# Patient Record
Sex: MALE | Race: WHITE | NOT HISPANIC OR LATINO | Employment: OTHER | URBAN - METROPOLITAN AREA
[De-identification: names, ages, dates, MRNs, and addresses within clinical notes are randomized per-mention and may not be internally consistent; named-entity substitution may affect disease eponyms.]

---

## 2023-10-18 ENCOUNTER — TELEPHONE (OUTPATIENT)
Dept: PRIMARY CARE | Facility: CLINIC | Age: 78
End: 2023-10-18
Payer: MEDICARE

## 2023-10-18 DIAGNOSIS — E11.9 TYPE 2 DIABETES MELLITUS WITHOUT COMPLICATION, UNSPECIFIED WHETHER LONG TERM INSULIN USE (MULTI): ICD-10-CM

## 2023-10-18 DIAGNOSIS — E78.2 MIXED HYPERLIPIDEMIA: ICD-10-CM

## 2023-10-18 RX ORDER — SIMVASTATIN 20 MG/1
20 TABLET, FILM COATED ORAL EVERY 24 HOURS
Qty: 90 TABLET | Refills: 0 | Status: SHIPPED | OUTPATIENT
Start: 2023-10-18 | End: 2024-01-04

## 2023-10-18 RX ORDER — METFORMIN HYDROCHLORIDE 500 MG/1
500 TABLET ORAL
COMMUNITY
Start: 2023-02-06 | End: 2023-10-18 | Stop reason: SDUPTHER

## 2023-10-18 RX ORDER — SIMVASTATIN 20 MG/1
20 TABLET, FILM COATED ORAL EVERY 24 HOURS
COMMUNITY
End: 2023-10-18 | Stop reason: SDUPTHER

## 2023-10-18 RX ORDER — METFORMIN HYDROCHLORIDE 500 MG/1
500 TABLET ORAL
Qty: 180 TABLET | Refills: 0 | Status: SHIPPED | OUTPATIENT
Start: 2023-10-18 | End: 2024-01-04

## 2023-10-18 NOTE — TELEPHONE ENCOUNTER
Rx Refill Request Telephone Encounter    Name:  Juan M Mcrae  :  [id number]  Medication Name:  metformin 500 mg                                    simvastatin 20 mg            Specific Pharmacy location:  [facility]  Date of last appointment:    Date of next appointment:    Best number to reach patient:  [phone] - Option #2

## 2024-01-04 DIAGNOSIS — E78.2 MIXED HYPERLIPIDEMIA: ICD-10-CM

## 2024-01-04 DIAGNOSIS — E11.9 TYPE 2 DIABETES MELLITUS WITHOUT COMPLICATION, UNSPECIFIED WHETHER LONG TERM INSULIN USE (MULTI): ICD-10-CM

## 2024-01-04 RX ORDER — METFORMIN HYDROCHLORIDE 500 MG/1
1000 TABLET ORAL
Qty: 180 TABLET | Refills: 0 | Status: SHIPPED | OUTPATIENT
Start: 2024-01-04 | End: 2024-04-01 | Stop reason: DRUGHIGH

## 2024-01-04 RX ORDER — SIMVASTATIN 20 MG/1
TABLET, FILM COATED ORAL
Qty: 90 TABLET | Refills: 0 | Status: SHIPPED | OUTPATIENT
Start: 2024-01-04 | End: 2024-04-01 | Stop reason: WASHOUT

## 2024-02-05 ENCOUNTER — LAB (OUTPATIENT)
Dept: LAB | Facility: LAB | Age: 79
End: 2024-02-05
Payer: MEDICARE

## 2024-02-05 DIAGNOSIS — E78.00 PURE HYPERCHOLESTEROLEMIA, UNSPECIFIED: ICD-10-CM

## 2024-02-05 DIAGNOSIS — E11.9 TYPE 2 DIABETES MELLITUS WITHOUT COMPLICATIONS (MULTI): Primary | ICD-10-CM

## 2024-02-05 LAB
ALBUMIN SERPL-MCNC: 4.6 G/DL (ref 3.5–5)
ALP BLD-CCNC: 45 U/L (ref 35–125)
ALT SERPL-CCNC: 10 U/L (ref 5–40)
ANION GAP SERPL CALC-SCNC: 11 MMOL/L
APPEARANCE UR: CLEAR
AST SERPL-CCNC: 13 U/L (ref 5–40)
BILIRUB DIRECT SERPL-MCNC: <0.2 MG/DL (ref 0–0.2)
BILIRUB SERPL-MCNC: 0.5 MG/DL (ref 0.1–1.2)
BILIRUB UR STRIP.AUTO-MCNC: NEGATIVE MG/DL
BUN SERPL-MCNC: 14 MG/DL (ref 8–25)
CALCIUM SERPL-MCNC: 9.7 MG/DL (ref 8.5–10.4)
CHLORIDE SERPL-SCNC: 104 MMOL/L (ref 97–107)
CHOLEST SERPL-MCNC: 142 MG/DL (ref 133–200)
CHOLEST/HDLC SERPL: 1.9 {RATIO}
CO2 SERPL-SCNC: 28 MMOL/L (ref 24–31)
COLOR UR: NORMAL
CREAT SERPL-MCNC: 1.1 MG/DL (ref 0.4–1.6)
EGFRCR SERPLBLD CKD-EPI 2021: 68 ML/MIN/1.73M*2
EST. AVERAGE GLUCOSE BLD GHB EST-MCNC: 148 MG/DL
GLUCOSE SERPL-MCNC: 125 MG/DL (ref 65–99)
GLUCOSE UR STRIP.AUTO-MCNC: NORMAL MG/DL
HBA1C MFR BLD: 6.8 %
HDLC SERPL-MCNC: 76 MG/DL
KETONES UR STRIP.AUTO-MCNC: NEGATIVE MG/DL
LDLC SERPL CALC-MCNC: 55 MG/DL (ref 65–130)
LEUKOCYTE ESTERASE UR QL STRIP.AUTO: NEGATIVE
NITRITE UR QL STRIP.AUTO: NEGATIVE
PH UR STRIP.AUTO: 5.5 [PH]
POTASSIUM SERPL-SCNC: 4.6 MMOL/L (ref 3.4–5.1)
PROT SERPL-MCNC: 6.7 G/DL (ref 5.9–7.9)
PROT UR STRIP.AUTO-MCNC: NEGATIVE MG/DL
RBC # UR STRIP.AUTO: NEGATIVE /UL
SODIUM SERPL-SCNC: 143 MMOL/L (ref 133–145)
SP GR UR STRIP.AUTO: 1.02
TRIGL SERPL-MCNC: 57 MG/DL (ref 40–150)
UROBILINOGEN UR STRIP.AUTO-MCNC: NORMAL MG/DL

## 2024-02-05 PROCEDURE — 80053 COMPREHEN METABOLIC PANEL: CPT

## 2024-02-05 PROCEDURE — 81003 URINALYSIS AUTO W/O SCOPE: CPT

## 2024-02-05 PROCEDURE — 36415 COLL VENOUS BLD VENIPUNCTURE: CPT

## 2024-02-05 PROCEDURE — 82248 BILIRUBIN DIRECT: CPT

## 2024-02-05 PROCEDURE — 83036 HEMOGLOBIN GLYCOSYLATED A1C: CPT

## 2024-02-05 PROCEDURE — 80061 LIPID PANEL: CPT

## 2024-02-14 ENCOUNTER — OFFICE VISIT (OUTPATIENT)
Dept: PRIMARY CARE | Facility: CLINIC | Age: 79
End: 2024-02-14
Payer: MEDICARE

## 2024-02-14 ENCOUNTER — TELEPHONE (OUTPATIENT)
Dept: PRIMARY CARE | Facility: CLINIC | Age: 79
End: 2024-02-14

## 2024-02-14 VITALS
HEART RATE: 63 BPM | SYSTOLIC BLOOD PRESSURE: 120 MMHG | DIASTOLIC BLOOD PRESSURE: 78 MMHG | BODY MASS INDEX: 29.25 KG/M2 | OXYGEN SATURATION: 98 % | WEIGHT: 181.2 LBS

## 2024-02-14 DIAGNOSIS — E78.2 MIXED HYPERLIPIDEMIA: ICD-10-CM

## 2024-02-14 DIAGNOSIS — K21.9 GASTROESOPHAGEAL REFLUX DISEASE, UNSPECIFIED WHETHER ESOPHAGITIS PRESENT: ICD-10-CM

## 2024-02-14 DIAGNOSIS — R13.10 DYSPHAGIA, UNSPECIFIED TYPE: ICD-10-CM

## 2024-02-14 DIAGNOSIS — E11.9 TYPE 2 DIABETES MELLITUS WITHOUT COMPLICATION, UNSPECIFIED WHETHER LONG TERM INSULIN USE (MULTI): ICD-10-CM

## 2024-02-14 DIAGNOSIS — E11.9 TYPE 2 DIABETES MELLITUS WITHOUT COMPLICATION, WITHOUT LONG-TERM CURRENT USE OF INSULIN (MULTI): Primary | ICD-10-CM

## 2024-02-14 PROCEDURE — 1126F AMNT PAIN NOTED NONE PRSNT: CPT | Performed by: STUDENT IN AN ORGANIZED HEALTH CARE EDUCATION/TRAINING PROGRAM

## 2024-02-14 PROCEDURE — 1159F MED LIST DOCD IN RCRD: CPT | Performed by: STUDENT IN AN ORGANIZED HEALTH CARE EDUCATION/TRAINING PROGRAM

## 2024-02-14 PROCEDURE — 99214 OFFICE O/P EST MOD 30 MIN: CPT | Performed by: STUDENT IN AN ORGANIZED HEALTH CARE EDUCATION/TRAINING PROGRAM

## 2024-02-14 PROCEDURE — 1036F TOBACCO NON-USER: CPT | Performed by: STUDENT IN AN ORGANIZED HEALTH CARE EDUCATION/TRAINING PROGRAM

## 2024-02-14 RX ORDER — PIOGLITAZONEHYDROCHLORIDE 30 MG/1
1 TABLET ORAL DAILY
COMMUNITY
Start: 2023-07-05 | End: 2024-05-06 | Stop reason: WASHOUT

## 2024-02-14 RX ORDER — TRAZODONE HYDROCHLORIDE 100 MG/1
1 TABLET ORAL NIGHTLY
COMMUNITY
Start: 2023-08-08 | End: 2024-05-07 | Stop reason: SDUPTHER

## 2024-02-14 RX ORDER — ATENOLOL 50 MG/1
1 TABLET ORAL DAILY
COMMUNITY
Start: 2018-06-28

## 2024-02-14 ASSESSMENT — PAIN SCALES - GENERAL: PAINLEVEL: 0-NO PAIN

## 2024-02-14 NOTE — PROGRESS NOTES
GENERAL PROGRESS NOTE    Chief Complaint   Patient presents with    Diabetes       HPI  Juan M Mcrae is a 78 y.o. male that presents today for DM follow up.     CURRENT CONCERNS   Couple weeks ago went to restaurant and was eating fish. Experienced sensation that bolus of food was stuck while eating that lasted few minutes and resolved. Since has had a little burning sensation in sternal area. Does have hx of acid reflux which has usually been controlled with GERD precautions and TUMS. Has noted increased gassiness, more burping. Has had a little more difficulty swallowing pills past few weeks. Couple years ago had precancerous lesions on vocal cords which was removed. Does get checks every 6 mos. Will be seeing next month.   Is gradually getting better. Does not eat after 8pm due to GERD  Had colonoscopy 9/2023 with Dr. Zheng    #DM review  Last A1c - 6.8, 6.1, 6.6, 7.0, 6.8, 6.6, 6.7   Current treatment:  - Metformin 1000mg BID  - Pioglitazone 30mg   Last eGFR -  68, 78, 77, 77  Last Creatinine -  1.1, 1.0, 1.0, 1.0  Last microalbumin - 7/23  Last eye exam - 10/23  Last foot exam - Today  Pneumovax done? - Complete  Need for diabetic educator? -  No  Exercise - Walking daily, stair stepper, weights    Vital signs   /78   Pulse 63   Wt 82.2 kg (181 lb 3.2 oz)   SpO2 98%   BMI 29.25 kg/m²      Current Outpatient Medications   Medication Sig Dispense Refill    atenolol (Tenormin) 50 mg tablet Take by mouth.      metFORMIN (Glucophage) 500 mg tablet Take 2 tablets (1,000 mg) by mouth 2 times a day with meals. 180 tablet 0    pioglitazone (Actos) 30 mg tablet Take by mouth.      simvastatin (Zocor) 20 mg tablet TAKE 1 TABLET EVERY 24     HOURS 90 tablet 0    traZODone (Desyrel) 100 mg tablet Take 1 tablet (100 mg) by mouth.       No current facility-administered medications for this visit.        Review of Systems   Constitutional:  Negative for chills and fever.   Eyes:  Negative for visual disturbance.   Respiratory:  Negative for shortness of breath.    Cardiovascular:  Negative for chest pain, palpitations and leg swelling.   Endocrine: Negative for polydipsia, polyphagia and polyuria.   Neurological:  Negative for headaches.        Physical Exam  Constitutional:       Appearance: Normal appearance.   Cardiovascular:      Rate and Rhythm: Normal rate and regular rhythm.      Heart sounds: Normal heart sounds. No murmur heard.  Pulmonary:      Effort: Pulmonary effort is normal.      Breath sounds: Normal breath sounds. No wheezing, rhonchi or rales.   Musculoskeletal:      Right lower leg: No edema.      Left lower leg: No edema.   Neurological:      Mental Status: He is alert.     DIABETIC FOOT EXAM:  Skin changes - None  Pulses Present bilaterally to DP and PT  Nails - No fungal infections  Sensation in tact throughout with monifilament  No skin breaks between toes  No ulcers      Assessment/Plan   1. Type 2 diabetes mellitus without complication, without long-term current use of insulin (CMS/[facility])  Interval increase in A1c from prior 6.1 to 6.8 but still in goal range. Encouraged to continue healthy, diabetic diet.  Limit simple sugars. Goals reviewed.  Continue with current medication.      2. Dysphagia, unspecified type  3. Gastroesophageal reflux disease, unspecified whether esophagitis present  Given his age and current symptoms recommend GI referral tor evaluation and consideration for EGD to further assess. In meantime, start pepcid daily, has omeprazole allergy. He also has upcoming appt with ENT for surveillance for VC lesions and he will discuss his current symptoms with his ENT provider as well.   - Referral to Gastroenterology; Future    Candy Grossman MD  02/14/24  10:14 AM

## 2024-02-27 ASSESSMENT — ENCOUNTER SYMPTOMS
HEADACHES: 0
PALPITATIONS: 0
POLYPHAGIA: 0
SHORTNESS OF BREATH: 0
CHILLS: 0
POLYDIPSIA: 0
FEVER: 0

## 2024-02-28 PROCEDURE — 88342 IMHCHEM/IMCYTCHM 1ST ANTB: CPT | Performed by: STUDENT IN AN ORGANIZED HEALTH CARE EDUCATION/TRAINING PROGRAM

## 2024-02-28 PROCEDURE — 88341 IMHCHEM/IMCYTCHM EA ADD ANTB: CPT | Performed by: STUDENT IN AN ORGANIZED HEALTH CARE EDUCATION/TRAINING PROGRAM

## 2024-02-28 PROCEDURE — 88342 IMHCHEM/IMCYTCHM 1ST ANTB: CPT

## 2024-02-28 PROCEDURE — 88341 IMHCHEM/IMCYTCHM EA ADD ANTB: CPT

## 2024-03-04 ENCOUNTER — OFFICE VISIT (OUTPATIENT)
Dept: PRIMARY CARE | Facility: CLINIC | Age: 79
End: 2024-03-04
Payer: MEDICARE

## 2024-03-04 VITALS
BODY MASS INDEX: 29.09 KG/M2 | SYSTOLIC BLOOD PRESSURE: 136 MMHG | WEIGHT: 181 LBS | HEIGHT: 66 IN | OXYGEN SATURATION: 98 % | HEART RATE: 57 BPM | DIASTOLIC BLOOD PRESSURE: 80 MMHG | TEMPERATURE: 97.6 F

## 2024-03-04 DIAGNOSIS — L23.9 ALLERGIC CONTACT DERMATITIS, UNSPECIFIED TRIGGER: Primary | ICD-10-CM

## 2024-03-04 PROCEDURE — 99213 OFFICE O/P EST LOW 20 MIN: CPT

## 2024-03-04 PROCEDURE — 1160F RVW MEDS BY RX/DR IN RCRD: CPT

## 2024-03-04 PROCEDURE — 1126F AMNT PAIN NOTED NONE PRSNT: CPT

## 2024-03-04 PROCEDURE — 1159F MED LIST DOCD IN RCRD: CPT

## 2024-03-04 PROCEDURE — 1036F TOBACCO NON-USER: CPT

## 2024-03-04 RX ORDER — METHYLPREDNISOLONE 4 MG/1
TABLET ORAL
Qty: 21 TABLET | Refills: 0 | Status: SHIPPED | OUTPATIENT
Start: 2024-03-04 | End: 2024-03-11

## 2024-03-04 ASSESSMENT — PATIENT HEALTH QUESTIONNAIRE - PHQ9
2. FEELING DOWN, DEPRESSED OR HOPELESS: NOT AT ALL
SUM OF ALL RESPONSES TO PHQ9 QUESTIONS 1 AND 2: 0
1. LITTLE INTEREST OR PLEASURE IN DOING THINGS: NOT AT ALL

## 2024-03-04 ASSESSMENT — PAIN SCALES - GENERAL: PAINLEVEL: 0-NO PAIN

## 2024-03-04 NOTE — PROGRESS NOTES
"Subjective   Patient ID: Juan M Mcrae is a 78 y.o. male who presents for Rash (Left arm) and Blister (Left arm ).    HPI     Juan M presents with concerns for blistered areas to his left arm which began on Tuesday.  On Tuesday he was helping to prep for painting, was using a new paste on the wall and the rash seemed to begin after he applied the paste and touche the past with hhis left hand and arm.  Rash has not spead to any other areas.  Started very mild on Tuesday and has progressivtly worsened to blisters now.     Review of Systems  All other systems have been reviewed and are negative except as noted in the HPI.       Objective   /80 (BP Location: Left arm)   Pulse 57   Temp 36.4 °C (97.6 °F) (Temporal)   Ht 1.676 m (5' 6\")   Wt 82.1 kg (181 lb)   SpO2 98%   BMI 29.21 kg/m²     Physical Exam  Vitals and nursing note reviewed.   Constitutional:       General: He is not in acute distress.     Appearance: Normal appearance. He is not ill-appearing.   Skin:     General: Skin is warm and dry.      Capillary Refill: Capillary refill takes less than 2 seconds.      Findings: Erythema and rash present. Rash is macular and papular.          Neurological:      Mental Status: He is alert.   Psychiatric:         Behavior: Behavior is cooperative.         Assessment/Plan   Problem List Items Addressed This Visit    None  Visit Diagnoses         Codes    Allergic contact dermatitis, unspecified trigger    -  Primary  Began oral steroids as prescribed. Explained intended effects, potential side effects, and schedule of dosages of the medication.  Recommend patient apply hydrocortisone once/twice a day.   May take OTC medications as discussed, including benadryl, Claritin, or zyrtec.  Follow-up for erythema, purulent drainage, fever, or other signs of infection.   Patient verbalizes understanding regarding plan of care and all questions answered.   L23.9    Relevant Medications    methylPREDNISolone (Medrol " Dospak) 4 mg tablets

## 2024-03-05 ENCOUNTER — OFFICE VISIT (OUTPATIENT)
Dept: OTOLARYNGOLOGY | Facility: CLINIC | Age: 79
End: 2024-03-05
Payer: MEDICARE

## 2024-03-05 ENCOUNTER — CLINICAL SUPPORT (OUTPATIENT)
Dept: AUDIOLOGY | Facility: CLINIC | Age: 79
End: 2024-03-05
Payer: MEDICARE

## 2024-03-05 VITALS — WEIGHT: 182 LBS | TEMPERATURE: 96.8 F | BODY MASS INDEX: 29.25 KG/M2 | HEIGHT: 66 IN

## 2024-03-05 DIAGNOSIS — J38.3 VOCAL CORD DYSPLASIA: ICD-10-CM

## 2024-03-05 DIAGNOSIS — H90.3 SENSORINEURAL HEARING LOSS (SNHL) OF BOTH EARS: Primary | ICD-10-CM

## 2024-03-05 DIAGNOSIS — H90.3 ASNHL (ASYMMETRICAL SENSORINEURAL HEARING LOSS): Primary | ICD-10-CM

## 2024-03-05 DIAGNOSIS — G47.33 OBSTRUCTIVE SLEEP APNEA: ICD-10-CM

## 2024-03-05 DIAGNOSIS — J34.2 DEVIATED NASAL SEPTUM: ICD-10-CM

## 2024-03-05 DIAGNOSIS — R49.0 HOARSENESS: ICD-10-CM

## 2024-03-05 PROCEDURE — 1159F MED LIST DOCD IN RCRD: CPT | Performed by: OTOLARYNGOLOGY

## 2024-03-05 PROCEDURE — 1160F RVW MEDS BY RX/DR IN RCRD: CPT | Performed by: OTOLARYNGOLOGY

## 2024-03-05 PROCEDURE — 92557 COMPREHENSIVE HEARING TEST: CPT | Performed by: AUDIOLOGIST

## 2024-03-05 PROCEDURE — 92550 TYMPANOMETRY & REFLEX THRESH: CPT | Performed by: AUDIOLOGIST

## 2024-03-05 PROCEDURE — 1126F AMNT PAIN NOTED NONE PRSNT: CPT | Performed by: OTOLARYNGOLOGY

## 2024-03-05 PROCEDURE — 99214 OFFICE O/P EST MOD 30 MIN: CPT | Performed by: OTOLARYNGOLOGY

## 2024-03-05 PROCEDURE — 31575 DIAGNOSTIC LARYNGOSCOPY: CPT | Performed by: OTOLARYNGOLOGY

## 2024-03-05 PROCEDURE — 1036F TOBACCO NON-USER: CPT | Performed by: OTOLARYNGOLOGY

## 2024-03-05 ASSESSMENT — PATIENT HEALTH QUESTIONNAIRE - PHQ9
SUM OF ALL RESPONSES TO PHQ9 QUESTIONS 1 & 2: 0
2. FEELING DOWN, DEPRESSED OR HOPELESS: NOT AT ALL
1. LITTLE INTEREST OR PLEASURE IN DOING THINGS: NOT AT ALL

## 2024-03-05 NOTE — PROGRESS NOTES
Chief Complaint   Patient presents with    Follow-up     Audio and follow up     using cpap every night doing good ,hoarseness  depends on the day     HPI:  Juan M Mcrae is a 78 y.o. male who presents in follow-up for recheck of his hearing loss as well as his history of obstructive sleep apnea and to check his larynx.  He has been doing quite well recently since I last saw him.  No sore throat, hoarseness, dysphagia.  Occasionally his voice gets raspy with use.  He has been using his CPAP every night for at least 6 to 7 hours with great success.  Beginning to notice the hearing loss and is thinking about hearing aids.  Repeat audiogram today shows no change from prior with continued mildly asymmetric sloping moderate to severe sensorineural hearing loss which is a little bit worse on the right.  96% discrimination on the left and 84% on the right.  H/O left vocal cord atypia and dysplasia. 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   He did have some visible recurrence and was referred to Dr. West. He had to KTP laser ablations, the last was in November 2018.  He is an ex-smoker.    PMH:  Past Medical History:   Diagnosis Date    Disorder of lipoprotein metabolism, unspecified     Elevated serum cholesterol    Other specified diabetes mellitus without complications (CMS/HCC)     Diabetes mellitus of other type without complication    Personal history of other diseases of the circulatory system     History of hypertension    Personal history of other diseases of urinary system     History of kidney disease    Sleep apnea      Past Surgical History:   Procedure Laterality Date    COLONOSCOPY  2023    Repeat in one year    OTHER SURGICAL HISTORY  03/08/2021    Eye surgery    OTHER SURGICAL HISTORY  03/08/2021    Transurethral resection of prostate    OTHER SURGICAL HISTORY  03/08/2021     "Bladder surgery    OTHER SURGICAL HISTORY  03/08/2021    Lithotripsy         Medications:     Current Outpatient Medications:     atenolol (Tenormin) 50 mg tablet, Take by mouth., Disp: , Rfl:     metFORMIN (Glucophage) 500 mg tablet, Take 2 tablets (1,000 mg) by mouth 2 times a day with meals., Disp: 180 tablet, Rfl: 0    pioglitazone (Actos) 30 mg tablet, Take by mouth., Disp: , Rfl:     simvastatin (Zocor) 20 mg tablet, TAKE 1 TABLET EVERY 24     HOURS, Disp: 90 tablet, Rfl: 0    traZODone (Desyrel) 100 mg tablet, Take 1 tablet (100 mg) by mouth., Disp: , Rfl:     methylPREDNISolone (Medrol Dospak) 4 mg tablets, Take as directed on package. (Patient not taking: Reported on 3/5/2024), Disp: 21 tablet, Rfl: 0     Allergies:  Allergies   Allergen Reactions    Omeprazole Itching and Unknown    Shellfish Containing Products Unknown    Sulfa (Sulfonamide Antibiotics) Rash        ROS:  Review of systems normal unless stated otherwise in the HPI and/or PMH.    Physical Exam:  Temperature 36 °C (96.8 °F), height 1.676 m (5' 6\"), weight 82.6 kg (182 lb). Body mass index is 29.38 kg/m².     GENERAL APPEARANCE: Well developed and well nourished.  Alert and oriented in no acute distress.  Normal vocal quality.      HEAD/FACE: No erythema or edema or facial tenderness.  Normal facial nerve function bilaterally.    EAR:       EXTERNAL: Normal pinnas and external auditory canals without lesion or obstructing wax.       MIDDLE EAR: Tympanic membranes intact and mobile with normal landmarks.  Middle ear space appears well aerated.  Wax removed bilaterally with alligators.       TUBE STATUS: N/A       MASTOID CAVITY: N/A       HEARING: Gross hearing assessment is within normal limits.      NOSE:       VISUALIZED USING: Anterior rhinoscopy with headlight and nasal speculum.       DORSUM: Midline, nontraumatic appearance.       MUCOSA: Normal-appearing.       SECRETIONS: Normal.       SEPTUM: Right deviation       INFERIOR " TURBINATES: Normal.       MIDDLE TURBINATES/MEATUS: N/A       BLEEDING: N/A         ORAL CAVITY/PHARYNX:       TEETH: Adequate dentition.       TONGUE: No mass or lesion.  Normal mobility.       FLOOR OF MOUTH: No mass or lesion.       PALATE: Normal hard palate, soft palate, and uvula.       OROPHARYNX: Normal without mass or lesion.       BUCCAL MUCOSA/GBS: Normal without mass or lesion.       LIPS: Normal.    LARYNX/HYPOPHARYNX/NASOPHARYNX: Flexible laryngoscopy was performed after consent secondary to an inadequate mirror examination.  The flexible laryngoscope was placed through the nasal cavity revealing normal nasopharynx, normal oropharynx, normal hypopharynx, and normal larynx.  There is normal bilateral vocal cord mobility without any mucosal masses or lesions.    NECK: No palpable masses or abnormal adenopathy.  Trachea is midline.    THYROID: No thyromegaly or palpable nodule.    SALIVARY GLANDS: Normal bilateral parotid and submandibular glands by inspection and palpation.    TMJ's: Normal.    NEURO: Cranial nerve exam grossly normal bilaterally.       Assessment/Plan   Juan M was seen today for follow-up.  Diagnoses and all orders for this visit:  ASNHL (asymmetrical sensorineural hearing loss) (Primary)  Obstructive sleep apnea  Hoarseness  Vocal cord dysplasia  Deviated nasal septum     No evidence of disease in the larynx.  Continue to recheck that yearly.  Doing great with CPAP compliance and clinical effect.  Continue CPAP.  He is interested in hearing aids.  He will schedule appointment with the audiologist.  Check audiogram yearly as well.  Follow up in about 1 year (around 3/5/2025) for Recheck.     Dario Smith MD

## 2024-03-05 NOTE — PROGRESS NOTES
AUDIOLOGY ADULT AUDIOMETRIC EVALUATION    Name:  Juan M Mcrae  :  1945  Age:  78 y.o.  Date of Evaluation:  2024    Reason for visit: Mr. Mcrae is seen in the clinic today at the request of Dario Smith MD in otolaryngology for an audiologic evaluation. Patient complains of increased difficulty hearing.    EVALUATION  See scanned audiogram: “Media” > “Audiology Report” > Document ID [id number].    RESULTS  Otoscopic Evaluation  Right Ear: minimal non-occluding cerumen with visualization of the tympanic membrane  Left Ear: minimal non-occluding cerumen with visualization of the tympanic membrane    Immittance Measures  Tympanometry:  Right Ear: Type A, normal tympanic membrane mobility with normal middle ear pressure  Left Ear: Type A, normal tympanic membrane mobility with normal middle ear pressure    Acoustic Reflexes:  Ipsilateral Right Ear: present and normal from 500 Hz to 1000 Hz, absent from 1000 Hz to 4000 Hz   Ipsilateral Left Ear: present and normal from 500 Hz to 2000 Hz, absent at 4000 Hz   Contralateral Right Ear: did not evaluate  Contralateral Left Ear: did not evaluate    Distortion Product Otoacoustic Emissions (DPOAEs)  Right Ear: absent from 1000 Hz to 8000 Hz  Left Ear: absent from 1000 Hz to 8000 Hz    Audiometry  Test Technique and Reliability:   Standard audiometry via supra-aural headphones. Pulsed tone stimulus. Reliability is good.    Pure tone air and bone conduction audiometry:  Right Ear: normal hearing sensitivity through 500 Hz with a mild to moderately-severe sensorineural hearing loss in the higher frequencies   Left Ear: normal hearing sensitivity through 1500 Hz with a mild to moderately-severe sensorineural hearing loss in the higher frequencies     Speech Audiometry:  Speech Reception Threshold (SRT) Right Ear: 40 dB HL  Speech Reception Threshold (SRT) Left Ear: 30 dB HL  Word Recognition Score (WRS) Right Ear: Good, 84% at 80 dB HL  Word Recognition  Score (WRS) Left Ear: Excellent, 96% at 70 dB HL    IMPRESSIONS  Audiometric evaluation revealed sensorineural hearing loss bilaterally. Results are essentially stable compared with 03/07/2023 evaluation. Tympanometry indicates normal tympanic membrane mobility with normal middle ear pressure bilaterally. The presence of acoustic reflexes within normal intensity limits is consistent with normal middle ear and brainstem function, and suggests that auditory sensitivity is not significantly impaired. An absent acoustic reflex threshold is consistent with a middle ear disorder, hearing loss in the stimulated ear, and/or interruption of neural innervation of the stapedius muscle. Absent Distortion Product Otoacoustic Emissions (DPOAEs) are consistent with abnormal cochlear outer hair cell function and some degree of hearing loss at those frequencies.    Hearing aids briefly discussed. Encouraged patient to schedule hearing aid evaluation appointment, and to contact insurance to determine if there is an applicable benefit and where it can be used.    RECOMMENDATIONS  - Follow up with otolaryngology today as scheduled.  - Annual audiologic evaluation, sooner if an acute change is noted.  - Consider hearing aids. Contact insurance to determine if there is an applicable benefit and where it can be used. Schedule a hearing aid evaluation appointment should he wish to proceed with hearing aids through our clinic.  - Follow-up with medical care team as planned.    PATIENT EDUCATION  Discussed results, impressions and recommendations with the patient. Questions were addressed and the patient was encouraged to contact our office should concerns arise.    Time for this encounter: 930-1000    Tiny Nazario, CCC-A  Licensed Audiologist

## 2024-03-14 ENCOUNTER — CLINICAL SUPPORT (OUTPATIENT)
Dept: AUDIOLOGY | Facility: CLINIC | Age: 79
End: 2024-03-14
Payer: MEDICARE

## 2024-03-14 DIAGNOSIS — H90.3 SENSORINEURAL HEARING LOSS (SNHL) OF BOTH EARS: Primary | ICD-10-CM

## 2024-03-14 PROCEDURE — HRANC PR HEARING AID NO CHARGE: Performed by: AUDIOLOGIST

## 2024-03-14 NOTE — PROGRESS NOTES
AUDIOLOGY ADULT HEARING AID EVALUATION    Name:  Juan M Mcrae  :  1945  Age:  78 y.o.  Date of Service:  2024    Reason for visit: Mr. Mcrae is seen in the clinic today for a hearing aid evaluation.    HISTORY  Patient reports increased difficulty hearing especially in group settings in background noise.    Audiogram dated 2024 revealed normal hearing sensitivity through 500 Hz with a mild to moderately-severe sensorineural hearing loss in the higher frequencies in the right ear, and normal hearing sensitivity through 1500 Hz with a mild to moderately-severe sensorineural hearing loss in the higher frequencies in the left ear. Word recognition ability was good in the right ear, and excellent in the left ear.     Patient has never worn hearing aids. He wears glasses for reading and driving, but there are no apparent vision or dexterity issues. He has a Chamelic1 cell phone and is interested in bluetooth connectivity.    HEARING AID EVALUATION  Reviewed results of audiologic evaluation with the patient in detail.    Client Oriented Scale of Improvement (COSI) goals:  1. Ask for repetition less often  2. Hear/understand conversation better when get together with his family  3. Hear the television at a lower volume  4. Hear people on his right side easier    Communication difficulties, amplification options and potential benefits/limitations of hearing aids were discussed. Specifically discussed hearing aid styles, technology levels, and monaural versus binaural hearing aids. Also discussed the option to do nothing. Explained cell phone connectivity options, traditional battery versus rechargeable, water resistant versus water proof hearing aids, trial period, repair warranty, and loss/damage warranty. Explained that there is no fee for hearing aid office visits within one year of hearing aid fitting; however, after one year there will be a fee.    Discussed ReSound promotion.  Discussed Phonak promotion.    Encouraged patient to contact insurance to determine if there is an applicable benefit and where it can be used. Specifically discussed third party administrators.    IMPRESSIONS  Bilateral sensorineural hearing loss. Patient wishes to proceed with ordering new hearing aids.     Patient expressed interest in a trial of rechargeable  in the ear (ABRAN) hearing aids bilaterally. Specifically, ReSound Nexia 9 [id number] miniRIE rechargeable  in the ear (ABRAN) hearing aids in sparkling silver with 1LP receivers, medium closed dome on the right, medium open dome on the left, and no retention lines. New user. iPhone. Self-pay, but patient is checking with his insurance company. [employer].    RECOMMENDATIONS  - Contact insurance to determine if there is an applicable benefit and where it can be used.  - Purchase Order Number was requested. Hearing aids were ordred from the .  - Hearing aid fitting appointment was scheduled.  - Annual audiologic evaluation, sooner if an acute change is noted.  - Follow-up with otolaryngology as planned.  - Follow-up with medical care team as planned.    PATIENT EDUCATION  Discussed results, impressions and recommendations with the patient. Questions were addressed and the patient was encouraged to contact our office should concerns arise.    Time for this encounter: 0314-0290    Tiny Nazario, CCC-A  Licensed Audiologist

## 2024-03-19 DIAGNOSIS — C16.0: Primary | ICD-10-CM

## 2024-03-20 ENCOUNTER — LAB (OUTPATIENT)
Dept: LAB | Facility: LAB | Age: 79
End: 2024-03-20
Payer: MEDICARE

## 2024-03-20 DIAGNOSIS — C15.5 MALIGNANT NEOPLASM OF LOWER THIRD OF ESOPHAGUS (MULTI): ICD-10-CM

## 2024-03-20 LAB
CREAT SERPL-MCNC: 1 MG/DL (ref 0.4–1.6)
EGFRCR SERPLBLD CKD-EPI 2021: 77 ML/MIN/1.73M*2

## 2024-03-20 PROCEDURE — 36415 COLL VENOUS BLD VENIPUNCTURE: CPT

## 2024-03-20 PROCEDURE — 82565 ASSAY OF CREATININE: CPT

## 2024-03-21 ENCOUNTER — HOSPITAL ENCOUNTER (OUTPATIENT)
Dept: GASTROENTEROLOGY | Facility: HOSPITAL | Age: 79
Setting detail: SURGERY ADMIT
Discharge: HOME | End: 2024-03-21
Payer: MEDICARE

## 2024-03-21 ENCOUNTER — ANESTHESIA EVENT (OUTPATIENT)
Dept: GASTROENTEROLOGY | Facility: HOSPITAL | Age: 79
End: 2024-03-21
Payer: MEDICARE

## 2024-03-21 ENCOUNTER — ANESTHESIA (OUTPATIENT)
Dept: GASTROENTEROLOGY | Facility: HOSPITAL | Age: 79
End: 2024-03-21
Payer: MEDICARE

## 2024-03-21 VITALS
OXYGEN SATURATION: 100 % | DIASTOLIC BLOOD PRESSURE: 83 MMHG | TEMPERATURE: 96.8 F | HEIGHT: 67 IN | RESPIRATION RATE: 16 BRPM | HEART RATE: 65 BPM | WEIGHT: 176.37 LBS | BODY MASS INDEX: 27.68 KG/M2 | SYSTOLIC BLOOD PRESSURE: 158 MMHG

## 2024-03-21 DIAGNOSIS — C16.0: ICD-10-CM

## 2024-03-21 LAB
GLUCOSE BLD MANUAL STRIP-MCNC: 119 MG/DL (ref 74–99)
GLUCOSE BLD MANUAL STRIP-MCNC: 140 MG/DL (ref 74–99)

## 2024-03-21 PROCEDURE — 99100 ANES PT EXTEME AGE<1 YR&>70: CPT | Performed by: ANESTHESIOLOGY

## 2024-03-21 PROCEDURE — 7100000009 HC PHASE TWO TIME - INITIAL BASE CHARGE

## 2024-03-21 PROCEDURE — 43259 EGD US EXAM DUODENUM/JEJUNUM: CPT | Performed by: INTERNAL MEDICINE

## 2024-03-21 PROCEDURE — 3700000001 HC GENERAL ANESTHESIA TIME - INITIAL BASE CHARGE

## 2024-03-21 PROCEDURE — 2500000004 HC RX 250 GENERAL PHARMACY W/ HCPCS (ALT 636 FOR OP/ED): Performed by: ANESTHESIOLOGIST ASSISTANT

## 2024-03-21 PROCEDURE — A43232 PR ESOPHAGOSCOPY INTRA/TRANSMURAL NEEDLE ASPIRAT/BX: Performed by: ANESTHESIOLOGY

## 2024-03-21 PROCEDURE — A43232 PR ESOPHAGOSCOPY INTRA/TRANSMURAL NEEDLE ASPIRAT/BX: Performed by: ANESTHESIOLOGIST ASSISTANT

## 2024-03-21 PROCEDURE — 3700000002 HC GENERAL ANESTHESIA TIME - EACH INCREMENTAL 1 MINUTE

## 2024-03-21 PROCEDURE — 2500000005 HC RX 250 GENERAL PHARMACY W/O HCPCS: Performed by: ANESTHESIOLOGIST ASSISTANT

## 2024-03-21 PROCEDURE — 43237 ENDOSCOPIC US EXAM ESOPH: CPT | Performed by: INTERNAL MEDICINE

## 2024-03-21 PROCEDURE — 2720000007 HC OR 272 NO HCPCS

## 2024-03-21 PROCEDURE — 82947 ASSAY GLUCOSE BLOOD QUANT: CPT | Mod: 91

## 2024-03-21 PROCEDURE — 7100000010 HC PHASE TWO TIME - EACH INCREMENTAL 1 MINUTE

## 2024-03-21 RX ORDER — SODIUM CHLORIDE, SODIUM LACTATE, POTASSIUM CHLORIDE, CALCIUM CHLORIDE 600; 310; 30; 20 MG/100ML; MG/100ML; MG/100ML; MG/100ML
INJECTION, SOLUTION INTRAVENOUS CONTINUOUS PRN
Status: DISCONTINUED | OUTPATIENT
Start: 2024-03-21 | End: 2024-03-21

## 2024-03-21 RX ORDER — PROPOFOL 10 MG/ML
INJECTION, EMULSION INTRAVENOUS AS NEEDED
Status: DISCONTINUED | OUTPATIENT
Start: 2024-03-21 | End: 2024-03-21

## 2024-03-21 RX ORDER — LIDOCAINE HYDROCHLORIDE 20 MG/ML
INJECTION, SOLUTION EPIDURAL; INFILTRATION; INTRACAUDAL; PERINEURAL AS NEEDED
Status: DISCONTINUED | OUTPATIENT
Start: 2024-03-21 | End: 2024-03-21

## 2024-03-21 RX ORDER — PROPOFOL 10 MG/ML
INJECTION, EMULSION INTRAVENOUS CONTINUOUS PRN
Status: DISCONTINUED | OUTPATIENT
Start: 2024-03-21 | End: 2024-03-21

## 2024-03-21 RX ADMIN — SODIUM CHLORIDE, POTASSIUM CHLORIDE, SODIUM LACTATE AND CALCIUM CHLORIDE: 600; 310; 30; 20 INJECTION, SOLUTION INTRAVENOUS at 13:26

## 2024-03-21 RX ADMIN — LIDOCAINE HYDROCHLORIDE 100 MG: 20 INJECTION, SOLUTION EPIDURAL; INFILTRATION; INTRACAUDAL; PERINEURAL at 13:26

## 2024-03-21 RX ADMIN — PROPOFOL 40 MG: 10 INJECTION, EMULSION INTRAVENOUS at 13:26

## 2024-03-21 RX ADMIN — PROPOFOL 20 MG: 10 INJECTION, EMULSION INTRAVENOUS at 13:30

## 2024-03-21 RX ADMIN — PROPOFOL 150 MCG/KG/MIN: 10 INJECTION, EMULSION INTRAVENOUS at 13:26

## 2024-03-21 ASSESSMENT — PAIN SCALES - GENERAL
PAINLEVEL_OUTOF10: 0 - NO PAIN

## 2024-03-21 ASSESSMENT — COLUMBIA-SUICIDE SEVERITY RATING SCALE - C-SSRS
2. HAVE YOU ACTUALLY HAD ANY THOUGHTS OF KILLING YOURSELF?: NO
1. IN THE PAST MONTH, HAVE YOU WISHED YOU WERE DEAD OR WISHED YOU COULD GO TO SLEEP AND NOT WAKE UP?: NO
6. HAVE YOU EVER DONE ANYTHING, STARTED TO DO ANYTHING, OR PREPARED TO DO ANYTHING TO END YOUR LIFE?: NO

## 2024-03-21 ASSESSMENT — PAIN - FUNCTIONAL ASSESSMENT: PAIN_FUNCTIONAL_ASSESSMENT: 0-10

## 2024-03-21 NOTE — DISCHARGE INSTRUCTIONS
Patient Instructions after an endoscopy      The anesthetics, sedatives or narcotics which were given to you today will be acting in your body for the next 24 hours, so you might feel a little sleepy or groggy.  This feeling should slowly wear off. Carefully read and follow the instructions.     You received sedation today:  - Do not drive or operate any machinery or power tools of any kind.   - No alcoholic beverages today, not even beer or wine.  - Do not make any important decisions or sign any legal documents.  - No over the counter medications that contain alcohol or that may cause drowsiness.  - Do not make any important decisions or sign any legal documents.    While it is common to experience mild to moderate abdominal distention, gas, or belching after your procedure, if any of these symptoms occur following discharge from the GI Lab or within one week of having your procedure, call the [facility] to be advised whether a visit to your nearest Urgent Care or Emergency Department is indicated.  Take this paper with you if you go.     - If you develop an allergic reaction to the medications that were given during your procedure such as difficulty breathing, rash, hives, severe nausea, vomiting or lightheadedness.- If you experience chest pain, shortness of breath, severe abdominal pain, fevers and chills.    -If you develop signs and symptoms of bleeding such as blood in your spit, if your stools turn black, tarry, or bloody    - If you have not urinated within 8 hours following your procedure.- If your IV site becomes painful, red, inflamed, or looks infected.

## 2024-03-21 NOTE — ANESTHESIA POSTPROCEDURE EVALUATION
Patient: Juan M Mcrae    Procedure Summary       Date: 03/21/24 Room / Location: [facility] [place]    Anesthesia Start: 1324 Anesthesia Stop: 1356    Procedure: ENDOSCOPIC ULTRASOUND (UPPER) Diagnosis: Primary malignant neoplasm of esophagogastric junction (CMS/HCC)    Scheduled Providers: Melina Farrell MD; Dario Rodas MD; MAGDALENO Weaver Responsible Provider: Dario Rodas MD    Anesthesia Type: MAC ASA Status: 2            Anesthesia Type: MAC    Vitals Value Taken Time   /83 03/21/24 1425   Temp 36 °C (96.8 °F) 03/21/24 1405   Pulse 65 03/21/24 1420   Resp 16 03/21/24 1425   SpO2 97 % 03/21/24 1420       Anesthesia Post Evaluation    Patient participation: complete - patient participated  Level of consciousness: awake  Pain management: adequate  Airway patency: patent  Cardiovascular status: acceptable and hemodynamically stable  Respiratory status: acceptable  Hydration status: acceptable  Postoperative Nausea and Vomiting: none        There were no known notable events for this encounter.

## 2024-03-21 NOTE — ANESTHESIA PREPROCEDURE EVALUATION
Patient: Juan M Mcrae    Procedure Information       Date/Time: 03/21/24 1300    Scheduled providers: Melina Farrell MD; Dario Rodas MD; MAGDALENO Weaver    Procedure: ENDOSCOPIC ULTRASOUND (UPPER)    Location: [facility]          79 yo M hx HTN, Dm (A1c 6.8) preop ), STACY on CPAP    Relevant Problems   Cardiovascular   (+) Mixed hyperlipidemia      Endocrine   (+) Type 2 diabetes mellitus without complications (CMS/HCC)       Clinical information reviewed:   Tobacco  Allergies  Meds   Med Hx  Surg Hx   Fam Hx  Soc Hx        NPO Detail:  NPO/Void Status  Carbohydrate Drink Given Prior to Surgery? : N  Date of Last Liquid: 03/21/24  Time of Last Liquid: 0800  Date of Last Solid: 03/20/24  Time of Last Solid: 2000  Last Intake Type: Clear fluids  Time of Last Void: 1200         Physical Exam    Airway  Mallampati: II  TM distance: >3 FB  Neck ROM: full     Cardiovascular   Rate: normal     Dental - normal exam     Pulmonary - normal exam     Abdominal            Anesthesia Plan    History of general anesthesia?: yes  History of complications of general anesthesia?: no    ASA 2     MAC     intravenous induction   Anesthetic plan and risks discussed with patient.

## 2024-03-21 NOTE — H&P
"History Of Present Illness  Juan M Mcrae is a 78 y.o. male presenting with esophageal cancer.    Here for staging EUS.     Past Medical History  Past Medical History:   Diagnosis Date    Disorder of lipoprotein metabolism, unspecified     Elevated serum cholesterol    Other specified diabetes mellitus without complications (CMS/HCC)     Diabetes mellitus of other type without complication    Personal history of other diseases of the circulatory system     History of hypertension    Personal history of other diseases of urinary system     History of kidney disease    Sleep apnea        Surgical History  Past Surgical History:   Procedure Laterality Date    COLONOSCOPY  2023    Repeat in one year    OTHER SURGICAL HISTORY  03/08/2021    Eye surgery    OTHER SURGICAL HISTORY  03/08/2021    Transurethral resection of prostate    OTHER SURGICAL HISTORY  03/08/2021    Bladder surgery    OTHER SURGICAL HISTORY  03/08/2021    Lithotripsy        Social History  He reports that he has quit smoking. His smoking use included cigarettes. He has never used smokeless tobacco. He reports current alcohol use. He reports current drug use. Drug: Marijuana.    Family History  No family history on file.     Allergies  Omeprazole, Shellfish containing products, and Sulfa (sulfonamide antibiotics)    Review of Systems     Physical Exam     Last Recorded Vitals  Blood pressure 167/72, pulse 67, temperature 36 °C (96.8 °F), temperature source Temporal, resp. rate 16, height 1.702 m (5' 7\"), weight 80 kg (176 lb 5.9 oz), SpO2 99 %.    Relevant Results       Assessment/Plan   Proceed with EUS.        I spent 5 minutes in the professional and overall care of this patient.      Melina Farrell MD    "

## 2024-03-22 ENCOUNTER — HOSPITAL ENCOUNTER (OUTPATIENT)
Dept: RADIOLOGY | Facility: CLINIC | Age: 79
Discharge: HOME | End: 2024-03-22
Payer: MEDICARE

## 2024-03-22 DIAGNOSIS — C15.5 MALIGNANT NEOPLASM OF LOWER THIRD OF ESOPHAGUS (MULTI): ICD-10-CM

## 2024-03-22 PROCEDURE — 2550000001 HC RX 255 CONTRASTS: Mod: 59 | Performed by: THORACIC SURGERY (CARDIOTHORACIC VASCULAR SURGERY)

## 2024-03-22 PROCEDURE — 71260 CT THORAX DX C+: CPT | Performed by: RADIOLOGY

## 2024-03-22 PROCEDURE — 71260 CT THORAX DX C+: CPT

## 2024-03-22 RX ADMIN — IOHEXOL 50 ML: 240 INJECTION, SOLUTION INTRATHECAL; INTRAVASCULAR; INTRAVENOUS; ORAL at 10:41

## 2024-03-22 RX ADMIN — IOHEXOL 50 ML: 350 INJECTION, SOLUTION INTRAVENOUS at 10:40

## 2024-03-22 ASSESSMENT — PAIN SCALES - GENERAL: PAINLEVEL_OUTOF10: 0 - NO PAIN

## 2024-03-27 ENCOUNTER — OFFICE VISIT (OUTPATIENT)
Dept: SURGERY | Facility: HOSPITAL | Age: 79
End: 2024-03-27
Payer: MEDICARE

## 2024-03-27 VITALS
OXYGEN SATURATION: 98 % | HEART RATE: 55 BPM | RESPIRATION RATE: 16 BRPM | HEIGHT: 65 IN | WEIGHT: 175.4 LBS | BODY MASS INDEX: 29.22 KG/M2 | TEMPERATURE: 97.3 F | DIASTOLIC BLOOD PRESSURE: 7 MMHG | SYSTOLIC BLOOD PRESSURE: 156 MMHG

## 2024-03-27 DIAGNOSIS — Z01.818 PREOP TESTING: Primary | ICD-10-CM

## 2024-03-27 DIAGNOSIS — C15.5 MALIGNANT NEOPLASM OF LOWER THIRD OF ESOPHAGUS (MULTI): Primary | ICD-10-CM

## 2024-03-27 PROCEDURE — 1160F RVW MEDS BY RX/DR IN RCRD: CPT | Performed by: THORACIC SURGERY (CARDIOTHORACIC VASCULAR SURGERY)

## 2024-03-27 PROCEDURE — 1126F AMNT PAIN NOTED NONE PRSNT: CPT | Performed by: THORACIC SURGERY (CARDIOTHORACIC VASCULAR SURGERY)

## 2024-03-27 PROCEDURE — 1159F MED LIST DOCD IN RCRD: CPT | Performed by: THORACIC SURGERY (CARDIOTHORACIC VASCULAR SURGERY)

## 2024-03-27 PROCEDURE — 99215 OFFICE O/P EST HI 40 MIN: CPT | Performed by: THORACIC SURGERY (CARDIOTHORACIC VASCULAR SURGERY)

## 2024-03-27 PROCEDURE — 99205 OFFICE O/P NEW HI 60 MIN: CPT | Performed by: THORACIC SURGERY (CARDIOTHORACIC VASCULAR SURGERY)

## 2024-03-27 PROCEDURE — 1157F ADVNC CARE PLAN IN RCRD: CPT | Performed by: THORACIC SURGERY (CARDIOTHORACIC VASCULAR SURGERY)

## 2024-03-27 PROCEDURE — 1036F TOBACCO NON-USER: CPT | Performed by: THORACIC SURGERY (CARDIOTHORACIC VASCULAR SURGERY)

## 2024-03-27 ASSESSMENT — PATIENT HEALTH QUESTIONNAIRE - PHQ9
SUM OF ALL RESPONSES TO PHQ9 QUESTIONS 1 AND 2: 0
2. FEELING DOWN, DEPRESSED OR HOPELESS: NOT AT ALL
1. LITTLE INTEREST OR PLEASURE IN DOING THINGS: NOT AT ALL

## 2024-03-27 ASSESSMENT — COLUMBIA-SUICIDE SEVERITY RATING SCALE - C-SSRS
1. IN THE PAST MONTH, HAVE YOU WISHED YOU WERE DEAD OR WISHED YOU COULD GO TO SLEEP AND NOT WAKE UP?: NO
2. HAVE YOU ACTUALLY HAD ANY THOUGHTS OF KILLING YOURSELF?: NO
6. HAVE YOU EVER DONE ANYTHING, STARTED TO DO ANYTHING, OR PREPARED TO DO ANYTHING TO END YOUR LIFE?: NO

## 2024-03-27 ASSESSMENT — ENCOUNTER SYMPTOMS: DEPRESSION: 0

## 2024-03-27 ASSESSMENT — PAIN SCALES - GENERAL: PAINLEVEL: 0-NO PAIN

## 2024-03-27 NOTE — PROGRESS NOTES
HPI:   Juan M Mcrae is a 78 y.o.male referred with esophageal cancer.  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  He underwent endoscopic ultrasound on March 21, 2024 that showed T2 N1 extending from 41 cm to 43 cm with minimal extension on the cardia.  He intentionally lost 60 pounds of weight over the last year.  He does have dysphagia to both solids and liquids at this point.  He has no other symptoms related to this condition.    Past Medical History:   Diagnosis Date    Disorder of lipoprotein metabolism, unspecified     Elevated serum cholesterol    Other specified diabetes mellitus without complications (CMS/HCC)     Diabetes mellitus of other type without complication    Personal history of other diseases of the circulatory system     History of hypertension    Personal history of other diseases of urinary system     History of kidney disease    Sleep apnea           Current Outpatient Medications:     atenolol (Tenormin) 50 mg tablet, Take by mouth., Disp: , Rfl:     metFORMIN (Glucophage) 500 mg tablet, Take 2 tablets (1,000 mg) by mouth 2 times a day with meals., Disp: 180 tablet, Rfl: 0    pioglitazone (Actos) 30 mg tablet, Take by mouth., Disp: , Rfl:     simvastatin (Zocor) 20 mg tablet, TAKE 1 TABLET EVERY 24     HOURS, Disp: 90 tablet, Rfl: 0    traZODone (Desyrel) 100 mg tablet, Take 1 tablet (100 mg) by mouth., Disp: , Rfl:     PSHx:  SHx: ex smoker quit in 1999  denies ETOH, or illicit drugs   FMHx: negative for history of thoracic cancer     ROS  General: negative for fever, chills, weight loss, night sweat  Head: negative for severe headache, vision change, blurred vision,   CV: negative for chest pain, dizziness, lightheadedness   Pulm: negative for shortness of breath, dyspnea on exertion, hemoptysis  GI: negative for diarrhea, constipation, abdominal pain, nausea or vomiting, BRBPR  : negative for  dysuria, hematuria, incontinence  Skin: negative for rash  Heme: negative for blood thinner, bleeding disorder or clotting disorder  Endo: negative for heat or cold intolerance, weight gain or weight loss  MSK: negative for rash, edema, weakness    PHYSICAL EXAM  Constitution: well-developed well-nourished in no acute distress  HEENT: NCAT, moist mucosal membrane, neck supple, no crepitus, sclera anicteric  Lymph nodes: no cervical or supraclavicular lymphadenopathy  Cardiac: RRR, normal S1, S2, no mrg  Pulmonary: normal air movement, CTAP, no wcr  Abdomen: soft, non-distended, non-tender, no rigidity, guarding or rebound tenderness, no splenohepatomegaly  Neuro: AOx3, CNII-XII grossly normal  Ext: warm, dry, no edema noted  Skin: dry, clean and intact  Psych: mood and affect wnl    Results    I looked at his chest CT from March 22 which is unremarkable.  I also looked at his pathology report and the endoscopic ultrasound reports.  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  We have set up an appointment with a medical and radiation oncology.  I explained the best strategy for this type of tumor provided does not metastatic his chemoradiation followed by recovery and surgery.  We went over today will be requiring surgery including the benefits risks and alternatives.  I also explained that I would recommend laparoscopy and placement of a J-tube prior to chemoradiation as his swallowing may get more difficult during chemoradiation and also we would like to check the abdomen for any metastatic disease.  I explained risks of bleeding or infection with his procedure and the benefits above.  I explained alternatives of no feeding tube.  He consents to feeding tube.    I discussed the risks (3-10% mortality,  pneumonia, blood clots, bleeding, leak from esophagus possibly causing a life threatening infection, narrowing of new esophagus, vocal cord injury, and respiratory difficulty) as well as benefits (potential for cure) of esophagectomy. I explained a 1% risk that the new esophagus would be nonfucntional requiring an additional large operation.  I explained the alternatives of chemotherapy and/or radiation. The patient had the opportunity to ask questions which were answered and consents to esophagectomy.        Macho Manzo MD  Chief Division of Thoracic and Esophageal Surgery    [facility]   Co-Director, Thoracic Oncology Program    [facility]  Professor of Surgery    [facility] [employer] [facility] [employer]  Office phone: [phone]  Fax: [phone]

## 2024-03-28 ENCOUNTER — PREP FOR PROCEDURE (OUTPATIENT)
Dept: CARDIOTHORACIC SURGERY | Facility: HOSPITAL | Age: 79
End: 2024-03-28
Payer: MEDICARE

## 2024-03-28 DIAGNOSIS — C15.5 MALIGNANT NEOPLASM OF LOWER THIRD OF ESOPHAGUS (MULTI): Primary | ICD-10-CM

## 2024-04-01 ENCOUNTER — TELEPHONE (OUTPATIENT)
Dept: RADIATION ONCOLOGY | Facility: HOSPITAL | Age: 79
End: 2024-04-01

## 2024-04-01 ENCOUNTER — NUTRITION (OUTPATIENT)
Dept: HEMATOLOGY/ONCOLOGY | Facility: CLINIC | Age: 79
End: 2024-04-01

## 2024-04-01 ENCOUNTER — OFFICE VISIT (OUTPATIENT)
Dept: HEMATOLOGY/ONCOLOGY | Facility: CLINIC | Age: 79
End: 2024-04-01
Payer: MEDICARE

## 2024-04-01 ENCOUNTER — HOSPITAL ENCOUNTER (OUTPATIENT)
Dept: RADIOLOGY | Facility: CLINIC | Age: 79
Discharge: HOME | End: 2024-04-01
Payer: MEDICARE

## 2024-04-01 VITALS
OXYGEN SATURATION: 97 % | BODY MASS INDEX: 28.04 KG/M2 | WEIGHT: 174.49 LBS | SYSTOLIC BLOOD PRESSURE: 159 MMHG | DIASTOLIC BLOOD PRESSURE: 78 MMHG | HEART RATE: 62 BPM | RESPIRATION RATE: 18 BRPM | TEMPERATURE: 97.6 F | HEIGHT: 66 IN

## 2024-04-01 VITALS — WEIGHT: 174.49 LBS | BODY MASS INDEX: 28.04 KG/M2 | HEIGHT: 66 IN

## 2024-04-01 DIAGNOSIS — C15.5 MALIGNANT NEOPLASM OF LOWER THIRD OF ESOPHAGUS (MULTI): ICD-10-CM

## 2024-04-01 DIAGNOSIS — C15.5 MALIGNANT NEOPLASM OF LOWER THIRD OF ESOPHAGUS (MULTI): Primary | ICD-10-CM

## 2024-04-01 LAB — GLUCOSE BLD MANUAL STRIP-MCNC: 146 MG/DL (ref 74–99)

## 2024-04-01 PROCEDURE — 1159F MED LIST DOCD IN RCRD: CPT | Performed by: INTERNAL MEDICINE

## 2024-04-01 PROCEDURE — 99205 OFFICE O/P NEW HI 60 MIN: CPT | Performed by: INTERNAL MEDICINE

## 2024-04-01 PROCEDURE — 78815 PET IMAGE W/CT SKULL-THIGH: CPT | Performed by: NUCLEAR MEDICINE

## 2024-04-01 PROCEDURE — 1157F ADVNC CARE PLAN IN RCRD: CPT | Performed by: INTERNAL MEDICINE

## 2024-04-01 PROCEDURE — 82947 ASSAY GLUCOSE BLOOD QUANT: CPT

## 2024-04-01 PROCEDURE — 1126F AMNT PAIN NOTED NONE PRSNT: CPT | Performed by: INTERNAL MEDICINE

## 2024-04-01 PROCEDURE — A9552 F18 FDG: HCPCS | Performed by: THORACIC SURGERY (CARDIOTHORACIC VASCULAR SURGERY)

## 2024-04-01 PROCEDURE — 99215 OFFICE O/P EST HI 40 MIN: CPT | Performed by: INTERNAL MEDICINE

## 2024-04-01 PROCEDURE — 1036F TOBACCO NON-USER: CPT | Performed by: INTERNAL MEDICINE

## 2024-04-01 PROCEDURE — 1160F RVW MEDS BY RX/DR IN RCRD: CPT | Performed by: INTERNAL MEDICINE

## 2024-04-01 PROCEDURE — 78815 PET IMAGE W/CT SKULL-THIGH: CPT

## 2024-04-01 PROCEDURE — 3430000001 HC RX 343 DIAGNOSTIC RADIOPHARMACEUTICALS: Performed by: THORACIC SURGERY (CARDIOTHORACIC VASCULAR SURGERY)

## 2024-04-01 RX ORDER — FLUDEOXYGLUCOSE F 18 200 MCI/ML
12 INJECTION, SOLUTION INTRAVENOUS
Status: COMPLETED | OUTPATIENT
Start: 2024-04-01 | End: 2024-04-01

## 2024-04-01 RX ADMIN — FLUDEOXYGLUCOSE F 18 12 MILLICURIE: 200 INJECTION, SOLUTION INTRAVENOUS at 08:55

## 2024-04-01 ASSESSMENT — PAIN SCALES - GENERAL: PAINLEVEL: 0-NO PAIN

## 2024-04-01 ASSESSMENT — COLUMBIA-SUICIDE SEVERITY RATING SCALE - C-SSRS
1. IN THE PAST MONTH, HAVE YOU WISHED YOU WERE DEAD OR WISHED YOU COULD GO TO SLEEP AND NOT WAKE UP?: NO
6. HAVE YOU EVER DONE ANYTHING, STARTED TO DO ANYTHING, OR PREPARED TO DO ANYTHING TO END YOUR LIFE?: NO
2. HAVE YOU ACTUALLY HAD ANY THOUGHTS OF KILLING YOURSELF?: NO

## 2024-04-01 ASSESSMENT — PATIENT HEALTH QUESTIONNAIRE - PHQ9
SUM OF ALL RESPONSES TO PHQ9 QUESTIONS 1 AND 2: 0
1. LITTLE INTEREST OR PLEASURE IN DOING THINGS: NOT AT ALL
2. FEELING DOWN, DEPRESSED OR HOPELESS: NOT AT ALL

## 2024-04-01 ASSESSMENT — ENCOUNTER SYMPTOMS
OCCASIONAL FEELINGS OF UNSTEADINESS: 0
DEPRESSION: 0
LOSS OF SENSATION IN FEET: 0

## 2024-04-01 NOTE — PROGRESS NOTES
"NUTRITION Assessment NOTE    Nutrition Assessment     Reason for Visit:  Juan M Mcrae is a 78 y.o. male who presents for esophageal cancer (lower esophageal/ GE junction)    Hx: DM, HTN  Allergy: omeprazole  A1c 6.8 (2/5/24) -- pt taken off metformin  Jtube to be placed on 4/8    Nutrition consult per diagnosis- visited with pt today following MD vergara.     Lab Results   Component Value Date/Time    GLUCOSE 125 (H) 02/05/2024 0852     02/05/2024 0852    K 4.6 02/05/2024 0852     02/05/2024 0852    CO2 28 02/05/2024 0852    ANIONGAP 11 02/05/2024 0852    BUN 14 02/05/2024 0852    CREATININE 1.00 03/20/2024 1515    EGFR 77 03/20/2024 1515    CALCIUM 9.7 02/05/2024 0852    ALBUMIN 4.6 02/05/2024 0852    ALKPHOS 45 02/05/2024 0852    PROT 6.7 02/05/2024 0852    AST 13 02/05/2024 0852    BILITOT 0.5 02/05/2024 0852    ALT 10 02/05/2024 0852     No results found for: \"VITD25\"    Anthropometrics:  Anthropometrics  Height: 167.4 cm (5' 5.91\")  Weight: 79.2 kg (174 lb 7.9 oz)  BMI (Calculated): 28.24  IBW/kg (Dietitian Calculated): 62 kg  Percent of IBW: 127 %  Weight Change  Weight History / % Weight Change: Pt reports intentionally losing weight last year using GOLO- down 60lbs and stopped at 175lbs. Weight loss from there has been unintentional due to swallowing difficulties; 3.5% loss in 1 month  Significant Weight Loss: No  Interpretation of Weight Loss: Other (see comment) (unintentional weight loss is not significant at this time)        Wt Readings from Last 10 Encounters:   04/01/24 79.2 kg (174 lb 7.9 oz)   04/01/24 79.2 kg (174 lb 7.9 oz)   03/27/24 79.6 kg (175 lb 6.4 oz)   03/21/24 80 kg (176 lb 5.9 oz)   03/05/24 82.6 kg (182 lb)   03/04/24 82.1 kg (181 lb)   02/14/24 82.2 kg (181 lb 3.2 oz)   08/08/23 81.6 kg (180 lb)   07/11/23 81.6 kg (180 lb)   03/07/23 91.6 kg (202 lb)        Food And Nutrient Intake:  Food and Nutrient History  Food and Nutrient History: Pt has difficulty swallowing soft/ " "solid foods- been doing more liquid/ blended foods- feels like it gets \"Stuck\"/ air bubble which resolves once he hiccups/ burps.  Energy Intake: Fair 50-75 %, Good > 75 % (softer, pureed/ blended foods mostly)  Fluid Intake: water, tea (herbal teas), hot chocolate- whole milk  Food Allergy: shrimp, crab; some shell fish- hives  Food Intolerance: nka  GI Symptoms: none  Oral Problems: dysphagia (sometimes stuck- dysphagia; things get stuck, then resolves on own- hiccuping)     Food Intake  Amount of Food: Pt is using baby foods to help other softer foods go down- is using meats, veggies; is trying to blend up some foods too. Can tolerate softer foods; baby food; eggs, cottage cheese    Food Preparation  Cooking: Patient, Spouse/Significant Other                                       Food Supplement Intake  Oral Nutrition Supplements: Boost Plus           Nutrition Focused Physical Exam Findings:      Subcutaneous Fat Loss  Orbital Fat Pads: Well nourished (slightly bulging fat pads)  Buccal Fat Pads: Mild-Moderate (flat cheeks, minimal bounce)  Triceps: Defer  Ribs: Defer    Muscle Wasting  Temporalis: Well nourished (well-defined muscle)  Pectoralis (Clavicular Region): Defer  Deltoid/Trapezius: Defer  Interosseous: Mild-Moderate (slightly depressed area between thumb and forefinger)  Trapezius/Infraspinatus/Supraspinatus (Scapular Region): Defer  Quadriceps: Defer  Gastrocnemius: Defer              Energy Needs  Calculated Energy Needs Using Equations  Height: 167.4 cm (5' 5.91\")  Weight Used for Equation Calculations: 79.2 kg (174 lb 9.7 oz)  Cheo Llanos Equation (Overweight or Obese Patients): 1448  Estimated Energy Needs  Total Energy Estimated Needs (kCal): 1980 kCal  Total Estimated Energy Need per Day (kCal/kg): 25 kCal/kg  Method for Estimating Needs: up to 2376+kcal/ day once start treatment  Estimated Fluid Needs  Total Fluid Estimated Needs (mL): 1980 mL  Total Fluid Estimated Needs (mL/kg): 25 " mL/kg  Estimated Protein Needs  Total Protein Estimated Needs (g): 79 g  Total Protein Estimated Needs (g/kg): 1 g/kg  Method for Estimating Needs: up to 119g/day (1.5g/day) once treatment starts        Nutrition Diagnosis        Nutrition Diagnosis  Patient has Nutrition Diagnosis: Yes  Diagnosis Status (1): New  Nutrition Diagnosis 1: Predicted inadequate energy intake  Related to (1): potential for nutrition impact symptoms and altered GI function  As Evidenced by (1): pt with esophageal CA with planned to start treatment, currently with dysphagia and need for altered consistency diet/ enteral feed supplementation to maintain weight.    Nutrition Interventions/Recommendations   Nutrition Prescription  Individualized Nutrition Prescription Provided for : Nutrition for altered consistency diet (purees/ blended) ; enteral nutrition (jtube)    Food and Nutrition Delivery  Food and Nutrition Delivery  Meals & Snacks: Texture-Modified Diet, Protein-modified diet, Energy-modified diet, Modify schedule of foods/fluids  Goals: Discussed with pt frequent meals and snacks; discussed pureed/ blended foods/ use of baby foods as well to meet needs and quanitites to meet needs. Encouraged use of ONS on own or as base in smoothies for added calories. Discussed ways o add calories and protein to foods. Encouraged intake of protein source with each meal and snacks.  Enteral Nutrition: Other, specify: (Jtube education)  Goals: Discussed with pt how a j-tube works and how he will need a pump to administer his feeds. Discussed length of time on pump from 12 or more hours daily- use of backpack to go out as well as use of IV pole while in home for mobility. Provided with Jtube care book. To be placed on 4/8/24 and kept over night for enteral set-up    Nutrition Education  Nutrition Education  Nutrition Education Content: Content related nutrition education  Goals: High calorie high protein soft/ moist/ pureed foods; until enteral  access obtained    Coordination of Care  Coordination of Nutrition Care by a Nutrition Professional  Collaboration and referral of nutrition care: Collaboration by nutrition professional with other providers    Patient Instructions   Discussed use of Boost VHC and how to obtain  Encouraged using supplements in base of smoothies  Encouraged frequent meals/ snacks 4-6x/day  Discussed ways to add calories: powdered milk, heavy cream, and butter/ oil.   Provided with the following handouts and reviewed:  High calorie high protein recipes  Blended food recipes.  Your Jtube Guide    Nutrition Monitoring and Evaluation   Food/Nutrient Related History Monitoring  Monitoring and Evaluation Plan: Energy intake, Fluid intake, Amount of food, Meal/snack pattern, Protein intake, Enteral and parenteral nutrition intake  Energy Intake: Estimated energy intake  Criteria: meet >/= 75% of needs  Fluid Intake: Estimated fluid intake  Criteria: maintain hydration; 66+oz daily  Amount of Food: Medical food intake  Criteria: use of VHC 2+ times daily depending on intake of other smoothies/ meals; up to 4x/day would meet needs  Meal/Snack Pattern: Estimated meal and snack pattern  Criteria: 4-6 meals/ snacks daily  Estimated protein intake: Estimated protein intake  Criteria: meet >/= 75% of needs  Enteral and Parenteral Nutrition Intake: Enteral nutrition formula/solution, Enteral nutrition intake  Criteria: Jtube then to meet nutritional needs once placed.  Body Composition/Growth/Weight History  Monitoring and Evaluation Plan: Weight  Weight: Measured weight  Criteria: Maintain weight          Time Spent  Prep time on day of patient encounter: 5 minutes  Time spent directly with patient, family or caregiver: 25 minutes  Additional Time Spent on Patient Care Activities: 5 minutes  Documentation Time: 25 minutes  Other Time Spent: 0 minutes  Total: 60 minutes        Readiness to Change : Good  Level of Understanding : Good  Anticipated  Compliant : Fair

## 2024-04-01 NOTE — PROGRESS NOTES
"Patient is being seen today for initial consultation and referred by Dr. Manzo for esophageal cancer. His family is present.    Fatigue: energy level \"still good\", he's still trying to exercise regularly.   PO intake: eating is getting harder, difficulty swallowing, liquids are ok  Weight: appears stable  N/V: denies, he does have occasional regurgitation of thick sputum    Medications reviewed.   Dr. Fry instructed patient to stop Metformin and simvastatin due to weight loss.     He has an appointment with PAT tomorrow with possible J-tube placement on 4/8. [facility]    PET unable to be fully reviewed as it has not been finalized. Results will ne available for his [facility] appointment.    If localized, patient will receive chemoRT followed by surgery with Dr. Manzo.     Red chemo bag and treatment specific education for Carboplatin/Paclitaxel reviewed with patient and family. All questions answered. They did not want a tour of day treatment.     IR referral placed for port placement.  Plan for phone visit on 4/11 to review.   "

## 2024-04-01 NOTE — PATIENT INSTRUCTIONS
Please stop your Metformin and Simvastatin. These are no longer needed due to your weight loss.     Referral placed to have port placed for chemotherapy.   Phone visit on 4/11.     Dr. Fry's team will reach out to schedule.

## 2024-04-01 NOTE — PATIENT INSTRUCTIONS
Discussed use of Boost VHC and how to obtain  Encouraged using supplements in base of smoothies  Encouraged frequent meals/ snacks 4-6x/day  Discussed ways to add calories: powdered milk, heavy cream, and butter/ oil.   Provided with the following handouts and reviewed:  High calorie high protein recipes  Blended food recipes.  Your [email or web address] Guide

## 2024-04-01 NOTE — PROGRESS NOTES
Patient ID: Juan M Mcrae is a 78 y.o. male.    Diagnoses: GE junction adenocarcinoma (signet ring cell), MMR status pending.  Staging pending.    Genomic profile:  MMR status on the biopsy by IHC has been ordered on April 1, 2024.    Assessment and Plan:  This is a pleasant 78-year-old man with a new diagnosis of GE junction adenocarcinoma.  His staging PET/CT result is pending.  MMR status on the tumor is also pending.  He is in good general health and has an excellent performance status.    Assuming that he has a locally advanced disease and has no distant metastasis, I have discussed neoadjuvant chemoradiation with chemotherapy being weekly carboplatin and Taxol along with radiation.  One of our clinic nurses, Felicia, will provide him printed information on both carboplatin and Taxol.  Briefly, carboplatin and Taxol can cause various side effects which include but not limited to nausea, vomiting, diarrhea, hair loss, oral mucositis, fatigue, peripheral neuropathy, risk of kidney damage, risk of infection, metallic test in the mouth, allergic reactions, etc..  I ordered a port placement as well.  He will see radiation oncology (Dr. Irish Thompson) tomorrow.  I have requested a follow-up appointment to finalize the plan.    I mention to him about the study where he can get standard radiation (photon) versus proton beam.  I asked him to discuss this study with Dr. Thompson tomorrow.  If he is assigned to the proton therapy, he has to travel to the main campus.    His J-tube is scheduled for April 8,2024.  I requested an appointment with the nutritionist today.      Follow up plan-in 7 to 10 days to finalize the plan.    I have placed all orders as outlined above. I advised the patient to schedule the tests and follow-up appointment as discussed by contacting the  on the way out or calling by phone.    Providers:  Surgeon: Dr. Jovany Bucknerc:  Jennie: Donna.    Chief complaint: GE junction  adenocarcinoma.    HPI:  ONCOLOGIC HISTORY-    February 28, 2024: Underwent an EGD for progressive dysphagia and weight loss.  EGD revealed an obstructing mass in the distal esophagus.  Biopsy confirmed adenocarcinoma with signet ring cell features.    March 21, 2024: EUS revealed T2 N1 mass extending from 41 cm to 43 cm and involving less than 1 cm of the gastric cardia.    March 22, 2024: CT scan of chest did not show any metastatic disease.    April 1, 2024 24: PET/CT scan done.  Report pending.    Interval history: He has significant dysphagia although he can get by with soup.  He has lost about 60 pounds in the last 6 months or so.  Feels fatigued.  Denies any pain.  Denies fever or chill or any other sign of ongoing infection.    Past Medical History: Type 2 diabetes mellitus on oral hypoglycemics, hypertension controlled with medications.  Past Medical History:  No date: CKD (chronic kidney disease)  No date: Disorder of lipoprotein metabolism, unspecified      Comment:  Elevated serum cholesterol  No date: Dysphagia  No date: ED (erectile dysfunction)  No date: GERD (gastroesophageal reflux disease)  No date: Hyperlipidemia  No date: Hypertension  No date: Nephrolithiasis  No date: Other specified diabetes mellitus without complications (CMS/  HCC)      Comment:  last A1c= 6.8 on 2/5/2024  No date: Personal history of other diseases of the circulatory system      Comment:  History of hypertension  No date: Personal history of other diseases of urinary system      Comment:  History of kidney disease  No date: Sleep apnea      Comment:  wear CPAP   Surgical History:    Past Surgical History:   Procedure Laterality Date    BLADDER SURGERY      COLONOSCOPY  2023    Repeat in one year    EYE SURGERY      LITHOTRIPSY      TRANSURETHRAL RESECTION OF PROSTATE      UPPER GASTROINTESTINAL ENDOSCOPY  03/02/2024      Family History:    No family history on file.  Family Oncology History:    Cancer-related family history  "is not on file.  Social History:    Social History     Tobacco Use    Smoking status: Former     Types: Cigarettes    Smokeless tobacco: Never   Vaping Use    Vaping Use: Never used   Substance Use Topics    Alcohol use: Yes     Comment: socially    Drug use: Yes     Types: Marijuana     Comment: gummies        Allergies  Allergies   Allergen Reactions    Omeprazole Itching and Unknown    Shellfish Containing Products Unknown    Sulfa (Sulfonamide Antibiotics) Rash        Medications  Current Outpatient Medications   Medication Instructions    atenolol (Tenormin) 50 mg tablet oral    pioglitazone (Actos) 30 mg tablet oral    traZODone (DESYREL) 100 mg, oral          Objective   VS: /78 (BP Location: Left arm, Patient Position: Sitting, BP Cuff Size: Large adult)   Pulse 62   Temp 36.4 °C (97.6 °F) (Temporal)   Resp 18   Ht (S) 1.674 m (5' 5.91\")   Wt 79.2 kg (174 lb 7.9 oz)   SpO2 97%   BMI 28.24 kg/m²   Weight:   Vitals:    04/01/24 1044   Weight: 79.2 kg (174 lb 7.9 oz)        PHYSICAL EXAMINATION  ECOG performance status-1.  VS:  /78 (BP Location: Left arm, Patient Position: Sitting, BP Cuff Size: Large adult)   Pulse 62   Temp 36.4 °C (97.6 °F) (Temporal)   Resp 18   Ht (S) 1.674 m (5' 5.91\")   Wt 79.2 kg (174 lb 7.9 oz)   SpO2 97%   BMI 28.24 kg/m²   Weight:   Vitals:    04/01/24 1044   Weight: 79.2 kg (174 lb 7.9 oz)       BSA: 1.92 meters squared   Pain Scale: 0.    Constitutional: Awake/alert/oriented x3, cooperative and answers questions appropriately.     Eyes: No pallor, conjunctival injection, clear sclera.    Mouth: No ulceration. No thrush.     Head/Neck: Neck supple, no apparent injury, thyroid without mass or tenderness, No JVD, trachea midline, no bruits.     Respiratory/Thorax: Normal breath sounds with bilaterally symmetrical chest expansion. No dullness.      Cardiovascular: No audible murmurs, normal heart sounds. No pericardial rub.     Gastrointestinal: Nondistended, " soft, non-tender, no rebound tenderness or guarding, no masses palpable, no organomegaly, +BS, no bruits. No ascites.     Musculoskeletal: No joint swelling, redness.      Extremities: normal extremities, no cyanosis edema, contusions or wounds, no clubbing.     Lymphatic: No significant lymphadenopathy.     Skin: Warm and dry, no lesions, no rashes.     Labs                         Image           Catarino Fry MD.

## 2024-04-02 ENCOUNTER — HOSPITAL ENCOUNTER (OUTPATIENT)
Dept: CARDIOLOGY | Facility: HOSPITAL | Age: 79
Discharge: HOME | End: 2024-04-02
Payer: MEDICARE

## 2024-04-02 ENCOUNTER — PRE-ADMISSION TESTING (OUTPATIENT)
Dept: PREADMISSION TESTING | Facility: HOSPITAL | Age: 79
End: 2024-04-02
Payer: MEDICARE

## 2024-04-02 ENCOUNTER — HOSPITAL ENCOUNTER (OUTPATIENT)
Dept: RADIATION ONCOLOGY | Facility: HOSPITAL | Age: 79
Setting detail: RADIATION/ONCOLOGY SERIES
Discharge: HOME | End: 2024-04-02
Payer: MEDICARE

## 2024-04-02 VITALS
TEMPERATURE: 97.5 F | HEART RATE: 57 BPM | SYSTOLIC BLOOD PRESSURE: 132 MMHG | HEIGHT: 67 IN | BODY MASS INDEX: 27.5 KG/M2 | WEIGHT: 175.2 LBS | DIASTOLIC BLOOD PRESSURE: 80 MMHG

## 2024-04-02 VITALS
OXYGEN SATURATION: 98 % | WEIGHT: 175 LBS | DIASTOLIC BLOOD PRESSURE: 77 MMHG | RESPIRATION RATE: 18 BRPM | TEMPERATURE: 98.1 F | HEIGHT: 67 IN | SYSTOLIC BLOOD PRESSURE: 170 MMHG | BODY MASS INDEX: 27.47 KG/M2 | HEART RATE: 62 BPM

## 2024-04-02 DIAGNOSIS — E78.2 MIXED HYPERLIPIDEMIA: Primary | ICD-10-CM

## 2024-04-02 DIAGNOSIS — C15.5 MALIGNANT NEOPLASM OF LOWER THIRD OF ESOPHAGUS (MULTI): ICD-10-CM

## 2024-04-02 DIAGNOSIS — Z01.818 PREOP TESTING: ICD-10-CM

## 2024-04-02 DIAGNOSIS — E78.2 MIXED HYPERLIPIDEMIA: ICD-10-CM

## 2024-04-02 DIAGNOSIS — C15.5 MALIGNANT NEOPLASM OF LOWER THIRD OF ESOPHAGUS (MULTI): Primary | ICD-10-CM

## 2024-04-02 DIAGNOSIS — D49.9 NEOPLASM: ICD-10-CM

## 2024-04-02 LAB
ABO GROUP (TYPE) IN BLOOD: NORMAL
ALBUMIN SERPL BCP-MCNC: 4.4 G/DL (ref 3.4–5)
ALP SERPL-CCNC: 40 U/L (ref 33–136)
ALT SERPL W P-5'-P-CCNC: 10 U/L (ref 10–52)
ANION GAP SERPL CALC-SCNC: 15 MMOL/L (ref 10–20)
ANTIBODY SCREEN: NORMAL
APTT PPP: 27 SECONDS (ref 27–38)
AST SERPL W P-5'-P-CCNC: 12 U/L (ref 9–39)
BILIRUB SERPL-MCNC: 0.4 MG/DL (ref 0–1.2)
BUN SERPL-MCNC: 17 MG/DL (ref 6–23)
CALCIUM SERPL-MCNC: 9.8 MG/DL (ref 8.6–10.6)
CHLORIDE SERPL-SCNC: 102 MMOL/L (ref 98–107)
CO2 SERPL-SCNC: 29 MMOL/L (ref 21–32)
CREAT SERPL-MCNC: 0.98 MG/DL (ref 0.5–1.3)
EGFRCR SERPLBLD CKD-EPI 2021: 78 ML/MIN/1.73M*2
ERYTHROCYTE [DISTWIDTH] IN BLOOD BY AUTOMATED COUNT: 14.6 % (ref 11.5–14.5)
GLUCOSE SERPL-MCNC: 124 MG/DL (ref 74–99)
HCT VFR BLD AUTO: 45.2 % (ref 41–52)
HGB BLD-MCNC: 14.4 G/DL (ref 13.5–17.5)
INR PPP: 1 (ref 0.9–1.1)
MCH RBC QN AUTO: 32.3 PG (ref 26–34)
MCHC RBC AUTO-ENTMCNC: 31.9 G/DL (ref 32–36)
MCV RBC AUTO: 101 FL (ref 80–100)
NRBC BLD-RTO: 0 /100 WBCS (ref 0–0)
PLATELET # BLD AUTO: 243 X10*3/UL (ref 150–450)
POTASSIUM SERPL-SCNC: 4.7 MMOL/L (ref 3.5–5.3)
PROT SERPL-MCNC: 7.1 G/DL (ref 6.4–8.2)
PROTHROMBIN TIME: 11.3 SECONDS (ref 9.8–12.8)
RBC # BLD AUTO: 4.46 X10*6/UL (ref 4.5–5.9)
RH FACTOR (ANTIGEN D): NORMAL
SODIUM SERPL-SCNC: 141 MMOL/L (ref 136–145)
WBC # BLD AUTO: 6.1 X10*3/UL (ref 4.4–11.3)

## 2024-04-02 PROCEDURE — 77263 THER RADIOLOGY TX PLNG CPLX: CPT | Performed by: RADIOLOGY

## 2024-04-02 PROCEDURE — 87081 CULTURE SCREEN ONLY: CPT | Performed by: NURSE PRACTITIONER

## 2024-04-02 PROCEDURE — 93010 ELECTROCARDIOGRAM REPORT: CPT | Performed by: INTERNAL MEDICINE

## 2024-04-02 PROCEDURE — 99204 OFFICE O/P NEW MOD 45 MIN: CPT | Performed by: NURSE PRACTITIONER

## 2024-04-02 PROCEDURE — 85027 COMPLETE CBC AUTOMATED: CPT | Performed by: NURSE PRACTITIONER

## 2024-04-02 PROCEDURE — 77470 SPECIAL RADIATION TREATMENT: CPT | Performed by: RADIOLOGY

## 2024-04-02 PROCEDURE — 93005 ELECTROCARDIOGRAM TRACING: CPT

## 2024-04-02 PROCEDURE — 99205 OFFICE O/P NEW HI 60 MIN: CPT | Performed by: RADIOLOGY

## 2024-04-02 PROCEDURE — 99215 OFFICE O/P EST HI 40 MIN: CPT | Mod: GC,25 | Performed by: RADIOLOGY

## 2024-04-02 PROCEDURE — 80053 COMPREHEN METABOLIC PANEL: CPT | Performed by: NURSE PRACTITIONER

## 2024-04-02 PROCEDURE — 36415 COLL VENOUS BLD VENIPUNCTURE: CPT

## 2024-04-02 PROCEDURE — 85730 THROMBOPLASTIN TIME PARTIAL: CPT | Mod: 91 | Performed by: NURSE PRACTITIONER

## 2024-04-02 PROCEDURE — 86900 BLOOD TYPING SEROLOGIC ABO: CPT | Mod: 91 | Performed by: NURSE PRACTITIONER

## 2024-04-02 RX ORDER — CHLORHEXIDINE GLUCONATE 40 MG/ML
SOLUTION TOPICAL 2 TIMES DAILY
Qty: 473 ML | Refills: 0 | Status: ON HOLD | OUTPATIENT
Start: 2024-04-02 | End: 2024-04-08 | Stop reason: WASHOUT

## 2024-04-02 RX ORDER — CHLORHEXIDINE GLUCONATE ORAL RINSE 1.2 MG/ML
15 SOLUTION DENTAL SEE ADMIN INSTRUCTIONS
Qty: 473 ML | Refills: 0 | Status: ON HOLD | OUTPATIENT
Start: 2024-04-02 | End: 2024-04-08 | Stop reason: WASHOUT

## 2024-04-02 ASSESSMENT — ENCOUNTER SYMPTOMS
LOSS OF SENSATION IN FEET: 0
CHILLS: 0
CONSTITUTIONAL NEGATIVE: 1
NEUROLOGICAL NEGATIVE: 1
NECK NEGATIVE: 1
FEVER: 0
OCCASIONAL FEELINGS OF UNSTEADINESS: 0
ENDOCRINE NEGATIVE: 1
GASTROINTESTINAL NEGATIVE: 1
DEPRESSION: 0
RESPIRATORY NEGATIVE: 1
CARDIOVASCULAR NEGATIVE: 1
MUSCULOSKELETAL NEGATIVE: 1
TROUBLE SWALLOWING: 1
EYES NEGATIVE: 1

## 2024-04-02 ASSESSMENT — DUKE ACTIVITY SCORE INDEX (DASI)
CAN YOU HAVE SEXUAL RELATIONS: NO
CAN YOU DO HEAVY WORK AROUND THE HOUSE LIKE SCRUBBING FLOORS OR LIFTING AND MOVING HEAVY FURNITURE: NO
CAN YOU DO LIGHT WORK AROUND THE HOUSE LIKE DUSTING OR WASHING DISHES: YES
CAN YOU TAKE CARE OF YOURSELF (EAT, DRESS, BATHE, OR USE TOILET): YES
TOTAL_SCORE: 37.45
CAN YOU PARTICIPATE IN MODERATE RECREATIONAL ACTIVITIES LIKE GOLF, BOWLING, DANCING, DOUBLES TENNIS OR THROWING A BASEBALL OR FOOTBALL: YES
CAN YOU WALK INDOORS, SUCH AS AROUND YOUR HOUSE: YES
DASI METS SCORE: 7.3
CAN YOU DO YARD WORK LIKE RAKING LEAVES, WEEDING OR PUSHING A MOWER: YES
CAN YOU WALK A BLOCK OR TWO ON LEVEL GROUND: YES
CAN YOU PARTICIPATE IN STRENOUS SPORTS LIKE SWIMMING, SINGLES TENNIS, FOOTBALL, BASKETBALL, OR SKIING: NO
CAN YOU DO MODERATE WORK AROUND THE HOUSE LIKE VACUUMING, SWEEPING FLOORS OR CARRYING GROCERIES: YES
CAN YOU CLIMB A FLIGHT OF STAIRS OR WALK UP A HILL: YES
CAN YOU RUN A SHORT DISTANCE: YES

## 2024-04-02 ASSESSMENT — CHADS2 SCORE
HYPERTENSION: YES
DIABETES: YES
PRIOR STROKE OR TIA OR THROMBOEMBOLISM: NO
CHF: NO
AGE GREATER THAN OR EQUAL TO 75: YES
CHADS2 SCORE: 3
AGE GREATER THAN OR EQUAL TO 75: YES

## 2024-04-02 ASSESSMENT — PAIN SCALES - GENERAL: PAINLEVEL_OUTOF10: 0 - NO PAIN

## 2024-04-02 ASSESSMENT — LIFESTYLE VARIABLES: SMOKING_STATUS: NONSMOKER

## 2024-04-02 NOTE — CPM/PAT H&P
CPM/PAT Evaluation       Name: Juan M Mcrae (Juan M Mcrae)  /Age: 1945/78 y.o.     Visit Type:   In-Person       Chief Complaint: Creation Jejunostomy Laparoscopy     HPI Patient is a 78 year old male, accompanied by family,  scheduled for Creation Jejunostomy Laparoscopy with Dr. Macho Manzo on 24 related to Malignant neoplasm of lower third of esophagus.    Patient referred by Dr. Manzo for preoperative evaluation of dysphagia, esophageal cancer, GERD, hyperlipidemia, hypertension, diabetes, and sleep apnea    Past Medical History:   Diagnosis Date    Cataract     CKD (chronic kidney disease)     Colon polyp     Deviated septum     Disorder of lipoprotein metabolism, unspecified     Elevated serum cholesterol    Dysphagia     ED (erectile dysfunction)     Esophageal cancer (CMS/HCC)     GERD (gastroesophageal reflux disease)     Hearing aid worn     HL (hearing loss)     Hyperlipidemia     Hypertension     Nephrolithiasis     Other specified diabetes mellitus without complications (CMS/HCC)     last A1c= 6.8 on 2024    Sleep apnea     wear CPAP/BiPAP    Vision loss     wears glasses     Past Surgical History:   Procedure Laterality Date    BLADDER SURGERY      COLONOSCOPY      Repeat in one year    EYE SURGERY      LITHOTRIPSY      OTHER SURGICAL HISTORY      Vocal cord surgery    TRANSURETHRAL RESECTION OF PROSTATE      UPPER GASTROINTESTINAL ENDOSCOPY  2024     Family History   Problem Relation Name Age of Onset    Stroke Father      Kidney cancer Brother Eliu     Stroke Brother Eliu      Allergies   Allergen Reactions    Omeprazole Itching and Unknown    Shellfish Containing Products Unknown    Sulfa (Sulfonamide Antibiotics) Rash     Prior to Admission medications    Medication Sig Start Date End Date Taking? Authorizing Provider   atenolol (Tenormin) 50 mg tablet Take by mouth. 18   Historical Provider, MD   multivitamin with minerals iron-free (Centrum Silver) Take 1  tablet by mouth once daily.    Historical Provider, MD   pioglitazone (Actos) 30 mg tablet Take by mouth. 7/5/23   Historical Provider, MD   traZODone (Desyrel) 100 mg tablet Take 1 tablet (100 mg) by mouth. 8/8/23   Historical Provider, MD   metFORMIN (Glucophage) 500 mg tablet Take 2 tablets (1,000 mg) by mouth 2 times a day with meals. 1/4/24 4/1/24  Candy Grossman MD   simvastatin (Zocor) 20 mg tablet TAKE 1 TABLET EVERY 24     HOURS 1/4/24 4/1/24  Candy Grossman MD      PAT ROS:   Constitutional:   neg     no fever   no chills  Neuro/Psych:   neg    Eyes:   neg    Ears:    Getting bilateral hearing aids this week     hearing loss   hearing aides  Nose:   neg    Mouth:   neg    Throat:    Soft foods and pureed foods   dysphagia  Neck:   neg    Cardio:   neg    Respiratory:   neg    Endocrine:   neg    GI:   neg    :   neg    Musculoskeletal:   neg    Hematologic:   neg     no history of blood transfusion   no blood clots  Skin:  neg      Physical Exam  Vitals reviewed.   Constitutional:       Appearance: Normal appearance. He is normal weight.   HENT:      Head: Normocephalic and atraumatic.      Nose: Nose normal.      Mouth/Throat:      Mouth: Mucous membranes are moist.      Pharynx: Oropharynx is clear.   Eyes:      Extraocular Movements: Extraocular movements intact.      Pupils: Pupils are equal, round, and reactive to light.   Cardiovascular:      Rate and Rhythm: Normal rate and regular rhythm.      Heart sounds: Normal heart sounds.   Pulmonary:      Effort: Pulmonary effort is normal.      Breath sounds: Normal breath sounds.   Abdominal:      General: Abdomen is flat. Bowel sounds are normal.      Palpations: Abdomen is soft.   Musculoskeletal:         General: Normal range of motion.      Cervical back: Normal range of motion and neck supple.   Skin:     General: Skin is warm and dry.   Neurological:      Mental Status: He is alert and oriented to person, place, and time.   Psychiatric:         Mood  and Affect: Mood normal.         Behavior: Behavior normal.         Thought Content: Thought content normal.         Judgment: Judgment normal.        PAT AIRWAY:   Airway:     Mallampati::  III    Neck ROM::  Full   States nothing loose or removable    Visit Vitals  /80 Comment: manual recheck   Pulse 57   Temp 36.4 °C (97.5 °F) (Temporal)     DASI Risk Score      Flowsheet Row Most Recent Value   DASI SCORE 37.45   METS Score (Will be calculated only when all the questions are answered) 7.3          Caprini DVT Assessment      Flowsheet Row Most Recent Value   DVT Score 10   Current Status Present cancer or chemotherapy, Major surgery planned, lasting 2-3 hours   History Prior major surgery   Age Over 75 years   BMI 30 or less          Modified Frailty Index      Flowsheet Row Most Recent Value   Modified Frailty Index Calculator .0909          CHADS2 Stroke Risk  Current as of 2 hours ago        N/A 3 - 100%: High Risk   2 - 3%: Medium Risk   0 - 2%: Low Risk     Last Change: N/A          This score determines the patient's risk of having a stroke if the patient has atrial fibrillation.        This score is not applicable to this patient. Components are not calculated.          Revised Cardiac Risk Index      Flowsheet Row Most Recent Value   Revised Cardiac Risk Calculator 1          Apfel Simplified Score      Flowsheet Row Most Recent Value   Apfel Simplified Score Calculator 2          Risk Analysis Index Results This Encounter    No data found in the last 1 encounters.       Stop Bang Score      Flowsheet Row Most Recent Value   Do you snore loudly? 1   Do you often feel tired or fatigued after your sleep? 1   Has anyone ever observed you stop breathing in your sleep? 1   Do you have or are you being treated for high blood pressure? 0   Recent BMI (Calculated) 27.4   Is BMI greater than 35 kg/m2? 0=No   Age older than 50 years old? 1=Yes   Is your neck circumference greater than 17 inches (Male) or 16  inches (Female)? 0   Gender - Male 1=Yes   STOP-BANG Total Score 5          Assessment and Plan:     Neuro:   No neurologic diagnoses, however, the patient is at an increased risk for post operative delirium secondary to age >/= 65,  type and duration of surgery    The patient is at an increased risk for perioperative stroke secondary to  increased age, HTN, HLD, DM, general anesthesia, and op time >2.5 hours    Patient given information on brain exercises and delirium prevention.    HEENT/Airway    States has had surgery on vocal cords in the past due to cancer.     Cardiovascular    Hyperlipidemia & hypertension  Currently taking atenolol. Was taking simvastatin but states he was recently told to stop taking it.   BP today 132/80  2-5-24: LDL 55    RCRI  The patient meets 0-1 RCRI criteria and therefore has a less than 1% risk of major adverse cardiac complications.  METS  The patient's functional capacity is greater than 4 METS.  MACE  30-day risk for MACE of 1 predictor, 6.0% risk for cardiac death, nonfatal myocardial infarction, and nonfatal cardiac arrest  MICHELE  0.4% risk for 51st to 90th percentile    4-2-24: EKG sinus bradycardia at 57    Pulmonary     Sleep apnea  States he uses CPAP    STOP BANG Score of 5, which places patient at high risk for having STACY.  ARISCAT 34, Intermediate, 13.3% risk of in-hospital postoperative pulmonary complications  PRODIGY 28, high of respiratory depression episode.    Patient given information on deep breathing exercises.     Renal  No diagnosis or significant findings on chart review or clinical presentation and evaluation.    Endocrine    Diabetes Evaluation  Currently treated with Actos   2-5-24: A1c 6.8    Hematology  No diagnosis or significant findings on chart review or clinical presentation and evaluation.    Caprini score 10, high risk of VTE    Transfusion Evaluation  A type and screen was obtained given the likelihood for perioperative transfusion of blood or  blood products.    Patient given information on DVT prevention.     GI    Dysphagia, esophageal cancer, & GERD  States eats soft and pureed foods. Also drinking protein shakes    Eat 10- 32  Apfel: 2 points 39% risk for post operative nausea/vomiting.     Genitourinary  No diagnosis or significant findings on chart review or clinical presentation and evaluation.    ID  No diagnosis or significant findings on chart review or clinical presentation and evaluation.    MRSA swab ordered  Chlorhexidine mouth wash and body wash ordered    Musculoskeletal  No diagnosis or significant findings on chart review or clinical presentation and evaluation.    -Preoperative medication instructions were provided and reviewed with the patient.  Any additional testing or evaluation was explained to the patient.  NPO Instructions were discussed, and the patient's questions were answered prior to conclusion of this encounter    Labs ordered:    Recent Results (from the past 168 hour(s))   POCT GLUCOSE    Collection Time: 04/01/24  8:49 AM   Result Value Ref Range    POCT Glucose 146 (H) 74 - 99 mg/dL   CBC    Collection Time: 04/02/24 10:45 AM   Result Value Ref Range    WBC 6.1 4.4 - 11.3 x10*3/uL    nRBC 0.0 0.0 - 0.0 /100 WBCs    RBC 4.46 (L) 4.50 - 5.90 x10*6/uL    Hemoglobin 14.4 13.5 - 17.5 g/dL    Hematocrit 45.2 41.0 - 52.0 %     (H) 80 - 100 fL    MCH 32.3 26.0 - 34.0 pg    MCHC 31.9 (L) 32.0 - 36.0 g/dL    RDW 14.6 (H) 11.5 - 14.5 %    Platelets 243 150 - 450 x10*3/uL   Comprehensive Metabolic Panel    Collection Time: 04/02/24 10:45 AM   Result Value Ref Range    Glucose 124 (H) 74 - 99 mg/dL    Sodium 141 136 - 145 mmol/L    Potassium 4.7 3.5 - 5.3 mmol/L    Chloride 102 98 - 107 mmol/L    Bicarbonate 29 21 - 32 mmol/L    Anion Gap 15 10 - 20 mmol/L    Urea Nitrogen 17 6 - 23 mg/dL    Creatinine 0.98 0.50 - 1.30 mg/dL    eGFR 78 >60 mL/min/1.73m*2    Calcium 9.8 8.6 - 10.6 mg/dL    Albumin 4.4 3.4 - 5.0 g/dL     Alkaline Phosphatase 40 33 - 136 U/L    Total Protein 7.1 6.4 - 8.2 g/dL    AST 12 9 - 39 U/L    Bilirubin, Total 0.4 0.0 - 1.2 mg/dL    ALT 10 10 - 52 U/L   Coagulation Screen    Collection Time: 04/02/24 10:45 AM   Result Value Ref Range    Protime 11.3 9.8 - 12.8 seconds    INR 1.0 0.9 - 1.1    aPTT 27 27 - 38 seconds   Type And Screen    Collection Time: 04/02/24 10:45 AM   Result Value Ref Range    ABO TYPE B     Rh TYPE POS     ANTIBODY SCREEN NEG    Staphylococcus aureus/MRSA colonization, Culture    Collection Time: 04/02/24 10:45 AM    Specimen: Nares/Axilla/Groin; Swab   Result Value Ref Range    Staph/MRSA Screen Culture No Staphylococcus aureus isolated    ECG 12 Lead    Collection Time: 04/03/24  2:58 PM   Result Value Ref Range    Ventricular Rate 57 BPM    Atrial Rate 57 BPM    ID Interval 152 ms    QRS Duration 96 ms    QT Interval 434 ms    QTC Calculation(Bazett) 422 ms    P Axis 49 degrees    R Axis 68 degrees    T Axis 63 degrees    QRS Count 9 beats    Q Onset 221 ms    P Onset 145 ms    P Offset 202 ms    T Offset 438 ms    QTC Fredericia 426 ms

## 2024-04-02 NOTE — PREPROCEDURE INSTRUCTIONS
NPO Instructions:    Do not eat any food after midnight the night before your surgery/procedure.  You may have 10 ounces clear liquids until TWO hours before surgery/procedure. This includes water, black tea/coffee, (no milk or cream) apple juice and electrolyte drinks (Gatorade).  You may chew gum up to TWO hours before your surgery/procedure.    Additional Instructions:     Seven/Six Days before Surgery:  We have sent a prescription for Hibiclens soap to your preferred pharmacy.  If you have not already, Please  your prescription and start using five days before surgery.  Follow the instruction sheet provided to you at your CPM/PAT appointment.  Review your medication instructions, stop indicated medications    Five Days before Surgery:  Review your medication instructions, stop indicated medications  Begin using your Hibiclens    Three Days before Surgery:  Review your medication instructions, stop indicated medications    The Day before Surgery:  Review your medication instructions, stop indicated medications  You will be contacted regarding the time of your arrival to facility and surgery time  Do not eat any food after Midnight    Day of Surgery:  Review your medication instructions, take indicated medications  If you have diabetes, please check your fasting blood sugar upon awakening.  If fasting blood sugar is <80 mg/dl, drink 100 ml of apple juice, time limit of 2 hours before  You may have clear liquids until TWO hours before surgery/procedure.  This includes water, black tea/coffee, (no milk or cream) apple juice and electrolyte drinks (Gatorade)  You may chew gum up to TWO hours before your surgery/procedure  Wear  comfortable loose fitting clothing  Do not use moisturizers, creams, lotions or perfume  All jewelry and valuables should be left at home    Avoid herbal supplements, multivitamins and NSAIDS (non-steroidal anti-inflammatory drugs) such as Advil, Aleve, Ibuprofen, Naproxen, Excedrin,  Meloxicam or Celebrex for at least 7 days prior to surgery. May take Tylenol as needed.    Thank you for visiting [facility].  If you have any changes to your health condition, please call the surgeons office to alert them and give them details of your symptoms.      Nay Emmanuel OBIE-CNP NP-C  Family Nurse Practitioner   Department of Anesthesiology and Perioperative Medicine  Main phone [phone]  Fax [phone]                  Preoperative Brain Exercises    What are brain exercises?  A brain exercise is any activity that engages your thinking (cognitive) skills.    What types of activities are considered brain exercises?  Jigsaw puzzles, crossword puzzles, word jumble, memory games, word search, and many more.  Many can be found free online or on your phone via a mobile alexandre.    Why should I do brain exercises before my surgery?  More recent research has shown brain exercise before surgery can lower the risk of postoperative delirium (confusion) which can be especially important for older adults.  Patients who did brain exercises for 5 to 10 hours the days before surgery, cut their risk of postoperative delirium in half up to 1 week after surgery.                  [facility]    Preoperative Deep Breathing Exercises    Why it is important to do deep breathing exercises before my surgery?  Deep breathing exercises strengthen your breathing muscles.  This helps you to recover after your surgery and decreases the chance of breathing complications.      How are the deep breathing exercises done?  Sit straight with your back supported.  Breathe in deeply and slowly through your nose. Your lower rib cage should expand and your abdomen may move forward.  Hold that breath for 3 to 5 seconds.  Breathe out through pursed lips, slowly and completely.  Rest and repeat 10 times every hour while awake.  Rest longer if you become dizzy or  lightheaded.                Patient and Family Education             Ways You Can Help Prevent Blood Clots             This handout explains some simple things you can do to help prevent blood clots.      Blood clots are blockages that can form in the body's veins. When a blood clot forms in your deep veins, it may be called a deep vein thrombosis, or DVT for short. Blood clots can happen in any part of the body where blood flows, but they are most common in the arms and legs. If a piece of a blood clot breaks free and travels to the lungs, it is called a pulmonary embolus (PE). A PE can be a very serious problem.         Being in the hospital or having surgery can raise your chances of getting a blood clot because you may not be well enough to move around as much as you normally do.         Ways you can help prevent blood clots in the hospital         Wearing SCDs. SCDs stands for Sequential Compression Devices.   SCDs are special sleeves that wrap around your legs  They attach to a pump that fills them with air to gently squeeze your legs every few minutes.   This helps return the blood in your legs to your heart.   SCDs should only be taken off when walking or bathing.   SCDs may not be comfortable, but they can help save your life.               Wearing compression stockings - if your doctor orders them. These special snug fitting stockings gently squeeze your legs to help blood flow.       Walking. Walking helps move the blood in your legs.   If your doctor says it is ok, try walking the halls at least   5 times a day. Ask us to help you get up, so you don't fall.      Taking any blood thinning medicines your doctor orders.        Page 1 of 2     [facility]; 3/23   Ways you can help prevent blood clots at home       Wearing compression stockings - if your doctor orders them. ? Walking - to help move the blood in your legs.       Taking any blood thinning medicines your doctor orders.      Signs of a  blood clot or PE      Tell your doctor or nurse know right away if you have of the problems listed below.    If you are at home, seek medical care right away. Call 911 for chest pain or problems breathing.               Signs of a blood clot (DVT) - such as pain,  swelling, redness or warmth in your arm or leg      Signs of a pulmonary embolism (PE) - such as chest     pain or feeling short of breath

## 2024-04-02 NOTE — H&P (VIEW-ONLY)
CPM/PAT Evaluation       Name: Juan M Mcrae (Juan M Mcrae)  /Age: 1945/78 y.o.     Visit Type:   In-Person       Chief Complaint: Creation Jejunostomy Laparoscopy     HPI Patient is a 78 year old male, accompanied by family,  scheduled for Creation Jejunostomy Laparoscopy with Dr. Macho Manzo on 24 related to Malignant neoplasm of lower third of esophagus.    Patient referred by Dr. Manzo for preoperative evaluation of dysphagia, esophageal cancer, GERD, hyperlipidemia, hypertension, diabetes, and sleep apnea    Past Medical History:   Diagnosis Date    Cataract     CKD (chronic kidney disease)     Colon polyp     Deviated septum     Disorder of lipoprotein metabolism, unspecified     Elevated serum cholesterol    Dysphagia     ED (erectile dysfunction)     Esophageal cancer (CMS/HCC)     GERD (gastroesophageal reflux disease)     Hearing aid worn     HL (hearing loss)     Hyperlipidemia     Hypertension     Nephrolithiasis     Other specified diabetes mellitus without complications (CMS/HCC)     last A1c= 6.8 on 2024    Sleep apnea     wear CPAP/BiPAP    Vision loss     wears glasses     Past Surgical History:   Procedure Laterality Date    BLADDER SURGERY      COLONOSCOPY      Repeat in one year    EYE SURGERY      LITHOTRIPSY      OTHER SURGICAL HISTORY      Vocal cord surgery    TRANSURETHRAL RESECTION OF PROSTATE      UPPER GASTROINTESTINAL ENDOSCOPY  2024     Family History   Problem Relation Name Age of Onset    Stroke Father      Kidney cancer Brother lEiu     Stroke Brother Eliu      Allergies   Allergen Reactions    Omeprazole Itching and Unknown    Shellfish Containing Products Unknown    Sulfa (Sulfonamide Antibiotics) Rash     Prior to Admission medications    Medication Sig Start Date End Date Taking? Authorizing Provider   atenolol (Tenormin) 50 mg tablet Take by mouth. 18   Historical Provider, MD   multivitamin with minerals iron-free (Centrum Silver) Take 1  tablet by mouth once daily.    Historical Provider, MD   pioglitazone (Actos) 30 mg tablet Take by mouth. 7/5/23   Historical Provider, MD   traZODone (Desyrel) 100 mg tablet Take 1 tablet (100 mg) by mouth. 8/8/23   Historical Provider, MD   metFORMIN (Glucophage) 500 mg tablet Take 2 tablets (1,000 mg) by mouth 2 times a day with meals. 1/4/24 4/1/24  Candy Grossman MD   simvastatin (Zocor) 20 mg tablet TAKE 1 TABLET EVERY 24     HOURS 1/4/24 4/1/24  Candy Grossman MD      PAT ROS:   Constitutional:   neg     no fever   no chills  Neuro/Psych:   neg    Eyes:   neg    Ears:    Getting bilateral hearing aids this week     hearing loss   hearing aides  Nose:   neg    Mouth:   neg    Throat:    Soft foods and pureed foods   dysphagia  Neck:   neg    Cardio:   neg    Respiratory:   neg    Endocrine:   neg    GI:   neg    :   neg    Musculoskeletal:   neg    Hematologic:   neg     no history of blood transfusion   no blood clots  Skin:  neg      Physical Exam  Vitals reviewed.   Constitutional:       Appearance: Normal appearance. He is normal weight.   HENT:      Head: Normocephalic and atraumatic.      Nose: Nose normal.      Mouth/Throat:      Mouth: Mucous membranes are moist.      Pharynx: Oropharynx is clear.   Eyes:      Extraocular Movements: Extraocular movements intact.      Pupils: Pupils are equal, round, and reactive to light.   Cardiovascular:      Rate and Rhythm: Normal rate and regular rhythm.      Heart sounds: Normal heart sounds.   Pulmonary:      Effort: Pulmonary effort is normal.      Breath sounds: Normal breath sounds.   Abdominal:      General: Abdomen is flat. Bowel sounds are normal.      Palpations: Abdomen is soft.   Musculoskeletal:         General: Normal range of motion.      Cervical back: Normal range of motion and neck supple.   Skin:     General: Skin is warm and dry.   Neurological:      Mental Status: He is alert and oriented to person, place, and time.   Psychiatric:         Mood  and Affect: Mood normal.         Behavior: Behavior normal.         Thought Content: Thought content normal.         Judgment: Judgment normal.        PAT AIRWAY:   Airway:     Mallampati::  III    Neck ROM::  Full   States nothing loose or removable    Visit Vitals  /80 Comment: manual recheck   Pulse 57   Temp 36.4 °C (97.5 °F) (Temporal)     DASI Risk Score      Flowsheet Row Most Recent Value   DASI SCORE 37.45   METS Score (Will be calculated only when all the questions are answered) 7.3          Caprini DVT Assessment      Flowsheet Row Most Recent Value   DVT Score 10   Current Status Present cancer or chemotherapy, Major surgery planned, lasting 2-3 hours   History Prior major surgery   Age Over 75 years   BMI 30 or less          Modified Frailty Index      Flowsheet Row Most Recent Value   Modified Frailty Index Calculator .0909          CHADS2 Stroke Risk  Current as of 2 hours ago        N/A 3 - 100%: High Risk   2 - 3%: Medium Risk   0 - 2%: Low Risk     Last Change: N/A          This score determines the patient's risk of having a stroke if the patient has atrial fibrillation.        This score is not applicable to this patient. Components are not calculated.          Revised Cardiac Risk Index      Flowsheet Row Most Recent Value   Revised Cardiac Risk Calculator 1          Apfel Simplified Score      Flowsheet Row Most Recent Value   Apfel Simplified Score Calculator 2          Risk Analysis Index Results This Encounter    No data found in the last 1 encounters.       Stop Bang Score      Flowsheet Row Most Recent Value   Do you snore loudly? 1   Do you often feel tired or fatigued after your sleep? 1   Has anyone ever observed you stop breathing in your sleep? 1   Do you have or are you being treated for high blood pressure? 0   Recent BMI (Calculated) 27.4   Is BMI greater than 35 kg/m2? 0=No   Age older than 50 years old? 1=Yes   Is your neck circumference greater than 17 inches (Male) or 16  inches (Female)? 0   Gender - Male 1=Yes   STOP-BANG Total Score 5          Assessment and Plan:     Neuro:   No neurologic diagnoses, however, the patient is at an increased risk for post operative delirium secondary to age >/= 65,  type and duration of surgery    The patient is at an increased risk for perioperative stroke secondary to  increased age, HTN, HLD, DM, general anesthesia, and op time >2.5 hours    Patient given information on brain exercises and delirium prevention.    HEENT/Airway    States has had surgery on vocal cords in the past due to cancer.     Cardiovascular    Hyperlipidemia & hypertension  Currently taking atenolol. Was taking simvastatin but states he was recently told to stop taking it.   BP today 132/80  2-5-24: LDL 55    RCRI  The patient meets 0-1 RCRI criteria and therefore has a less than 1% risk of major adverse cardiac complications.  METS  The patient's functional capacity is greater than 4 METS.  MACE  30-day risk for MACE of 1 predictor, 6.0% risk for cardiac death, nonfatal myocardial infarction, and nonfatal cardiac arrest  MICHELE  0.4% risk for 51st to 90th percentile    4-2-24: EKG sinus bradycardia at 57    Pulmonary     Sleep apnea  States he uses CPAP    STOP BANG Score of 5, which places patient at high risk for having STACY.  ARISCAT 34, Intermediate, 13.3% risk of in-hospital postoperative pulmonary complications  PRODIGY 28, high of respiratory depression episode.    Patient given information on deep breathing exercises.     Renal  No diagnosis or significant findings on chart review or clinical presentation and evaluation.    Endocrine    Diabetes Evaluation  Currently treated with Actos   2-5-24: A1c 6.8    Hematology  No diagnosis or significant findings on chart review or clinical presentation and evaluation.    Caprini score 10, high risk of VTE    Transfusion Evaluation  A type and screen was obtained given the likelihood for perioperative transfusion of blood or  blood products.    Patient given information on DVT prevention.     GI    Dysphagia, esophageal cancer, & GERD  States eats soft and pureed foods. Also drinking protein shakes    Eat 10- 32  Apfel: 2 points 39% risk for post operative nausea/vomiting.     Genitourinary  No diagnosis or significant findings on chart review or clinical presentation and evaluation.    ID  No diagnosis or significant findings on chart review or clinical presentation and evaluation.    MRSA swab ordered  Chlorhexidine mouth wash and body wash ordered    Musculoskeletal  No diagnosis or significant findings on chart review or clinical presentation and evaluation.    -Preoperative medication instructions were provided and reviewed with the patient.  Any additional testing or evaluation was explained to the patient.  NPO Instructions were discussed, and the patient's questions were answered prior to conclusion of this encounter    Labs ordered:    Recent Results (from the past 168 hour(s))   POCT GLUCOSE    Collection Time: 04/01/24  8:49 AM   Result Value Ref Range    POCT Glucose 146 (H) 74 - 99 mg/dL   CBC    Collection Time: 04/02/24 10:45 AM   Result Value Ref Range    WBC 6.1 4.4 - 11.3 x10*3/uL    nRBC 0.0 0.0 - 0.0 /100 WBCs    RBC 4.46 (L) 4.50 - 5.90 x10*6/uL    Hemoglobin 14.4 13.5 - 17.5 g/dL    Hematocrit 45.2 41.0 - 52.0 %     (H) 80 - 100 fL    MCH 32.3 26.0 - 34.0 pg    MCHC 31.9 (L) 32.0 - 36.0 g/dL    RDW 14.6 (H) 11.5 - 14.5 %    Platelets 243 150 - 450 x10*3/uL   Comprehensive Metabolic Panel    Collection Time: 04/02/24 10:45 AM   Result Value Ref Range    Glucose 124 (H) 74 - 99 mg/dL    Sodium 141 136 - 145 mmol/L    Potassium 4.7 3.5 - 5.3 mmol/L    Chloride 102 98 - 107 mmol/L    Bicarbonate 29 21 - 32 mmol/L    Anion Gap 15 10 - 20 mmol/L    Urea Nitrogen 17 6 - 23 mg/dL    Creatinine 0.98 0.50 - 1.30 mg/dL    eGFR 78 >60 mL/min/1.73m*2    Calcium 9.8 8.6 - 10.6 mg/dL    Albumin 4.4 3.4 - 5.0 g/dL     Alkaline Phosphatase 40 33 - 136 U/L    Total Protein 7.1 6.4 - 8.2 g/dL    AST 12 9 - 39 U/L    Bilirubin, Total 0.4 0.0 - 1.2 mg/dL    ALT 10 10 - 52 U/L   Coagulation Screen    Collection Time: 04/02/24 10:45 AM   Result Value Ref Range    Protime 11.3 9.8 - 12.8 seconds    INR 1.0 0.9 - 1.1    aPTT 27 27 - 38 seconds   Type And Screen    Collection Time: 04/02/24 10:45 AM   Result Value Ref Range    ABO TYPE B     Rh TYPE POS     ANTIBODY SCREEN NEG    Staphylococcus aureus/MRSA colonization, Culture    Collection Time: 04/02/24 10:45 AM    Specimen: Nares/Axilla/Groin; Swab   Result Value Ref Range    Staph/MRSA Screen Culture No Staphylococcus aureus isolated    ECG 12 Lead    Collection Time: 04/03/24  2:58 PM   Result Value Ref Range    Ventricular Rate 57 BPM    Atrial Rate 57 BPM    AL Interval 152 ms    QRS Duration 96 ms    QT Interval 434 ms    QTC Calculation(Bazett) 422 ms    P Axis 49 degrees    R Axis 68 degrees    T Axis 63 degrees    QRS Count 9 beats    Q Onset 221 ms    P Onset 145 ms    P Offset 202 ms    T Offset 438 ms    QTC Fredericia 426 ms

## 2024-04-03 ENCOUNTER — HOSPITAL ENCOUNTER (OUTPATIENT)
Dept: RADIOLOGY | Facility: CLINIC | Age: 79
Discharge: HOME | End: 2024-04-03
Payer: MEDICARE

## 2024-04-03 DIAGNOSIS — C15.5 MALIGNANT NEOPLASM OF LOWER THIRD OF ESOPHAGUS (MULTI): ICD-10-CM

## 2024-04-03 PROCEDURE — 2550000001 HC RX 255 CONTRASTS: Performed by: STUDENT IN AN ORGANIZED HEALTH CARE EDUCATION/TRAINING PROGRAM

## 2024-04-03 PROCEDURE — A9575 INJ GADOTERATE MEGLUMI 0.1ML: HCPCS | Performed by: STUDENT IN AN ORGANIZED HEALTH CARE EDUCATION/TRAINING PROGRAM

## 2024-04-03 PROCEDURE — 74182 MRI ABDOMEN W/CONTRAST: CPT

## 2024-04-03 PROCEDURE — 74160 CT ABDOMEN W/CONTRAST: CPT | Performed by: RADIOLOGY

## 2024-04-03 PROCEDURE — 2550000001 HC RX 255 CONTRASTS: Performed by: THORACIC SURGERY (CARDIOTHORACIC VASCULAR SURGERY)

## 2024-04-03 PROCEDURE — 74160 CT ABDOMEN W/CONTRAST: CPT

## 2024-04-03 PROCEDURE — 93005 ELECTROCARDIOGRAM TRACING: CPT

## 2024-04-03 RX ORDER — GADOTERATE MEGLUMINE 376.9 MG/ML
0.2 INJECTION INTRAVENOUS
Status: COMPLETED | OUTPATIENT
Start: 2024-04-03 | End: 2024-04-03

## 2024-04-03 RX ADMIN — IOHEXOL 75 ML: 350 INJECTION, SOLUTION INTRAVENOUS at 11:11

## 2024-04-03 RX ADMIN — GADOTERATE MEGLUMINE 16 ML: 376.9 INJECTION INTRAVENOUS at 11:45

## 2024-04-03 NOTE — PROGRESS NOTES
Staff Physician: Irish Thompson MD  Resident Physician: Lorelei Fernandes MD  Referring Physician: Macho Manzo MD  Date of Service: 4/2/2024    RADIATION ONCOLOGY CONSULT NOTE    IDENTIFYING DATA:   Cancer Staging   Malignant neoplasm of lower third of esophagus (CMS/HCC)  Staging form: Esophagus - Adenocarcinoma, AJCC 8th Edition  - Clinical stage from 4/3/2024: Stage III (cT2, cN1, cM0) - Unsigned    Problem List Items Addressed This Visit       Malignant neoplasm of lower third of esophagus (CMS/HCC)    Relevant Orders    Referral to Radiation Oncology    MR liver w IV contrast (Completed)       Mr. 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  He presents to consider RT options.    HISTORY OF PRESENT ILLNESS:  Mr. Juan M Mcrae is accompanied by his son and partner at this visit. His workup was prompted by dysphagia symptoms with solids and liquid foods. He denies decrease in PO intake but his dysphagia causes gurgling when drinking liquids and has required him to eat more slowly. He believes that his weight has been stable during the past month.  EUS on 3/21/24 revealed T2N1 mass covering two thirds of the circumference with extension to muscularis propria 41cm-43cm from incisors at GEJ and involvement <1cm of the gastric cardia.   PET CT on 4/1/24 showed FDG avid focus c/w known GEJ adenocarcinoma with extension to gastric cardia/proximal lesser curvature, but also showed FDG avid focus in the left hepatic lobe with hypoattenuating lesion on CT correlate.  J-tube placement is scheduled for April 8, 2024 with Dr. Manzo.    PAST MEDICAL HISTORY:  Past Medical History:   Diagnosis Date    Cataract     CKD (chronic kidney disease)     Colon polyp     Deviated septum     Disorder of lipoprotein metabolism, unspecified     Elevated serum cholesterol    Dysphagia      ED (erectile dysfunction)     Esophageal cancer (CMS/[facility])     GERD (gastroesophageal reflux disease)     Hearing aid worn     HL (hearing loss)     Hyperlipidemia     Hypertension     Nephrolithiasis     Other specified diabetes mellitus without complications (CMS/[facility])     last A1c= 6.8 on 2/5/2024    Sleep apnea     wear CPAP/BiPAP    Vision loss     wears glasses     PAST SURGICAL HISTORY:  Past Surgical History:   Procedure Laterality Date    BLADDER SURGERY      COLONOSCOPY  2023    Repeat in one year    EYE SURGERY      LITHOTRIPSY      OTHER SURGICAL HISTORY      Vocal cord surgery    TRANSURETHRAL RESECTION OF PROSTATE      UPPER GASTROINTESTINAL ENDOSCOPY  03/02/2024     ALLERGIES:  Allergies   Allergen Reactions    Omeprazole Itching and Unknown    Shellfish Containing Products Unknown    Sulfa (Sulfonamide Antibiotics) Rash     MEDICATIONS:  No current facility-administered medications for this encounter.  No current outpatient medications on file.    Facility-Administered Medications Ordered in Other Encounters:     acetaminophen (Tylenol) oral liquid 650 mg, 650 mg, j-tube, q4h PRN, Brain Tamez MD    albuterol 2.5 mg /3 mL (0.083 %) nebulizer solution 2.5 mg, 2.5 mg, nebulization, Once PRN, Nestor Norris MD    [START ON 4/9/2024] atenolol (Tenormin) tablet 50 mg, 50 mg, oral, Daily, Brain Tamez MD    ceFAZolin in dextrose (iso-os) (Ancef) IVPB 2 g, 2 g, intravenous, q8h, Brain Tamez MD    HYDROmorphone (Dilaudid) injection 0.1 mg, 0.1 mg, intravenous, q5 min PRN, Nestor Norris MD    HYDROmorphone (Dilaudid) injection 0.2 mg, 0.2 mg, intravenous, q5 min PRN, Nestor Norris MD, 0.2 mg at 04/08/24 1038    labetaloL (Normodyne,Trandate) injection 5 mg, 5 mg, intravenous, Once PRN, Nestor Norris MD    lactated Ringer's infusion, 100 mL/hr, intravenous, Continuous, Nestor Norris MD, Last Rate: 100 mL/hr at 04/08/24 0955, 100 mL/hr at 04/08/24 0955    lidocaine (Xylocaine) 10  mg/mL (1 %) injection 1 mg, 0.1 mL, subcutaneous, Once, Nestor Norris MD    metoclopramide (Reglan) injection 10 mg, 10 mg, intravenous, Once PRN, Nestor Norris MD    oxyCODONE (Roxicodone) immediate release tablet 5 mg, 5 mg, oral, q4h PRN, Nestor Norris MD    oxyCODONE (Roxicodone) solution 10 mg, 10 mg, oral, q6h PRN, Brain Tamez MD    oxyCODONE (Roxicodone) solution 5 mg, 5 mg, j-tube, q6h PRN, Brain Tamez MD    oxygen (O2) therapy, , inhalation, Continuous PRN - O2/gases, Nestor Norris MD    [START ON 2024] pioglitazone (Actos) tablet 30 mg, 30 mg, oral, Daily, Brain Tamez MD    traZODone (Desyrel) tablet 100 mg, 100 mg, j-tube, Nightly, Brain Tamez MD   SOCIAL HISTORY:  Social History     Tobacco Use    Smoking status: Former     Packs/day: 2     Types: Cigarettes     Start date: 1959     Quit date: 1999     Years since quittin.2    Smokeless tobacco: Never   Substance Use Topics    Alcohol use: Yes     Alcohol/week: 2.0 standard drinks of alcohol     Types: 2 Cans of beer per week     Comment: couple beers a week     FAMILY HISTORY:  Family History   Problem Relation Name Age of Onset    Stroke Father      Kidney cancer Brother Eliu     Stroke Brother Eliu        REVIEW OF SYSTEMS:  Except for the symptoms described above, the review of systems is negative. Specifically, except as noted in the HPI, when asked the patient expressed no complaints relative to constitutional (fever, weight loss), eyes, ears, nose, mouth, throat, neurologic, cardiovascular, pulmonary, breast, GI, , skin, musculoskeletal, endocrine, hematologic/lymphatic, or immunologic systems.    RADIATION SCREENING QUESTIONS:  Prior radiation therapy: No  Pacemaker: No  Other implantable devices: No  Connective tissue disease: No    PERFORMANCE STATUS:  Karnofsky Performance Score/ECO, Able to carry on normal activity; minor signs or symptoms of disease (ECOG equivalent  "0)    PHYSICAL EXAMINATION:  /77   Pulse 62   Temp 36.7 °C (98.1 °F) (Temporal)   Resp 18   Ht 1.702 m (5' 7\")   Wt 79.4 kg (175 lb)   SpO2 98%   BMI 27.41 kg/m²   Pain score: 0/10  General: no acute distress, engaged in conversation. No jaundice.  HEENT: Normocephalic, atraumatic. Extraocular movements are intact.   Neck: supple with trachea at midline, no palpable adenopathy.   Pulmonary: Breathing comfortably on room air, no respiratory distress  Cardiovascular: Regular rate, no cyanosis, well-perfused  Abdomen: Soft, nontender, nondistended.   Extremities: No lower extremity edema or cyanosis.   Musculoskeletal: Normal range of motion. Able to raise both arms above head without issues  Skin: Without rash or obvious lesions.   Neurologic: Alert and oriented x3. Cranial nerves grossly intact.    LABORATORY AND IMAGING DATA:  Imaging: All imaging was personally reviewed and interpreted in clinic. Findings as per HPI and EMR.    Laboratory/Pathology:  All pertinent labs and pathology were personally reviewed and interpreted in clinic. Findings as per HPI and EMR.    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    PLAN:  We first discussed the PET CT results with Mr. Mcrae and his family, emphasizing that the solitary liver lesion requires further workup to determine whether it is a metastasis. We have arranged for STAT MRI of the liver and will determine whether he is a definitive trimodality therapy candidate.    In the event that the liver lesion is not a metastatic lesion, I would recommend a course of neoadjuvant chemoradiation therapy as part of curative intent, trimodality treatment. I discussed with the patient that the workup to date was diagnostic of a GEJ esophageal cancer.  I explained that while he is intended to eventually undergo surgical " resection, neoadjuvant chemoradiation has been shown to improve local disease control, margin-negative resection rate, and overall survival compared to surgery alone. I discussed that post-operative adjuvant therapy is more morbid with likely worsened efficacy. We discussed that while perioperative chemotherapy could be considered, recent data from the [facility] trial indicate that chemotherapy alone does not improve survival over the long-standing gold standard of neoadjuvant CRT and has lesser pathologic down-staging and greater rates of life-threatening infections. We then reviewed the logistics of CT simulation and delivery of once-daily treatments over six weeks.  I said I would recommend fasting for 2 hours before radiation treatments and taking an anti-emetic immediately prior to each treatment. We reviewed acute and chronic toxicities associated with radiation therapy which could include but would not be limited to: fatigue, loss of appetite, dysphagia, odynophagia, nausea, vomiting, dehydration, possible need for G-tube support of nutrition in the short term, and risk of radiation pneumonitis, scarring, and small but non-zero increased risk of cardiovascular risks in the long term.     We then discussed that there is an open, randomized clinical trial of photon (traditional Xray) radiotherapy vs. proton radiotherapy (NRG ) ongoing at our institution. We discussed that protons appear, dosimetrically, to better spare the heart and lungs, potentially reducing the risk of long-term radiotherapy associated cardiopulmonary effects. However, there is some added uncertainty around dose delivery, especially at the Analia peak (end of the beam path), and the efficacy of protons to meaningfully benefit esophageal patients remains a clinical question. We discussed their eligibility and they are going to consider trial participation.    PAIN PLAN: The patient reports their pain is well-controlled on their current  regimen.  Their pain regimen is currently managed by Radiation Oncology and Primary Care.      Thank you for the opportunity to participate in the care of this pleasant patient.    The patient was assessed and plan discussed with the attending radiation oncologist Dr. Thompson.    Lorelei Fernandes MD  PGY-2 Radiation Oncology Resident  On-call pager [phone]  Available on Epic Secure Chat

## 2024-04-04 ENCOUNTER — CLINICAL SUPPORT (OUTPATIENT)
Dept: AUDIOLOGY | Facility: CLINIC | Age: 79
End: 2024-04-04

## 2024-04-04 DIAGNOSIS — H90.3 SENSORINEURAL HEARING LOSS (SNHL) OF BOTH EARS: Primary | ICD-10-CM

## 2024-04-04 LAB
ATRIAL RATE: 57 BPM
P AXIS: 49 DEGREES
P OFFSET: 202 MS
P ONSET: 145 MS
PR INTERVAL: 152 MS
Q ONSET: 221 MS
QRS COUNT: 9 BEATS
QRS DURATION: 96 MS
QT INTERVAL: 434 MS
QTC CALCULATION(BAZETT): 422 MS
QTC FREDERICIA: 426 MS
R AXIS: 68 DEGREES
STAPHYLOCOCCUS SPEC CULT: NORMAL
T AXIS: 63 DEGREES
T OFFSET: 438 MS
VENTRICULAR RATE: 57 BPM

## 2024-04-04 NOTE — PROGRESS NOTES
AUDIOLOGY ADULT HEARING AID FITTING    Name:  Juan M Mcrae  :  1945  Age:  78 y.o.  Date of Fitting:  2024    Reason for visit: Mr. Mcrae is seen in the clinic today for a hearing aid fitting appointment.    HISTORY  Audiogram dated 2024 revealed normal hearing sensitivity through 500 Hz with a mild to moderately-severe sensorineural hearing loss in the higher frequencies in the right ear, and normal hearing sensitivity through 1500 Hz with a mild to moderately-severe sensorineural hearing loss in the higher frequencies in the left ear. Word recognition ability was good in the right ear, and excellent in the left ear.      Patient has never worn hearing aids.    HEARING AID FITTING  Hearing aids were dispensed today 24: Binaural ReSound Nexia 9 [id number] miniRIE rechargeable  in the ear (ABRAN) hearing aids in sparkling silver with 2LP receivers, small open dome on the left, small closed dome on the right, and no retention lines (right serial number [id number], left serial number [id number],  serial number [id number]). Hearing aid repair and loss/damage warranties end 04/15/2027.  repair warranty ends 04/15/2027 and  loss/damage warranty ends 04/15/2025. One year in-office service plan ends 2025. Self-pay. Not connected to smartphone per patient's request. New user. [employer].    Patient was fit with the hearing aids listed above using the hearing aid 's proprietary fitting formula during today's visit. Hearing aids are set as follows: 100% target gain, sound shaper is off, push button is set as a short push right raise left lower volume control and long push no function (extra long push is power off/on), tap control is off, and feedback manager was calibrated.    With measured real ear to  difference (RECD), real ear aided response (REAR) probe microphone measurements verified a satisfactory fit using NAL-NL2 targets for soft,  medium, and loud inputs (NAL = [employer] [facility]). Normal conversational speech was comfortable and easily understood. Extensive counseling was provided regarding the care and use of these devices. Counseling and instruction were provided at a level appropriate for patient's age and sophistication level.    Patient did well operating the hearing aids and expressed understanding of the care and use. Patient performed an adequate return demonstration of insertion, removal, charging, cleaning, and operation of the hearing aids. Hearing aids were NOT paired to the patient's cell phone today per the patient's request. The following educational materials were provided: instructional brochures.    Patient expressed satisfaction with the sound quality and physical fit of the devices. Offered to schedule a follow-up appointment in 2-3 weeks to assess progress with the hearing aids; however, patient deferred. (He is about to begin cancer treatment and is not sure how easily he will be able to get to the clinic for hearing aid appointments.) Instructed to contact me should there be any additional questions, or any problems arise. (Explained that, although it is better if I see him, I am willing to see a representative with the hearing aids if needed in the short term.) Recommend annual audiologic evaluation and annual appointment for routine hearing aid maintenance, sooner if needed.    IMPRESSIONS  Hearing aids were fit today. Prognosis with the hearing aids is good.    RECOMMENDATIONS  - Wear hearing aids.- Contact the clinic if questions/problems arise.  - Annual audiologic evaluation, sooner if an acute change is noted.  - Follow-up with audiology annually for routine hearing aid maintenance, sooner if needed.   - Follow up with otolaryngology as planned.  - Follow-up with medical care team as planned.    3yy game platformt reminder message scheduled? Yes.    PATIENT EDUCATION  Discussed results, impressions and  recommendations with the patient. Questions were addressed and the patient was encouraged to contact our office should concerns arise.    CHARGE CAPTURE  $300 + $3080 = TOTAL of $3380. Paper encounter form utilized and submitted to  for payment processing.    Time for this encounter: 6618-1378    Tiny Nazario, CCC-A  Licensed Audiologist

## 2024-04-05 ENCOUNTER — ANESTHESIA EVENT (OUTPATIENT)
Dept: OPERATING ROOM | Facility: HOSPITAL | Age: 79
End: 2024-04-05
Payer: MEDICARE

## 2024-04-08 ENCOUNTER — APPOINTMENT (OUTPATIENT)
Dept: CARDIOLOGY | Facility: HOSPITAL | Age: 79
End: 2024-04-08
Payer: MEDICARE

## 2024-04-08 ENCOUNTER — HOSPITAL ENCOUNTER (OUTPATIENT)
Facility: HOSPITAL | Age: 79
LOS: 1 days | Discharge: HOME HEALTH | End: 2024-04-09
Attending: THORACIC SURGERY (CARDIOTHORACIC VASCULAR SURGERY) | Admitting: THORACIC SURGERY (CARDIOTHORACIC VASCULAR SURGERY)
Payer: MEDICARE

## 2024-04-08 ENCOUNTER — ANESTHESIA (OUTPATIENT)
Dept: OPERATING ROOM | Facility: HOSPITAL | Age: 79
End: 2024-04-08
Payer: MEDICARE

## 2024-04-08 DIAGNOSIS — C15.5 MALIGNANT NEOPLASM OF LOWER THIRD OF ESOPHAGUS (MULTI): Primary | ICD-10-CM

## 2024-04-08 LAB
GLUCOSE BLD MANUAL STRIP-MCNC: 106 MG/DL (ref 74–99)
GLUCOSE BLD MANUAL STRIP-MCNC: 135 MG/DL (ref 74–99)
GLUCOSE BLD MANUAL STRIP-MCNC: 222 MG/DL (ref 74–99)

## 2024-04-08 PROCEDURE — 7100000001 HC RECOVERY ROOM TIME - INITIAL BASE CHARGE: Performed by: THORACIC SURGERY (CARDIOTHORACIC VASCULAR SURGERY)

## 2024-04-08 PROCEDURE — 2500000004 HC RX 250 GENERAL PHARMACY W/ HCPCS (ALT 636 FOR OP/ED): Performed by: STUDENT IN AN ORGANIZED HEALTH CARE EDUCATION/TRAINING PROGRAM

## 2024-04-08 PROCEDURE — 88112 CYTOPATH CELL ENHANCE TECH: CPT | Performed by: PATHOLOGY

## 2024-04-08 PROCEDURE — 3600000003 HC OR TIME - INITIAL BASE CHARGE - PROCEDURE LEVEL THREE: Performed by: THORACIC SURGERY (CARDIOTHORACIC VASCULAR SURGERY)

## 2024-04-08 PROCEDURE — 3700000001 HC GENERAL ANESTHESIA TIME - INITIAL BASE CHARGE: Performed by: THORACIC SURGERY (CARDIOTHORACIC VASCULAR SURGERY)

## 2024-04-08 PROCEDURE — 3700000002 HC GENERAL ANESTHESIA TIME - EACH INCREMENTAL 1 MINUTE: Performed by: THORACIC SURGERY (CARDIOTHORACIC VASCULAR SURGERY)

## 2024-04-08 PROCEDURE — 2500000004 HC RX 250 GENERAL PHARMACY W/ HCPCS (ALT 636 FOR OP/ED): Performed by: THORACIC SURGERY (CARDIOTHORACIC VASCULAR SURGERY)

## 2024-04-08 PROCEDURE — 3600000008 HC OR TIME - EACH INCREMENTAL 1 MINUTE - PROCEDURE LEVEL THREE: Performed by: THORACIC SURGERY (CARDIOTHORACIC VASCULAR SURGERY)

## 2024-04-08 PROCEDURE — 96372 THER/PROPH/DIAG INJ SC/IM: CPT | Performed by: STUDENT IN AN ORGANIZED HEALTH CARE EDUCATION/TRAINING PROGRAM

## 2024-04-08 PROCEDURE — 2720000007 HC OR 272 NO HCPCS: Performed by: THORACIC SURGERY (CARDIOTHORACIC VASCULAR SURGERY)

## 2024-04-08 PROCEDURE — 2500000005 HC RX 250 GENERAL PHARMACY W/O HCPCS: Performed by: STUDENT IN AN ORGANIZED HEALTH CARE EDUCATION/TRAINING PROGRAM

## 2024-04-08 PROCEDURE — 44300 OPEN BOWEL TO SKIN: CPT | Performed by: THORACIC SURGERY (CARDIOTHORACIC VASCULAR SURGERY)

## 2024-04-08 PROCEDURE — 93005 ELECTROCARDIOGRAM TRACING: CPT | Mod: 59

## 2024-04-08 PROCEDURE — 2500000001 HC RX 250 WO HCPCS SELF ADMINISTERED DRUGS (ALT 637 FOR MEDICARE OP): Performed by: STUDENT IN AN ORGANIZED HEALTH CARE EDUCATION/TRAINING PROGRAM

## 2024-04-08 PROCEDURE — 88112 CYTOPATH CELL ENHANCE TECH: CPT | Mod: TC,MCY,91 | Performed by: THORACIC SURGERY (CARDIOTHORACIC VASCULAR SURGERY)

## 2024-04-08 PROCEDURE — 1100000001 HC PRIVATE ROOM DAILY

## 2024-04-08 PROCEDURE — 82947 ASSAY GLUCOSE BLOOD QUANT: CPT

## 2024-04-08 PROCEDURE — 31622 DX BRONCHOSCOPE/WASH: CPT | Performed by: THORACIC SURGERY (CARDIOTHORACIC VASCULAR SURGERY)

## 2024-04-08 PROCEDURE — A4217 STERILE WATER/SALINE, 500 ML: HCPCS | Performed by: THORACIC SURGERY (CARDIOTHORACIC VASCULAR SURGERY)

## 2024-04-08 PROCEDURE — 43235 EGD DIAGNOSTIC BRUSH WASH: CPT | Performed by: THORACIC SURGERY (CARDIOTHORACIC VASCULAR SURGERY)

## 2024-04-08 PROCEDURE — 2500000005 HC RX 250 GENERAL PHARMACY W/O HCPCS: Performed by: THORACIC SURGERY (CARDIOTHORACIC VASCULAR SURGERY)

## 2024-04-08 PROCEDURE — 2500000004 HC RX 250 GENERAL PHARMACY W/ HCPCS (ALT 636 FOR OP/ED): Mod: JZ | Performed by: STUDENT IN AN ORGANIZED HEALTH CARE EDUCATION/TRAINING PROGRAM

## 2024-04-08 PROCEDURE — 7100000002 HC RECOVERY ROOM TIME - EACH INCREMENTAL 1 MINUTE: Performed by: THORACIC SURGERY (CARDIOTHORACIC VASCULAR SURGERY)

## 2024-04-08 RX ORDER — OXYCODONE HYDROCHLORIDE 5 MG/1
5 TABLET ORAL EVERY 4 HOURS PRN
Status: DISCONTINUED | OUTPATIENT
Start: 2024-04-08 | End: 2024-04-08 | Stop reason: HOSPADM

## 2024-04-08 RX ORDER — HYDROMORPHONE HYDROCHLORIDE 1 MG/ML
0.2 INJECTION, SOLUTION INTRAMUSCULAR; INTRAVENOUS; SUBCUTANEOUS EVERY 5 MIN PRN
Status: DISCONTINUED | OUTPATIENT
Start: 2024-04-08 | End: 2024-04-08 | Stop reason: HOSPADM

## 2024-04-08 RX ORDER — BUPIVACAINE HCL/EPINEPHRINE 0.25-.0005
VIAL (ML) INJECTION AS NEEDED
Status: DISCONTINUED | OUTPATIENT
Start: 2024-04-08 | End: 2024-04-08 | Stop reason: HOSPADM

## 2024-04-08 RX ORDER — CEFAZOLIN SODIUM 2 G/100ML
2 INJECTION, SOLUTION INTRAVENOUS EVERY 8 HOURS
Status: COMPLETED | OUTPATIENT
Start: 2024-04-08 | End: 2024-04-09

## 2024-04-08 RX ORDER — ROCURONIUM BROMIDE 10 MG/ML
INJECTION, SOLUTION INTRAVENOUS AS NEEDED
Status: DISCONTINUED | OUTPATIENT
Start: 2024-04-08 | End: 2024-04-08

## 2024-04-08 RX ORDER — TRAZODONE HYDROCHLORIDE 100 MG/1
100 TABLET ORAL NIGHTLY
Status: DISCONTINUED | OUTPATIENT
Start: 2024-04-08 | End: 2024-04-09

## 2024-04-08 RX ORDER — PROPOFOL 10 MG/ML
INJECTION, EMULSION INTRAVENOUS AS NEEDED
Status: DISCONTINUED | OUTPATIENT
Start: 2024-04-08 | End: 2024-04-08

## 2024-04-08 RX ORDER — SODIUM CHLORIDE, SODIUM LACTATE, POTASSIUM CHLORIDE, CALCIUM CHLORIDE 600; 310; 30; 20 MG/100ML; MG/100ML; MG/100ML; MG/100ML
INJECTION, SOLUTION INTRAVENOUS CONTINUOUS PRN
Status: DISCONTINUED | OUTPATIENT
Start: 2024-04-08 | End: 2024-04-08

## 2024-04-08 RX ORDER — ACETAMINOPHEN 160 MG/5ML
650 SOLUTION ORAL EVERY 4 HOURS PRN
Status: DISCONTINUED | OUTPATIENT
Start: 2024-04-08 | End: 2024-04-09 | Stop reason: HOSPADM

## 2024-04-08 RX ORDER — FAMOTIDINE 10 MG/ML
20 INJECTION INTRAVENOUS ONCE
Status: COMPLETED | OUTPATIENT
Start: 2024-04-08 | End: 2024-04-08

## 2024-04-08 RX ORDER — DEXAMETHASONE SODIUM PHOSPHATE 100 MG/10ML
INJECTION INTRAMUSCULAR; INTRAVENOUS AS NEEDED
Status: DISCONTINUED | OUTPATIENT
Start: 2024-04-08 | End: 2024-04-08

## 2024-04-08 RX ORDER — ESMOLOL HYDROCHLORIDE 10 MG/ML
INJECTION INTRAVENOUS AS NEEDED
Status: DISCONTINUED | OUTPATIENT
Start: 2024-04-08 | End: 2024-04-08

## 2024-04-08 RX ORDER — HYDROMORPHONE HYDROCHLORIDE 1 MG/ML
INJECTION, SOLUTION INTRAMUSCULAR; INTRAVENOUS; SUBCUTANEOUS AS NEEDED
Status: DISCONTINUED | OUTPATIENT
Start: 2024-04-08 | End: 2024-04-08

## 2024-04-08 RX ORDER — HYDROMORPHONE HYDROCHLORIDE 1 MG/ML
0.1 INJECTION, SOLUTION INTRAMUSCULAR; INTRAVENOUS; SUBCUTANEOUS EVERY 5 MIN PRN
Status: DISCONTINUED | OUTPATIENT
Start: 2024-04-08 | End: 2024-04-08 | Stop reason: HOSPADM

## 2024-04-08 RX ORDER — ALBUTEROL SULFATE 0.83 MG/ML
2.5 SOLUTION RESPIRATORY (INHALATION) ONCE AS NEEDED
Status: DISCONTINUED | OUTPATIENT
Start: 2024-04-08 | End: 2024-04-08 | Stop reason: HOSPADM

## 2024-04-08 RX ORDER — SODIUM CHLORIDE, SODIUM LACTATE, POTASSIUM CHLORIDE, CALCIUM CHLORIDE 600; 310; 30; 20 MG/100ML; MG/100ML; MG/100ML; MG/100ML
100 INJECTION, SOLUTION INTRAVENOUS CONTINUOUS
Status: DISCONTINUED | OUTPATIENT
Start: 2024-04-08 | End: 2024-04-08 | Stop reason: HOSPADM

## 2024-04-08 RX ORDER — ATENOLOL 50 MG/1
50 TABLET ORAL DAILY
Status: DISCONTINUED | OUTPATIENT
Start: 2024-04-09 | End: 2024-04-09 | Stop reason: HOSPADM

## 2024-04-08 RX ORDER — LIDOCAINE HYDROCHLORIDE 20 MG/ML
INJECTION, SOLUTION INFILTRATION; PERINEURAL AS NEEDED
Status: DISCONTINUED | OUTPATIENT
Start: 2024-04-08 | End: 2024-04-08

## 2024-04-08 RX ORDER — METOCLOPRAMIDE HYDROCHLORIDE 5 MG/ML
10 INJECTION INTRAMUSCULAR; INTRAVENOUS ONCE AS NEEDED
Status: DISCONTINUED | OUTPATIENT
Start: 2024-04-08 | End: 2024-04-08 | Stop reason: HOSPADM

## 2024-04-08 RX ORDER — OXYCODONE HCL 5 MG/5 ML
5 SOLUTION, ORAL ORAL EVERY 6 HOURS PRN
Status: DISCONTINUED | OUTPATIENT
Start: 2024-04-08 | End: 2024-04-09 | Stop reason: HOSPADM

## 2024-04-08 RX ORDER — PIOGLITAZONEHYDROCHLORIDE 15 MG/1
30 TABLET ORAL DAILY
Status: DISCONTINUED | OUTPATIENT
Start: 2024-04-09 | End: 2024-04-09 | Stop reason: HOSPADM

## 2024-04-08 RX ORDER — LABETALOL HYDROCHLORIDE 5 MG/ML
5 INJECTION, SOLUTION INTRAVENOUS ONCE AS NEEDED
Status: DISCONTINUED | OUTPATIENT
Start: 2024-04-08 | End: 2024-04-08 | Stop reason: HOSPADM

## 2024-04-08 RX ORDER — NALOXONE HYDROCHLORIDE 0.4 MG/ML
0.2 INJECTION, SOLUTION INTRAMUSCULAR; INTRAVENOUS; SUBCUTANEOUS EVERY 5 MIN PRN
Status: DISCONTINUED | OUTPATIENT
Start: 2024-04-08 | End: 2024-04-09 | Stop reason: HOSPADM

## 2024-04-08 RX ORDER — SUCCINYLCHOLINE CHLORIDE 100 MG/5ML
SYRINGE (ML) INTRAVENOUS AS NEEDED
Status: DISCONTINUED | OUTPATIENT
Start: 2024-04-08 | End: 2024-04-08

## 2024-04-08 RX ORDER — NORETHINDRONE AND ETHINYL ESTRADIOL 0.5-0.035
KIT ORAL CONTINUOUS PRN
Status: DISCONTINUED | OUTPATIENT
Start: 2024-04-08 | End: 2024-04-08

## 2024-04-08 RX ORDER — GLYCOPYRROLATE 0.2 MG/ML
INJECTION INTRAMUSCULAR; INTRAVENOUS AS NEEDED
Status: DISCONTINUED | OUTPATIENT
Start: 2024-04-08 | End: 2024-04-08

## 2024-04-08 RX ORDER — CEFAZOLIN 1 G/1
INJECTION, POWDER, FOR SOLUTION INTRAVENOUS AS NEEDED
Status: DISCONTINUED | OUTPATIENT
Start: 2024-04-08 | End: 2024-04-08

## 2024-04-08 RX ORDER — LIDOCAINE HYDROCHLORIDE 10 MG/ML
0.1 INJECTION INFILTRATION; PERINEURAL ONCE
Status: DISCONTINUED | OUTPATIENT
Start: 2024-04-08 | End: 2024-04-08 | Stop reason: HOSPADM

## 2024-04-08 RX ORDER — HEPARIN SODIUM 5000 [USP'U]/ML
INJECTION, SOLUTION INTRAVENOUS; SUBCUTANEOUS AS NEEDED
Status: DISCONTINUED | OUTPATIENT
Start: 2024-04-08 | End: 2024-04-08

## 2024-04-08 RX ORDER — OXYCODONE HCL 5 MG/5 ML
10 SOLUTION, ORAL ORAL EVERY 6 HOURS PRN
Status: DISCONTINUED | OUTPATIENT
Start: 2024-04-08 | End: 2024-04-09 | Stop reason: HOSPADM

## 2024-04-08 RX ORDER — HEPARIN SODIUM 5000 [USP'U]/ML
5000 INJECTION, SOLUTION INTRAVENOUS; SUBCUTANEOUS EVERY 8 HOURS
Status: DISCONTINUED | OUTPATIENT
Start: 2024-04-08 | End: 2024-04-09 | Stop reason: HOSPADM

## 2024-04-08 RX ORDER — ONDANSETRON HYDROCHLORIDE 2 MG/ML
INJECTION, SOLUTION INTRAVENOUS AS NEEDED
Status: DISCONTINUED | OUTPATIENT
Start: 2024-04-08 | End: 2024-04-08

## 2024-04-08 RX ORDER — SODIUM CHLORIDE 0.9 G/100ML
IRRIGANT IRRIGATION AS NEEDED
Status: DISCONTINUED | OUTPATIENT
Start: 2024-04-08 | End: 2024-04-08 | Stop reason: HOSPADM

## 2024-04-08 RX ADMIN — ESMOLOL HYDROCHLORIDE 30 MG: 10 INJECTION, SOLUTION INTRAVENOUS at 08:28

## 2024-04-08 RX ADMIN — CEFAZOLIN SODIUM 2 G: 2 INJECTION, SOLUTION INTRAVENOUS at 15:34

## 2024-04-08 RX ADMIN — HYDROMORPHONE HYDROCHLORIDE 0.2 MG: 1 INJECTION, SOLUTION INTRAMUSCULAR; INTRAVENOUS; SUBCUTANEOUS at 09:17

## 2024-04-08 RX ADMIN — HEPARIN SODIUM 5000 UNITS: 5000 INJECTION INTRAVENOUS; SUBCUTANEOUS at 21:23

## 2024-04-08 RX ADMIN — OXYCODONE HYDROCHLORIDE 10 MG: 5 SOLUTION ORAL at 14:39

## 2024-04-08 RX ADMIN — DEXAMETHASONE SODIUM PHOSPHATE 4 MG: 10 INJECTION INTRAMUSCULAR; INTRAVENOUS at 07:53

## 2024-04-08 RX ADMIN — CEFAZOLIN 2 G: 1 INJECTION, POWDER, FOR SOLUTION INTRAMUSCULAR; INTRAVENOUS at 07:51

## 2024-04-08 RX ADMIN — SUGAMMADEX 200 MG: 100 INJECTION, SOLUTION INTRAVENOUS at 09:43

## 2024-04-08 RX ADMIN — GLYCOPYRROLATE 0.2 MG: 0.2 INJECTION, SOLUTION INTRAMUSCULAR; INTRAVENOUS at 08:21

## 2024-04-08 RX ADMIN — GLYCOPYRROLATE 0.2 MG: 0.2 INJECTION, SOLUTION INTRAMUSCULAR; INTRAVENOUS at 08:09

## 2024-04-08 RX ADMIN — LIDOCAINE HYDROCHLORIDE 100 MG: 20 INJECTION, SOLUTION INFILTRATION; PERINEURAL at 07:45

## 2024-04-08 RX ADMIN — ROCURONIUM BROMIDE 10 MG: 10 INJECTION INTRAVENOUS at 08:43

## 2024-04-08 RX ADMIN — OXYCODONE HYDROCHLORIDE 10 MG: 5 SOLUTION ORAL at 21:22

## 2024-04-08 RX ADMIN — HYDROMORPHONE HYDROCHLORIDE 0.2 MG: 1 INJECTION, SOLUTION INTRAMUSCULAR; INTRAVENOUS; SUBCUTANEOUS at 10:27

## 2024-04-08 RX ADMIN — HYDROMORPHONE HYDROCHLORIDE 0.2 MG: 1 INJECTION, SOLUTION INTRAMUSCULAR; INTRAVENOUS; SUBCUTANEOUS at 10:38

## 2024-04-08 RX ADMIN — Medication 140 MG: at 07:45

## 2024-04-08 RX ADMIN — ROCURONIUM BROMIDE 10 MG: 10 INJECTION INTRAVENOUS at 07:55

## 2024-04-08 RX ADMIN — FAMOTIDINE 20 MG: 10 INJECTION INTRAVENOUS at 10:09

## 2024-04-08 RX ADMIN — TRAZODONE HYDROCHLORIDE 100 MG: 100 TABLET ORAL at 21:23

## 2024-04-08 RX ADMIN — PROPOFOL 150 MG: 10 INJECTION, EMULSION INTRAVENOUS at 07:45

## 2024-04-08 RX ADMIN — HYDROMORPHONE HYDROCHLORIDE 0.2 MG: 1 INJECTION, SOLUTION INTRAMUSCULAR; INTRAVENOUS; SUBCUTANEOUS at 10:20

## 2024-04-08 RX ADMIN — ROCURONIUM BROMIDE 20 MG: 10 INJECTION INTRAVENOUS at 08:17

## 2024-04-08 RX ADMIN — ONDANSETRON 4 MG: 2 INJECTION INTRAMUSCULAR; INTRAVENOUS at 09:31

## 2024-04-08 RX ADMIN — SODIUM CHLORIDE, POTASSIUM CHLORIDE, SODIUM LACTATE AND CALCIUM CHLORIDE: 600; 310; 30; 20 INJECTION, SOLUTION INTRAVENOUS at 07:33

## 2024-04-08 RX ADMIN — PROPOFOL 50 MG: 10 INJECTION, EMULSION INTRAVENOUS at 07:55

## 2024-04-08 RX ADMIN — ROCURONIUM BROMIDE 10 MG: 10 INJECTION INTRAVENOUS at 09:02

## 2024-04-08 RX ADMIN — CEFAZOLIN SODIUM 2 G: 2 INJECTION, SOLUTION INTRAVENOUS at 23:34

## 2024-04-08 RX ADMIN — HEPARIN SODIUM 5000 UNITS: 5000 INJECTION, SOLUTION INTRAVENOUS; SUBCUTANEOUS at 07:54

## 2024-04-08 RX ADMIN — HEPARIN SODIUM 5000 UNITS: 5000 INJECTION INTRAVENOUS; SUBCUTANEOUS at 15:34

## 2024-04-08 SDOH — SOCIAL STABILITY: SOCIAL INSECURITY: ARE YOU OR HAVE YOU BEEN THREATENED OR ABUSED PHYSICALLY, EMOTIONALLY, OR SEXUALLY BY ANYONE?: NO

## 2024-04-08 SDOH — HEALTH STABILITY: MENTAL HEALTH: EXPERIENCED ANY OF THE FOLLOWING LIFE EVENTS: LIFE-THREATENING ILLNESS OR INJURY

## 2024-04-08 SDOH — SOCIAL STABILITY: SOCIAL INSECURITY: ARE THERE ANY APPARENT SIGNS OF INJURIES/BEHAVIORS THAT COULD BE RELATED TO ABUSE/NEGLECT?: NO

## 2024-04-08 SDOH — HEALTH STABILITY: MENTAL HEALTH: CURRENT SMOKER: 0

## 2024-04-08 SDOH — SOCIAL STABILITY: SOCIAL INSECURITY: ABUSE: ADULT

## 2024-04-08 SDOH — SOCIAL STABILITY: SOCIAL INSECURITY: HAVE YOU HAD THOUGHTS OF HARMING ANYONE ELSE?: NO

## 2024-04-08 SDOH — SOCIAL STABILITY: SOCIAL INSECURITY: WERE YOU ABLE TO COMPLETE ALL THE BEHAVIORAL HEALTH SCREENINGS?: YES

## 2024-04-08 SDOH — SOCIAL STABILITY: SOCIAL INSECURITY: DOES ANYONE TRY TO KEEP YOU FROM HAVING/CONTACTING OTHER FRIENDS OR DOING THINGS OUTSIDE YOUR HOME?: NO

## 2024-04-08 SDOH — SOCIAL STABILITY: SOCIAL INSECURITY: DO YOU FEEL ANYONE HAS EXPLOITED OR TAKEN ADVANTAGE OF YOU FINANCIALLY OR OF YOUR PERSONAL PROPERTY?: NO

## 2024-04-08 SDOH — SOCIAL STABILITY: SOCIAL INSECURITY: HAS ANYONE EVER THREATENED TO HURT YOUR FAMILY OR YOUR PETS?: NO

## 2024-04-08 SDOH — SOCIAL STABILITY: SOCIAL INSECURITY: DO YOU FEEL UNSAFE GOING BACK TO THE PLACE WHERE YOU ARE LIVING?: NO

## 2024-04-08 ASSESSMENT — COGNITIVE AND FUNCTIONAL STATUS - GENERAL
DRESSING REGULAR UPPER BODY CLOTHING: A LOT
MOBILITY SCORE: 18
WALKING IN HOSPITAL ROOM: A LITTLE
CLIMB 3 TO 5 STEPS WITH RAILING: A LITTLE
TURNING FROM BACK TO SIDE WHILE IN FLAT BAD: A LITTLE
STANDING UP FROM CHAIR USING ARMS: A LITTLE
MOVING TO AND FROM BED TO CHAIR: A LITTLE
PATIENT BASELINE BEDBOUND: NO
DAILY ACTIVITIY SCORE: 18
TOILETING: A LITTLE
DRESSING REGULAR LOWER BODY CLOTHING: A LOT
HELP NEEDED FOR BATHING: A LITTLE
MOVING FROM LYING ON BACK TO SITTING ON SIDE OF FLAT BED WITH BEDRAILS: A LITTLE

## 2024-04-08 ASSESSMENT — ACTIVITIES OF DAILY LIVING (ADL)
TOILETING: NEEDS ASSISTANCE
JUDGMENT_ADEQUATE_SAFELY_COMPLETE_DAILY_ACTIVITIES: YES
DRESSING YOURSELF: NEEDS ASSISTANCE
PATIENT'S MEMORY ADEQUATE TO SAFELY COMPLETE DAILY ACTIVITIES?: YES
WALKS IN HOME: NEEDS ASSISTANCE
GROOMING: NEEDS ASSISTANCE
ADEQUATE_TO_COMPLETE_ADL: YES
HEARING - RIGHT EAR: FUNCTIONAL
HEARING - LEFT EAR: FUNCTIONAL
FEEDING YOURSELF: NEEDS ASSISTANCE
LACK_OF_TRANSPORTATION: NO
BATHING: NEEDS ASSISTANCE
LACK_OF_TRANSPORTATION: NO

## 2024-04-08 ASSESSMENT — PAIN SCALES - GENERAL
PAINLEVEL_OUTOF10: 7
PAINLEVEL_OUTOF10: 2
PAINLEVEL_OUTOF10: 2
PAINLEVEL_OUTOF10: 7
PAINLEVEL_OUTOF10: 0 - NO PAIN
PAINLEVEL_OUTOF10: 7
PAINLEVEL_OUTOF10: 0 - NO PAIN
PAIN_LEVEL: 0
PAINLEVEL_OUTOF10: 5 - MODERATE PAIN
PAINLEVEL_OUTOF10: 2
PAINLEVEL_OUTOF10: 3
PAINLEVEL_OUTOF10: 7

## 2024-04-08 ASSESSMENT — PAIN - FUNCTIONAL ASSESSMENT
PAIN_FUNCTIONAL_ASSESSMENT: 0-10

## 2024-04-08 ASSESSMENT — LIFESTYLE VARIABLES
AUDIT-C TOTAL SCORE: 7
PRESCIPTION_ABUSE_PAST_12_MONTHS: NO
HOW OFTEN DO YOU HAVE A DRINK CONTAINING ALCOHOL: 2-3 TIMES A WEEK
HOW OFTEN DURING THE LAST YEAR HAVE YOU FAILED TO DO WHAT WAS NORMALLY EXPECTED FROM YOU BECAUSE OF DRINKING: NEVER
HOW OFTEN DURING THE LAST YEAR HAVE YOU HAD A FEELING OF GUILT OR REMORSE AFTER DRINKING: NEVER
HOW OFTEN DURING THE LAST YEAR HAVE YOU NEEDED AN ALCOHOLIC DRINK FIRST THING IN THE MORNING TO GET YOURSELF GOING AFTER A NIGHT OF HEAVY DRINKING: NEVER
SUBSTANCE_ABUSE_PAST_12_MONTHS: NO
HAS A RELATIVE, FRIEND, DOCTOR, OR ANOTHER HEALTH PROFESSIONAL EXPRESSED CONCERN ABOUT YOUR DRINKING OR SUGGESTED YOU CUT DOWN: NO
AUDIT TOTAL SCORE: 0
SKIP TO QUESTIONS 9-10: 0
HOW MANY STANDARD DRINKS CONTAINING ALCOHOL DO YOU HAVE ON A TYPICAL DAY: 5 OR 6
HAVE YOU OR SOMEONE ELSE BEEN INJURED AS A RESULT OF YOUR DRINKING: NO
HOW OFTEN DO YOU HAVE 6 OR MORE DRINKS ON ONE OCCASION: MONTHLY
HOW OFTEN DURING THE LAST YEAR HAVE YOU FOUND THAT YOU WERE NOT ABLE TO STOP DRINKING ONCE YOU HAD STARTED: NEVER
AUDIT-C TOTAL SCORE: 7
AUDIT TOTAL SCORE: 7
HOW OFTEN DURING THE LAST YEAR HAVE YOU BEEN UNABLE TO REMEMBER WHAT HAPPENED THE NIGHT BEFORE BECAUSE YOU HAD BEEN DRINKING: NEVER

## 2024-04-08 ASSESSMENT — PATIENT HEALTH QUESTIONNAIRE - PHQ9
2. FEELING DOWN, DEPRESSED OR HOPELESS: NOT AT ALL
SUM OF ALL RESPONSES TO PHQ9 QUESTIONS 1 & 2: 0
1. LITTLE INTEREST OR PLEASURE IN DOING THINGS: NOT AT ALL

## 2024-04-08 ASSESSMENT — COLUMBIA-SUICIDE SEVERITY RATING SCALE - C-SSRS
6. HAVE YOU EVER DONE ANYTHING, STARTED TO DO ANYTHING, OR PREPARED TO DO ANYTHING TO END YOUR LIFE?: NO
2. HAVE YOU ACTUALLY HAD ANY THOUGHTS OF KILLING YOURSELF?: NO
1. IN THE PAST MONTH, HAVE YOU WISHED YOU WERE DEAD OR WISHED YOU COULD GO TO SLEEP AND NOT WAKE UP?: NO

## 2024-04-08 ASSESSMENT — PAIN DESCRIPTION - DESCRIPTORS
DESCRIPTORS: SORE

## 2024-04-08 NOTE — PROGRESS NOTES
"Juan M Mcrae is a 78 y.o. 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  Met with patient to introduce myself, role and discuss discharge planning.  Patient lives with significant other.  Independent in all adl's.  Requires no assist devices for mobility.  Patient denies active home care or home care needs.  Patient will have a ride at discharge.  Patient has expressed no questions and no social work needs.    PCP:  Candy Grossman  Pharmacy:  [facility]  Home Care:  N/A  DME:  N/A      Physical Exam    Last Recorded Vitals  Blood pressure 127/66, pulse 69, temperature 36.3 °C (97.3 °F), temperature source Temporal, resp. rate 18, height 1.676 m (5' 6\"), weight 77.6 kg (171 lb), SpO2 92 %.  Intake/Output last 3 Shifts:  No intake/output data recorded.          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

## 2024-04-08 NOTE — ANESTHESIA PROCEDURE NOTES
Airway  Date/Time: 4/8/2024 7:47 AM  Urgency: elective    Airway not difficult    Staffing  Performed: resident   Authorized by: Raciel Estevez MD    Performed by: Nestor Norris MD  Patient location during procedure: OR    Indications and Patient Condition  Indications for airway management: anesthesia  Spontaneous Ventilation: absent  Sedation level: deep  Patient position: sniffing  MILS maintained throughout  Mask difficulty assessment: 1 - vent by mask  Planned trial extubation    Final Airway Details  Final airway type: endotracheal airway      Successful airway: ETT  Cuffed: yes   Successful intubation technique: direct laryngoscopy  Facilitating devices/methods: intubating stylet  Endotracheal tube insertion site: oral  Blade: Markell  Blade size: #4  ETT size (mm): 8.0  Cormack-Lehane Classification: grade IIa - partial view of glottis  Placement verified by: chest auscultation, bronchoscopy and capnometry   Measured from: lips  ETT to lips (cm): 22

## 2024-04-08 NOTE — OP NOTE
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) [C15.5]     Procedures  Bronchoscopy  Esophagoscopy  Laparoscopy with peritoneal exploration and washings  Mini laparotomy and jtube      Surgeons      * Macho Manzo - Primary    Resident/Fellow/Other Assistant:  Surgeon(s) and Role:     * Brain Tamez MD - Resident - Assisting    Procedure Summary  Anesthesia: General  ASA: III  Anesthesia Staff: Anesthesiologist: Raciel Estevez MD  Anesthesia Resident: Nestor Norris MD  Estimated Blood Loss: 20mL  Intra-op Medications:   Administrations occurring from 0715 to 0935 on 24:   Medication Name Total Dose   sodium chloride 0.9 % irrigation solution 1,000 mL   BUPivacaine-EPINEPHrine (Marcaine w/EPI) 0.25 %-1:200,000 injection 23 mL              Anesthesia Record               Intraprocedure I/O Totals       None           Specimen:   ID Type Source Tests Collected by Time   1 : ABDOMINAL WASHINGS Non-Gynecologic Cytology PERITONEAL FLUID RIGHT UPPER QUADRANT CYTOLOGY CONSULTATION (NON-GYNECOLOGIC) Macho Manzo MD 2024 0842        Staff:   Circulator: Nati Mistry RN; Essie Gross RN  Scrub Person: Maribell Rodas         Drains and/or Catheters:   Gastrostomy/Enterostomy Jejunostomy 1 14 Fr. LUQ (Active)   Surrounding Skin Dry;Intact 24 0852   Dressing Status Clean;Dry 24 0852           Findings: No evidence of peritoneal spread.  Tiny serosal nodule just distal to the GE junction.    Indications: Juan M Mcrae is an 78 y.o. male who presented with dysphagia and profound weight loss.  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  He is advised undergo staging endoscopies and laparoscopy as well as J-tube insertion  The patient was seen in the preoperative area. The risks, benefits, complications, treatment options, non-operative alternatives, expected recovery and outcomes were discussed with the patient. The possibilities of reaction to medication, pulmonary aspiration, injury to surrounding structures, bleeding, recurrent infection, the need for additional procedures, failure to diagnose a condition, and creating a complication requiring transfusion or operation were discussed with the patient. The patient concurred with the proposed plan, giving informed consent.  The site of surgery was properly noted/marked if necessary per policy. The patient has been actively warmed in preoperative area. Preoperative antibiotics have been ordered and given within 1 hours of incision. Venous thrombosis prophylaxis have been ordered including bilateral sequential compression devices and chemical prophylaxis    Procedure Details: After satisfactory induction of general trach tube anesthesia bronchoscopy was performed which was normal in anatomy and showed no endobronchial lesions.  Specifically there is no invasion of the membranous left main bronchus or trachea.  Upper endoscopy were performed initially with an adult scope.  The upper esophagus were normal.  At approximately 39 cm tumor was seen this involved the GE junction and was nearly structuring.  The adult scope would not pass.  Next a pediatric scope was inserted attempts were made with passage of the GE junction and again this was not possible.  The patient was then prepped and draped in usual sterile fashion.  Using an optical view camera a 5 mm incision was placed in the left upper quadrant paramedian position.  Under direct vision a 0 degree laparoscope was inserted without difficulty.  The abdomen was insufflated.  Under direct vision a second 5 mm port  was placed in right upper quadrant.  Laparoscopy showed normal-appearing serosal surfaces.  A hiatal hernia was present.  Visualization of the junction showed a tiny serosal implant on the stomach just distal to the GE junction.  No other serosal implants were seen throughout the abdomen.  Next the peritoneum was lavaged and the effluent sent for cytology.  A left paramedian incision was then performed after removal of the ports through the left 5 mm port site.  The anterior posterior rectus sheath was opened.  The small bowel was identified.  The ligament of Treitz was identified.  At a point 40 cm distal to this a 3-0 silk pursestring suture was placed.  The bowel was entered and a 14 British whistle-tip catheter was passed.  It was Witzeled over series of 4 separate 3-0 silk stitches.  The tube exited laterally.  It was tacked circumferentially to the abdominal wall using 4 separate 3-0 silk stitches.  An additional stitch was placed distally to prevent torsion.  The tube flushed well there was no leak.  Next the posterior rectus sheath was closed using a running 0 Ethibond.  The anterior rectus sheath was closed using a running 0 Ethibond.  Marcaine was injected into the rectus sheath and skin.  The wound was irrigated and the skin was closed using 3-0 Monocryl.  Complications:  None; patient tolerated the procedure well.    Disposition: PACU - hemodynamically stable.  Condition: stable           Attending Attestation: I was present and scrubbed for the key portions of the procedure.    Macho Manzo  Phone Number: [phone]

## 2024-04-08 NOTE — ANESTHESIA PREPROCEDURE EVALUATION
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 : N  Date of Last Liquid: 04/08/24  Time of Last Liquid: 0000  Date of Last Solid: 04/08/24  Time of Last Solid: 0000  Last Intake Type: Clear fluids  Time of Last Void: 0610         Physical Exam    Airway  Mallampati: II  TM distance: >3 FB  Neck ROM: full     Cardiovascular - normal exam     Dental    Pulmonary - normal exam     Abdominal            Anesthesia Plan    History of general anesthesia?: yes  History of complications of general anesthesia?: no    ASA 3     general     The patient is not a current smoker.  Patient was previously instructed to abstain from smoking on day of procedure.  Patient did not smoke on day of procedure.  Education provided regarding risk of obstructive sleep apnea.  intravenous induction   Trial extubation is planned.  Anesthetic plan and risks discussed with patient.  Use of blood products discussed with patient who.

## 2024-04-08 NOTE — PROGRESS NOTES
Pharmacy Admission Order Reconciliation Review    Juan M Mcrae is a 78 y.o. male admitted for Malignant neoplasm of lower third of esophagus (CMS/[facility]). Pharmacy reviewed the patient's unreconciled admission medications.    Prior to admission medications that were reviewed and acted on by the pharmacist include:  Atenolol  Multivitamin  Pioglitazone  Trazodone    These medications have been reconciled.     Any other unreconcilied medications have been addressed and will be ordered or held by the patient's medical team. Medications addressed by the pharmacist may be added or changed by the patient's medical team at any time.    Glendy Aguilar, PharmD  Transitions of Care Pharmacist  [facility]  Please reach out via Secure Chat for questions, or if no response call [phone]

## 2024-04-08 NOTE — ADDENDUM NOTE
Encounter addended by: Irish Thompson MD on: 4/8/2024 10:54 AM   Actions taken: SmartForm saved, Cosign clinical note with attestation, Clinical Note Signed

## 2024-04-08 NOTE — BRIEF OP NOTE
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) [C15.5]     Procedures  Bronchoscopy  Esophagoscopy  Laparoscopy with peritoneal exploration and washings  Mini laparotomy and jtube    Surgeons      * Macho Manzo - Primary    Resident/Fellow/Other Assistant:  Surgeon(s) and Role:     * Brain Tamez MD - Resident - Assisting    Procedure Summary  Anesthesia: General  ASA: III  Anesthesia Staff: Anesthesiologist: Raciel Estevez MD  Anesthesia Resident: Nestor Norris MD  Estimated Blood Loss: 50mL  Intra-op Medications:   Administrations occurring from 0715 to 0935 on 24:   Medication Name Total Dose   sodium chloride 0.9 % irrigation solution 1,000 mL   BUPivacaine-EPINEPHrine (Marcaine w/EPI) 0.25 %-1:200,000 injection 23 mL          Anesthesia Record               Intraprocedure I/O Totals          Output    Est. Blood Loss 50 mL    Total Output 50 mL          Specimen:   ID Type Source Tests Collected by Time   1 : ABDOMINAL WASHINGS Non-Gynecologic Cytology PERITONEAL FLUID RIGHT UPPER QUADRANT CYTOLOGY CONSULTATION (NON-GYNECOLOGIC) Macho Manzo MD 2024 0842        Staff:   Circulator: Nati Mistry RN; Essie Gross RN  Scrub Person: Maribell Rodas    Findings:   Patent J-tube upon flushing.  Appropriate hemostasis upon closure.    Complications:  None; patient tolerated the procedure well.     Disposition: PACU - hemodynamically stable.  Condition: stable  Specimens Collected:   ID Type Source Tests Collected by Time   1 : ABDOMINAL WASHINGS Non-Gynecologic Cytology PERITONEAL FLUID RIGHT UPPER QUADRANT CYTOLOGY CONSULTATION (NON-GYNECOLOGIC) Macho Manzo MD 2024 0842     Attending Attestation: I was present and  scrubbed for the entire procedure.    Macho Manzo  Phone Number: [phone]    Brain Tamez MD

## 2024-04-08 NOTE — CARE PLAN
The patient's goals for the shift include  pain control    The clinical goals for the shift include      Admitted to floor, family at bedside.

## 2024-04-08 NOTE — PROGRESS NOTES
Pharmacy Medication History Review    Juan M Mcrae is a 78 y.o. male admitted for Malignant neoplasm of lower third of esophagus (CMS/[facility]). Pharmacy reviewed the patient's [id number]-bsmjeruwn medications and allergies for accuracy.    The list below reflects the updated PTA list. Comments regarding how patient may be taking medications differently can be found in the Admit Orders Activity  Prior to Admission Medications   Prescriptions Last Dose Informant   atenolol (Tenormin) 50 mg tablet 4/8/2024 Self   Sig: Take 1 tablet (50 mg) by mouth once daily.   multivitamin with minerals iron-free (Centrum Silver) 4/1/2024 Self   Sig: Take 1 tablet by mouth once daily.   pioglitazone (Actos) 30 mg tablet 4/7/2024 Self   Sig: Take 1 tablet (30 mg) by mouth once daily.   traZODone (Desyrel) 100 mg tablet 4/6/2024 Self   Sig: Take 1 tablet (100 mg) by mouth once daily at bedtime.      Facility-Administered Medications: None        The list below reflects the updated allergy list. Please review each documented allergy for additional clarification and justification.  Allergies  Reviewed by Rachel Zarate RN on 4/8/2024        Severity Reactions Comments    Omeprazole Not Specified Itching, Unknown     Shellfish Containing Products Not Specified Unknown     Sulfa (sulfonamide Antibiotics) Low Rash             Patient declines M2B at discharge. Pharmacy has been updated to [facility] in [place], [place].    Sources:    -patient interview  -outpatient pharmacy dispense history  -Care Everywhere medication lists  -OARRS    Below are additional concerns with the patient's PTA list:     Simvastatin and metformin-  patient said he has lost weight and his doctor told him about 1 week ago that he did not need to take these medications anymore    Fito Horton PharmD  Transitions of Care Pharmacist  [facility]  Please reach out via Secure Chat for questions, or if no response call [employer] or [employer]

## 2024-04-08 NOTE — PERIOPERATIVE NURSING NOTE
Called report to LT 3 RN. Updated r/t post op pain management, incisions and pts hx including post op GBL.

## 2024-04-08 NOTE — CARE PLAN
The patient's goals for the shift include pain control    The clinical goals for the shift include Void    Pt resting comfortably, Hooked up to Jtube with Isosource 1.5 at 10 ml, no symptoms. Tolerating clear liquids.     Problem: Pain  Goal: My pain/discomfort is manageable  Outcome: Progressing     Problem: Safety  Goal: Patient will be injury free during hospitalization  Outcome: Progressing  Goal: I will remain free of falls  Outcome: Progressing     Problem: Daily Care  Goal: Daily care needs are met  Outcome: Progressing     Problem: Psychosocial Needs  Goal: Demonstrates ability to cope with hospitalization/illness  Outcome: Progressing  Goal: Collaborate with me, my family, and caregiver to identify my specific goals  Outcome: Progressing  Flowsheets (Taken 4/8/2024 1151)  Cultural Requests During Hospitalization: none  Spiritual Requests During Hospitalization: none     Problem: Discharge Barriers  Goal: My discharge needs are met  Outcome: Progressing

## 2024-04-08 NOTE — ANESTHESIA POSTPROCEDURE EVALUATION
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    Anesthesia Post Evaluation    Patient location during evaluation: bedside  Patient participation: complete - patient participated  Level of consciousness: awake  Pain score: 0  Pain management: adequate  Multimodal analgesia pain management approach  Airway patency: patent  Two or more strategies used to mitigate risk of obstructive sleep apnea  Cardiovascular status: stable  Respiratory status: room air  Hydration status: euvolemic  Postoperative Nausea and Vomiting: none        There were no known notable events for this encounter.

## 2024-04-09 ENCOUNTER — NUTRITION (OUTPATIENT)
Dept: RADIATION ONCOLOGY | Facility: CLINIC | Age: 79
End: 2024-04-09
Payer: MEDICARE

## 2024-04-09 ENCOUNTER — TELEPHONE (OUTPATIENT)
Dept: HEMATOLOGY/ONCOLOGY | Facility: CLINIC | Age: 79
End: 2024-04-09
Payer: MEDICARE

## 2024-04-09 ENCOUNTER — HOME HEALTH ADMISSION (OUTPATIENT)
Dept: HOME HEALTH SERVICES | Facility: HOME HEALTH | Age: 79
End: 2024-04-09
Payer: MEDICARE

## 2024-04-09 VITALS
BODY MASS INDEX: 28.03 KG/M2 | HEIGHT: 66 IN | WEIGHT: 174.4 LBS | SYSTOLIC BLOOD PRESSURE: 133 MMHG | TEMPERATURE: 98.8 F | OXYGEN SATURATION: 96 % | RESPIRATION RATE: 19 BRPM | HEART RATE: 56 BPM | DIASTOLIC BLOOD PRESSURE: 72 MMHG

## 2024-04-09 LAB
ANION GAP SERPL CALC-SCNC: 11 MMOL/L (ref 10–20)
BUN SERPL-MCNC: 11 MG/DL (ref 6–23)
CALCIUM SERPL-MCNC: 9.1 MG/DL (ref 8.6–10.6)
CHLORIDE SERPL-SCNC: 101 MMOL/L (ref 98–107)
CO2 SERPL-SCNC: 30 MMOL/L (ref 21–32)
CREAT SERPL-MCNC: 0.98 MG/DL (ref 0.5–1.3)
EGFRCR SERPLBLD CKD-EPI 2021: 78 ML/MIN/1.73M*2
ERYTHROCYTE [DISTWIDTH] IN BLOOD BY AUTOMATED COUNT: 14.8 % (ref 11.5–14.5)
GLUCOSE BLD MANUAL STRIP-MCNC: 121 MG/DL (ref 74–99)
GLUCOSE SERPL-MCNC: 138 MG/DL (ref 74–99)
HCT VFR BLD AUTO: 44.9 % (ref 41–52)
HGB BLD-MCNC: 15 G/DL (ref 13.5–17.5)
LABORATORY COMMENT REPORT: NORMAL
LABORATORY COMMENT REPORT: NORMAL
MAGNESIUM SERPL-MCNC: 2.09 MG/DL (ref 1.6–2.4)
MCH RBC QN AUTO: 33.9 PG (ref 26–34)
MCHC RBC AUTO-ENTMCNC: 33.4 G/DL (ref 32–36)
MCV RBC AUTO: 101 FL (ref 80–100)
NRBC BLD-RTO: 0 /100 WBCS (ref 0–0)
PATH REPORT.FINAL DX SPEC: NORMAL
PATH REPORT.GROSS SPEC: NORMAL
PATH REPORT.RELEVANT HX SPEC: NORMAL
PATH REPORT.TOTAL CANCER: NORMAL
PLATELET # BLD AUTO: 214 X10*3/UL (ref 150–450)
POTASSIUM SERPL-SCNC: 4.2 MMOL/L (ref 3.5–5.3)
RBC # BLD AUTO: 4.43 X10*6/UL (ref 4.5–5.9)
SODIUM SERPL-SCNC: 138 MMOL/L (ref 136–145)
WBC # BLD AUTO: 8.6 X10*3/UL (ref 4.4–11.3)

## 2024-04-09 PROCEDURE — 7100000011 HC EXTENDED STAY RECOVERY HOURLY - NURSING UNIT

## 2024-04-09 PROCEDURE — 2500000001 HC RX 250 WO HCPCS SELF ADMINISTERED DRUGS (ALT 637 FOR MEDICARE OP): Performed by: STUDENT IN AN ORGANIZED HEALTH CARE EDUCATION/TRAINING PROGRAM

## 2024-04-09 PROCEDURE — 2500000004 HC RX 250 GENERAL PHARMACY W/ HCPCS (ALT 636 FOR OP/ED): Mod: JZ,JG | Performed by: STUDENT IN AN ORGANIZED HEALTH CARE EDUCATION/TRAINING PROGRAM

## 2024-04-09 PROCEDURE — 36415 COLL VENOUS BLD VENIPUNCTURE: CPT | Performed by: STUDENT IN AN ORGANIZED HEALTH CARE EDUCATION/TRAINING PROGRAM

## 2024-04-09 PROCEDURE — 82947 ASSAY GLUCOSE BLOOD QUANT: CPT

## 2024-04-09 PROCEDURE — 83735 ASSAY OF MAGNESIUM: CPT | Performed by: STUDENT IN AN ORGANIZED HEALTH CARE EDUCATION/TRAINING PROGRAM

## 2024-04-09 PROCEDURE — 99232 SBSQ HOSP IP/OBS MODERATE 35: CPT | Performed by: NURSE PRACTITIONER

## 2024-04-09 PROCEDURE — 85027 COMPLETE CBC AUTOMATED: CPT | Performed by: STUDENT IN AN ORGANIZED HEALTH CARE EDUCATION/TRAINING PROGRAM

## 2024-04-09 PROCEDURE — 96372 THER/PROPH/DIAG INJ SC/IM: CPT | Performed by: STUDENT IN AN ORGANIZED HEALTH CARE EDUCATION/TRAINING PROGRAM

## 2024-04-09 PROCEDURE — 2500000002 HC RX 250 W HCPCS SELF ADMINISTERED DRUGS (ALT 637 FOR MEDICARE OP, ALT 636 FOR OP/ED): Mod: MUE | Performed by: PHYSICIAN ASSISTANT

## 2024-04-09 PROCEDURE — 2500000001 HC RX 250 WO HCPCS SELF ADMINISTERED DRUGS (ALT 637 FOR MEDICARE OP): Performed by: PHYSICIAN ASSISTANT

## 2024-04-09 PROCEDURE — G0378 HOSPITAL OBSERVATION PER HR: HCPCS

## 2024-04-09 PROCEDURE — 80048 BASIC METABOLIC PNL TOTAL CA: CPT | Performed by: STUDENT IN AN ORGANIZED HEALTH CARE EDUCATION/TRAINING PROGRAM

## 2024-04-09 PROCEDURE — 2500000002 HC RX 250 W HCPCS SELF ADMINISTERED DRUGS (ALT 637 FOR MEDICARE OP, ALT 636 FOR OP/ED): Performed by: PHYSICIAN ASSISTANT

## 2024-04-09 RX ORDER — ACETAMINOPHEN 160 MG/5ML
650 SOLUTION ORAL EVERY 6 HOURS PRN
Start: 2024-04-09

## 2024-04-09 RX ORDER — OXYCODONE HCL 5 MG/5 ML
5 SOLUTION, ORAL ORAL EVERY 6 HOURS PRN
Qty: 140 ML | Refills: 0 | Status: SHIPPED | OUTPATIENT
Start: 2024-04-09 | End: 2024-04-16

## 2024-04-09 RX ORDER — TRAZODONE HYDROCHLORIDE 100 MG/1
100 TABLET ORAL NIGHTLY
Status: DISCONTINUED | OUTPATIENT
Start: 2024-04-09 | End: 2024-04-09 | Stop reason: HOSPADM

## 2024-04-09 RX ADMIN — CEFAZOLIN SODIUM 2 G: 2 INJECTION, SOLUTION INTRAVENOUS at 11:19

## 2024-04-09 RX ADMIN — HEPARIN SODIUM 5000 UNITS: 5000 INJECTION INTRAVENOUS; SUBCUTANEOUS at 14:41

## 2024-04-09 RX ADMIN — ATENOLOL 50 MG: 50 TABLET ORAL at 11:24

## 2024-04-09 RX ADMIN — OXYCODONE HYDROCHLORIDE 5 MG: 5 SOLUTION ORAL at 05:18

## 2024-04-09 RX ADMIN — PIOGLITAZONE 30 MG: 15 TABLET ORAL at 11:24

## 2024-04-09 ASSESSMENT — COGNITIVE AND FUNCTIONAL STATUS - GENERAL
CLIMB 3 TO 5 STEPS WITH RAILING: A LITTLE
DRESSING REGULAR LOWER BODY CLOTHING: A LITTLE
MOBILITY SCORE: 21
HELP NEEDED FOR BATHING: A LITTLE
DRESSING REGULAR UPPER BODY CLOTHING: A LITTLE
MOVING TO AND FROM BED TO CHAIR: A LITTLE
TURNING FROM BACK TO SIDE WHILE IN FLAT BAD: A LITTLE
DAILY ACTIVITIY SCORE: 21

## 2024-04-09 ASSESSMENT — PAIN - FUNCTIONAL ASSESSMENT
PAIN_FUNCTIONAL_ASSESSMENT: 0-10

## 2024-04-09 ASSESSMENT — PAIN SCALES - GENERAL
PAINLEVEL_OUTOF10: 5 - MODERATE PAIN
PAINLEVEL_OUTOF10: 0 - NO PAIN
PAINLEVEL_OUTOF10: 0 - NO PAIN

## 2024-04-09 NOTE — PROGRESS NOTES
"Juan M Mcrae is a 78 y.o. 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    Subjective   Incisional discomfort and dysphagia overnight     Objective     Physical Exam  Constitutional:       Appearance: Normal appearance.   HENT:      Head: Normocephalic and atraumatic.   Cardiovascular:      Rate and Rhythm: Normal rate and regular rhythm.      Comments: NSR per telemetry   Pulmonary:      Effort: Pulmonary effort is normal.      Breath sounds: Normal breath sounds.      Comments: Oxygenating well on room air   Abdominal:      Comments: Soft, appropriately tender to touch, absent BS abd incision without redness or drainage    Musculoskeletal:      Cervical back: Neck supple.      Comments: No calf tenderness or edema    Skin:     General: Skin is warm and dry.   Neurological:      General: No focal deficit present.      Mental Status: He is alert and oriented to person, place, and time.   Psychiatric:         Mood and Affect: Mood normal.         Behavior: Behavior normal.         Last Recorded Vitals  Blood pressure 146/83, pulse 59, temperature 37.2 °C (99 °F), temperature source Temporal, resp. rate 18, height 1.676 m (5' 6\"), weight 79.1 kg (174 lb 6.4 oz), SpO2 92 %.  Intake/Output last 3 Shifts:  I/O last 3 completed shifts:  In: 1328.3 (16.8 mL/kg) [P.O.:360; I.V.:908.3 (11.5 mL/kg); NG/GT:60]  Out: 1525 (19.3 mL/kg) [Urine:1475 (0.5 mL/kg/hr); Blood:50]  Weight: 79.1 kg     Relevant Results  Scheduled medications  atenolol, 50 mg, oral, Daily  heparin (porcine), 5,000 Units, subcutaneous, q8h  pioglitazone, 30 mg, oral, Daily  traZODone, 100 mg, oral, Nightly      Continuous medications     PRN medications  PRN medications: acetaminophen, naloxone, oxyCODONE, oxyCODONE    Results for orders placed or performed during the hospital encounter of 04/08/24 (from the past 24 hour(s))   POCT GLUCOSE   Result Value Ref Range    POCT Glucose 222 (H) 74 - 99 " mg/dL   POCT GLUCOSE   Result Value Ref Range    POCT Glucose 121 (H) 74 - 99 mg/dL   CBC   Result Value Ref Range    WBC 8.6 4.4 - 11.3 x10*3/uL    nRBC 0.0 0.0 - 0.0 /100 WBCs    RBC 4.43 (L) 4.50 - 5.90 x10*6/uL    Hemoglobin 15.0 13.5 - 17.5 g/dL    Hematocrit 44.9 41.0 - 52.0 %     (H) 80 - 100 fL    MCH 33.9 26.0 - 34.0 pg    MCHC 33.4 32.0 - 36.0 g/dL    RDW 14.8 (H) 11.5 - 14.5 %    Platelets 214 150 - 450 x10*3/uL   Basic metabolic panel   Result Value Ref Range    Glucose 138 (H) 74 - 99 mg/dL    Sodium 138 136 - 145 mmol/L    Potassium 4.2 3.5 - 5.3 mmol/L    Chloride 101 98 - 107 mmol/L    Bicarbonate 30 21 - 32 mmol/L    Anion Gap 11 10 - 20 mmol/L    Urea Nitrogen 11 6 - 23 mg/dL    Creatinine 0.98 0.50 - 1.30 mg/dL    eGFR 78 >60 mL/min/1.73m*2    Calcium 9.1 8.6 - 10.6 mg/dL   Magnesium   Result Value Ref Range    Magnesium 2.09 1.60 - 2.40 mg/dL         Assessment/Plan   Principal Problem:    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 Manzo on 4/8/24 by Dr. Manzo.  Uneventful post perative course.    Neurology: post operative pain  -Good pain control on liquid regimen of Acetaminophen + Oxycodone - continue, monitor effectiveness and adjust dose as needed   -Bowel regimen while on narcotics   -Out of bed to the chair throughout the day  -Encourage ambulation as tolerated  - Resume home Trazodone     Cardiovascular: hx of HTN on Atenolol 50 mg at home   Normotensive, NSR on telemetry   -Continue telemetry  -Vital signs per unit routine  -Continue home Atenolol   -Replace electrolytes as needed for K >4, Mg >2    Pulmonology:   -Encourage incentive spirometer use every hour  -Continue pulmonary hygiene  -Oxygenating well on room air     Gastrointestinal: esophageal cancer,  dysphagia, moderate protein calorie malnutrition s/p lap j-tube insertion and peritoneal washings    - Clear liquid diet - advance to fulls if tolerating   - Obtain nutrition consult for recommendations on tube feed goals  - Maintain HOB >30 degrees at all times to decrease aspiration risk  - Await pathology results  - J-tube: tolerated enteral feeds overnight - advance to goal, plan to discharge patient home on nocturnal feeds, will arrange for home supplies and home nursing care on discharge     Genitourinary:   -Voiding spontaneously   -Continue to monitor daily electrolytes with routine BMPs   -Adequate urine output  -Creatinine 0.98 today     Infectious Disease:   Afebrile, no leukocytosis   -Continue to trend daily temperatures and WBC count to monitor for signs of post-operative infection   -Monitor surgical incisions for signs of infection   -Perioperative antibiotics completed     Hematology:   -Monitor for signs of acute blood loss  -Trend CBCs     Endocrine: hx of NIDDM on Actose 30 mg at home   - Q6 accu checks with SSI per unit protocol  - Resume home Actose     DVT Prophylaxis:   -Continue subcutaneous heparin and SCDs, early ambulation  -OOB to chair throughout the day, encourage ambulation as tolerated    Disposition:   -Plan to discharge patient home today + home nursing care, will arrange for tube feed supplies on discharge   -Patient to follow up with Dr. Manzo in 2 weeks     Patient evaluated by this provider and discussed with attending physician Dr. Manzo.     OBIE Arita-CNP  Thoracic Surgery [id number]

## 2024-04-09 NOTE — TELEPHONE ENCOUNTER
Returned call to patient's son, Sascha and he states that he and the family received complete home going instructions regarding his father's J-tube and it's care. He had no other questions or concerns at this time and he thanked us for all of our help, concern and care in the the care of his father, Juan M.

## 2024-04-09 NOTE — CONSULTS
"Nutrition Initial Assessment:   Nutrition Assessment    Reason for Assessment: Provider consult order    Patient is a 78 y.o. male presenting with lignant neoplasm of lower third of esophagus s/p creation of jejunostomy laparoscopy 4/8/24. Pt states he is planned for port placement + chemo initiation.       Nutrition History:  Energy Intake: Fair 50-75 %  Food and Nutrient History: Pt has recently had difficulty swallowing foods, mostly eating anything pureed, with broths, soups and beverages. He states that 1 year ago he started GOLO and lost 60lbs intentionally. Just recently he started having difficulty swallowing and reports ~ 3 lbs weight loss within the last month. S/p J tube placement. Currently on Isosource 1.5 @ 30ml/hre. He consumed ~ 70% of his CL breakfast tray. He met with RD last week and was educated on his J tube, received J tube booklet. All questions answered regarding previous education.  Vitamin/Herbal Supplement Use: MVI- discontinued 1 week ago  Food Allergies/Intolerances:   shellfish  GI Symptoms: None  Oral Problems: Swallowing difficulty       Anthropometrics:  Height: 167.6 cm (5' 6\")   Weight: 79.1 kg (174 lb 6.4 oz)   BMI (Calculated): 28.16  IBW/kg (Dietitian Calculated): 64.5 kg  Percent of IBW: 123 %       Weight History:   Wt Readings from Last 10 Encounters:   04/09/24 79.1 kg (174 lb 6.4 oz)   04/02/24 79.4 kg (175 lb)   04/02/24 79.5 kg (175 lb 3.2 oz)   04/01/24 79.2 kg (174 lb 7.9 oz)   04/01/24 79.2 kg (174 lb 7.9 oz)   03/27/24 79.6 kg (175 lb 6.4 oz)   03/21/24 80 kg (176 lb 5.9 oz)   03/05/24 82.6 kg (182 lb)   03/04/24 82.1 kg (181 lb)   02/14/24 82.2 kg (181 lb 3.2 oz)       Weight Change %:  Weight History / % Weight Change: recent unintentional weight loss of 3.5kg (4/2%) over the last month, previous intentional weight loss off 60lbs over the past year after started GOLO  Significant Weight Loss: No    Nutrition Focused Physical Exam Findings:  Subcutaneous Fat Loss: "   Orbital Fat Pads: Well nourished (slightly bulging fat pads)  Buccal Fat Pads: Mild-Moderate (flat cheeks, minimal bounce)  Triceps: Well nourished (ample fat tissue)  Muscle Wasting:  Temporalis: Mild-Moderate (slight depression)  Pectoralis (Clavicular Region): Mild-Moderate (some protrusion of clavicle)  Deltoid/Trapezius: Well nourished (rounded appearance at arm, shoulder, neck)  Edema:  Edema: none  Physical Findings:  Skin: Negative    Nutrition Significant Labs:  CBC Trend:   Results from last 7 days   Lab Units 04/09/24  0810   WBC AUTO x10*3/uL 8.6   RBC AUTO x10*6/uL 4.43*   HEMOGLOBIN g/dL 15.0   HEMATOCRIT % 44.9   MCV fL 101*   PLATELETS AUTO x10*3/uL 214    , BMP Trend:   Results from last 7 days   Lab Units 04/09/24  0810   GLUCOSE mg/dL 138*   CALCIUM mg/dL 9.1   SODIUM mmol/L 138   POTASSIUM mmol/L 4.2   CO2 mmol/L 30   CHLORIDE mmol/L 101   BUN mg/dL 11   CREATININE mg/dL 0.98    , Renal Lab Trend:   Results from last 7 days   Lab Units 04/09/24  0810   POTASSIUM mmol/L 4.2   SODIUM mmol/L 138   MAGNESIUM mg/dL 2.09   EGFR mL/min/1.73m*2 78   BUN mg/dL 11   CREATININE mg/dL 0.98        Nutrition Specific Medications:  Scheduled medications  atenolol, 50 mg, oral, Daily  ceFAZolin, 2 g, intravenous, q8h  heparin (porcine), 5,000 Units, subcutaneous, q8h  pioglitazone, 30 mg, oral, Daily  traZODone, 100 mg, j-tube, Nightly      Continuous medications     PRN medications  PRN medications: acetaminophen, naloxone, oxyCODONE, oxyCODONE      I/O:   Last BM Date: 04/07/24;      Dietary Orders (From admission, onward)       Start     Ordered    04/08/24 1316  Enteral feeding WITH diet order Diet type: Clear Liquid No Reds; Tube feeding formula: Isosource 1.5; 10 (start at 16:00)  Continuous        Question Answer Comment   Diet type Clear Liquid No Reds    Tube feeding formula: Isosource 1.5    Feeding route: JT (jejunostomy tube)    Tube feeding continuous rate (mL/hr): 10 start at 16:00       04/08/24  1316                     Estimated Needs:   Total Energy Estimated Needs (kCal): 2373 kCal (+)  Method for Estimating Needs: 30kcal/kg IBW  Total Protein Estimated Needs (g): 77 g  Method for Estimating Needs: 1.2g/kg IBW  Total Fluid Estimated Needs (mL): 2373 mL  Method for Estimating Needs: 1ml/kcal        Nutrition Diagnosis   Malnutrition Diagnosis  Patient has Malnutrition Diagnosis: Yes  Diagnosis Status: New  Malnutrition Diagnosis: Moderate malnutrition related to acute disease or injury  As Evidenced by: mild subcutaneous fat loss + muscle wasting, poor intake of < 75% x 1 month    Nutrition Interventions/Recommendations         Nutrition Prescription:   Isosource 1.5 @ 65ml/hr goal to provide 2340kcal, 107gm protein, and 1186ml free water   If cycle feeds preferred, Isosource 1.5 @ 85ml/hr x 14 hours (ie [id number])   FWF per MD/ team discretion         Nutrition Interventions:   Interventions: Enteral intake  Enteral Intake: Modify rate of enteral nutrition  Goal: 100ml/hr x 12 hours    Nutrition Education:    All questions answered regarding TF for discharge. Pt received education on J tube w/ J tube booklet from [facility] on 4/1.     Nutrition Monitoring and Evaluation   Food/Nutrient Related History Monitoring  Monitoring and Evaluation Plan: Enteral and parenteral nutrition intake  Enteral and Parenteral Nutrition Intake: Enteral nutrition intake  Criteria: tolerate TF @ goal    Body Composition/Growth/Weight History  Monitoring and Evaluation Plan: Weight, Weight change  Criteria: maintain stable weight    Biochemical Data, Medical Tests and Procedures  Monitoring and Evaluation Plan: Electrolyte/renal panel, Glucose/endocrine profile  Criteria: labs WNL      Time Spent/Follow-up Reminder:   Time Spent (min): 60 minutes  Last Date of Nutrition Visit: 04/09/24  Nutrition Follow-Up Needed?: Dietitian to reassess per policy  Follow up Comment: WILLY recs

## 2024-04-09 NOTE — PROGRESS NOTES
"Juan M Mcrae is a 78 y.o. 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    Subjective   ***       Objective     Physical Exam    Last Recorded Vitals  Blood pressure 146/83, pulse 59, temperature 37.2 °C (99 °F), temperature source Temporal, resp. rate 18, height 1.676 m (5' 6\"), weight 79.1 kg (174 lb 6.4 oz), SpO2 92 %.  Intake/Output last 3 Shifts:  I/O last 3 completed shifts:  In: 1328.3 (16.8 mL/kg) [P.O.:360; I.V.:908.3 (11.5 mL/kg); NG/GT:60]  Out: 1525 (19.3 mL/kg) [Urine:1475 (0.5 mL/kg/hr); Blood:50]  Weight: 79.1 kg     Relevant Results  {If you would like to pull in Medications, type .meds     If you would like to pull in Lab results for the last 24 hours, type .[id number]    If you would like to pull in Imaging results, type .imgrslt :99}    {Link to Stroke Scoring tools - Link :99}                {Current documented malnutrition diagnosis is Moderate malnutrition related to acute disease or injury   As evidenced by mild subcutaneous fat loss + muscle wasting, poor intake of < 75% x 1 month   :99}  {Accept, Reject, Defer:12282}      Assessment/Plan   Principal Problem:    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

## 2024-04-09 NOTE — DISCHARGE SUMMARY
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            Hospital Course  77 y/o male with newly diagnosed esophageal cancer, dysphagia and malnutrition now post bronchoscopy  Esophagoscopy, laparoscopy with peritoneal exploration and washings and j -tube insertion by Dr. Manzo on 4/8/24 by Dr. Manzo.  Uneventful post operative course.  Started trickle enteral feeds via j-tube on POD#0 and advanced as tolerated.  Consulted dietician for enteral feeds recommendation and education.  WE arranged for tube feed supplies and home nursing care on discharge.  Official pathology results were pending on discharge.  Patient is scheduled to follow up with Dr. Manzo in 2 weeks.     Pertinent Physical Exam At Time of Discharge  Constitutional:       Appearance: Normal appearance.   HENT:      Head: Normocephalic and atraumatic.   Cardiovascular:      Rate and Rhythm: Normal rate and regular rhythm.      Comments: NSR per telemetry   Pulmonary:      Effort: Pulmonary effort is normal.      Breath sounds: Normal breath sounds.      Comments: Oxygenating well on room air   Abdominal:      Comments: Soft, appropriately tender to touch, absent BS abd incision without redness or drainage    Musculoskeletal:      Cervical back: Neck supple.      Comments: No calf tenderness or edema    Skin:     General: Skin is warm and dry.   Neurological:      General: No focal deficit present.      Mental Status: He is alert and oriented to person, place, and time.   Psychiatric:         Mood and Affect: Mood normal.         Behavior: Behavior normal.     Home Medications     Medication List      START taking these medications     acetaminophen 650 mg/20.3 mL solution oral liquid; Commonly known as:   Tylenol; 20.3 mL (650 mg) by j-tube route every 6 hours if needed  for   pain.   oxyCODONE 5 mg/5 mL solution; Commonly known as: Roxicodone; 5 mL (5 mg)   by j-tube route every 6 hours if needed for moderate pain (4 - 6) for up   to 7 days.     CONTINUE taking these medications     atenolol 50 mg tablet; Commonly known as: Tenormin   multivitamin with minerals iron-free; Commonly known as: Centrum Silver   pioglitazone 30 mg tablet; Commonly known as: Actos   traZODone 100 mg tablet; Commonly known as: Desyrel       Outpatient Follow-Up  Future Appointments   Date Time Provider Department [place]   4/10/2024  2:00 PM U LAB 4 [id number] U Rad   4/11/2024  3:40 PM Catarino Fry MD [id number] Academic   4/24/2024  1:45 PM Macho Manzo MD [id number] [facility]   8/12/2024 10:00 AM Candy Grossman MD [id number] [facility]       Chantal Guillen APRN-CNP  Thoracic Surgery [id number]

## 2024-04-09 NOTE — PROGRESS NOTES
NUTRITION COMMUNICATION NOTE    Juan M Mcrae     REASON FOR COMMUNICATION:     Visit Type: Clinical Nutrition, Oncology, Telephone Visit:  A telephone visit (audio only) between the patient (at the distant site) and the provider (at the originating site) was utilized to provide this telehealth service.    Verbal Consent for Encounter: Verbal consent was requested and obtained from patient on this date for a telehealth visit.      RD reached out to patient to check in following J-tube placement. He is doing well. Plan is for DME to come out to deliver supplies and provide education on Thursday. They are not sure what company, and I am unable to find in the chart. Patient will reach out later in the week if there are any difficulties getting enteral supplies. He is grateful for support. Will continue to monitor and assist as able.

## 2024-04-10 ENCOUNTER — DOCUMENTATION (OUTPATIENT)
Dept: HOME HEALTH SERVICES | Facility: HOME HEALTH | Age: 79
End: 2024-04-10
Payer: MEDICARE

## 2024-04-10 ENCOUNTER — HOSPITAL ENCOUNTER (OUTPATIENT)
Dept: CARDIOLOGY | Facility: HOSPITAL | Age: 79
Discharge: HOME | End: 2024-04-10
Payer: MEDICARE

## 2024-04-10 VITALS
OXYGEN SATURATION: 93 % | TEMPERATURE: 98.8 F | HEART RATE: 73 BPM | DIASTOLIC BLOOD PRESSURE: 86 MMHG | SYSTOLIC BLOOD PRESSURE: 181 MMHG | RESPIRATION RATE: 15 BRPM

## 2024-04-10 DIAGNOSIS — C15.5 MALIGNANT NEOPLASM OF LOWER THIRD OF ESOPHAGUS (MULTI): ICD-10-CM

## 2024-04-10 PROCEDURE — 7100000009 HC PHASE TWO TIME - INITIAL BASE CHARGE: Performed by: RADIOLOGY

## 2024-04-10 PROCEDURE — 2500000005 HC RX 250 GENERAL PHARMACY W/O HCPCS: Performed by: RADIOLOGY

## 2024-04-10 PROCEDURE — 77001 FLUOROGUIDE FOR VEIN DEVICE: CPT | Performed by: RADIOLOGY

## 2024-04-10 PROCEDURE — 99152 MOD SED SAME PHYS/QHP 5/>YRS: CPT | Performed by: RADIOLOGY

## 2024-04-10 PROCEDURE — 76937 US GUIDE VASCULAR ACCESS: CPT | Performed by: RADIOLOGY

## 2024-04-10 PROCEDURE — 7100000010 HC PHASE TWO TIME - EACH INCREMENTAL 1 MINUTE: Performed by: RADIOLOGY

## 2024-04-10 PROCEDURE — 99222 1ST HOSP IP/OBS MODERATE 55: CPT | Performed by: NURSE PRACTITIONER

## 2024-04-10 PROCEDURE — 36561 INSERT TUNNELED CV CATH: CPT | Performed by: RADIOLOGY

## 2024-04-10 PROCEDURE — 2780000003 HC OR 278 NO HCPCS: Performed by: RADIOLOGY

## 2024-04-10 PROCEDURE — 2500000004 HC RX 250 GENERAL PHARMACY W/ HCPCS (ALT 636 FOR OP/ED): Performed by: NURSE PRACTITIONER

## 2024-04-10 PROCEDURE — 2500000004 HC RX 250 GENERAL PHARMACY W/ HCPCS (ALT 636 FOR OP/ED): Performed by: RADIOLOGY

## 2024-04-10 PROCEDURE — C1788 PORT, INDWELLING, IMP: HCPCS | Performed by: RADIOLOGY

## 2024-04-10 RX ORDER — SODIUM CHLORIDE 9 MG/ML
75 INJECTION, SOLUTION INTRAVENOUS CONTINUOUS
Status: DISCONTINUED | OUTPATIENT
Start: 2024-04-10 | End: 2024-04-10

## 2024-04-10 RX ORDER — LIDOCAINE HYDROCHLORIDE AND EPINEPHRINE 10; 10 MG/ML; UG/ML
INJECTION, SOLUTION INFILTRATION; PERINEURAL AS NEEDED
Status: DISCONTINUED | OUTPATIENT
Start: 2024-04-10 | End: 2024-04-11 | Stop reason: HOSPADM

## 2024-04-10 RX ORDER — FENTANYL CITRATE 50 UG/ML
INJECTION, SOLUTION INTRAMUSCULAR; INTRAVENOUS AS NEEDED
Status: DISCONTINUED | OUTPATIENT
Start: 2024-04-10 | End: 2024-04-11 | Stop reason: HOSPADM

## 2024-04-10 RX ORDER — MIDAZOLAM HYDROCHLORIDE 1 MG/ML
INJECTION, SOLUTION INTRAMUSCULAR; INTRAVENOUS AS NEEDED
Status: DISCONTINUED | OUTPATIENT
Start: 2024-04-10 | End: 2024-04-11 | Stop reason: HOSPADM

## 2024-04-10 RX ADMIN — LIDOCAINE HYDROCHLORIDE AND EPINEPHRINE 10 ML: 10; 10 INJECTION, SOLUTION INFILTRATION; PERINEURAL at 14:30

## 2024-04-10 RX ADMIN — MIDAZOLAM HYDROCHLORIDE 1 MG: 1 INJECTION, SOLUTION INTRAMUSCULAR; INTRAVENOUS at 14:46

## 2024-04-10 RX ADMIN — SODIUM CHLORIDE 100 ML/HR: 9 INJECTION, SOLUTION INTRAVENOUS at 14:39

## 2024-04-10 RX ADMIN — FENTANYL CITRATE 50 MCG: 50 INJECTION, SOLUTION INTRAMUSCULAR; INTRAVENOUS at 14:46

## 2024-04-10 ASSESSMENT — ENCOUNTER SYMPTOMS
CARDIOVASCULAR NEGATIVE: 1
ENDOCRINE NEGATIVE: 1
ABDOMINAL PAIN: 1
NEUROLOGICAL NEGATIVE: 1
RESPIRATORY NEGATIVE: 1
ALLERGIC/IMMUNOLOGIC NEGATIVE: 1
MUSCULOSKELETAL NEGATIVE: 1
CONSTITUTIONAL NEGATIVE: 1
HEMATOLOGIC/LYMPHATIC NEGATIVE: 1
PSYCHIATRIC NEGATIVE: 1
EYES NEGATIVE: 1

## 2024-04-10 ASSESSMENT — PAIN - FUNCTIONAL ASSESSMENT
PAIN_FUNCTIONAL_ASSESSMENT: 0-10

## 2024-04-10 ASSESSMENT — PAIN SCALES - GENERAL
PAINLEVEL_OUTOF10: 0 - NO PAIN

## 2024-04-10 NOTE — POST-PROCEDURE NOTE
Interventional Radiology Brief Postprocedure Note    Attending: Christofer Buckley MD      Assistant: none    Diagnosis: Esophageal cancer    Description of procedure: Right mediport placement, ready for use.     Anesthesia:  Moderate sedation    Complications: None    Estimated Blood Loss: minimal    Medications (Filter: Administrations occurring from 1532 to 1532 on 04/10/24) As of 04/10/24 1532      None          No specimens collected      See detailed result report with images in PACS.    The patient tolerated the procedure well without incident or complication and is in stable condition.

## 2024-04-10 NOTE — Clinical Note
ED PROVIDER NOTE    Chief Complaint   Patient presents with    Shortness of Breath     Worsening over the last 2 weeks. Pt wears 3L O2 at home    Fatigue    Chest Pain    Diarrhea     Began this morning        HPI:  5/22/22,   Time: 7:18 PM EDT       Abad Hua is a 76 y.o. female presenting to the ED for shortness of breath. Gradual onset x 2 weeks, progressively worsening, worse w/ exertion. Associated midsternal chest tightness, radiates to back, also worse w/ exertion. Associated nasal congestion and rhinorrhea x 2 weeks. Diarrhea starting today. No black/bloody stools. No fever, chills, orthopnea, leg swelling, abdominal pain. Normal po intake and urine output. Hx of COPD, on 3L home O2. Was hospitalized for 45 days for COVID pneumonia in 12/2021. Chart review: hx of HFpEF, DM, HTN, HLD, COPD, afib on apixaban, GERD, anxiety, depression, atypical angina  Lab Results   Component Value Date    LVEF 73 07/26/2021       Review of Systems:     Review of Systems   Constitutional: Negative for appetite change, chills and fever. HENT: Positive for congestion and rhinorrhea. Eyes: Negative for visual disturbance. Respiratory: Positive for chest tightness and shortness of breath. Negative for cough. Cardiovascular: Negative for chest pain and leg swelling. Gastrointestinal: Positive for diarrhea. Negative for abdominal pain, blood in stool, nausea and vomiting. Genitourinary: Positive for dysuria. Negative for decreased urine volume and difficulty urinating. Musculoskeletal: Negative for back pain and neck pain. Skin: Negative for color change.    Neurological: Negative for dizziness, syncope, weakness, light-headedness, numbness and headaches.         --------------------------------------------- PAST HISTORY ---------------------------------------------  Past Medical History:   Past Medical History:   Diagnosis Date    Anxiety     Arthritis     Atrial fibrillation ([facility] [[email or web address]ca 75.) Patient ID band present and verified. Patient contact is in the lobby.  Number of children: None    Years of education: None    Highest education level: None   Occupational History    None   Tobacco Use    Smoking status: Never Smoker    Smokeless tobacco: Never Used   Vaping Use    Vaping Use: Never used   Substance and Sexual Activity    Alcohol use: No     Comment: 3 cups coffee daily    Drug use: Not Currently     Types: Marijuana Mega Pare)     Comment: medical marijuana    Sexual activity: None   Other Topics Concern    None   Social History Narrative    None     Social Determinants of Health     Financial Resource Strain:     Difficulty of Paying Living Expenses: Not on file   Food Insecurity:     Worried About Running Out of Food in the Last Year: Not on file    Cindy of Food in the Last Year: Not on file   Transportation Needs:     Lack of Transportation (Medical): Not on file    Lack of Transportation (Non-Medical):  Not on file   Physical Activity:     Days of Exercise per Week: Not on file    Minutes of Exercise per Session: Not on file   Stress:     Feeling of Stress : Not on file   Social Connections:     Frequency of Communication with Friends and Family: Not on file    Frequency of Social Gatherings with Friends and Family: Not on file    Attends [employer] Services: Not on file    Active Member of [address] [place] [address] or Organizations: Not on file    Attends Club or Organization Meetings: Not on file    Marital Status: Not on file   Intimate Partner Violence:     Fear of Current or Ex-Partner: Not on file    Emotionally Abused: Not on file    Physically Abused: Not on file    Sexually Abused: Not on file   Housing Stability:     Unable to Pay for Housing in the Last Year: Not on file    Number of [place] in the Last Year: Not on file    Unstable Housing in the Last Year: Not on file       Family History:   Family History   Problem Relation Age of Onset    No Known Problems Mother     No Known Problems Father        The patients home medications have been reviewed. Allergies: Allergies   Allergen Reactions    Morphine Other (See Comments)     States that morphine gives patient a headache.           ---------------------------------------------------PHYSICAL EXAM--------------------------------------    BP (!) 186/81   Pulse 63   Temp 97.1 °F (36.2 °C) (Infrared)   Resp 18   SpO2 98%     Physical Exam  Constitutional:       General: She is not in acute distress. Appearance: She is not toxic-appearing. HENT:      Mouth/Throat:      Mouth: Mucous membranes are moist.   Eyes:      General: No scleral icterus. Extraocular Movements: Extraocular movements intact. Pupils: Pupils are equal, round, and reactive to light. Neck:      Comments: No JVD  Cardiovascular:      Rate and Rhythm: Normal rate and regular rhythm. Pulses: Normal pulses. Heart sounds: Normal heart sounds. No murmur heard. Pulmonary:      Effort: Pulmonary effort is normal. No respiratory distress. Breath sounds: Normal breath sounds. No wheezing or rales. Abdominal:      General: There is no distension. Palpations: Abdomen is soft. Tenderness: There is no abdominal tenderness. Musculoskeletal:         General: No swelling or tenderness. Normal range of motion. Cervical back: Normal range of motion and neck supple. Comments: Radial, DP, and PT pulses 2+ bilaterally. Skin:     General: Skin is warm and dry. Neurological:      Mental Status: She is alert and oriented to person, place, and time. Comments: Strength 5/5 and sensation grossly intact to light touch and equal bilaterally throughout all extremities             -------------------------------------------------- RESULTS -------------------------------------------------  I have personally reviewed all laboratory and imaging results for this patient. Results are listed below.      LABS:  Labs Reviewed   CBC WITH AUTO DIFFERENTIAL - Abnormal; Notable for the following components:       Result Value    WBC 4.2 (*)     Neutrophils % 42.7 (*)     Lymphocytes % 46.7 (*)     All other components within normal limits   BASIC METABOLIC PANEL W/ REFLEX TO MG FOR LOW K - Abnormal; Notable for the following components:    CO2 30 (*)     Anion Gap 6 (*)     All other components within normal limits   URINALYSIS WITH MICROSCOPIC - Abnormal; Notable for the following components:    Glucose, Ur >=1000 (*)     Blood, Urine TRACE (*)     Nitrite, Urine POSITIVE (*)     Bacteria, UA MANY (*)     Yeast, UA Present (*)     All other components within normal limits   BRAIN NATRIURETIC PEPTIDE - Abnormal; Notable for the following components:    Pro- (*)     All other components within normal limits   COVID-19, RAPID   RAPID INFLUENZA A/B ANTIGENS   TROPONIN       RADIOLOGY:  Interpreted personally and by Radiologist.  XR CHEST (2 VW)   Final Result   No pneumonia or pleural effusion. EKG:  This EKG is signed and interpreted by the EP. Sinus rhythm w/ 1st degree AV block, vent rate 62bpm, normal axis, no acute injury pattern, nonspecific ST-T abnormality in inferior and anterolateral precordial leads, no clinically significant change compared w/ prior EKG       ------------------------- NURSING NOTES AND VITALS REVIEWED ---------------------------   The nursing notes within the ED encounter and vital signs as below have been reviewed by myself. BP (!) 186/81   Pulse 63   Temp 97.1 °F (36.2 °C) (Infrared)   Resp 18   SpO2 98%   Oxygen Saturation Interpretation: Abnormal - but at baseline    The patients available past medical records and past encounters were reviewed. ------------------------------ ED COURSE/MEDICAL DECISION MAKING----------------------  Medications   fluconazole (DIFLUCAN) tablet 150 mg (150 mg Oral Given 5/22/22 2112)   cefdinir (OMNICEF) capsule 300 mg (300 mg Oral Given 5/22/22 2112)       Counseling:    The emergency provider has spoken with the patient and discussed todays results, in addition to providing specific details for the plan of care and counseling regarding the diagnosis and prognosis. Questions are answered at this time and they are agreeable with the plan. ED Course/Medical Decision Makin y.o. female here with shortness of breath. Non-toxic appearing, afebrile, hemodynamically stable, and in no acute distress. Breathing comfortably on baseline home O2 without respiratory distress. Workup unrevealing. CXR no acute process. EKG and troponin not suggestive of ACS. Patient did also report dysuria and vaginal yeast infection. UA c/w UTI. Treated w/ diflucan and DC home on omnicef. After discussion of findings and return precautions, patient agrees with plan for discharge and outpatient follow up with PCP.       --------------------------------- IMPRESSION AND DISPOSITION ---------------------------------    IMPRESSION  1. Shortness of breath    2. Acute cystitis without hematuria    3. Vaginal candida        DISPOSITION  Disposition: Discharge to home  Patient condition is good    NOTE: This report was transcribed using voice recognition software.  Every effort was made to ensure accuracy; however, inadvertent computerized transcription errors may be present    Mario Coombs MD  Attending Emergency Physician        Mario Coombs MD  [phone]

## 2024-04-10 NOTE — HH CARE COORDINATION
Home Care received a Referral for Nursing. We have processed the referral for a Start of Care on 04/11.     If you have any questions or concerns, please feel free to contact us at [phone]. Follow the prompts, enter your five digit zip code, and you will be directed to your care team on [facility].

## 2024-04-10 NOTE — DISCHARGE INSTRUCTIONS
What is a Central Venous Access Port? Patient and Family Education    What is a Central Venous Access Port?  A central venous access port is a small device made up of 2 parts:  ? The port, which is a hollow metal or plastic disk with a rubber center and  ? The tube (also called a catheter) that connects to the port. The catheter is  threaded through a vein that leads to your heart.  A doctor places the port under the skin in the chest near the collarbone, or in the arm. The port  feels like a small bump. Once your port site heals it should not hurt. A port provides easy  access to a vein. A port is useful if you need frequent intravenous (IV) treatments, medicines,  blood tests or blood products. A port can stay in for months or years if it is cared for correctly  and working as it should. A port may also be called a Mediport, Power Port or Port-a-cath.    Before your Port is Placed:  ? If you take any medicine that thins your blood or helps prevent clots, talk with  your doctor. Before your port is placed, ask your doctor if your dose of these  medicines needs to be changed to help prevent bleeding problems. If your doctor tells  you to stop taking these medicines, ask him or her when you should restart them. If  your port placement is cancelled, call your doctor and ask if you need to restart your  medicine or change your dose. These medicines include, but are not limited to:  Warfarin (Coumadin), Lovenox (enoxaparin), Eliquis (apixaban), Plavix (clopidogrel),  Pradaxa (dabigatran) and Xarelto (rivaroxaban).  ? Do not eat or drink anything 8 hours before your port placement. If you have  been told to take certain medicines, take them with a sip of water.  ? Take your daily medicines, especially any cardiac (heart), high blood pressure, seizure,  and antibiotic medicines. If you take insulin for diabetes, ask your doctor how to adjust  your insulin dose the morning of your port placement. Be sure to tell your  doctor that  you have to fast for 8 hours before the port is placed.  ? Talk with your doctor, nurse or dietitian before taking probiotics. Ask if it’s safe for  you to take them when you have a central line. Some patients should avoid taking  certain probiotics because they may increase their chance of getting an infection.    The Day your Port is Placed:  ? A doctor in Radiology will place your port. The port placement takes about 60 to 90  minutes but plan on being here for 3 to 4 hours. This allows time for your check-in and  recovery.  ? A staff member will talk with you about the procedure, ask you questions and help  answer your questions. For women: Tell your doctor or technologist if there is any  chance you are pregnant.  ? You will be asked to change into a hospital gown for the procedure. You must remove  necklaces and earrings because they can get in the way during your port placement. An  IV will be placed in your arm and you will be asked to lay on a cart. Once we are  ready, we will take you to the procedure room. A family member may stay in our  waiting room while your port is placed.    In the Procedure Room:  You will get medicine through your IV to help you relax. While the port is placed, some  people fall asleep and some are awake but feel very relaxed. We will wash the area where the  port is being placed with a germ killing solution. This solution helps lower your chances of  getting an infection. Next, the doctor injects a numbing medicine into your skin, around the  port site. Once your skin is numb, the doctor makes 2 small incisions (cuts) to place the port  under your skin. The incisions are closed with skin glue or sutures and paper Band-Aids called  steri-strips. A gauze bandage is then placed over the port site.    After the Port is Placed:  ? You will be taken to the recovery area. We will check your pulse and blood pressure  often and check your port site for bleeding and swelling.  Some bruising is normal. If  you see bleeding, apply pressure with your fingertips and call your nurse right away. If  you feel swelling or more pain at the site, call your nurse.  ? You may have some soreness where your port is placed but it should improve each  day as the site heals. The incisions used to place the port are small and should heal in  10 to 14 days.  ? Once healed, you do not need to have a dressing over the site unless the port has a  needle in it.    If You go Home After Your Port is Placed:  ? You must have someone drive you home. We cannot let you drive or travel home alone in  a cab or on a bus. Do not drive for 24 hours after your port is placed.  ? Someone should stay with you through the night.  ? Resume your routine normal diet. You may resume your normal medicines but if you  take blood-thinning medicine, ask your doctor when you should start taking it again.  ? Rest until morning.   ? Lifting your arm on the same side of the mediport should be restricted to 10-15 pounds   or less for 1 week.  ? Avoid pushing, pulling, straining, or any strenuous activities.  ? You may climb stairs.  ? You may return to work when you feel you are ready at any point after surgery.  If you are not able to contact your doctor, go to the nearest Emergency Room.    Caring for Your Incisions:  ? Remove the gauze dressing after 48 hours. You do not need to replace it. Don’t try to peel  off the surgical glue or steri-strips. Let them fall off on their own, which often happens  after 2 weeks.  ? Do not let your incisions get wet until they are fully healed, which often takes 10 to 14  days. When you shower, cover your incisions with a dressing made from plastic wrap  (like Saran wrap or Glad Press n’ Seal) or a plastic bag and tape to keep the area dry.  After you shower, remove the plastic wrap and pat the incisions dry. Don’t swim or soak  in a tub or Jacuzzi until your incisions are fully healed.  ? Do not get  your port site wet if it has a needle in it. Follow the steps above to make  sure your port site is covered with plastic wrap or a plastic bag when you shower.  ? Look at your incisions each day. Call your doctor right away if you have any of the signs  of infection that are listed on the next page.    Caring for Your Port:  -A trained nurse must use a special non-coring needle, called a Bush needle, each time they  access your port. The needle is placed through your skin and into the rubber center of the port.  -You will likely feel slight pricking when your nurse inserts the needle. The needle stays in  place for your treatments and can be removed afterwards.  -Your port needs to be flushed every 4 to 6 weeks to help prevent blood clots  from forming in the catheter. If blood clots form and cause a blockage, your  port may need to be removed. Your nurse will flush your port with saline. If  needed, they may also flush it with a blood thinning medicine called heparin.  -Port flushes are done right before the needle is taken out. Some patients are  taught how to do these flushes at home. If you need to flush your port at home,  your nurse will show you how. If your port is not being used at least once  every 4 to 6 weeks, talk with your doctor or nurse about scheduling port  flushes.    Sedation:  If you received medication for sedation, it will be active in your body for approximately 24  hours. So you may feel a little sleepy. Therefore, for the next 24 hours:  ? DO NOT drive a car, operate machinery or power tools. It is recommended that a   responsible adult be with you for the first 24 hours.  ? DO NOT drink any alcoholic beverages or take any non-prescriptive medications that   contain alcohol.  ? DO NOT make any important decisions or sign any legal/important documents.    When to Call Your Doctor:  ? Redness, swelling or drainage near the port or over the port site.  ? Drainage from the port site.  ? Shake  or chills.  ? Temperature of 100.4°F (38°C) or higher.  ? Pain, warm or soreness at the port site.  ? Swelling of your arm, check at the port site.  ? Also call if the port has been moved  ? If you have any questions about your port.     How to Reach your Doctor:    Call [phone] with problems or questions    This info is a general resource. It is not meant to replace your health care provider’s advice.  Ask your doctor or health care team any questions. Always follow their instructions.

## 2024-04-10 NOTE — H&P
History Of Present Illness  Juan M Mcrae is a 78 y.o. male presenting with esophageal CA s/p J tube placement, here for mediport placement. PMH includes DM, HLD.     Past Medical History  He has a past medical history of Cataract, CKD (chronic kidney disease), Colon polyp, Deviated septum, Disorder of lipoprotein metabolism, unspecified, Dysphagia, ED (erectile dysfunction), Esophageal cancer (CMS/HCC), GERD (gastroesophageal reflux disease), Hearing aid worn, HL (hearing loss), Hyperlipidemia, Hypertension, Nephrolithiasis, Other specified diabetes mellitus without complications (CMS/HCC), Sleep apnea, and Vision loss.    Surgical History  He has a past surgical history that includes Colonoscopy (2023); Lithotripsy; Bladder surgery; Eye surgery; Transurethral resection of prostate; Upper gastrointestinal endoscopy (03/02/2024); and Other surgical history.     Social History  He reports that he quit smoking about 25 years ago. His smoking use included cigarettes. He started smoking about 65 years ago. He has a 80 pack-year smoking history. He has never used smokeless tobacco. He reports current alcohol use of about 2.0 standard drinks of alcohol per week. He reports that he does not currently use drugs.    Family History  Family History   Problem Relation Name Age of Onset    Stroke Father      Kidney cancer Brother Eliu     Stroke Brother Eliu         Allergies  Omeprazole, Shellfish containing products, and Sulfa (sulfonamide antibiotics)    Home Medications  Current Outpatient Medications on File Prior to Encounter   Medication Sig Dispense Refill    acetaminophen (Tylenol) 650 mg/20.3 mL solution oral liquid 20.3 mL (650 mg) by j-tube route every 6 hours if needed for pain.      atenolol (Tenormin) 50 mg tablet Take 1 tablet (50 mg) by mouth once daily.      multivitamin with minerals iron-free (Centrum Silver) Take 1 tablet by mouth once daily.      oxyCODONE (Roxicodone) 5 mg/5 mL solution 5 mL (5 mg) by  j-tube route every 6 hours if needed for moderate pain (4 - 6) for up to 7 days. 140 mL 0    pioglitazone (Actos) 30 mg tablet Take 1 tablet (30 mg) by mouth once daily.      traZODone (Desyrel) 100 mg tablet Take 1 tablet (100 mg) by mouth once daily at bedtime.      [DISCONTINUED] chlorhexidine (Hibiclens) 4 % external liquid Apply topically 2 times a day for 5 days. 473 mL 0    [DISCONTINUED] chlorhexidine (Peridex) 0.12 % solution Use 15 mL in the mouth or throat see administration instructions for 2 doses. Use the night before and morning of surgery. 473 mL 0     Current Facility-Administered Medications on File Prior to Encounter   Medication Dose Route Frequency Provider Last Rate Last Admin    [DISCONTINUED] acetaminophen (Tylenol) oral liquid 650 mg  650 mg j-tube q4h PRN Brain Tamez MD        [DISCONTINUED] atenolol (Tenormin) tablet 50 mg  50 mg oral Daily Jonny Grossman PA-C   50 mg at 04/09/24 1124    [DISCONTINUED] heparin (porcine) injection 5,000 Units  5,000 Units subcutaneous q8h Brain Tamez MD   5,000 Units at 04/09/24 1441    [DISCONTINUED] naloxone (Narcan) injection 0.2 mg  0.2 mg intravenous q5 min PRN Brain Tamez MD        [DISCONTINUED] oxyCODONE (Roxicodone) solution 10 mg  10 mg oral q6h PRN Brain Tamez MD   10 mg at 04/08/24 2122    [DISCONTINUED] oxyCODONE (Roxicodone) solution 5 mg  5 mg j-tube q6h PRN Brain Tamez MD   5 mg at 04/09/24 0518    [DISCONTINUED] pioglitazone (Actos) tablet 30 mg  30 mg oral Daily Jonny Grossman PA-C   30 mg at 04/09/24 1124    [DISCONTINUED] traZODone (Desyrel) tablet 100 mg  100 mg oral Nightly Jonny Grossman PA-C              Inpatient Medications:  Scheduled medications   Medication Dose Route Frequency     PRN medications   Medication     Continuous Medications   Medication Dose Last Rate    sodium chloride 0.9%  75 mL/hr           Review of Systems   Constitutional: Negative.    HENT: Negative.      Eyes: Negative.    Respiratory: Negative.     Cardiovascular: Negative.    Gastrointestinal:  Positive for abdominal pain (where J- tube placed yesterday).   Endocrine: Negative.    Genitourinary: Negative.    Musculoskeletal: Negative.    Skin: Negative.    Allergic/Immunologic: Negative.    Neurological: Negative.    Hematological: Negative.    Psychiatric/Behavioral: Negative.            Physical Exam  Constitutional:       General: He is awake. He is not in acute distress.     Appearance: He is not ill-appearing.   Cardiovascular:      Rate and Rhythm: Normal rate and regular rhythm.      Pulses: Normal pulses.           Radial pulses are 2+ on the right side and 2+ on the left side.        Dorsalis pedis pulses are 2+ on the right side and 2+ on the left side.      Heart sounds: Normal heart sounds. No murmur heard.  Pulmonary:      Effort: Pulmonary effort is normal.      Breath sounds: Normal breath sounds and air entry.   Abdominal:      General: Bowel sounds are normal.      Palpations: Abdomen is soft.      Tenderness: There is no abdominal tenderness.   Musculoskeletal:      Right lower leg: No edema.      Left lower leg: No edema.   Skin:     General: Skin is warm and dry.   Neurological:      General: No focal deficit present.      Mental Status: He is alert and oriented to person, place, and time.      GCS: GCS eye subscore is 4. GCS verbal subscore is 5. GCS motor subscore is 6.   Psychiatric:         Mood and Affect: Mood normal.         Behavior: Behavior is cooperative.        Sedation Plan    ASA 3     Mallampati class: II.         Last Recorded Vitals  Blood pressure 160/78, pulse 57, temperature 37.1 °C (98.8 °F), temperature source Temporal, resp. rate 18, SpO2 95%.         Vitals from the Past 24 Hours  Heart Rate:  [57]   Temp:  [37.1 °C (98.8 °F)]   Resp:  [18]   BP: (160)/(78)   SpO2:  [95 %]          Relevant Results    Labs    CBC:   Recent Labs     04/09/24  0810 04/02/24  1045  "08/01/23  0900 04/11/22  0926 01/25/21  0855 01/03/20  0900   WBC 8.6 6.1 4.3* 4.7 5.1 5.5   HGB 15.0 14.4 15.1 14.9 15.9 17.1*   HCT 44.9 45.2 46.8 45.1 49.1 49.4    243 193 195 225 218   * 101* 102.6* 100.4* 102.1* 99.6     BMP/CMP:   Recent Labs     04/09/24  0810 04/02/24  1045 03/20/24  1515 02/05/24  0852 08/01/23  0900 01/30/23  0845 07/27/22  0927 04/11/22  0926    141  --  143 145 139 143 142   K 4.2 4.7  --  4.6 5.2* 4.7 4.9 4.6    102  --  104 107 100 108* 104   BUN 11 17  --  14 16 14 18 14   CREATININE 0.98 0.98 1.00 1.10 1.1 1.1 1.0 1.0   CO2 30 29  --  28 21* 23* 25 26   CALCIUM 9.1 9.8  --  9.7 9.8 9.8 9.7 9.6   PROT  --  7.1  --  6.7 7.2 7.2 7.1 7.3   BILITOT  --  0.4  --  0.5 0.5 0.4 0.3 0.4   ALKPHOS  --  40  --  45 46 39 55 48   ALT  --  10  --  10 10 13 13 14   AST  --  12  --  13 15 17 13 15   GLUCOSE 138* 124*  --  125* 134* 135* 129* 147*      Lipid Panel:   Recent Labs     02/05/24  0852 08/01/23  0900 01/30/23  0845 04/11/22  0926 01/25/21  0855 08/10/20  0906 01/03/20  0900 12/11/18  0845   CHOL 142 157 125* 162 164 157 183 157   HDL 76.0 82 65 52 58 53 48 48   CHHDL 1.9 1.9 1.9 3.1 2.8 3.0 3.8 3.3   TRIG 57 61 66 131 117 118 128 162*     Cardiac       No lab exists for component: \"CK\", \"CKMBP\"   Hemoglobin A1C:   Recent Labs     02/05/24  0852 08/01/23  0900 01/30/23  0845 07/27/22  0927 04/11/22  0926 11/29/21  0909 06/07/21  0840 01/25/21  0855 08/10/20  0906 01/03/20  0900 08/05/19  0830 04/08/19  0900   HGBA1C 6.8* 6.1* 6.6* 7.0* 7.0* 6.8* 6.6* 6.7* 6.8* 7.6* 7.5* 7.1*     TSH/ Free T4:   Recent Labs     08/01/23  0900 04/11/22  0926 01/25/21  0855 01/03/20  0900 12/11/18  0845   TSH 1.54 1.47 1.79 2.73 1.87     Iron: No results for input(s): \"FERRITIN\", \"TIBC\", \"IRONSAT\", \"BNP\" in the last 68726 hours.  Coag:     ABO: No results found for: \"ABO\"    Past Cardiology Tests (Last 3 Years):  EKG:  Encounter Date: 04/02/24   ECG 12 Lead   Result Value    " "Ventricular Rate 57    Atrial Rate 57    NM Interval 152    QRS Duration 96    QT Interval 434    QTC Calculation(Bazett) 422    P Axis 49    R Axis 68    T Axis 63    QRS Count 9    Q Onset 221    P Onset 145    P Offset 202    T Offset 438    QTC Fredericia 426    Narrative    Sinus bradycardia  Otherwise normal ECG  No previous ECGs available  Confirmed by Albino Tucker ([id number]) on 4/4/2024 12:00:12 AM     Echo:  No results found for this or any previous visit.    Ejection Fractions:  No results found for: \"EF\"  Cath:  No results found for this or any previous visit.    Stress Test:  No results found for this or any previous visit.    Cardiac Imaging:  No results found for this or any previous visit.      === 04/03/24 ===    MR LIVER WITH CONTRAST    - Impression -  1.  No suspicious hepatic mass. The area of concern described on the  previous PET-CT correlates with a hemangioma within segment 2. An  additional smaller hemangioma is noted at the hepatic dome within  segment 8.  2. There is limited evaluation of the stomach due to gastric  underdistention, within this limitation there is mild wall thickening  and diffusion restriction involving the gastroesophageal junction and  gastric fundus which correlates with the patient's known malignancy  in this area.  3. Prominent retrocaval lymph node which does not meet criteria for  lymphadenopathy. Continued attention on follow-up is recommended on  subsequently performed imaging.      === 04/03/24 ===    CT ABDOMEN W IV CONTRAST    - Impression -  Esophageal cancer staging scan.  1. Indeterminate ill-defined 1.8 x 1.6 cm lesion in hepatic segment 2  as well as an indeterminate ill-defined 0.8 cm lesion in hepatic  segment 4B/5. Please see the same-day MRI for further  characterization.  2. No evidence of abdominal lymphadenopathy.  3. The known mass at the gastroesophageal junction is not well  delineated by CT. There is ill-defined wall thickening.         " Assessment/Plan  Assessment/Plan   Active Problems:  There are no active Hospital Problems.        Esophageal CA s/p J tube placement  -mediport placement with Dr. Buckley on 4/10/24       I spent 30 minutes in the professional and overall care of this patient.      Edmar Toussaint, APRN-CNP, DNP

## 2024-04-10 NOTE — Clinical Note
Closure device placed in the right subclavian vein. Site closed by suture, Steri-Strips and Dermabond.

## 2024-04-11 ENCOUNTER — HOME CARE VISIT (OUTPATIENT)
Dept: HOME HEALTH SERVICES | Facility: HOME HEALTH | Age: 79
End: 2024-04-11
Payer: MEDICARE

## 2024-04-11 ENCOUNTER — TELEMEDICINE (OUTPATIENT)
Dept: HEMATOLOGY/ONCOLOGY | Facility: HOSPITAL | Age: 79
End: 2024-04-11
Payer: MEDICARE

## 2024-04-11 ENCOUNTER — NUTRITION (OUTPATIENT)
Dept: HEMATOLOGY/ONCOLOGY | Facility: CLINIC | Age: 79
End: 2024-04-11
Payer: MEDICARE

## 2024-04-11 ENCOUNTER — NUTRITION (OUTPATIENT)
Dept: HEMATOLOGY/ONCOLOGY | Facility: HOSPITAL | Age: 79
End: 2024-04-11

## 2024-04-11 VITALS
BODY MASS INDEX: 26.2 KG/M2 | TEMPERATURE: 98.2 F | WEIGHT: 163 LBS | OXYGEN SATURATION: 95 % | RESPIRATION RATE: 18 BRPM | HEART RATE: 62 BPM | HEIGHT: 66 IN | SYSTOLIC BLOOD PRESSURE: 138 MMHG | DIASTOLIC BLOOD PRESSURE: 62 MMHG

## 2024-04-11 VITALS — WEIGHT: 174.38 LBS | HEIGHT: 66 IN | BODY MASS INDEX: 28.03 KG/M2

## 2024-04-11 DIAGNOSIS — C15.5 MALIGNANT NEOPLASM OF LOWER THIRD OF ESOPHAGUS (MULTI): Primary | ICD-10-CM

## 2024-04-11 DIAGNOSIS — C15.5 MALIGNANT NEOPLASM OF LOWER THIRD OF ESOPHAGUS (MULTI): ICD-10-CM

## 2024-04-11 PROCEDURE — 169592 NO-PAY CLAIM PROCEDURE

## 2024-04-11 PROCEDURE — 1157F ADVNC CARE PLAN IN RCRD: CPT | Performed by: INTERNAL MEDICINE

## 2024-04-11 PROCEDURE — 99214 OFFICE O/P EST MOD 30 MIN: CPT | Performed by: INTERNAL MEDICINE

## 2024-04-11 PROCEDURE — G0299 HHS/HOSPICE OF RN EA 15 MIN: HCPCS | Mod: HHH

## 2024-04-11 PROCEDURE — 0023 HH SOC

## 2024-04-11 PROCEDURE — 1159F MED LIST DOCD IN RCRD: CPT | Performed by: INTERNAL MEDICINE

## 2024-04-11 PROCEDURE — 1090000002 HH PPS REVENUE DEBIT

## 2024-04-11 PROCEDURE — 1090000001 HH PPS REVENUE CREDIT

## 2024-04-11 PROCEDURE — 1160F RVW MEDS BY RX/DR IN RCRD: CPT | Performed by: INTERNAL MEDICINE

## 2024-04-11 PROCEDURE — 1111F DSCHRG MED/CURRENT MED MERGE: CPT | Performed by: INTERNAL MEDICINE

## 2024-04-11 ASSESSMENT — ENCOUNTER SYMPTOMS
LAST BOWEL MOVEMENT: 66938
FATIGUES EASILY: 1
APPETITE LEVEL: POOR
INDIGESTION: 1
NAUSEA: 1
CONSTIPATION: 1
DENIES PAIN: 1
LOWEST PAIN SEVERITY IN PAST 24 HOURS: 0/10
CHANGE IN APPETITE: DECREASED
PAIN LOCATION: ABDOMEN
PERSON REPORTING PAIN: PATIENT
SORE THROAT: 1
CONTUSION: 1
HIGHEST PAIN SEVERITY IN PAST 24 HOURS: 6/10
STOOL FREQUENCY: TWICE DAILY

## 2024-04-11 ASSESSMENT — ACTIVITIES OF DAILY LIVING (ADL)
OASIS_M1830: 03
AMBULATION ASSISTANCE: STAND BY ASSIST
AMBULATION ASSISTANCE: 1
ENTERING_EXITING_HOME: STAND BY ASSIST

## 2024-04-11 ASSESSMENT — LIFESTYLE VARIABLES: SMOKING_STATUS: 0

## 2024-04-11 NOTE — PATIENT INSTRUCTIONS
Pt updated that DME is [facility] and their contact number was provided to the patient.   CMN was received to [facility], waiting for insurance approval and then [facility] will phone pt prior to sending order. Notified pt.

## 2024-04-11 NOTE — PROGRESS NOTES
NUTRITION COMMUNICATION NOTE    Juan M Mcrae     REASON FOR COMMUNICATION:     Patient had a telemedicine appointment with Dr. Fry  He had asked me to be present for part of this as pt was struggling to get TF supplies-  prior to entering room I was able to speak with colleague Anneliese Guadalupe who had been working on securing TF supplies for pt   Please note he was admitted 4-8 to 4-9 for j-tube placement   During my telemedicine visit in collaboration with Dr. Fry- pt was eating cream of potato soup- he is taking small spoonfuls and taking his time- he feel she will be able to get this bowl in  He is also drinking clear liquids   He also reports he had his tube flushed today by home care RN and that went well  Additionally I had the TF order from [facility] with me that Dr. Fry was able to sign.  This was faxed to [facility] and also emailed to them to ensure receipt.  Dr. Manzo's office may send as well but this documentation should suffice.    Discussed flushing tube with pt  Discussed TF with pt  Discussed po intake with pt   I am told by Dr. Fry- he may receive RT downtown if he gets proton treatment and if that is the case I will most likely follow   Pt was given my business phone number and he will call if he is not receiving supplies from [facility] as expected on 4-.                    Time Spent  Prep time on day of patient encounter: 10 minutes  Time spent directly with patient, family or caregiver: 10 minutes  Additional Time Spent on Patient Care Activities: 10 minutes  Documentation Time: 10 minutes  Other Time Spent: 0 minutes  Total: 40 minutes

## 2024-04-11 NOTE — PROGRESS NOTES
"NUTRITION Follow-up NOTE    Nutrition Assessment     Reason for Visit:  Juan M Mcrae is a 78 y.o. male who presents for esophageal cancer (lower esophageal/ GE junction)    Hx: DM, HTN  Allergy: omeprazole  A1c 6.8 (2/5/24) -- pt taken off metformin  Jtube to be placed on 4/8    Nutrition consult per diagnosis; pt s/p J placement on 4/8/24.   Home care RN visited pt today- pt has still not received supplies for enteral order. This RDN phoned several DME's without success; Dr. Manzo's team able to find out that pt's DME is [facility]. CMN needed to be filled out and sent back. [facility] RDN helped obtained signed CMN and sent back to DME for processing.     Phoned pt for updates. Pt answered. HIPAA compliant  Visit Type: Clinical Nutrition, Oncology, Telephone Visit:  A telephone visit (audio only) between the patient (at the distant site) and the provider (at the originating site) was utilized to provide this telehealth service.    Verbal Consent for Encounter: Verbal consent was requested and obtained from patient on this date for a telehealth visit.        Lab Results   Component Value Date/Time    GLUCOSE 138 (H) 04/09/2024 0810     04/09/2024 0810    K 4.2 04/09/2024 0810     04/09/2024 0810    CO2 30 04/09/2024 0810    ANIONGAP 11 04/09/2024 0810    BUN 11 04/09/2024 0810    CREATININE 0.98 04/09/2024 0810    EGFR 78 04/09/2024 0810    CALCIUM 9.1 04/09/2024 0810    ALBUMIN 4.4 04/02/2024 1045    ALKPHOS 40 04/02/2024 1045    PROT 7.1 04/02/2024 1045    AST 12 04/02/2024 1045    BILITOT 0.4 04/02/2024 1045    ALT 10 04/02/2024 1045     No results found for: \"VITD25\"    Anthropometrics:  Anthropometrics  Height: 167.4 cm (5' 5.91\")  Weight: 79.1 kg (174 lb 6.1 oz) (at discharge)  BMI (Calculated): 28.23  IBW/kg (Dietitian Calculated): 64.5 kg  Percent of IBW: 123 %  Adjusted Body Weight (kg): 68 kg  Weight Change  Weight History / % Weight Change: Pt reports intentionally losing weight last year " "using GOLO- down 60lbs and stopped at 175lbs. Weight loss from there has been unintentional due to swallowing difficulties; 3.5% loss in 1 month noted  Significant Weight Loss: No        Wt Readings from Last 10 Encounters:   04/11/24 79.1 kg (174 lb 6.1 oz)   04/09/24 79.1 kg (174 lb 6.4 oz)   04/02/24 79.4 kg (175 lb)   04/02/24 79.5 kg (175 lb 3.2 oz)   04/01/24 79.2 kg (174 lb 7.9 oz)   04/01/24 79.2 kg (174 lb 7.9 oz)   03/27/24 79.6 kg (175 lb 6.4 oz)   03/21/24 80 kg (176 lb 5.9 oz)   03/05/24 82.6 kg (182 lb)   03/04/24 82.1 kg (181 lb)        Food And Nutrient Intake:  Food and Nutrient History  Food and Nutrient History: Did have some trouble yesterday with eating- was drinking Boost VHC this morning and able to tolerate. Has not received enteral supplies yet.  Oral Problems: dysphagia                         Enteral Nutrition Intake  Enteral Nutrition Formula/Solution: Pt was discharged on Isosource 1.5 65mL/hx 24hrs; 2340kcal, 106g protein, 1192mL free water, 23g fiber; pt to progress and increase rate to allow for time off pump = 86mL/h x 18hrs; 100mL/h x ~16hrs.  Feeding Tube Flush: Flushes were written for 60mL flush and 20mL flush with medications                        Food Supplement Intake  Oral Nutrition Supplements: Boost Very High Calorie           Nutrition Focused Physical Exam Findings:  Completed 4/1/2024                        Energy Needs  Calculated Energy Needs Using Equations  Height: 167.4 cm (5' 5.91\")  Weight Used for Equation Calculations: 79.2 kg (174 lb 9.7 oz)  Ana María- St[place] Lee Equation (Overweight or Obese Patients): 1448  Estimated Energy Needs  Total Energy Estimated Needs (kCal): 1980 kCal  Total Estimated Energy Need per Day (kCal/kg): 25 kCal/kg  Method for Estimating Needs: up to 2376kcal/day once treatment starts  Estimated Fluid Needs  Total Fluid Estimated Needs (mL): 1980 mL  Total Fluid Estimated Needs (mL/kg): 25 mL/kg  Method for Estimating Needs: additional " fluids based on NIS  Estimated Protein Needs  Total Protein Estimated Needs (g): 95.04 g  Total Protein Estimated Needs (g/kg): 1.2 g/kg  Method for Estimating Needs: up to 110g/day        Nutrition Diagnosis   Malnutrition Diagnosis  Patient has Malnutrition Diagnosis: Yes  Diagnosis Status: New  Malnutrition Diagnosis: Moderate malnutrition related to chronic disease or condition  As Evidenced by: mild muscle/ fat loss noted and pt with variable intake due to dysphagia, requiring altered consistency diet with intake < 75% of needs.    Nutrition Diagnosis  Patient has Nutrition Diagnosis: Yes  Diagnosis Status (1): Ongoing  Nutrition Diagnosis 1: Predicted inadequate energy intake  Related to (1): potential for nutrition impact symptoms and altered GI function  As Evidenced by (1): pt with esophageal CA with planned to start treatment, currently with dysphagia and need for altered consistency diet/ enteral feed supplementation to maintain weight.    Nutrition Interventions/Recommendations   Nutrition Prescription  Individualized Nutrition Prescription Provided for : Nutrition for altered consistency diet (purees/ blended) ; enteral nutrition (jtube)    Food and Nutrition Delivery  Food and Nutrition Delivery  Enteral Nutrition: Feeding tube flush, Other, specify:  Total Nutrition Provided: Isosource 1.5 total volume of 1560 = 2340kcal, 106g protein, 1192mL free water; additional flushes of 60mL at hook up and disconnect and 60mL 6x/day  Goals: Isosource 1.5 @ 65mL/h x 24hrs; with increase in 10mL every 1-2 days until goal of 100mL/h x 16hrs is reached.    Nutrition Education  Nutrition Education  Nutrition Education Content: Content related nutrition education  Goals: Enteral nutrition    Coordination of Care  Coordination of Nutrition Care by a Nutrition Professional  Collaboration and referral of nutrition care: Collaboration by nutrition professional with other providers, Collaboration by nutrition professional  with other nutrition professionals  Goals: CMN to be completed and resent back to DME: [facility]    Patient Instructions   Pt updated that DME is [facility] and their contact number was provided to the patient.   CMN was received to [facility], waiting for insurance approval and then [facility] will phone pt prior to sending order. Notified pt.     Nutrition Monitoring and Evaluation   Food/Nutrient Related History Monitoring  Monitoring and Evaluation Plan: Enteral and parenteral nutrition intake, Fluid intake  Fluid Intake: Estimated fluid intake  Criteria: maintain hydration; 66+oz daily  Enteral and Parenteral Nutrition Intake: Enteral nutrition intake, Enteral nutrition formula/solution  Criteria: Pt to be able to tolerate enteral feed at goal rate with time off pump for ADLs. Pt to tolerate standard formula.  Body Composition/Growth/Weight History  Monitoring and Evaluation Plan: Weight  Weight: Measured weight  Criteria: Maintain weight          Time Spent  Prep time on day of patient encounter: 5 minutes  Time spent directly with patient, family or caregiver: 5 minutes  Additional Time Spent on Patient Care Activities: 75 minutes  Documentation Time: 30 minutes  Other Time Spent: 0 minutes  Total: 115 minutes

## 2024-04-11 NOTE — PROGRESS NOTES
Patient ID: Juan M Mcrae is a 78 y.o. male.    Diagnoses: GE junction adenocarcinoma (signet ring cell), MMR status pending.  Localized disease by PET CT scan.    Genomic profile:  MMR status on the biopsy by IHC has been ordered on April 1, 2024.    Assessment and Plan:  This is a pleasant 78-year-old man with a new diagnosis of GE junction adenocarcinoma.  His staging PET/CT and subsequent imaging studies confirmed localized disease.  He had a port placement and a J-tube placement recently.  He has seen radiation oncology and he is interested in the NRG- (photon versus proton) study.    I discussed weekly chemotherapy with carboplatin and Taxol briefly with him.  I have requested a follow-up appointment on April 15, 2024 when I plan to consent him and ordered his chemotherapy.  I briefly described the chemotherapy regimen with him. Briefly, carboplatin and Taxol can cause various side effects which include but not limited to nausea, vomiting, diarrhea, hair loss, oral mucositis, fatigue, peripheral neuropathy, risk of kidney damage, risk of infection, metallic test in the mouth, allergic reactions, etc..      Follow up plan-I have requested an appointment with me on April 15, 2024 at [place].      I have placed all orders as outlined above. I advised the patient to schedule the tests and follow-up appointment as discussed by contacting the  on the way out or calling by phone.    Providers:  Surgeon: Dr. Jovany Solares:  Jennie: Donna.    Chief complaint: GE junction adenocarcinoma.    HPI:  ONCOLOGIC HISTORY-    February 28, 2024: Underwent an EGD for progressive dysphagia and weight loss.  EGD revealed an obstructing mass in the distal esophagus.  Biopsy confirmed adenocarcinoma with signet ring cell features.    March 21, 2024: EUS revealed T2 N1 mass extending from 41 cm to 43 cm and involving less than 1 cm of the gastric cardia.    March 22, 2024: CT scan of chest did not show any metastatic  disease.    April 1, 2024 24: PET/CT scan done:   1. Hypermetabolic linear focus at gastroesophageal junction which is  extending to the gastric cardia/proximal lesser curvature in  correlation with distal esophageal /gastroesophageal junction wall  thickening. These findings are compatible with biopsy-proven  adenocarcinoma.  2. No evidence of hypermetabolic lymphadenopathy.  3. Abnormal focal increase in FDG uptake in the left hepatic lobe in  correlation with hypoattenuating lesion, concerning for metastatic  disease. Further correlation with dedicated CT abdomen is recommended.  4. Large fat containing left inguinal hernia.  Interval history: He has significant dysphagia although he can get by with soup.  He has lost about 60 pounds in the last 6 months or so.  Feels fatigued.  Denies any pain.  Denies fever or chill or any other sign of ongoing infection.    April 3, 2024: CT scan of abdomen and MRI liver ruled out metastatic disease (liver lesions were hemangiomas).    Current condition: This was over the phone appointment.  He tells me that he is about to start using the J-tube.  He is able to flush the J-tube with water.  Denies any other complaints.    Past Medical History: Type 2 diabetes mellitus on oral hypoglycemics, hypertension controlled with medications.  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, unspecified      Comment:  Elevated serum cholesterol  No date: Dysphagia  No date: ED (erectile dysfunction)  No date: Esophageal cancer (CMS/HCC)  No date: GERD (gastroesophageal reflux disease)  No date: Hearing aid worn  No date: HL (hearing loss)  No date: Hyperlipidemia  No date: Hypertension  No date: Nephrolithiasis  No date: Other specified diabetes mellitus without complications (CMS/  HCC)      Comment:  last A1c= 6.8 on 2/5/2024  No date: Sleep apnea      Comment:   wear CPAP/BiPAP  No date: Vision loss      Comment:  wears glasses   Surgical History:    Past Surgical History:   Procedure Laterality Date    BLADDER SURGERY      COLONOSCOPY      Repeat in one year    EYE SURGERY      LITHOTRIPSY      OTHER SURGICAL HISTORY      Vocal cord surgery    TRANSURETHRAL RESECTION OF PROSTATE      UPPER GASTROINTESTINAL ENDOSCOPY  2024      Family History:    Family History   Problem Relation Name Age of Onset    Stroke Father      Kidney cancer Brother Eliu     Stroke Brother Eliu      Family Oncology History:    Cancer-related family history includes Kidney cancer in his brother.  Social History:    Social History     Tobacco Use    Smoking status: Former     Current packs/day: 0.00     Average packs/day: 2.0 packs/day for 40.0 years (80.0 ttl pk-yrs)     Types: Cigarettes     Start date: 1959     Quit date: 1999     Years since quittin.2    Smokeless tobacco: Never   Vaping Use    Vaping status: Never Used   Substance Use Topics    Alcohol use: Yes     Alcohol/week: 2.0 standard drinks of alcohol     Types: 2 Cans of beer per week     Comment: couple beers a week    Drug use: Not Currently     Comment: gummies-dispensary from [place]        Allergies  Allergies   Allergen Reactions    Omeprazole Itching and Unknown    Shellfish Containing Products Unknown    Sulfa (Sulfonamide Antibiotics) Rash        Medications  Current Outpatient Medications   Medication Instructions    acetaminophen (TYLENOL) 650 mg, j-tube, Every 6 hours PRN    atenolol (Tenormin) 50 mg tablet 1 tablet, oral, Daily    multivitamin with minerals iron-free (Centrum Silver) 1 tablet, oral, Daily    oxyCODONE (ROXICODONE) 5 mg, j-tube, Every 6 hours PRN    pioglitazone (ACTOS) 30 mg, oral, Daily    traZODone (DESYREL) 100 mg, oral, Nightly          Objective   VS: There were no vitals taken for this visit.  Weight:   There were no vitals filed for this visit.       PHYSICAL  EXAMINATION  Labs  Results from last 7 days   Lab Units 04/09/24  0810   WBC AUTO x10*3/uL 8.6   HEMOGLOBIN g/dL 15.0   HEMATOCRIT % 44.9   PLATELETS AUTO x10*3/uL 214      Results from last 7 days   Lab Units 04/09/24  0810   GLUCOSE mg/dL 138*   SODIUM mmol/L 138   POTASSIUM mmol/L 4.2   CHLORIDE mmol/L 101   CO2 mmol/L 30   BUN mg/dL 11   CREATININE mg/dL 0.98   EGFR mL/min/1.73m*2 78   CALCIUM mg/dL 9.1   MAGNESIUM mg/dL 2.09                   Image           Catarino Fry MD.

## 2024-04-12 ENCOUNTER — NUTRITION (OUTPATIENT)
Dept: HEMATOLOGY/ONCOLOGY | Facility: HOSPITAL | Age: 79
End: 2024-04-12
Payer: MEDICARE

## 2024-04-12 DIAGNOSIS — C15.5 MALIGNANT NEOPLASM OF LOWER THIRD OF ESOPHAGUS (MULTI): ICD-10-CM

## 2024-04-12 PROCEDURE — 1090000001 HH PPS REVENUE CREDIT

## 2024-04-12 PROCEDURE — 1090000002 HH PPS REVENUE DEBIT

## 2024-04-12 NOTE — PROGRESS NOTES
NUTRITION COMMUNICATION NOTE    Juan M Mcrea     REASON FOR COMMUNICATION:     Patient called today- he is concerned that he had not heard from [facility] regarding delivery of TF supplies.  Home care RN is coming out tomorrow.    [facility] was called-   He will be set up with them today- 4-    Pt reports he was told by MD at discharge to run TF at 85 ml per hour for 14 hours  This was his plan infusion rate and time frame.    He reports he did receive TF in the hospital- he believes it was run between 30-40 ml per hour and he had no issues  TF as he was instructed to run it provides  1190 ml (4.76 cartons)  To provide  1785 calories  81 g protein  909 ml of free water    Estimated Energy Needs  Total Energy Estimated Needs (kCal): 1980 kCal  Total Estimated Energy Need per Day (kCal/kg): 25 kCal/kg  Method for Estimating Needs: up to 2376kcal/day once treatment starts  Estimated Fluid Needs  Total Fluid Estimated Needs (mL): 1980 mL  Total Fluid Estimated Needs (mL/kg): 25 mL/kg  Method for Estimating Needs: additional fluids based on NIS  Estimated Protein Needs  Total Protein Estimated Needs (g): 95.04 g  Total Protein Estimated Needs (g/kg): 1.2 g/kg  Method for Estimating Needs: up to 110g/day    Feel this is good start to TF intake with current ability to take some po at this point.    Pt is comfortable with TF plan for now.  He knows he will be instructed on pump use by [facility] upon delivery- also discussed education regarding pump on YouTube .    I will plan on meeting with pt on 4- in [place]- pt is aware and will call if he has any questions or concerns     3p  Pt called and was concerned he has had no delivery from [facility]  With this I called [facility] - got  and then emailed to asked about ETA  4:30p  Called pt to see if he had received anything since I had not received a reply from my email   He had not.  He is very concerned about his lack of nutrition.  He reports to me he is having trouble  getting water down today.  The potato soup he had yesterday only amounted to 3 bites- it would not go down.  He has lost 2 more pounds according to his home scale.  He had flushed his tube with 60 ml and then took meds and flushed with another 60 ml  I asked him to try to get in 16 syringes- 4 cups of water / Gatorade per day  I asked him to flush no more than 120 ml or 2 syringes at a time  I reiterated that I was told initially his set-up would be today- I have to trust their word.  I do know deliveries can go out very late (8-9p) He remains concerned- which I completely understand.  He has [facility] number  I did provide him with my cell phone number if there was something he needed over the weekend, just to provide some peace of mind.          Time Spent  Prep time on day of patient encounter: 10 minutes  Time spent directly with patient, family or caregiver: 20 minutes  Additional Time Spent on Patient Care Activities: 25 minutes  Documentation Time: 20 minutes  Other Time Spent: 0 minutes  Total: 75 minutes

## 2024-04-13 ENCOUNTER — HOME CARE VISIT (OUTPATIENT)
Dept: HOME HEALTH SERVICES | Facility: HOME HEALTH | Age: 79
End: 2024-04-13
Payer: MEDICARE

## 2024-04-13 VITALS
SYSTOLIC BLOOD PRESSURE: 128 MMHG | RESPIRATION RATE: 18 BRPM | HEART RATE: 55 BPM | DIASTOLIC BLOOD PRESSURE: 60 MMHG | OXYGEN SATURATION: 97 % | TEMPERATURE: 97.5 F

## 2024-04-13 PROCEDURE — 1090000002 HH PPS REVENUE DEBIT

## 2024-04-13 PROCEDURE — G0299 HHS/HOSPICE OF RN EA 15 MIN: HCPCS | Mod: HHH

## 2024-04-13 PROCEDURE — 1090000001 HH PPS REVENUE CREDIT

## 2024-04-13 SDOH — ECONOMIC STABILITY: GENERAL

## 2024-04-13 ASSESSMENT — ENCOUNTER SYMPTOMS
PAIN: 1
PAIN LOCATION - RELIEVING FACTORS: MEDICATIONS
PAIN LOCATION - PAIN QUALITY: REACHING
LOWEST PAIN SEVERITY IN PAST 24 HOURS: 0/10
BOWEL PATTERN NORMAL: 1
PAIN LOCATION - PAIN FREQUENCY: INFREQUENT
PERSON REPORTING PAIN: PATIENT
HIGHEST PAIN SEVERITY IN PAST 24 HOURS: 5/10
PAIN SEVERITY GOAL: 2/10
PAIN LOCATION - EXACERBATING FACTORS: MOVEMENT
PAIN LOCATION - PAIN DURATION: BRIEF
PAIN LOCATION: ABDOMEN
PAIN LOCATION - PAIN SEVERITY: 3/10

## 2024-04-13 ASSESSMENT — ACTIVITIES OF DAILY LIVING (ADL)
AMBULATION ASSISTANCE: 1
MONEY MANAGEMENT (EXPENSES/BILLS): INDEPENDENT

## 2024-04-14 PROCEDURE — 1090000001 HH PPS REVENUE CREDIT

## 2024-04-14 PROCEDURE — 1090000002 HH PPS REVENUE DEBIT

## 2024-04-15 ENCOUNTER — OFFICE VISIT (OUTPATIENT)
Dept: HEMATOLOGY/ONCOLOGY | Facility: CLINIC | Age: 79
End: 2024-04-15
Payer: MEDICARE

## 2024-04-15 ENCOUNTER — APPOINTMENT (OUTPATIENT)
Dept: LAB | Facility: CLINIC | Age: 79
End: 2024-04-15
Payer: MEDICARE

## 2024-04-15 ENCOUNTER — TELEPHONE (OUTPATIENT)
Dept: RADIATION ONCOLOGY | Facility: HOSPITAL | Age: 79
End: 2024-04-15
Payer: MEDICARE

## 2024-04-15 ENCOUNTER — NUTRITION (OUTPATIENT)
Dept: HEMATOLOGY/ONCOLOGY | Facility: CLINIC | Age: 79
End: 2024-04-15

## 2024-04-15 VITALS
WEIGHT: 168.76 LBS | HEART RATE: 56 BPM | DIASTOLIC BLOOD PRESSURE: 88 MMHG | OXYGEN SATURATION: 98 % | SYSTOLIC BLOOD PRESSURE: 155 MMHG | BODY MASS INDEX: 27.12 KG/M2 | HEIGHT: 66 IN | RESPIRATION RATE: 18 BRPM | TEMPERATURE: 97.9 F

## 2024-04-15 VITALS — BODY MASS INDEX: 27.12 KG/M2 | WEIGHT: 168.76 LBS | HEIGHT: 66 IN

## 2024-04-15 DIAGNOSIS — C15.5 MALIGNANT NEOPLASM OF LOWER THIRD OF ESOPHAGUS (MULTI): Primary | ICD-10-CM

## 2024-04-15 LAB
ALBUMIN SERPL BCP-MCNC: 4.3 G/DL (ref 3.4–5)
ALP SERPL-CCNC: 42 U/L (ref 33–136)
ALT SERPL W P-5'-P-CCNC: 11 U/L (ref 10–52)
ANION GAP SERPL CALC-SCNC: 11 MMOL/L (ref 10–20)
AST SERPL W P-5'-P-CCNC: 12 U/L (ref 9–39)
BASOPHILS # BLD AUTO: 0.04 X10*3/UL (ref 0–0.1)
BASOPHILS NFR BLD AUTO: 0.6 %
BILIRUB SERPL-MCNC: 0.4 MG/DL (ref 0–1.2)
BUN SERPL-MCNC: 26 MG/DL (ref 6–23)
CALCIUM SERPL-MCNC: 9.3 MG/DL (ref 8.6–10.3)
CHLORIDE SERPL-SCNC: 103 MMOL/L (ref 98–107)
CO2 SERPL-SCNC: 31 MMOL/L (ref 21–32)
CREAT SERPL-MCNC: 0.85 MG/DL (ref 0.5–1.3)
EGFRCR SERPLBLD CKD-EPI 2021: 88 ML/MIN/1.73M*2
EOSINOPHIL # BLD AUTO: 0.29 X10*3/UL (ref 0–0.4)
EOSINOPHIL NFR BLD AUTO: 4.7 %
ERYTHROCYTE [DISTWIDTH] IN BLOOD BY AUTOMATED COUNT: 14.5 % (ref 11.5–14.5)
GLUCOSE SERPL-MCNC: 113 MG/DL (ref 74–99)
HCT VFR BLD AUTO: 42.7 % (ref 41–52)
HGB BLD-MCNC: 13.9 G/DL (ref 13.5–17.5)
IMM GRANULOCYTES # BLD AUTO: 0.01 X10*3/UL (ref 0–0.5)
IMM GRANULOCYTES NFR BLD AUTO: 0.2 % (ref 0–0.9)
LYMPHOCYTES # BLD AUTO: 1.27 X10*3/UL (ref 0.8–3)
LYMPHOCYTES NFR BLD AUTO: 20.6 %
MAGNESIUM SERPL-MCNC: 2.01 MG/DL (ref 1.6–2.4)
MCH RBC QN AUTO: 32.7 PG (ref 26–34)
MCHC RBC AUTO-ENTMCNC: 32.6 G/DL (ref 32–36)
MCV RBC AUTO: 101 FL (ref 80–100)
MONOCYTES # BLD AUTO: 0.79 X10*3/UL (ref 0.05–0.8)
MONOCYTES NFR BLD AUTO: 12.8 %
NEUTROPHILS # BLD AUTO: 3.77 X10*3/UL (ref 1.6–5.5)
NEUTROPHILS NFR BLD AUTO: 61.1 %
NRBC BLD-RTO: 0 /100 WBCS (ref 0–0)
PLATELET # BLD AUTO: 202 X10*3/UL (ref 150–450)
POTASSIUM SERPL-SCNC: 3.8 MMOL/L (ref 3.5–5.3)
PROT SERPL-MCNC: 7.1 G/DL (ref 6.4–8.2)
RBC # BLD AUTO: 4.25 X10*6/UL (ref 4.5–5.9)
SODIUM SERPL-SCNC: 141 MMOL/L (ref 136–145)
WBC # BLD AUTO: 6.2 X10*3/UL (ref 4.4–11.3)

## 2024-04-15 PROCEDURE — 99215 OFFICE O/P EST HI 40 MIN: CPT | Performed by: INTERNAL MEDICINE

## 2024-04-15 PROCEDURE — 85025 COMPLETE CBC W/AUTO DIFF WBC: CPT | Performed by: INTERNAL MEDICINE

## 2024-04-15 PROCEDURE — 80053 COMPREHEN METABOLIC PANEL: CPT | Performed by: INTERNAL MEDICINE

## 2024-04-15 PROCEDURE — 1111F DSCHRG MED/CURRENT MED MERGE: CPT | Performed by: INTERNAL MEDICINE

## 2024-04-15 PROCEDURE — 1159F MED LIST DOCD IN RCRD: CPT | Performed by: INTERNAL MEDICINE

## 2024-04-15 PROCEDURE — 1157F ADVNC CARE PLAN IN RCRD: CPT | Performed by: INTERNAL MEDICINE

## 2024-04-15 PROCEDURE — 1125F AMNT PAIN NOTED PAIN PRSNT: CPT | Performed by: INTERNAL MEDICINE

## 2024-04-15 PROCEDURE — 83735 ASSAY OF MAGNESIUM: CPT | Performed by: RADIOLOGY

## 2024-04-15 PROCEDURE — 1160F RVW MEDS BY RX/DR IN RCRD: CPT | Performed by: INTERNAL MEDICINE

## 2024-04-15 PROCEDURE — 1090000001 HH PPS REVENUE CREDIT

## 2024-04-15 PROCEDURE — 36415 COLL VENOUS BLD VENIPUNCTURE: CPT | Performed by: INTERNAL MEDICINE

## 2024-04-15 PROCEDURE — 1090000002 HH PPS REVENUE DEBIT

## 2024-04-15 RX ORDER — PROCHLORPERAZINE MALEATE 10 MG
10 TABLET ORAL EVERY 6 HOURS PRN
Qty: 30 TABLET | Refills: 5 | Status: SHIPPED | OUTPATIENT
Start: 2024-04-15 | End: 2024-04-15 | Stop reason: WASHOUT

## 2024-04-15 RX ORDER — ONDANSETRON HYDROCHLORIDE 8 MG/1
8 TABLET, FILM COATED ORAL EVERY 8 HOURS PRN
Qty: 30 TABLET | Refills: 5 | Status: SHIPPED | OUTPATIENT
Start: 2024-04-15 | End: 2024-04-15 | Stop reason: WASHOUT

## 2024-04-15 RX ORDER — ONDANSETRON 8 MG/1
8 TABLET, ORALLY DISINTEGRATING ORAL EVERY 8 HOURS PRN
Qty: 30 TABLET | Refills: 0 | Status: SHIPPED | OUTPATIENT
Start: 2024-04-15 | End: 2024-04-25

## 2024-04-15 RX ORDER — LIDOCAINE AND PRILOCAINE 25; 25 MG/G; MG/G
CREAM TOPICAL DAILY PRN
Qty: 30 G | Refills: 2 | Status: SHIPPED | OUTPATIENT
Start: 2024-04-15

## 2024-04-15 ASSESSMENT — COLUMBIA-SUICIDE SEVERITY RATING SCALE - C-SSRS
6. HAVE YOU EVER DONE ANYTHING, STARTED TO DO ANYTHING, OR PREPARED TO DO ANYTHING TO END YOUR LIFE?: NO
1. IN THE PAST MONTH, HAVE YOU WISHED YOU WERE DEAD OR WISHED YOU COULD GO TO SLEEP AND NOT WAKE UP?: NO
2. HAVE YOU ACTUALLY HAD ANY THOUGHTS OF KILLING YOURSELF?: NO

## 2024-04-15 ASSESSMENT — PATIENT HEALTH QUESTIONNAIRE - PHQ9
1. LITTLE INTEREST OR PLEASURE IN DOING THINGS: NOT AT ALL
2. FEELING DOWN, DEPRESSED OR HOPELESS: NOT AT ALL
SUM OF ALL RESPONSES TO PHQ9 QUESTIONS 1 AND 2: 0

## 2024-04-15 ASSESSMENT — PAIN SCALES - GENERAL: PAINLEVEL: 2

## 2024-04-15 NOTE — PROGRESS NOTES
Patient here for follow up with Dr. Fry accompanied by his significant other. Medications and allergies reviewed.     Patient stated he was discharged home from the hospital after J-tube placement and has had difficulties getting tube feeding supplies from [facility]. This RN informed the patient that the dietician is here today and she will be in to speak with him and she will assist him with getting his supplies and answer his questions regarding his feed.     Patient stated he is passing stool at this time. Stated he is having troubles sleeping, currently taking trazodone and crushing it with warm water and drinking because it clogged his tube. Per Dr. Fry patient can continue on trazodone.     Chemotherapy discussed, plan weekly carboplatin and taxol. Chemo education provided.

## 2024-04-15 NOTE — PROGRESS NOTES
"NUTRITION Follow-up NOTE    Nutrition Assessment     Reason for Visit:  Juan M Mcrae is a 78 y.o. male who presents for esophageal cancer (lower esophageal/ GE junction)    Hx: DM, HTN  Allergy: omeprazole  A1c 6.8 (2/5/24) -- pt taken off metformin  Jtube to be placed on 4/8    Nutrition consult per diagnosis; pt s/p J placement on 4/8/24.   Visited with pt today while in to see MD- signed consent for chemo- carbo/ taxol    Pt with D[facility] - still having difficulty getting supplies; [facility] could not provide this RDN with any updated shipment/ delivery information as the rest of his shipment does not have an order number.     This RDN touched base with main campus RDN; asking if able to provide pt with additional case of formula if doesn't receive shipment by 4/16. Contact pt and encouraged to phone [facility] daily with shipment updates.     Lab Results   Component Value Date/Time    GLUCOSE 113 (H) 04/15/2024 0908     04/15/2024 0908    K 3.8 04/15/2024 0908     04/15/2024 0908    CO2 31 04/15/2024 0908    ANIONGAP 11 04/15/2024 0908    BUN 26 (H) 04/15/2024 0908    CREATININE 0.85 04/15/2024 0908    EGFR 88 04/15/2024 0908    CALCIUM 9.3 04/15/2024 0908    ALBUMIN 4.3 04/15/2024 0908    ALKPHOS 42 04/15/2024 0908    PROT 7.1 04/15/2024 0908    AST 12 04/15/2024 0908    BILITOT 0.4 04/15/2024 0908    ALT 11 04/15/2024 0908     No results found for: \"VITD25\"    Anthropometrics:  Anthropometrics  Height: 167.3 cm (5' 5.87\")  Weight: 76.5 kg (168 lb 12.2 oz)  BMI (Calculated): 27.35  IBW/kg (Dietitian Calculated): 64.5 kg  Weight Change  Weight History / % Weight Change: 3.4% weight loss in about 2 weeks  Significant Weight Loss: Yes  Interpretation of Weight Loss: 1-2% in 1 week        Wt Readings from Last 10 Encounters:   04/15/24 76.5 kg (168 lb 12.2 oz)   04/15/24 76.5 kg (168 lb 12.2 oz)   04/11/24 79.1 kg (174 lb 6.1 oz)   04/11/24 73.9 kg (163 lb)   04/09/24 79.1 kg (174 lb 6.4 oz) " "  04/02/24 79.4 kg (175 lb)   04/02/24 79.5 kg (175 lb 3.2 oz)   04/01/24 79.2 kg (174 lb 7.9 oz)   04/01/24 79.2 kg (174 lb 7.9 oz)   03/27/24 79.6 kg (175 lb 6.4 oz)        Food And Nutrient Intake:  Food and Nutrient History  Food and Nutrient History: Started enteral feeds on Saturday- running at 85mL/h x 14hrs. Pt did not receive full shipment on 4/12, did not hear when they might receive the rest of the shipment. Pt has been able to drink Boost VHC and High protein; 1-2 per day, one of each. Pt is also able to consume 1 whole can of mushroom soup with 2% milk, jello, has been mixing heavy cream into soups as well.  Energy Intake: Good > 75 % (the last 2 days)  Fluid Intake: Some coffee, some water but not much; flushes of 60mL 3x/day; did do 1 day of gatorade with water per [facility] RDN but stopped once enteral feeds started.  Oral Problems: dysphagia                         Enteral Nutrition Intake  Enteral Nutrition Formula/Solution: Start with goal of 85mL/h x 14 hrs.; Saturday and Sunday; tolerating well. Bms started again; solid and firmer.  Feeding Tube Flush: 60mL flush 3x/day, before/ after hookup and once during the day                                    Nutrition Focused Physical Exam Findings:  Completed 4/1/2024                        Energy Needs  Calculated Energy Needs Using Equations  Height: 167.3 cm (5' 5.87\")  Weight Used for Equation Calculations: 79.2 kg (174 lb 9.7 oz)  [place] Equation (Overweight or Obese Patients): 1448  Estimated Energy Needs  Total Energy Estimated Needs (kCal): 1980 kCal  Total Estimated Energy Need per Day (kCal/kg): 25 kCal/kg  Method for Estimating Needs: up to 2376kcal/day once treatment starts  Estimated Fluid Needs  Total Fluid Estimated Needs (mL): 1980 mL  Total Fluid Estimated Needs (mL/kg): 25 mL/kg  Method for Estimating Needs: additional fluids based on NIS  Estimated Protein Needs  Total Protein Estimated Needs (g): 95.04 g  Total Protein Estimated " Needs (g/kg): 1.2 g/kg  Method for Estimating Needs: up to 110g/day        Nutrition Diagnosis   Malnutrition Diagnosis  Patient has Malnutrition Diagnosis: Yes  Diagnosis Status: Ongoing  Malnutrition Diagnosis: Moderate malnutrition related to chronic disease or condition  As Evidenced by: mild muscle/ fat loss noted and pt with variable intake due to dysphagia requiring altered consistency diet with intake < 75% of needs.  Additional Assessment Information: Continued weight loss noted s/p PEG placement without sufficient enteral feeds and decreased oral intake    Nutrition Diagnosis  Patient has Nutrition Diagnosis: Yes  Diagnosis Status (1): Ongoing  Nutrition Diagnosis 1: Predicted inadequate energy intake  Related to (1): potential for nutrition impact symptoms and altered GI function  As Evidenced by (1): pt with esophageal CA with planned to start treatment, currently with dysphagia and need for altered consistency diet/ enteral feed supplementation to maintain weight.    Nutrition Interventions/Recommendations   Nutrition Prescription  Individualized Nutrition Prescription Provided for : Nutrition for altered consistency diet (purees/ blended) ; enteral nutrition (jtube)    Food and Nutrition Delivery  Food and Nutrition Delivery  Meals & Snacks: Texture-Modified Diet, Protein-modified diet, Energy-modified diet, Modify schedule of foods/fluids  Goals: Oral diet as able; continued modified foods of pureed/ liquids as able.  Enteral Nutrition: Feeding tube flush  Total Nutrition Provided: Current intake of 85mL/h x 14hrs = 1190 ml (4.76 cartons)  To provide  1785 calories  81 g protein  909 ml of free water  sufficient with oral intake; will likely need increase in volume if/ when oral intake declines.  Other:: Hydration/ Fluids  Goals: Recommend to increase water flushes to 120mL 3x/day and oral intake of additional 2.5cups orally. Pt feels he will be able to do do this at this [facility]    Nutrition  Education  Nutrition Education  Nutrition Education Content: Content related nutrition education  Goals: Enteral nutrition/ modified oral intake    Coordination of Care  Coordination of Nutrition Care by a Nutrition Professional  Collaboration and referral of nutrition care: Collaboration by nutrition professional with other nutrition professionals, Collaboration by nutrition professional with other providers  Goals: [facility] RDN to follow-up with pt while at [facility] on 4/16/24    There are no Patient Instructions on file for this visit.    Nutrition Monitoring and Evaluation   Food/Nutrient Related History Monitoring  Monitoring and Evaluation Plan: Enteral and parenteral nutrition intake, Fluid intake  Fluid Intake: Estimated fluid intake  Criteria: maintain hydration; 66+oz daily  Enteral and Parenteral Nutrition Intake: Enteral nutrition intake, Enteral nutrition formula/solution  Criteria: Pt to be able to tolerate enteral feed at goal rate with time off pump for ADLs. Pt to tolerate standard formula.  Body Composition/Growth/Weight History  Monitoring and Evaluation Plan: Weight  Weight: Measured weight  Criteria: Maintain weight          Time Spent  Prep time on day of patient encounter: 5 minutes  Time spent directly with patient, family or caregiver: 25 minutes  Additional Time Spent on Patient Care Activities: 30 minutes  Documentation Time: 20 minutes  Other Time Spent: 0 minutes  Total: 80 minutes

## 2024-04-15 NOTE — PROGRESS NOTES
Patient ID: Juan M Mcrae is a 78 y.o. male.    Diagnoses: GE junction adenocarcinoma (signet ring cell), MMR status pending.  Localized disease by PET CT scan.    Genomic profile:  MMR status on the biopsy by IHC has been ordered on April 1, 2024.    Assessment and Plan:  This is a pleasant 78-year-old man with a new diagnosis of GE junction adenocarcinoma.  His staging PET/CT and subsequent imaging studies confirmed localized disease.  He had a port placement and a J-tube placement recently.  He has seen radiation oncology and he is interested in the NRG- (photon versus proton) study.    I discussed weekly chemotherapy with carboplatin and Taxol briefly with him.  I described the chemotherapy regimen in detail. Carboplatin and Taxol can cause various side effects which include but not limited to nausea, vomiting, diarrhea, hair loss, oral mucositis, fatigue, peripheral neuropathy, risk of kidney damage, risk of infection, metallic test in the mouth, allergic reactions, etc..  Our clinic nurse Wilma has provided him printed information on the chemotherapeutic drugs.  After discussion, he signed the consent form.  Tentatively I am thinking to start his chemotherapy on April 29, 2024 as long as his radiation is ready to go.    Follow up plan-I have requested an appointment with me on April 29, 2024 at [facility].      I have placed all orders as outlined above. I advised the patient to schedule the tests and follow-up appointment as discussed by contacting the  on the way out or calling by phone.    Providers:  Surgeon: Dr. Jovany Bucknerc:  Jennie: Donna.    Chief complaint: GE junction adenocarcinoma.    HPI:  ONCOLOGIC HISTORY-    February 28, 2024: Underwent an EGD for progressive dysphagia and weight loss.  EGD revealed an obstructing mass in the distal esophagus.  Biopsy confirmed adenocarcinoma with signet ring cell features.    March 21, 2024: EUS revealed T2 N1 mass extending from 41 cm to 43 cm  and involving less than 1 cm of the gastric cardia.    March 22, 2024: CT scan of chest did not show any metastatic disease.    April 1, 2024 24: PET/CT scan done:   1. Hypermetabolic linear focus at gastroesophageal junction which is  extending to the gastric cardia/proximal lesser curvature in  correlation with distal esophageal /gastroesophageal junction wall  thickening. These findings are compatible with biopsy-proven  adenocarcinoma.  2. No evidence of hypermetabolic lymphadenopathy.  3. Abnormal focal increase in FDG uptake in the left hepatic lobe in  correlation with hypoattenuating lesion, concerning for metastatic  disease. Further correlation with dedicated CT abdomen is recommended.  4. Large fat containing left inguinal hernia.  Interval history: He has significant dysphagia although he can get by with soup.  He has lost about 60 pounds in the last 6 months or so.  Feels fatigued.  Denies any pain.  Denies fever or chill or any other sign of ongoing infection.    April 3, 2024: CT scan of abdomen and MRI liver ruled out metastatic disease (liver lesions were hemangiomas).    Interval history:  He is overall doing well.  He is using the J-tube for feeding without any significant difficulty.  Denies any pain.  Denies fever or chill or any other complaints.      Past Medical History: Type 2 diabetes mellitus on oral hypoglycemics, hypertension controlled with medications.  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, unspecified      Comment:  Elevated serum cholesterol  No date: Dysphagia  No date: ED (erectile dysfunction)  No date: Esophageal cancer (Multi)  No date: GERD (gastroesophageal reflux disease)  No date: Hearing aid worn  No date: HL (hearing loss)  No date: Hyperlipidemia  No date: Hypertension  No date: Nephrolithiasis  No date: Other specified diabetes mellitus  without complications   (Multi)      Comment:  last A1c= 6.8 on 2024  No date: Sleep apnea      Comment:  wear CPAP/BiPAP  No date: Vision loss      Comment:  wears glasses   Surgical History:    Past Surgical History:   Procedure Laterality Date    BLADDER SURGERY      COLONOSCOPY      Repeat in one year    EYE SURGERY      LITHOTRIPSY      OTHER SURGICAL HISTORY      Vocal cord surgery    TRANSURETHRAL RESECTION OF PROSTATE      UPPER GASTROINTESTINAL ENDOSCOPY  2024      Family History:    Family History   Problem Relation Name Age of Onset    Stroke Father      Kidney cancer Brother Eliu     Stroke Brother Eliu      Family Oncology History:    Cancer-related family history includes Kidney cancer in his brother.  Social History:    Social History     Tobacco Use    Smoking status: Former     Current packs/day: 0.00     Average packs/day: 2.0 packs/day for 40.0 years (80.0 ttl pk-yrs)     Types: Cigarettes     Start date: 1959     Quit date: 1999     Years since quittin.3    Smokeless tobacco: Never   Vaping Use    Vaping status: Never Used   Substance Use Topics    Alcohol use: Yes     Alcohol/week: 2.0 standard drinks of alcohol     Types: 2 Cans of beer per week     Comment: couple beers a week    Drug use: Not Currently     Comment: gummies-dispensary from [place]        Allergies  Allergies   Allergen Reactions    Omeprazole Itching and Unknown    Shellfish Containing Products Unknown    Sulfa (Sulfonamide Antibiotics) Rash        Medications  Current Outpatient Medications   Medication Instructions    acetaminophen (TYLENOL) 650 mg, j-tube, Every 6 hours PRN    atenolol (Tenormin) 50 mg tablet 1 tablet, oral, Daily    multivitamin with minerals iron-free (Centrum Silver) 1 tablet, oral, Daily    oxyCODONE (ROXICODONE) 5 mg, j-tube, Every 6 hours PRN    pioglitazone (ACTOS) 30 mg, oral, Daily    traZODone (DESYREL) 100 mg, oral, Nightly          Objective   VS: /88 (BP  "Location: Right arm, Patient Position: Sitting, BP Cuff Size: Adult long)   Pulse 56   Temp 36.6 °C (97.9 °F) (Temporal)   Resp 18   Ht (S) 1.673 m (5' 5.87\")   Wt 76.5 kg (168 lb 12.2 oz)   SpO2 98%   BMI 27.35 kg/m²   Weight:   Vitals:    04/15/24 0920   Weight: 76.5 kg (168 lb 12.2 oz)      PHYSICAL EXAMINATION  Alert, answers questions appropriately.  Vitals reviewed.  ENT-no pallor.  No icterus.  Chest -bilateral good air entry.  Abdomen: Soft and nontender.  No mass.  No ascites.  Extremity-no edema or swelling.    Labs  Results from last 7 days   Lab Units 04/15/24  0908 04/09/24  0810   WBC AUTO x10*3/uL 6.2 8.6   HEMOGLOBIN g/dL 13.9 15.0   HEMATOCRIT % 42.7 44.9   PLATELETS AUTO x10*3/uL 202 214   NEUTROS ABS x10*3/uL 3.77  --    LYMPHS ABS AUTO x10*3/uL 1.27  --    MONOS ABS AUTO x10*3/uL 0.79  --    EOS ABS AUTO x10*3/uL 0.29  --    NEUTROS PCT AUTO % 61.1  --    LYMPHS PCT AUTO % 20.6  --    MONOS PCT AUTO % 12.8  --    EOS PCT AUTO % 4.7  --       Results from last 7 days   Lab Units 04/09/24  0810   GLUCOSE mg/dL 138*   SODIUM mmol/L 138   POTASSIUM mmol/L 4.2   CHLORIDE mmol/L 101   CO2 mmol/L 30   BUN mg/dL 11   CREATININE mg/dL 0.98   EGFR mL/min/1.73m*2 78   CALCIUM mg/dL 9.1   MAGNESIUM mg/dL 2.09                 Image     Catarino Fry MD.          "

## 2024-04-15 NOTE — TELEPHONE ENCOUNTER
Called pt to remind of appointment on 4/16/24 at 11:00 am. Pt answered and will be present. Pt mentioned that he is getting blood drawn today (4/15/24).

## 2024-04-16 ENCOUNTER — APPOINTMENT (OUTPATIENT)
Dept: HEMATOLOGY/ONCOLOGY | Facility: HOSPITAL | Age: 79
End: 2024-04-16
Payer: MEDICARE

## 2024-04-16 ENCOUNTER — NUTRITION (OUTPATIENT)
Dept: HEMATOLOGY/ONCOLOGY | Facility: HOSPITAL | Age: 79
End: 2024-04-16
Payer: MEDICARE

## 2024-04-16 ENCOUNTER — HOSPITAL ENCOUNTER (OUTPATIENT)
Dept: RADIATION ONCOLOGY | Facility: HOSPITAL | Age: 79
Setting detail: RADIATION/ONCOLOGY SERIES
Discharge: HOME | End: 2024-04-16
Payer: MEDICARE

## 2024-04-16 ENCOUNTER — HOSPITAL ENCOUNTER (OUTPATIENT)
Dept: RADIOLOGY | Facility: EXTERNAL LOCATION | Age: 79
Discharge: HOME | End: 2024-04-16

## 2024-04-16 DIAGNOSIS — C15.5 MALIGNANT NEOPLASM OF LOWER THIRD OF ESOPHAGUS (MULTI): ICD-10-CM

## 2024-04-16 DIAGNOSIS — C15.5 MALIGNANT NEOPLASM OF LOWER THIRD OF ESOPHAGUS (MULTI): Primary | ICD-10-CM

## 2024-04-16 PROCEDURE — 1090000001 HH PPS REVENUE CREDIT

## 2024-04-16 PROCEDURE — 77290 THER RAD SIMULAJ FIELD CPLX: CPT | Performed by: RADIOLOGY

## 2024-04-16 PROCEDURE — 77334 RADIATION TREATMENT AID(S): CPT | Performed by: RADIOLOGY

## 2024-04-16 PROCEDURE — 1090000002 HH PPS REVENUE DEBIT

## 2024-04-16 RX ORDER — HEPARIN 100 UNIT/ML
SYRINGE INTRAVENOUS
Status: DISPENSED
Start: 2024-04-16 | End: 2024-04-16

## 2024-04-16 NOTE — PROGRESS NOTES
NUTRITION COMMUNICATION NOTE    Juan M Mcrae     REASON FOR COMMUNICATION:     Patient was seen today in [place]- his friend was with him.    I am aware colleague met with him yesterday in [place]    They are aware that I will follow him if he undergoes RT at main [place]- otherwise it will be [place] [address] or [address].  He tells me he had an alert from [employer] of a delivery today from [facility]- this should be his TF supplies ( Isosource 1.5 and infinity bags)  They will let me know if this is nto the case.  Additionally- I did provide them with a case of Isosource 1.5 as back up- I do not have infinity bags - he has 2  left- he did try to clean one to re-use but this did not work out well for him .    He is taking Isosource 1.5 - 1200 ml per day- rate is 85 ml per hour for 14 hours and is  tolerating well  1785 calories  81 g protein  909 ml of free water    He continues to have some oral intake of soft moist / liquids     Nutritional needs appear adequately met    Discussed tube- he is concerned about it stretching- discussed that this should not be a concern - if this did  happen - which is unlikely- it can easily be replaced. Asked him to not overly / aggressively push ending of tubing into j-tube.  They are taping nicely  They will secure it a bit at night with tape to decrease pulling  We did discuss need to go to ED if tube falls out as soon as possible and to let them know they have a j-tube    Lastly dicussed re-ordering of TF supplies from [place] in the next month- we discussed time frame for re-order and they have the number.    This service will continue to follow    Wt Readings from Last 10 Encounters:   04/15/24 76.5 kg (168 lb 12.2 oz)   04/15/24 76.5 kg (168 lb 12.2 oz)   04/11/24 79.1 kg (174 lb 6.1 oz)   04/11/24 73.9 kg (163 lb)   04/09/24 79.1 kg (174 lb 6.4 oz)   04/02/24 79.4 kg (175 lb)   04/02/24 79.5 kg (175 lb 3.2 oz)   04/01/24 79.2 kg (174 lb 7.9 oz)   04/01/24 79.2 kg (174 lb 7.9 oz)   03/27/24  79.6 kg (175 lb 6.4 oz)   {              Time Spent  Prep time on day of patient encounter: 5 minutes  Time spent directly with patient, family or caregiver: 20 minutes  Additional Time Spent on Patient Care Activities: 5 minutes  Documentation Time: 5 minutes  Other Time Spent: 0 minutes  Total: 35 minutes

## 2024-04-17 ENCOUNTER — HOME CARE VISIT (OUTPATIENT)
Dept: HOME HEALTH SERVICES | Facility: HOME HEALTH | Age: 79
End: 2024-04-17
Payer: MEDICARE

## 2024-04-17 VITALS
OXYGEN SATURATION: 98 % | RESPIRATION RATE: 18 BRPM | SYSTOLIC BLOOD PRESSURE: 154 MMHG | HEART RATE: 66 BPM | TEMPERATURE: 98.3 F | DIASTOLIC BLOOD PRESSURE: 70 MMHG | WEIGHT: 168 LBS | BODY MASS INDEX: 27.23 KG/M2

## 2024-04-17 PROCEDURE — 1090000002 HH PPS REVENUE DEBIT

## 2024-04-17 PROCEDURE — 1090000001 HH PPS REVENUE CREDIT

## 2024-04-17 PROCEDURE — G0299 HHS/HOSPICE OF RN EA 15 MIN: HCPCS | Mod: HHH

## 2024-04-17 SDOH — ECONOMIC STABILITY: GENERAL

## 2024-04-17 ASSESSMENT — ENCOUNTER SYMPTOMS
PERSON REPORTING PAIN: PATIENT
DENIES PAIN: 1
LAST BOWEL MOVEMENT: 66946

## 2024-04-17 ASSESSMENT — ACTIVITIES OF DAILY LIVING (ADL)
AMBULATION ASSISTANCE: 1
MONEY MANAGEMENT (EXPENSES/BILLS): INDEPENDENT

## 2024-04-18 PROCEDURE — 1090000001 HH PPS REVENUE CREDIT

## 2024-04-18 PROCEDURE — 1090000002 HH PPS REVENUE DEBIT

## 2024-04-19 ENCOUNTER — HOME CARE VISIT (OUTPATIENT)
Dept: HOME HEALTH SERVICES | Facility: HOME HEALTH | Age: 79
End: 2024-04-19
Payer: MEDICARE

## 2024-04-19 ENCOUNTER — DOCUMENTATION (OUTPATIENT)
Dept: HEMATOLOGY/ONCOLOGY | Facility: HOSPITAL | Age: 79
End: 2024-04-19

## 2024-04-19 PROCEDURE — 1090000002 HH PPS REVENUE DEBIT

## 2024-04-19 PROCEDURE — 1090000001 HH PPS REVENUE CREDIT

## 2024-04-19 PROCEDURE — G0180 MD CERTIFICATION HHA PATIENT: HCPCS | Performed by: THORACIC SURGERY (CARDIOTHORACIC VASCULAR SURGERY)

## 2024-04-19 NOTE — RESEARCH NOTES
Research Note Randomization Note    Juan M Mcrae has been screened and is eligible for treatment on NRG-. Patient randomized to Proton arm. Patient is expected to start treatment on 04/29/2024. Patient has been contacted and is aware of plan. Will coordinate pt to receive chemotherapy at [place] per request as study is open at that location.    Carine Adan RN

## 2024-04-20 PROCEDURE — 1090000001 HH PPS REVENUE CREDIT

## 2024-04-20 PROCEDURE — 1090000002 HH PPS REVENUE DEBIT

## 2024-04-21 PROCEDURE — 1090000001 HH PPS REVENUE CREDIT

## 2024-04-21 PROCEDURE — 1090000002 HH PPS REVENUE DEBIT

## 2024-04-22 ENCOUNTER — CLINICAL SUPPORT (OUTPATIENT)
Dept: AUDIOLOGY | Facility: CLINIC | Age: 79
End: 2024-04-22

## 2024-04-22 ENCOUNTER — HOME CARE VISIT (OUTPATIENT)
Dept: HOME HEALTH SERVICES | Facility: HOME HEALTH | Age: 79
End: 2024-04-22
Payer: MEDICARE

## 2024-04-22 VITALS
SYSTOLIC BLOOD PRESSURE: 144 MMHG | HEART RATE: 58 BPM | RESPIRATION RATE: 18 BRPM | DIASTOLIC BLOOD PRESSURE: 80 MMHG | TEMPERATURE: 98.1 F | OXYGEN SATURATION: 97 %

## 2024-04-22 DIAGNOSIS — C15.5 MALIGNANT NEOPLASM OF LOWER THIRD OF ESOPHAGUS (MULTI): ICD-10-CM

## 2024-04-22 PROCEDURE — G0299 HHS/HOSPICE OF RN EA 15 MIN: HCPCS | Mod: HHH

## 2024-04-22 PROCEDURE — 1090000002 HH PPS REVENUE DEBIT

## 2024-04-22 PROCEDURE — 1090000001 HH PPS REVENUE CREDIT

## 2024-04-22 RX ORDER — ONDANSETRON HYDROCHLORIDE 8 MG/1
8 TABLET, FILM COATED ORAL EVERY 8 HOURS PRN
Qty: 30 TABLET | Refills: 5 | Status: SHIPPED | OUTPATIENT
Start: 2024-04-22 | End: 2024-05-06 | Stop reason: WASHOUT

## 2024-04-22 RX ORDER — DEXAMETHASONE 4 MG/1
8 TABLET ORAL DAILY
Qty: 36 TABLET | Refills: 0 | Status: SHIPPED | OUTPATIENT
Start: 2024-04-22

## 2024-04-22 RX ORDER — PROCHLORPERAZINE MALEATE 10 MG
10 TABLET ORAL EVERY 6 HOURS PRN
Qty: 30 TABLET | Refills: 5 | Status: SHIPPED | OUTPATIENT
Start: 2024-04-22

## 2024-04-22 RX ORDER — OLANZAPINE 5 MG/1
5 TABLET ORAL NIGHTLY
Qty: 24 TABLET | Refills: 0 | Status: SHIPPED | OUTPATIENT
Start: 2024-04-22 | End: 2024-05-13 | Stop reason: SDUPTHER

## 2024-04-22 SDOH — ECONOMIC STABILITY: GENERAL

## 2024-04-22 ASSESSMENT — ACTIVITIES OF DAILY LIVING (ADL)
MONEY MANAGEMENT (EXPENSES/BILLS): INDEPENDENT
AMBULATION ASSISTANCE: 1

## 2024-04-22 ASSESSMENT — ENCOUNTER SYMPTOMS
DENIES PAIN: 1
LAST BOWEL MOVEMENT: 66951

## 2024-04-22 NOTE — PROGRESS NOTES
AUDIOLOGY ADULT HEARING AID CLEANING    Name:  Juan M Mcrae  :  1945  Age:  78 y.o.  Date of Service:  2024    Reason for visit: Mr. Mcrae is seen in the clinic today by audiology assistant for a hearing aid cleaning appointment. Patient complains that his right hearing aid is not producing any sound.     HISTORY  Hearing Aid History:   Binaural ReSound Nexia 9 [id number] miniRIE rechargeable  in the ear (ABRAN) hearing aids in sparkling silver with 2LP receivers, small open dome on the left, small closed dome on the right, and no retention lines (right serial number [id number], left serial number [id number],  serial number [id number]). Hearing aid repair and loss/damage warranties end 04/15/2027.  repair warranty ends 04/15/2027 and  loss/damage warranty ends 04/15/2025. One year in-office service plan ends 2025. Self-pay. Not connected to smartphone per patient's request. New user. [employer].     HEARING AID CHECK  Hearing Aid Initial Listening Check:  Right: no sound    Hearing Aid Physical Examination:  Right: occluded wax guard; when wax guard was replaced hearing aid still did not produce any sound    Hearing Aid Clean & Check:  Hearing aid  and dome were replaced.    Hearing Aid Final Listening Check:  Right: large improvement noted     Hearing Aid Adjustment & Connectivity & Specific Counseling:  Mr. Mcrae has been in the hospital for much of the time since his hearing aid fitting 2024. He says that he's only worn his hearing aids 4-5 times since the hearing aid fitting. Mr. Mcrae also says that he has been experience constant drainage from his right ear for an extended period of time. Mr. Mcrae is undergoing cancer treatment over the next few months; because of this he is unsure what his availability will be after this week.     IMPRESSIONS  Hearing aids were returned to the patient and he expressed satisfaction.     and dome  were replaced on right hearing aid.     RECOMMENDATIONS  - Continued hearing aid use.  - Follow up with Dr. Palak Judge 4/23/2024 regarding hearing aids.    PATIENT EDUCATION  Discussed results, impressions and recommendations with the patient. Questions were addressed and the patient was encouraged to contact our office should concerns arise.    CHARGE CAPTURE  No charge within first year.    Lauren Horta  Licensed Audiology Assistant

## 2024-04-23 ENCOUNTER — CLINICAL SUPPORT (OUTPATIENT)
Dept: AUDIOLOGY | Facility: CLINIC | Age: 79
End: 2024-04-23

## 2024-04-23 DIAGNOSIS — H90.3 SENSORINEURAL HEARING LOSS (SNHL) OF BOTH EARS: Primary | ICD-10-CM

## 2024-04-23 PROCEDURE — 1090000001 HH PPS REVENUE CREDIT

## 2024-04-23 PROCEDURE — 1090000002 HH PPS REVENUE DEBIT

## 2024-04-23 PROCEDURE — HRANC PR HEARING AID NO CHARGE: Performed by: AUDIOLOGIST

## 2024-04-23 NOTE — PROGRESS NOTES
AUDIOLOGY ADULT HEARING AID CHECK    Name:  Juan M Mcrae  :  1945  Age:  78 y.o.  Date of Service:  2024    Reason for visit: Mr. Mcrae is seen in the clinic today for a hearing aid check appointment. Patient complains that his hearing aids occasionally stop working, and come out of his ears when he puts his glasses on.    HISTORY  Hearing Aid History:  Patient wears binaural 2024 ReSound Nexia 9 [id number] miniRIE rechargeable  in the ear (ABRAN) hearing aids in sparkling silver with 2LP receivers, small open dome on the left, small closed dome on the right, and no retention lines (right serial number [id number], left serial number [id number],  serial number [id number]). Hearing aid repair and loss/damage warranties end 04/15/2027.  repair warranty ends 04/15/2027 and  loss/damage warranty ends 04/15/2025. One year in-office service plan ends 2025. Self-pay. Not connected to smartphone per patient's request. [employer].     Hearing Loss History:  Audiogram dated 2024 revealed normal hearing sensitivity through 500 Hz with a mild to moderately-severe sensorineural hearing loss in the higher frequencies in the right ear, and normal hearing sensitivity through 1500 Hz with a mild to moderately-severe sensorineural hearing loss in the higher frequencies in the left ear. Word recognition ability was good in the right ear, and excellent in the left ear.     HEARING AID CHECK  Hearing Aid Physical Examination:  Right: partially occluded wax guard, dirty dome  Left: occluded wax guard, dirty dome    Hearing Aid Clean & Check:  Hearing aids were sanitized and checked. Receivers and domes were replaced. Added retention lines. Microphones were brushed.    Hearing Aid Specific Counseling:  Reviewed cleaning, especially wax guard replacement. Provided extra domes and wax guards as a courtesy and encouraged patient to replace as often as needed.    Reviewed and  provided guided practice with hearing aid insertion and retention lines.    IMPRESSIONS  Hearing aids were returned to the patient and he expressed satisfaction.    Patient wears binaural April 2024 ReSound Nexia 9 [id number] miniRIE rechargeable  in the ear (ABRAN) hearing aids in sparkling silver with 2LP receivers, small open dome on the left, small closed dome on the right, and retention lines (right serial number [id number], left serial number [id number],  serial number [id number]). Hearing aid repair and loss/damage warranties end 04/15/2027.  repair warranty ends 04/15/2027 and  loss/damage warranty ends 04/15/2025. One year in-office service plan ends 04/04/2025. Self-pay. Not connected to smartphone per patient's request. [employer].     RECOMMENDATIONS  - Continued hearing aid use.  - Annual audiologic evaluation, sooner if an acute change is noted.  - Annual appointment for routine hearing aid maintenance, sooner if questions/problems arise.  - Follow up with otolaryngology as planned.  - Follow-up with medical care team as planned.    [place] reminder message scheduled? Yes.    PATIENT EDUCATION  Discussed results, impressions and recommendations with the patient. Questions were addressed and the patient was encouraged to contact our office should concerns arise.    CHARGE CAPTURE  No fee under first year in-office service plan.    Time for this encounter: [id number]    Tiny Nazario, CCC-A  Licensed Audiologist

## 2024-04-24 ENCOUNTER — OFFICE VISIT (OUTPATIENT)
Dept: SURGERY | Facility: HOSPITAL | Age: 79
End: 2024-04-24
Payer: MEDICARE

## 2024-04-24 ENCOUNTER — TELEPHONE (OUTPATIENT)
Dept: HEMATOLOGY/ONCOLOGY | Facility: CLINIC | Age: 79
End: 2024-04-24

## 2024-04-24 VITALS
HEIGHT: 66 IN | WEIGHT: 170.3 LBS | TEMPERATURE: 97 F | SYSTOLIC BLOOD PRESSURE: 157 MMHG | RESPIRATION RATE: 18 BRPM | DIASTOLIC BLOOD PRESSURE: 75 MMHG | OXYGEN SATURATION: 99 % | BODY MASS INDEX: 27.37 KG/M2 | HEART RATE: 61 BPM

## 2024-04-24 DIAGNOSIS — C15.5 MALIGNANT NEOPLASM OF LOWER THIRD OF ESOPHAGUS (MULTI): Primary | ICD-10-CM

## 2024-04-24 PROCEDURE — 1157F ADVNC CARE PLAN IN RCRD: CPT | Performed by: THORACIC SURGERY (CARDIOTHORACIC VASCULAR SURGERY)

## 2024-04-24 PROCEDURE — 1159F MED LIST DOCD IN RCRD: CPT | Performed by: THORACIC SURGERY (CARDIOTHORACIC VASCULAR SURGERY)

## 2024-04-24 PROCEDURE — 1090000001 HH PPS REVENUE CREDIT

## 2024-04-24 PROCEDURE — 99024 POSTOP FOLLOW-UP VISIT: CPT | Performed by: THORACIC SURGERY (CARDIOTHORACIC VASCULAR SURGERY)

## 2024-04-24 PROCEDURE — 1111F DSCHRG MED/CURRENT MED MERGE: CPT | Performed by: THORACIC SURGERY (CARDIOTHORACIC VASCULAR SURGERY)

## 2024-04-24 PROCEDURE — 1126F AMNT PAIN NOTED NONE PRSNT: CPT | Performed by: THORACIC SURGERY (CARDIOTHORACIC VASCULAR SURGERY)

## 2024-04-24 PROCEDURE — 1160F RVW MEDS BY RX/DR IN RCRD: CPT | Performed by: THORACIC SURGERY (CARDIOTHORACIC VASCULAR SURGERY)

## 2024-04-24 PROCEDURE — 1036F TOBACCO NON-USER: CPT | Performed by: THORACIC SURGERY (CARDIOTHORACIC VASCULAR SURGERY)

## 2024-04-24 PROCEDURE — 1090000002 HH PPS REVENUE DEBIT

## 2024-04-24 ASSESSMENT — ENCOUNTER SYMPTOMS: DEPRESSION: 0

## 2024-04-24 ASSESSMENT — PATIENT HEALTH QUESTIONNAIRE - PHQ9
2. FEELING DOWN, DEPRESSED OR HOPELESS: NOT AT ALL
1. LITTLE INTEREST OR PLEASURE IN DOING THINGS: NOT AT ALL
SUM OF ALL RESPONSES TO PHQ9 QUESTIONS 1 AND 2: 0

## 2024-04-24 ASSESSMENT — PAIN SCALES - GENERAL: PAINLEVEL: 0-NO PAIN

## 2024-04-24 ASSESSMENT — COLUMBIA-SUICIDE SEVERITY RATING SCALE - C-SSRS
2. HAVE YOU ACTUALLY HAD ANY THOUGHTS OF KILLING YOURSELF?: NO
6. HAVE YOU EVER DONE ANYTHING, STARTED TO DO ANYTHING, OR PREPARED TO DO ANYTHING TO END YOUR LIFE?: NO
1. IN THE PAST MONTH, HAVE YOU WISHED YOU WERE DEAD OR WISHED YOU COULD GO TO SLEEP AND NOT WAKE UP?: NO

## 2024-04-24 NOTE — PROGRESS NOTES
Juan M Mcrae is a 78 y.o.male referred with esophageal cancer.  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  He underwent endoscopic ultrasound on March 21, 2024 that showed T2 N1 extending from 41 cm to 43 cm with minimal extension on the cardia. He underwent jtube placement and laparoscopy on 4/8/24    On exam his incisions are healing well    Mr. Mcrae is set to begin his chemoradiation next week

## 2024-04-25 ENCOUNTER — TELEPHONE (OUTPATIENT)
Dept: HEMATOLOGY/ONCOLOGY | Facility: HOSPITAL | Age: 79
End: 2024-04-25
Payer: MEDICARE

## 2024-04-25 ENCOUNTER — HOME CARE VISIT (OUTPATIENT)
Dept: HOME HEALTH SERVICES | Facility: HOME HEALTH | Age: 79
End: 2024-04-25
Payer: MEDICARE

## 2024-04-25 VITALS
TEMPERATURE: 98.4 F | WEIGHT: 170 LBS | SYSTOLIC BLOOD PRESSURE: 153 MMHG | OXYGEN SATURATION: 100 % | BODY MASS INDEX: 27.65 KG/M2 | RESPIRATION RATE: 18 BRPM | HEART RATE: 57 BPM | DIASTOLIC BLOOD PRESSURE: 76 MMHG

## 2024-04-25 PROCEDURE — 1090000001 HH PPS REVENUE CREDIT

## 2024-04-25 PROCEDURE — 1090000002 HH PPS REVENUE DEBIT

## 2024-04-25 PROCEDURE — G0299 HHS/HOSPICE OF RN EA 15 MIN: HCPCS | Mod: HHH

## 2024-04-25 SDOH — ECONOMIC STABILITY: GENERAL

## 2024-04-25 ASSESSMENT — ENCOUNTER SYMPTOMS
APPETITE LEVEL: FAIR
LAST BOWEL MOVEMENT: 66955
AGITATION: 1
DENIES PAIN: 1

## 2024-04-25 ASSESSMENT — ACTIVITIES OF DAILY LIVING (ADL)
MONEY MANAGEMENT (EXPENSES/BILLS): INDEPENDENT
AMBULATION ASSISTANCE: INDEPENDENT
AMBULATION ASSISTANCE: 1

## 2024-04-25 NOTE — RESEARCH NOTES
Called and spoke with patient regarding upcoming treatment schedule. Patient aware of first set date of chemotherapy and time at [place] and knows that the proton team will be reaching out to him with a schedule as well as a potential dry-run for the proton machine.  All questions answered and patient very appreciative of call. He has this RN's contact information for any issues or concerns and knows to call.    Carine Adan RN

## 2024-04-26 ENCOUNTER — HOSPITAL ENCOUNTER (OUTPATIENT)
Dept: RADIATION ONCOLOGY | Facility: HOSPITAL | Age: 79
Setting detail: RADIATION/ONCOLOGY SERIES
Discharge: HOME | End: 2024-04-26
Payer: MEDICARE

## 2024-04-26 ENCOUNTER — TELEPHONE (OUTPATIENT)
Dept: RADIATION ONCOLOGY | Facility: HOSPITAL | Age: 79
End: 2024-04-26

## 2024-04-26 PROCEDURE — 77295 3-D RADIOTHERAPY PLAN: CPT | Performed by: RADIOLOGY

## 2024-04-26 PROCEDURE — 77300 RADIATION THERAPY DOSE PLAN: CPT | Performed by: RADIOLOGY

## 2024-04-26 PROCEDURE — 77293 RESPIRATOR MOTION MGMT SIMUL: CPT | Performed by: RADIOLOGY

## 2024-04-26 PROCEDURE — 1090000001 HH PPS REVENUE CREDIT

## 2024-04-26 PROCEDURE — 1090000002 HH PPS REVENUE DEBIT

## 2024-04-27 PROCEDURE — 1090000002 HH PPS REVENUE DEBIT

## 2024-04-27 PROCEDURE — 1090000001 HH PPS REVENUE CREDIT

## 2024-04-28 PROCEDURE — 1090000001 HH PPS REVENUE CREDIT

## 2024-04-28 PROCEDURE — 1090000002 HH PPS REVENUE DEBIT

## 2024-04-29 ENCOUNTER — APPOINTMENT (OUTPATIENT)
Dept: HEMATOLOGY/ONCOLOGY | Facility: CLINIC | Age: 79
End: 2024-04-29
Payer: MEDICARE

## 2024-04-29 PROCEDURE — 1090000002 HH PPS REVENUE DEBIT

## 2024-04-29 PROCEDURE — 1090000001 HH PPS REVENUE CREDIT

## 2024-04-30 PROCEDURE — 1090000001 HH PPS REVENUE CREDIT

## 2024-04-30 PROCEDURE — 1090000002 HH PPS REVENUE DEBIT

## 2024-05-01 ENCOUNTER — HOME CARE VISIT (OUTPATIENT)
Dept: HOME HEALTH SERVICES | Facility: HOME HEALTH | Age: 79
End: 2024-05-01
Payer: MEDICARE

## 2024-05-01 VITALS
OXYGEN SATURATION: 96 % | HEART RATE: 54 BPM | SYSTOLIC BLOOD PRESSURE: 142 MMHG | RESPIRATION RATE: 18 BRPM | TEMPERATURE: 97.9 F | DIASTOLIC BLOOD PRESSURE: 70 MMHG

## 2024-05-01 PROCEDURE — G0299 HHS/HOSPICE OF RN EA 15 MIN: HCPCS | Mod: HHH

## 2024-05-01 PROCEDURE — 1090000001 HH PPS REVENUE CREDIT

## 2024-05-01 PROCEDURE — 1090000002 HH PPS REVENUE DEBIT

## 2024-05-01 ASSESSMENT — ENCOUNTER SYMPTOMS
DENIES PAIN: 1
COUGH: 1
LAST BOWEL MOVEMENT: 66961
COUGH CHARACTERISTICS: NON-PRODUCTIVE

## 2024-05-01 ASSESSMENT — ACTIVITIES OF DAILY LIVING (ADL): AMBULATION ASSISTANCE: 1

## 2024-05-02 ENCOUNTER — TELEPHONE (OUTPATIENT)
Dept: HEMATOLOGY/ONCOLOGY | Facility: HOSPITAL | Age: 79
End: 2024-05-02
Payer: MEDICARE

## 2024-05-02 DIAGNOSIS — Z00.6 RESEARCH STUDY PATIENT: ICD-10-CM

## 2024-05-02 DIAGNOSIS — C15.5 MALIGNANT NEOPLASM OF LOWER THIRD OF ESOPHAGUS (MULTI): ICD-10-CM

## 2024-05-02 PROCEDURE — 1090000002 HH PPS REVENUE DEBIT

## 2024-05-02 PROCEDURE — 1090000001 HH PPS REVENUE CREDIT

## 2024-05-02 RX ORDER — HEPARIN SODIUM,PORCINE/PF 10 UNIT/ML
50 SYRINGE (ML) INTRAVENOUS AS NEEDED
Status: CANCELLED | OUTPATIENT
Start: 2024-05-02

## 2024-05-02 RX ORDER — HEPARIN 100 UNIT/ML
500 SYRINGE INTRAVENOUS AS NEEDED
Status: CANCELLED | OUTPATIENT
Start: 2024-05-02

## 2024-05-02 NOTE — TELEPHONE ENCOUNTER
Spoke with patient and he is aware of his central line lab draw appointment for tomorrow, following his proton dry-run with time and location.  No further questions or concerns at this time.    Carine Adan RN

## 2024-05-03 ENCOUNTER — LAB REQUISITION (OUTPATIENT)
Dept: LAB | Facility: HOSPITAL | Age: 79
End: 2024-05-03
Payer: MEDICARE

## 2024-05-03 ENCOUNTER — HOSPITAL ENCOUNTER (OUTPATIENT)
Dept: RADIATION ONCOLOGY | Facility: HOSPITAL | Age: 79
Setting detail: RADIATION/ONCOLOGY SERIES
Discharge: HOME | End: 2024-05-03
Payer: MEDICARE

## 2024-05-03 ENCOUNTER — OFFICE VISIT (OUTPATIENT)
Dept: HEMATOLOGY/ONCOLOGY | Facility: HOSPITAL | Age: 79
End: 2024-05-03
Payer: MEDICARE

## 2024-05-03 DIAGNOSIS — Z00.6 RESEARCH STUDY PATIENT: ICD-10-CM

## 2024-05-03 DIAGNOSIS — C15.5 MALIGNANT NEOPLASM OF LOWER THIRD OF ESOPHAGUS (MULTI): ICD-10-CM

## 2024-05-03 LAB
ALBUMIN SERPL BCP-MCNC: 4.4 G/DL (ref 3.4–5)
ALP SERPL-CCNC: 64 U/L (ref 33–136)
ALT SERPL W P-5'-P-CCNC: 11 U/L (ref 10–52)
ANION GAP SERPL CALC-SCNC: 13 MMOL/L (ref 10–20)
AST SERPL W P-5'-P-CCNC: 10 U/L (ref 9–39)
BASOPHILS # BLD AUTO: 0.06 X10*3/UL (ref 0–0.1)
BASOPHILS NFR BLD AUTO: 0.9 %
BILIRUB SERPL-MCNC: 0.5 MG/DL (ref 0–1.2)
BUN SERPL-MCNC: 24 MG/DL (ref 6–23)
CALCIUM SERPL-MCNC: 9.4 MG/DL (ref 8.6–10.3)
CHLORIDE SERPL-SCNC: 102 MMOL/L (ref 98–107)
CO2 SERPL-SCNC: 29 MMOL/L (ref 21–32)
CREAT SERPL-MCNC: 0.81 MG/DL (ref 0.5–1.3)
EGFRCR SERPLBLD CKD-EPI 2021: 90 ML/MIN/1.73M*2
EOSINOPHIL # BLD AUTO: 0.38 X10*3/UL (ref 0–0.4)
EOSINOPHIL NFR BLD AUTO: 5.5 %
ERYTHROCYTE [DISTWIDTH] IN BLOOD BY AUTOMATED COUNT: 13.7 % (ref 11.5–14.5)
GLUCOSE SERPL-MCNC: 160 MG/DL (ref 74–99)
HBV CORE AB SER QL: NONREACTIVE
HBV SURFACE AB SER-ACNC: <3.1 MIU/ML
HBV SURFACE AG SERPL QL IA: NONREACTIVE
HCT VFR BLD AUTO: 41.2 % (ref 41–52)
HGB BLD-MCNC: 13.8 G/DL (ref 13.5–17.5)
HOLD SPECIMEN: NORMAL
IMM GRANULOCYTES # BLD AUTO: 0.01 X10*3/UL (ref 0–0.5)
IMM GRANULOCYTES NFR BLD AUTO: 0.1 % (ref 0–0.9)
LYMPHOCYTES # BLD AUTO: 0.98 X10*3/UL (ref 0.8–3)
LYMPHOCYTES NFR BLD AUTO: 14.3 %
MAGNESIUM SERPL-MCNC: 2.08 MG/DL (ref 1.6–2.4)
MCH RBC QN AUTO: 32.5 PG (ref 26–34)
MCHC RBC AUTO-ENTMCNC: 33.5 G/DL (ref 32–36)
MCV RBC AUTO: 97 FL (ref 80–100)
MONOCYTES # BLD AUTO: 0.67 X10*3/UL (ref 0.05–0.8)
MONOCYTES NFR BLD AUTO: 9.8 %
NEUTROPHILS # BLD AUTO: 4.75 X10*3/UL (ref 1.6–5.5)
NEUTROPHILS NFR BLD AUTO: 69.4 %
NRBC BLD-RTO: 0 /100 WBCS (ref 0–0)
PLATELET # BLD AUTO: 193 X10*3/UL (ref 150–450)
POTASSIUM SERPL-SCNC: 4.3 MMOL/L (ref 3.5–5.3)
PROT SERPL-MCNC: 7.3 G/DL (ref 6.4–8.2)
RBC # BLD AUTO: 4.24 X10*6/UL (ref 4.5–5.9)
SODIUM SERPL-SCNC: 140 MMOL/L (ref 136–145)
WBC # BLD AUTO: 6.9 X10*3/UL (ref 4.4–11.3)

## 2024-05-03 PROCEDURE — 77321 SPECIAL TELETX PORT PLAN: CPT | Performed by: RADIOLOGY

## 2024-05-03 PROCEDURE — 86704 HEP B CORE ANTIBODY TOTAL: CPT | Performed by: INTERNAL MEDICINE

## 2024-05-03 PROCEDURE — 36591 DRAW BLOOD OFF VENOUS DEVICE: CPT

## 2024-05-03 PROCEDURE — 85025 COMPLETE CBC W/AUTO DIFF WBC: CPT

## 2024-05-03 PROCEDURE — 83735 ASSAY OF MAGNESIUM: CPT

## 2024-05-03 PROCEDURE — 1090000002 HH PPS REVENUE DEBIT

## 2024-05-03 PROCEDURE — 77280 THER RAD SIMULAJ FIELD SMPL: CPT | Performed by: RADIOLOGY

## 2024-05-03 PROCEDURE — 1090000001 HH PPS REVENUE CREDIT

## 2024-05-03 PROCEDURE — 77334 RADIATION TREATMENT AID(S): CPT | Performed by: RADIOLOGY

## 2024-05-03 PROCEDURE — 87340 HEPATITIS B SURFACE AG IA: CPT | Performed by: INTERNAL MEDICINE

## 2024-05-03 PROCEDURE — 2500000004 HC RX 250 GENERAL PHARMACY W/ HCPCS (ALT 636 FOR OP/ED): Performed by: INTERNAL MEDICINE

## 2024-05-03 PROCEDURE — 80053 COMPREHEN METABOLIC PANEL: CPT

## 2024-05-03 PROCEDURE — 86706 HEP B SURFACE ANTIBODY: CPT | Performed by: INTERNAL MEDICINE

## 2024-05-03 RX ORDER — HEPARIN SODIUM,PORCINE/PF 10 UNIT/ML
50 SYRINGE (ML) INTRAVENOUS AS NEEDED
Status: CANCELLED | OUTPATIENT
Start: 2024-05-03

## 2024-05-03 RX ORDER — HEPARIN 100 UNIT/ML
500 SYRINGE INTRAVENOUS AS NEEDED
Status: CANCELLED | OUTPATIENT
Start: 2024-05-03

## 2024-05-03 RX ORDER — HEPARIN 100 UNIT/ML
500 SYRINGE INTRAVENOUS AS NEEDED
Status: ACTIVE | OUTPATIENT
Start: 2024-05-03

## 2024-05-03 RX ADMIN — HEPARIN 500 UNITS: 100 SYRINGE at 11:51

## 2024-05-04 LAB
LAB AP ASR DISCLAIMER: NORMAL
LABORATORY COMMENT REPORT: NORMAL
PATH REPORT.FINAL DX SPEC: NORMAL
PATH REPORT.GROSS SPEC: NORMAL
PATH REPORT.TOTAL CANCER: NORMAL

## 2024-05-04 PROCEDURE — 1090000001 HH PPS REVENUE CREDIT

## 2024-05-04 PROCEDURE — 1090000002 HH PPS REVENUE DEBIT

## 2024-05-05 PROCEDURE — 1090000002 HH PPS REVENUE DEBIT

## 2024-05-05 PROCEDURE — 1090000001 HH PPS REVENUE CREDIT

## 2024-05-06 ENCOUNTER — HOSPITAL ENCOUNTER (OUTPATIENT)
Dept: RADIATION ONCOLOGY | Facility: HOSPITAL | Age: 79
Setting detail: RADIATION/ONCOLOGY SERIES
Discharge: HOME | End: 2024-05-06
Payer: MEDICARE

## 2024-05-06 ENCOUNTER — OFFICE VISIT (OUTPATIENT)
Dept: HEMATOLOGY/ONCOLOGY | Facility: CLINIC | Age: 79
End: 2024-05-06
Payer: MEDICARE

## 2024-05-06 ENCOUNTER — APPOINTMENT (OUTPATIENT)
Dept: HEMATOLOGY/ONCOLOGY | Facility: CLINIC | Age: 79
End: 2024-05-06
Payer: MEDICARE

## 2024-05-06 ENCOUNTER — INFUSION (OUTPATIENT)
Dept: HEMATOLOGY/ONCOLOGY | Facility: CLINIC | Age: 79
End: 2024-05-06
Payer: MEDICARE

## 2024-05-06 VITALS
OXYGEN SATURATION: 98 % | DIASTOLIC BLOOD PRESSURE: 82 MMHG | WEIGHT: 171.63 LBS | SYSTOLIC BLOOD PRESSURE: 151 MMHG | HEIGHT: 66 IN | HEART RATE: 60 BPM | RESPIRATION RATE: 14 BRPM | TEMPERATURE: 96.4 F | BODY MASS INDEX: 27.58 KG/M2

## 2024-05-06 DIAGNOSIS — C15.5 MALIGNANT NEOPLASM OF LOWER THIRD OF ESOPHAGUS (MULTI): ICD-10-CM

## 2024-05-06 DIAGNOSIS — Z51.0 ENCOUNTER FOR ANTINEOPLASTIC RADIATION THERAPY: ICD-10-CM

## 2024-05-06 DIAGNOSIS — C15.5 MALIGNANT NEOPLASM OF LOWER THIRD OF ESOPHAGUS (MULTI): Primary | ICD-10-CM

## 2024-05-06 DIAGNOSIS — C16.0 MALIGNANT NEOPLASM OF CARDIA (MULTI): ICD-10-CM

## 2024-05-06 LAB
RAD ONC MSQ ACTUAL FRACTIONS DELIVERED: 1
RAD ONC MSQ ACTUAL SESSION BIOLOGICAL DOSE: 180 CCGE
RAD ONC MSQ ACTUAL SESSION DELIVERED DOSE: 164 CGRAY
RAD ONC MSQ ACTUAL TOTAL BIOLOGICAL DOSE: 180 CCGE
RAD ONC MSQ ACTUAL TOTAL DOSE: 164 CGRAY
RAD ONC MSQ ELAPSED DAYS: 0
RAD ONC MSQ LAST DATE: NORMAL
RAD ONC MSQ PRESCRIBED BIOLOGICAL FRACTIONAL DOSE: 180 CCGE
RAD ONC MSQ PRESCRIBED BIOLOGICAL TOTAL DOSE: 5040 CCGE
RAD ONC MSQ PRESCRIBED FRACTIONAL DOSE: 164 CGRAY
RAD ONC MSQ PRESCRIBED NUMBER OF FRACTIONS: 28
RAD ONC MSQ PRESCRIBED TECHNIQUE: NORMAL
RAD ONC MSQ PRESCRIBED TOTAL DOSE: 4582 CGRAY
RAD ONC MSQ PRESCRIPTION PATTERN COMMENT: NORMAL
RAD ONC MSQ START DATE: NORMAL
RAD ONC MSQ TREATMENT COURSE NUMBER: 1
RAD ONC MSQ TREATMENT SITE: NORMAL

## 2024-05-06 PROCEDURE — 2500000004 HC RX 250 GENERAL PHARMACY W/ HCPCS (ALT 636 FOR OP/ED): Performed by: INTERNAL MEDICINE

## 2024-05-06 PROCEDURE — 1036F TOBACCO NON-USER: CPT | Performed by: INTERNAL MEDICINE

## 2024-05-06 PROCEDURE — 77523 PROTON TRMT INTERMEDIATE: CPT | Performed by: RADIOLOGY

## 2024-05-06 PROCEDURE — 1111F DSCHRG MED/CURRENT MED MERGE: CPT | Performed by: INTERNAL MEDICINE

## 2024-05-06 PROCEDURE — 99214 OFFICE O/P EST MOD 30 MIN: CPT | Performed by: INTERNAL MEDICINE

## 2024-05-06 PROCEDURE — 96413 CHEMO IV INFUSION 1 HR: CPT

## 2024-05-06 PROCEDURE — 1159F MED LIST DOCD IN RCRD: CPT | Performed by: INTERNAL MEDICINE

## 2024-05-06 PROCEDURE — 96411 CHEMO IV PUSH ADDL DRUG: CPT

## 2024-05-06 PROCEDURE — G6002 STEREOSCOPIC X-RAY GUIDANCE: HCPCS | Performed by: RADIOLOGY

## 2024-05-06 PROCEDURE — 96375 TX/PRO/DX INJ NEW DRUG ADDON: CPT | Mod: INF

## 2024-05-06 PROCEDURE — 1126F AMNT PAIN NOTED NONE PRSNT: CPT | Performed by: INTERNAL MEDICINE

## 2024-05-06 PROCEDURE — 1160F RVW MEDS BY RX/DR IN RCRD: CPT | Performed by: INTERNAL MEDICINE

## 2024-05-06 PROCEDURE — 96367 TX/PROPH/DG ADDL SEQ IV INF: CPT

## 2024-05-06 PROCEDURE — 1090000001 HH PPS REVENUE CREDIT

## 2024-05-06 PROCEDURE — 1157F ADVNC CARE PLAN IN RCRD: CPT | Performed by: INTERNAL MEDICINE

## 2024-05-06 PROCEDURE — 1090000002 HH PPS REVENUE DEBIT

## 2024-05-06 PROCEDURE — 77387 GUIDANCE FOR RADJ TX DLVR: CPT | Performed by: RADIOLOGY

## 2024-05-06 RX ORDER — DIPHENHYDRAMINE HYDROCHLORIDE 50 MG/ML
50 INJECTION INTRAMUSCULAR; INTRAVENOUS AS NEEDED
Status: DISCONTINUED | OUTPATIENT
Start: 2024-05-06 | End: 2024-05-06 | Stop reason: HOSPADM

## 2024-05-06 RX ORDER — PROCHLORPERAZINE EDISYLATE 5 MG/ML
10 INJECTION INTRAMUSCULAR; INTRAVENOUS EVERY 6 HOURS PRN
Status: CANCELLED | OUTPATIENT
Start: 2024-05-06

## 2024-05-06 RX ORDER — DIPHENHYDRAMINE HCL 25 MG
50 CAPSULE ORAL ONCE
Status: CANCELLED | OUTPATIENT
Start: 2024-06-03

## 2024-05-06 RX ORDER — PROCHLORPERAZINE EDISYLATE 5 MG/ML
10 INJECTION INTRAMUSCULAR; INTRAVENOUS EVERY 6 HOURS PRN
Status: CANCELLED | OUTPATIENT
Start: 2024-05-20

## 2024-05-06 RX ORDER — PALONOSETRON 0.05 MG/ML
0.25 INJECTION, SOLUTION INTRAVENOUS ONCE
Status: CANCELLED | OUTPATIENT
Start: 2024-05-20

## 2024-05-06 RX ORDER — DIPHENHYDRAMINE HYDROCHLORIDE 50 MG/ML
50 INJECTION INTRAMUSCULAR; INTRAVENOUS AS NEEDED
Status: CANCELLED | OUTPATIENT
Start: 2024-05-13

## 2024-05-06 RX ORDER — DIPHENHYDRAMINE HYDROCHLORIDE 50 MG/ML
25 INJECTION INTRAMUSCULAR; INTRAVENOUS ONCE
Status: CANCELLED | OUTPATIENT
Start: 2024-05-28 | End: 2024-06-03

## 2024-05-06 RX ORDER — EPINEPHRINE 0.3 MG/.3ML
0.3 INJECTION SUBCUTANEOUS EVERY 5 MIN PRN
Status: CANCELLED | OUTPATIENT
Start: 2024-06-03

## 2024-05-06 RX ORDER — ALBUTEROL SULFATE 0.83 MG/ML
3 SOLUTION RESPIRATORY (INHALATION) AS NEEDED
Status: DISCONTINUED | OUTPATIENT
Start: 2024-05-06 | End: 2024-05-06 | Stop reason: HOSPADM

## 2024-05-06 RX ORDER — FAMOTIDINE 10 MG/ML
20 INJECTION INTRAVENOUS ONCE AS NEEDED
Status: CANCELLED | OUTPATIENT
Start: 2024-05-06

## 2024-05-06 RX ORDER — EPINEPHRINE 0.3 MG/.3ML
0.3 INJECTION SUBCUTANEOUS EVERY 5 MIN PRN
Status: DISCONTINUED | OUTPATIENT
Start: 2024-05-06 | End: 2024-05-06 | Stop reason: HOSPADM

## 2024-05-06 RX ORDER — MAGNESIUM SULFATE HEPTAHYDRATE 40 MG/ML
2 INJECTION, SOLUTION INTRAVENOUS ONCE
Status: CANCELLED | OUTPATIENT
Start: 2024-05-28

## 2024-05-06 RX ORDER — FAMOTIDINE 10 MG/ML
20 INJECTION INTRAVENOUS ONCE AS NEEDED
Status: CANCELLED | OUTPATIENT
Start: 2024-05-28

## 2024-05-06 RX ORDER — HEPARIN SODIUM,PORCINE/PF 10 UNIT/ML
50 SYRINGE (ML) INTRAVENOUS AS NEEDED
Status: DISCONTINUED | OUTPATIENT
Start: 2024-05-06 | End: 2024-05-06 | Stop reason: HOSPADM

## 2024-05-06 RX ORDER — DIPHENHYDRAMINE HYDROCHLORIDE 50 MG/ML
25 INJECTION INTRAMUSCULAR; INTRAVENOUS ONCE
Status: CANCELLED | OUTPATIENT
Start: 2024-05-06 | End: 2024-05-06

## 2024-05-06 RX ORDER — HEPARIN SODIUM,PORCINE/PF 10 UNIT/ML
50 SYRINGE (ML) INTRAVENOUS AS NEEDED
Status: CANCELLED | OUTPATIENT
Start: 2024-05-06

## 2024-05-06 RX ORDER — HEPARIN 100 UNIT/ML
500 SYRINGE INTRAVENOUS AS NEEDED
Status: CANCELLED | OUTPATIENT
Start: 2024-05-06

## 2024-05-06 RX ORDER — DIPHENHYDRAMINE HYDROCHLORIDE 50 MG/ML
50 INJECTION INTRAMUSCULAR; INTRAVENOUS AS NEEDED
Status: CANCELLED | OUTPATIENT
Start: 2024-05-28

## 2024-05-06 RX ORDER — MAGNESIUM SULFATE HEPTAHYDRATE 40 MG/ML
2 INJECTION, SOLUTION INTRAVENOUS ONCE
Status: CANCELLED | OUTPATIENT
Start: 2024-05-13

## 2024-05-06 RX ORDER — PROCHLORPERAZINE MALEATE 10 MG
10 TABLET ORAL EVERY 6 HOURS PRN
Status: CANCELLED | OUTPATIENT
Start: 2024-05-06

## 2024-05-06 RX ORDER — ALBUTEROL SULFATE 0.83 MG/ML
3 SOLUTION RESPIRATORY (INHALATION) AS NEEDED
Status: CANCELLED | OUTPATIENT
Start: 2024-05-13

## 2024-05-06 RX ORDER — PROCHLORPERAZINE EDISYLATE 5 MG/ML
10 INJECTION INTRAMUSCULAR; INTRAVENOUS EVERY 6 HOURS PRN
Status: CANCELLED | OUTPATIENT
Start: 2024-05-28

## 2024-05-06 RX ORDER — PROCHLORPERAZINE EDISYLATE 5 MG/ML
10 INJECTION INTRAMUSCULAR; INTRAVENOUS EVERY 6 HOURS PRN
Status: DISCONTINUED | OUTPATIENT
Start: 2024-05-06 | End: 2024-05-06 | Stop reason: HOSPADM

## 2024-05-06 RX ORDER — PALONOSETRON 0.05 MG/ML
0.25 INJECTION, SOLUTION INTRAVENOUS ONCE
Status: CANCELLED | OUTPATIENT
Start: 2024-05-06

## 2024-05-06 RX ORDER — DIPHENHYDRAMINE HYDROCHLORIDE 50 MG/ML
50 INJECTION INTRAMUSCULAR; INTRAVENOUS AS NEEDED
Status: CANCELLED | OUTPATIENT
Start: 2024-06-03

## 2024-05-06 RX ORDER — FAMOTIDINE 10 MG/ML
20 INJECTION INTRAVENOUS ONCE
Status: CANCELLED | OUTPATIENT
Start: 2024-05-13

## 2024-05-06 RX ORDER — DIPHENHYDRAMINE HCL 25 MG
50 CAPSULE ORAL ONCE
Status: CANCELLED | OUTPATIENT
Start: 2024-06-10

## 2024-05-06 RX ORDER — PROCHLORPERAZINE MALEATE 10 MG
10 TABLET ORAL EVERY 6 HOURS PRN
Status: CANCELLED | OUTPATIENT
Start: 2024-06-10

## 2024-05-06 RX ORDER — DIPHENHYDRAMINE HYDROCHLORIDE 50 MG/ML
25 INJECTION INTRAMUSCULAR; INTRAVENOUS ONCE
Status: CANCELLED | OUTPATIENT
Start: 2024-06-10 | End: 2024-06-17

## 2024-05-06 RX ORDER — DIPHENHYDRAMINE HCL 25 MG
50 CAPSULE ORAL ONCE
Status: CANCELLED | OUTPATIENT
Start: 2024-06-17

## 2024-05-06 RX ORDER — PROCHLORPERAZINE MALEATE 10 MG
10 TABLET ORAL EVERY 6 HOURS PRN
Status: CANCELLED | OUTPATIENT
Start: 2024-05-20

## 2024-05-06 RX ORDER — ALBUTEROL SULFATE 0.83 MG/ML
3 SOLUTION RESPIRATORY (INHALATION) AS NEEDED
Status: CANCELLED | OUTPATIENT
Start: 2024-06-10

## 2024-05-06 RX ORDER — FAMOTIDINE 10 MG/ML
20 INJECTION INTRAVENOUS ONCE
Status: CANCELLED | OUTPATIENT
Start: 2024-05-28

## 2024-05-06 RX ORDER — HEPARIN 100 UNIT/ML
500 SYRINGE INTRAVENOUS AS NEEDED
Status: DISCONTINUED | OUTPATIENT
Start: 2024-05-06 | End: 2024-05-06 | Stop reason: HOSPADM

## 2024-05-06 RX ORDER — DIPHENHYDRAMINE HYDROCHLORIDE 50 MG/ML
25 INJECTION INTRAMUSCULAR; INTRAVENOUS ONCE
Status: COMPLETED | OUTPATIENT
Start: 2024-05-06 | End: 2024-05-06

## 2024-05-06 RX ORDER — DIPHENHYDRAMINE HYDROCHLORIDE 50 MG/ML
50 INJECTION INTRAMUSCULAR; INTRAVENOUS AS NEEDED
Status: CANCELLED | OUTPATIENT
Start: 2024-06-10

## 2024-05-06 RX ORDER — DIPHENHYDRAMINE HYDROCHLORIDE 50 MG/ML
25 INJECTION INTRAMUSCULAR; INTRAVENOUS ONCE
Status: CANCELLED | OUTPATIENT
Start: 2024-05-13 | End: 2024-05-20

## 2024-05-06 RX ORDER — MAGNESIUM SULFATE HEPTAHYDRATE 40 MG/ML
2 INJECTION, SOLUTION INTRAVENOUS ONCE
Status: CANCELLED | OUTPATIENT
Start: 2024-06-10

## 2024-05-06 RX ORDER — DIPHENHYDRAMINE HCL 25 MG
50 CAPSULE ORAL ONCE
Status: DISCONTINUED | OUTPATIENT
Start: 2024-05-06 | End: 2024-05-06

## 2024-05-06 RX ORDER — DIPHENHYDRAMINE HCL 25 MG
50 CAPSULE ORAL ONCE
Status: CANCELLED | OUTPATIENT
Start: 2024-05-06

## 2024-05-06 RX ORDER — EPINEPHRINE 0.3 MG/.3ML
0.3 INJECTION SUBCUTANEOUS EVERY 5 MIN PRN
Status: CANCELLED | OUTPATIENT
Start: 2024-05-20

## 2024-05-06 RX ORDER — PALONOSETRON 0.05 MG/ML
0.25 INJECTION, SOLUTION INTRAVENOUS ONCE
Status: CANCELLED | OUTPATIENT
Start: 2024-05-28

## 2024-05-06 RX ORDER — FAMOTIDINE 10 MG/ML
20 INJECTION INTRAVENOUS ONCE
Status: CANCELLED | OUTPATIENT
Start: 2024-06-10

## 2024-05-06 RX ORDER — PALONOSETRON 0.05 MG/ML
0.25 INJECTION, SOLUTION INTRAVENOUS ONCE
Status: CANCELLED | OUTPATIENT
Start: 2024-05-13

## 2024-05-06 RX ORDER — EPINEPHRINE 0.3 MG/.3ML
0.3 INJECTION SUBCUTANEOUS EVERY 5 MIN PRN
Status: CANCELLED | OUTPATIENT
Start: 2024-06-10

## 2024-05-06 RX ORDER — FAMOTIDINE 10 MG/ML
20 INJECTION INTRAVENOUS ONCE
Status: CANCELLED | OUTPATIENT
Start: 2024-05-20

## 2024-05-06 RX ORDER — PALONOSETRON 0.05 MG/ML
0.25 INJECTION, SOLUTION INTRAVENOUS ONCE
Status: CANCELLED | OUTPATIENT
Start: 2024-06-10

## 2024-05-06 RX ORDER — DIPHENHYDRAMINE HCL 25 MG
50 CAPSULE ORAL ONCE
Status: CANCELLED | OUTPATIENT
Start: 2024-05-27

## 2024-05-06 RX ORDER — DIPHENHYDRAMINE HCL 25 MG
50 CAPSULE ORAL ONCE
Status: CANCELLED | OUTPATIENT
Start: 2024-05-20

## 2024-05-06 RX ORDER — ALBUTEROL SULFATE 0.83 MG/ML
3 SOLUTION RESPIRATORY (INHALATION) AS NEEDED
Status: CANCELLED | OUTPATIENT
Start: 2024-05-28

## 2024-05-06 RX ORDER — DIPHENHYDRAMINE HYDROCHLORIDE 50 MG/ML
50 INJECTION INTRAMUSCULAR; INTRAVENOUS AS NEEDED
Status: CANCELLED | OUTPATIENT
Start: 2024-05-20

## 2024-05-06 RX ORDER — FAMOTIDINE 10 MG/ML
20 INJECTION INTRAVENOUS ONCE AS NEEDED
Status: CANCELLED | OUTPATIENT
Start: 2024-05-20

## 2024-05-06 RX ORDER — FAMOTIDINE 10 MG/ML
20 INJECTION INTRAVENOUS ONCE
Status: COMPLETED | OUTPATIENT
Start: 2024-05-06 | End: 2024-05-06

## 2024-05-06 RX ORDER — DIPHENHYDRAMINE HYDROCHLORIDE 50 MG/ML
25 INJECTION INTRAMUSCULAR; INTRAVENOUS ONCE
Status: CANCELLED | OUTPATIENT
Start: 2024-06-03 | End: 2024-06-10

## 2024-05-06 RX ORDER — MAGNESIUM SULFATE HEPTAHYDRATE 40 MG/ML
2 INJECTION, SOLUTION INTRAVENOUS ONCE
Status: CANCELLED | OUTPATIENT
Start: 2024-06-03

## 2024-05-06 RX ORDER — PROCHLORPERAZINE EDISYLATE 5 MG/ML
10 INJECTION INTRAMUSCULAR; INTRAVENOUS EVERY 6 HOURS PRN
Status: CANCELLED | OUTPATIENT
Start: 2024-06-10

## 2024-05-06 RX ORDER — FAMOTIDINE 10 MG/ML
20 INJECTION INTRAVENOUS ONCE AS NEEDED
Status: DISCONTINUED | OUTPATIENT
Start: 2024-05-06 | End: 2024-05-06 | Stop reason: HOSPADM

## 2024-05-06 RX ORDER — PALONOSETRON 0.05 MG/ML
0.25 INJECTION, SOLUTION INTRAVENOUS ONCE
Status: COMPLETED | OUTPATIENT
Start: 2024-05-06 | End: 2024-05-06

## 2024-05-06 RX ORDER — MAGNESIUM SULFATE HEPTAHYDRATE 40 MG/ML
2 INJECTION, SOLUTION INTRAVENOUS ONCE
Status: COMPLETED | OUTPATIENT
Start: 2024-05-06 | End: 2024-05-06

## 2024-05-06 RX ORDER — DIPHENHYDRAMINE HYDROCHLORIDE 50 MG/ML
25 INJECTION INTRAMUSCULAR; INTRAVENOUS ONCE
Status: CANCELLED | OUTPATIENT
Start: 2024-05-20 | End: 2024-05-27

## 2024-05-06 RX ORDER — FAMOTIDINE 10 MG/ML
20 INJECTION INTRAVENOUS ONCE
Status: CANCELLED | OUTPATIENT
Start: 2024-05-06

## 2024-05-06 RX ORDER — PROCHLORPERAZINE EDISYLATE 5 MG/ML
10 INJECTION INTRAMUSCULAR; INTRAVENOUS EVERY 6 HOURS PRN
Status: CANCELLED | OUTPATIENT
Start: 2024-05-13

## 2024-05-06 RX ORDER — PROCHLORPERAZINE MALEATE 10 MG
10 TABLET ORAL EVERY 6 HOURS PRN
Status: CANCELLED | OUTPATIENT
Start: 2024-05-28

## 2024-05-06 RX ORDER — PALONOSETRON 0.05 MG/ML
0.25 INJECTION, SOLUTION INTRAVENOUS ONCE
Status: CANCELLED | OUTPATIENT
Start: 2024-06-03

## 2024-05-06 RX ORDER — ALBUTEROL SULFATE 0.83 MG/ML
3 SOLUTION RESPIRATORY (INHALATION) AS NEEDED
Status: CANCELLED | OUTPATIENT
Start: 2024-05-20

## 2024-05-06 RX ORDER — FAMOTIDINE 10 MG/ML
20 INJECTION INTRAVENOUS ONCE AS NEEDED
Status: CANCELLED | OUTPATIENT
Start: 2024-05-13

## 2024-05-06 RX ORDER — PROCHLORPERAZINE MALEATE 10 MG
10 TABLET ORAL EVERY 6 HOURS PRN
Status: CANCELLED | OUTPATIENT
Start: 2024-06-03

## 2024-05-06 RX ORDER — FAMOTIDINE 10 MG/ML
20 INJECTION INTRAVENOUS ONCE
Status: CANCELLED | OUTPATIENT
Start: 2024-06-03

## 2024-05-06 RX ORDER — DIPHENHYDRAMINE HYDROCHLORIDE 50 MG/ML
50 INJECTION INTRAMUSCULAR; INTRAVENOUS AS NEEDED
Status: CANCELLED | OUTPATIENT
Start: 2024-05-06

## 2024-05-06 RX ORDER — PROCHLORPERAZINE MALEATE 10 MG
10 TABLET ORAL EVERY 6 HOURS PRN
Status: CANCELLED | OUTPATIENT
Start: 2024-05-13

## 2024-05-06 RX ORDER — MAGNESIUM SULFATE HEPTAHYDRATE 40 MG/ML
2 INJECTION, SOLUTION INTRAVENOUS ONCE
Status: CANCELLED | OUTPATIENT
Start: 2024-05-06

## 2024-05-06 RX ORDER — ALBUTEROL SULFATE 0.83 MG/ML
3 SOLUTION RESPIRATORY (INHALATION) AS NEEDED
Status: CANCELLED | OUTPATIENT
Start: 2024-06-03

## 2024-05-06 RX ORDER — EPINEPHRINE 0.3 MG/.3ML
0.3 INJECTION SUBCUTANEOUS EVERY 5 MIN PRN
Status: CANCELLED | OUTPATIENT
Start: 2024-05-28

## 2024-05-06 RX ORDER — FAMOTIDINE 10 MG/ML
20 INJECTION INTRAVENOUS ONCE AS NEEDED
Status: CANCELLED | OUTPATIENT
Start: 2024-06-03

## 2024-05-06 RX ORDER — PROCHLORPERAZINE EDISYLATE 5 MG/ML
10 INJECTION INTRAMUSCULAR; INTRAVENOUS EVERY 6 HOURS PRN
Status: CANCELLED | OUTPATIENT
Start: 2024-06-03

## 2024-05-06 RX ORDER — MAGNESIUM SULFATE HEPTAHYDRATE 40 MG/ML
2 INJECTION, SOLUTION INTRAVENOUS ONCE
Status: CANCELLED | OUTPATIENT
Start: 2024-05-20

## 2024-05-06 RX ORDER — EPINEPHRINE 0.3 MG/.3ML
0.3 INJECTION SUBCUTANEOUS EVERY 5 MIN PRN
Status: CANCELLED | OUTPATIENT
Start: 2024-05-13

## 2024-05-06 RX ORDER — EPINEPHRINE 0.3 MG/.3ML
0.3 INJECTION SUBCUTANEOUS EVERY 5 MIN PRN
Status: CANCELLED | OUTPATIENT
Start: 2024-05-06

## 2024-05-06 RX ORDER — FAMOTIDINE 10 MG/ML
20 INJECTION INTRAVENOUS ONCE AS NEEDED
Status: CANCELLED | OUTPATIENT
Start: 2024-06-10

## 2024-05-06 RX ORDER — ALBUTEROL SULFATE 0.83 MG/ML
3 SOLUTION RESPIRATORY (INHALATION) AS NEEDED
Status: CANCELLED | OUTPATIENT
Start: 2024-05-06

## 2024-05-06 RX ORDER — PROCHLORPERAZINE MALEATE 10 MG
10 TABLET ORAL EVERY 6 HOURS PRN
Status: DISCONTINUED | OUTPATIENT
Start: 2024-05-06 | End: 2024-05-06 | Stop reason: HOSPADM

## 2024-05-06 RX ADMIN — DIPHENHYDRAMINE HYDROCHLORIDE 25 MG: 50 INJECTION INTRAMUSCULAR; INTRAVENOUS at 09:41

## 2024-05-06 RX ADMIN — PALONOSETRON HYDROCHLORIDE 250 MCG: 0.25 INJECTION INTRAVENOUS at 09:29

## 2024-05-06 RX ADMIN — CARBOPLATIN 194 MG: 10 INJECTION, SOLUTION INTRAVENOUS at 11:39

## 2024-05-06 RX ADMIN — DEXAMETHASONE SODIUM PHOSPHATE 10 MG: 4 INJECTION, SOLUTION INTRA-ARTICULAR; INTRALESIONAL; INTRAMUSCULAR; INTRAVENOUS; SOFT TISSUE at 09:29

## 2024-05-06 RX ADMIN — FAMOTIDINE 20 MG: 10 INJECTION INTRAVENOUS at 09:29

## 2024-05-06 RX ADMIN — SODIUM CHLORIDE 500 ML: 9 INJECTION, SOLUTION INTRAVENOUS at 09:41

## 2024-05-06 RX ADMIN — MAGNESIUM SULFATE HEPTAHYDRATE 2 G: 40 INJECTION, SOLUTION INTRAVENOUS at 09:29

## 2024-05-06 RX ADMIN — HEPARIN 500 UNITS: 100 SYRINGE at 12:21

## 2024-05-06 RX ADMIN — PACLITAXEL 93 MG: 6 INJECTION, SOLUTION INTRAVENOUS at 10:39

## 2024-05-06 ASSESSMENT — ENCOUNTER SYMPTOMS
OCCASIONAL FEELINGS OF UNSTEADINESS: 0
DEPRESSION: 0
LOSS OF SENSATION IN FEET: 0

## 2024-05-06 ASSESSMENT — PAIN SCALES - GENERAL: PAINLEVEL: 0-NO PAIN

## 2024-05-06 NOTE — PROGRESS NOTES
Patient ID: Juan M Mcrae is a 78 y.o. male.    Diagnoses:   1.GE junction adenocarcinoma (signet ring cell), proficient MMR.  Localized (clinical stage II/III).  2. Type 2 diabetes mellitus on oral hypoglycemics, hypertension controlled with medications.    Genomic profile:  Proficient MMR status on the biopsy by IHC.    Assessment and Plan:  This is a pleasant 78-year-old man with a diagnosis of GE junction adenocarcinoma.  His staging PET/CT and subsequent imaging studies confirmed localized disease.  He had a port placement and a J-tube placement.  He has seen radiation oncology and he has enrolled in the NRG- (photon versus proton) study.    I discussed weekly chemotherapy with carboplatin and Taxol with him.  I described the chemotherapy regimen in detail.  I discussed various possible side effects of this chemotherapy regimen which include but not limited to nausea, vomiting, diarrhea, hair loss, oral mucositis, fatigue, peripheral neuropathy, risk of kidney damage, risk of infection, metallic test in the mouth, allergic reactions.  Our clinic nurse Wilma has provided him printed information on the chemotherapeutic drugs.  After discussion, he signed the consent form.  Today he is here to start concurrent radiation and chemotherapy.      Overall he is feeling well.  His labs are unremarkable.  I ordered cycle 1 (week 1) of chemotherapy today.    I discussed his risk of venous thromboembolism.  I asked him to look for signs of venous clot in the form of leg swelling, tenderness in the calf, chest pain, shortness of breath.  If he notices any of the symptoms, he should call us right away or go to the nearby emergency room.  His Khorana score is 2 which puts him at intermediate risk.  However, he has significant difficulties with swallowing pills because of esophageal cancer.  Given his swallowing difficulty and that he is in the intermediate risk category, I did not give him Eliquis or any other oral  anticoagulant at this time.      Follow up plan-I have requested an appointment with me in 1 week for week 2 chemotherapy  at [facility].      I have placed all orders as outlined above. I advised the patient to schedule the tests and follow-up appointment as discussed by contacting the  on the way out or calling by phone.    Providers:  Surgeon: Dr. Jovany Bucknrec:  Jennie: Donna.    Chief complaint: GE junction adenocarcinoma.    HPI:  ONCOLOGIC HISTORY-    February 28, 2024: Underwent an EGD for progressive dysphagia and weight loss.  EGD revealed an obstructing mass in the distal esophagus.  Biopsy confirmed adenocarcinoma with signet ring cell features.    March 21, 2024: EUS revealed T2 N1 mass extending from 41 cm to 43 cm and involving less than 1 cm of the gastric cardia.    March 22, 2024: CT scan of chest did not show any metastatic disease.    April 1, 2024 24: PET/CT scan done:   1. Hypermetabolic linear focus at gastroesophageal junction which is  extending to the gastric cardia/proximal lesser curvature in  correlation with distal esophageal /gastroesophageal junction wall  thickening. These findings are compatible with biopsy-proven  adenocarcinoma.  2. No evidence of hypermetabolic lymphadenopathy.  3. Abnormal focal increase in FDG uptake in the left hepatic lobe in  correlation with hypoattenuating lesion, concerning for metastatic  disease. Further correlation with dedicated CT abdomen is recommended.  4. Large fat containing left inguinal hernia.  Interval history: He has significant dysphagia although he can get by with soup.  He has lost about 60 pounds in the last 6 months or so.  Feels fatigued.  Denies any pain.  Denies fever or chill or any other sign of ongoing infection.    April 3, 2024: CT scan of abdomen and MRI liver ruled out metastatic disease (liver lesions were hemangiomas).    May 6, 2024: Started concurrent radiation and chemotherapy with weekly carboplatin and  Taxol.    Interval history:  He is overall doing well.  He is using the J-tube for feeding without any significant difficulty.  Denies any pain.  Denies fever or chill or any other complaints.      Past Medical History: Type 2 diabetes mellitus on oral hypoglycemics, hypertension controlled with medications.  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, unspecified      Comment:  Elevated serum cholesterol  No date: Dysphagia  No date: ED (erectile dysfunction)  No date: Esophageal cancer (Multi)  No date: GERD (gastroesophageal reflux disease)  No date: Hearing aid worn  No date: HL (hearing loss)  No date: Hyperlipidemia  No date: Hypertension  No date: Nephrolithiasis  No date: Other specified diabetes mellitus without complications   (Multi)      Comment:  last A1c= 6.8 on 2024  No date: Sleep apnea      Comment:  wear CPAP/BiPAP  No date: Vision loss      Comment:  wears glasses   Surgical History:    Past Surgical History:   Procedure Laterality Date    BLADDER SURGERY      COLONOSCOPY      Repeat in one year    EYE SURGERY      LITHOTRIPSY      OTHER SURGICAL HISTORY      Vocal cord surgery    TRANSURETHRAL RESECTION OF PROSTATE      UPPER GASTROINTESTINAL ENDOSCOPY  2024      Family History:    Family History   Problem Relation Name Age of Onset    Stroke Father      Kidney cancer Brother Eliu     Stroke Brother Eliu      Family Oncology History:    Cancer-related family history includes Kidney cancer in his brother.  Social History:    Social History     Tobacco Use    Smoking status: Former     Current packs/day: 0.00     Average packs/day: 2.0 packs/day for 40.0 years (80.0 ttl pk-yrs)     Types: Cigarettes     Start date: 1959     Quit date: 1999     Years since quittin.3    Smokeless tobacco: Never   Vaping Use    Vaping status: Never Used  "  Substance Use Topics    Alcohol use: Yes     Alcohol/week: 2.0 standard drinks of alcohol     Types: 2 Cans of beer per week     Comment: couple beers a week    Drug use: Not Currently     Comment: gummies-dispensary from [place]        Allergies  Allergies   Allergen Reactions    Omeprazole Itching and Unknown    Shellfish Containing Products Unknown    Sulfa (Sulfonamide Antibiotics) Rash        Medications  Current Outpatient Medications   Medication Instructions    acetaminophen (TYLENOL) 650 mg, j-tube, Every 6 hours PRN    atenolol (Tenormin) 50 mg tablet 1 tablet, j-tube, Daily    dexAMETHasone (DECADRON) 8 mg, oral, Daily, For 3 days per week starting the day after treatment    lidocaine-prilocaine (Emla) 2.5-2.5 % cream Topical, Daily PRN    multivitamin with minerals iron-free (Centrum Silver) 1 tablet, j-tube, Daily    OLANZapine (ZYPREXA) 5 mg, oral, Nightly, For 4 doses per week starting the evening of treatment    prochlorperazine (COMPAZINE) 10 mg, oral, Every 6 hours PRN    traZODone (Desyrel) 100 mg tablet 1 tablet, j-tube, Nightly          Objective   VS: /82 (BP Location: Left arm, Patient Position: Sitting, BP Cuff Size: Adult)   Pulse 60   Temp 35.8 °C (96.4 °F) (Temporal)   Resp 14   Ht 1.675 m (5' 5.95\")   Wt 77.9 kg (171 lb 10.1 oz)   SpO2 98%   BMI 27.75 kg/m²   Weight:   Vitals:    05/06/24 0801   Weight: 77.9 kg (171 lb 10.1 oz)        PHYSICAL EXAMINATION  ECOG performance status-1.  Alert, answers questions appropriately.  Ambulant without any help.  Vitals reviewed.  Eyes- pallor+, no icterus.  Mouth- no thrush or ulceration.  Neck- no mass, thyromegaly.  Chest -bilateral good air entry.  Cardiovascular system- No audible abnormal heart sounds. No murmur.  Abdomen: Soft and nontender.  No mass.  No ascites.  Extremity-no redness in hands.  No edema or swelling.    Labs  Results from last 7 days   Lab Units 05/03/24  1150   WBC AUTO x10*3/uL 6.9   HEMOGLOBIN g/dL 13.8 "   HEMATOCRIT % 41.2   PLATELETS AUTO x10*3/uL 193   NEUTROS ABS x10*3/uL 4.75   LYMPHS ABS AUTO x10*3/uL 0.98   MONOS ABS AUTO x10*3/uL 0.67   EOS ABS AUTO x10*3/uL 0.38   NEUTROS PCT AUTO % 69.4   LYMPHS PCT AUTO % 14.3   MONOS PCT AUTO % 9.8   EOS PCT AUTO % 5.5      Results from last 7 days   Lab Units 05/03/24  1150   GLUCOSE mg/dL 160*   SODIUM mmol/L 140   POTASSIUM mmol/L 4.3   CHLORIDE mmol/L 102   CO2 mmol/L 29   BUN mg/dL 24*   CREATININE mg/dL 0.81   EGFR mL/min/1.73m*2 90   CALCIUM mg/dL 9.4   MAGNESIUM mg/dL 2.08   ALBUMIN g/dL 4.4   PROTEIN TOTAL g/dL 7.3   BILIRUBIN TOTAL mg/dL 0.5   ALK PHOS U/L 64   ALT U/L 11   AST U/L 10                 Image     Catarino Fry MD.

## 2024-05-06 NOTE — PROGRESS NOTES
Pt. Arrived to visit with supportive family.   Seen in [facility], schedule updated and reviewed. VSS. No pain.   Updated and educated pt. On what to expect after D1. Pt. Stated verbal understanding. No further questions at this time.

## 2024-05-07 ENCOUNTER — TELEPHONE (OUTPATIENT)
Dept: PRIMARY CARE | Facility: CLINIC | Age: 79
End: 2024-05-07
Payer: MEDICARE

## 2024-05-07 ENCOUNTER — HOSPITAL ENCOUNTER (OUTPATIENT)
Dept: RADIATION ONCOLOGY | Facility: HOSPITAL | Age: 79
Setting detail: RADIATION/ONCOLOGY SERIES
Discharge: HOME | End: 2024-05-07
Payer: MEDICARE

## 2024-05-07 DIAGNOSIS — F51.01 PRIMARY INSOMNIA: Primary | ICD-10-CM

## 2024-05-07 DIAGNOSIS — Z51.0 ENCOUNTER FOR ANTINEOPLASTIC RADIATION THERAPY: ICD-10-CM

## 2024-05-07 DIAGNOSIS — C16.0 MALIGNANT NEOPLASM OF CARDIA (MULTI): ICD-10-CM

## 2024-05-07 LAB
RAD ONC MSQ ACTUAL FRACTIONS DELIVERED: 2
RAD ONC MSQ ACTUAL SESSION BIOLOGICAL DOSE: 180 CCGE
RAD ONC MSQ ACTUAL SESSION DELIVERED DOSE: 164 CGRAY
RAD ONC MSQ ACTUAL TOTAL BIOLOGICAL DOSE: 361 CCGE
RAD ONC MSQ ACTUAL TOTAL DOSE: 328 CGRAY
RAD ONC MSQ ELAPSED DAYS: 1
RAD ONC MSQ LAST DATE: NORMAL
RAD ONC MSQ PRESCRIBED BIOLOGICAL FRACTIONAL DOSE: 180 CCGE
RAD ONC MSQ PRESCRIBED BIOLOGICAL TOTAL DOSE: 5040 CCGE
RAD ONC MSQ PRESCRIBED FRACTIONAL DOSE: 164 CGRAY
RAD ONC MSQ PRESCRIBED NUMBER OF FRACTIONS: 28
RAD ONC MSQ PRESCRIBED TECHNIQUE: NORMAL
RAD ONC MSQ PRESCRIBED TOTAL DOSE: 4582 CGRAY
RAD ONC MSQ PRESCRIPTION PATTERN COMMENT: NORMAL
RAD ONC MSQ START DATE: NORMAL
RAD ONC MSQ TREATMENT COURSE NUMBER: 1
RAD ONC MSQ TREATMENT SITE: NORMAL

## 2024-05-07 PROCEDURE — 77387 GUIDANCE FOR RADJ TX DLVR: CPT | Performed by: STUDENT IN AN ORGANIZED HEALTH CARE EDUCATION/TRAINING PROGRAM

## 2024-05-07 PROCEDURE — 1090000002 HH PPS REVENUE DEBIT

## 2024-05-07 PROCEDURE — 77523 PROTON TRMT INTERMEDIATE: CPT | Performed by: STUDENT IN AN ORGANIZED HEALTH CARE EDUCATION/TRAINING PROGRAM

## 2024-05-07 PROCEDURE — 1090000001 HH PPS REVENUE CREDIT

## 2024-05-07 PROCEDURE — G6002 STEREOSCOPIC X-RAY GUIDANCE: HCPCS | Performed by: STUDENT IN AN ORGANIZED HEALTH CARE EDUCATION/TRAINING PROGRAM

## 2024-05-07 RX ORDER — TRAZODONE HYDROCHLORIDE 100 MG/1
100 TABLET ORAL NIGHTLY
Qty: 90 TABLET | Refills: 0 | Status: SHIPPED | OUTPATIENT
Start: 2024-05-07

## 2024-05-08 ENCOUNTER — RADIATION ONCOLOGY OTV (OUTPATIENT)
Dept: RADIATION ONCOLOGY | Facility: HOSPITAL | Age: 79
End: 2024-05-08
Payer: MEDICARE

## 2024-05-08 ENCOUNTER — HOSPITAL ENCOUNTER (OUTPATIENT)
Dept: RADIATION ONCOLOGY | Facility: HOSPITAL | Age: 79
Setting detail: RADIATION/ONCOLOGY SERIES
Discharge: HOME | End: 2024-05-08
Payer: MEDICARE

## 2024-05-08 ENCOUNTER — EDUCATION (OUTPATIENT)
Dept: HEMATOLOGY/ONCOLOGY | Facility: HOSPITAL | Age: 79
End: 2024-05-08
Payer: MEDICARE

## 2024-05-08 ENCOUNTER — HOME CARE VISIT (OUTPATIENT)
Dept: HOME HEALTH SERVICES | Facility: HOME HEALTH | Age: 79
End: 2024-05-08
Payer: MEDICARE

## 2024-05-08 VITALS
HEART RATE: 60 BPM | RESPIRATION RATE: 18 BRPM | TEMPERATURE: 98.2 F | OXYGEN SATURATION: 97 % | SYSTOLIC BLOOD PRESSURE: 122 MMHG | DIASTOLIC BLOOD PRESSURE: 82 MMHG

## 2024-05-08 VITALS
TEMPERATURE: 96.6 F | DIASTOLIC BLOOD PRESSURE: 76 MMHG | WEIGHT: 169 LBS | HEART RATE: 53 BPM | BODY MASS INDEX: 27.32 KG/M2 | OXYGEN SATURATION: 96 % | SYSTOLIC BLOOD PRESSURE: 173 MMHG | RESPIRATION RATE: 20 BRPM

## 2024-05-08 DIAGNOSIS — Z51.0 ENCOUNTER FOR ANTINEOPLASTIC RADIATION THERAPY: ICD-10-CM

## 2024-05-08 DIAGNOSIS — C16.0 MALIGNANT NEOPLASM OF CARDIA (MULTI): ICD-10-CM

## 2024-05-08 LAB
RAD ONC MSQ ACTUAL FRACTIONS DELIVERED: 3
RAD ONC MSQ ACTUAL SESSION BIOLOGICAL DOSE: 180 CCGE
RAD ONC MSQ ACTUAL SESSION DELIVERED DOSE: 164 CGRAY
RAD ONC MSQ ACTUAL TOTAL BIOLOGICAL DOSE: 541 CCGE
RAD ONC MSQ ACTUAL TOTAL DOSE: 492 CGRAY
RAD ONC MSQ ELAPSED DAYS: 2
RAD ONC MSQ LAST DATE: NORMAL
RAD ONC MSQ PRESCRIBED BIOLOGICAL FRACTIONAL DOSE: 180 CCGE
RAD ONC MSQ PRESCRIBED BIOLOGICAL TOTAL DOSE: 5040 CCGE
RAD ONC MSQ PRESCRIBED FRACTIONAL DOSE: 164 CGRAY
RAD ONC MSQ PRESCRIBED NUMBER OF FRACTIONS: 28
RAD ONC MSQ PRESCRIBED TECHNIQUE: NORMAL
RAD ONC MSQ PRESCRIBED TOTAL DOSE: 4582 CGRAY
RAD ONC MSQ PRESCRIPTION PATTERN COMMENT: NORMAL
RAD ONC MSQ START DATE: NORMAL
RAD ONC MSQ TREATMENT COURSE NUMBER: 1
RAD ONC MSQ TREATMENT SITE: NORMAL

## 2024-05-08 PROCEDURE — G0299 HHS/HOSPICE OF RN EA 15 MIN: HCPCS | Mod: HHH

## 2024-05-08 PROCEDURE — 77523 PROTON TRMT INTERMEDIATE: CPT | Performed by: RADIOLOGY

## 2024-05-08 PROCEDURE — G6002 STEREOSCOPIC X-RAY GUIDANCE: HCPCS | Performed by: RADIOLOGY

## 2024-05-08 PROCEDURE — 1090000002 HH PPS REVENUE DEBIT

## 2024-05-08 PROCEDURE — 77427 RADIATION TX MANAGEMENT X5: CPT | Performed by: RADIOLOGY

## 2024-05-08 PROCEDURE — 1090000001 HH PPS REVENUE CREDIT

## 2024-05-08 PROCEDURE — 77387 GUIDANCE FOR RADJ TX DLVR: CPT | Performed by: RADIOLOGY

## 2024-05-08 SDOH — ECONOMIC STABILITY: GENERAL

## 2024-05-08 ASSESSMENT — PAIN SCALES - GENERAL: PAINLEVEL: 0-NO PAIN

## 2024-05-08 ASSESSMENT — ENCOUNTER SYMPTOMS
DENIES PAIN: 1
PERSON REPORTING PAIN: PATIENT

## 2024-05-08 ASSESSMENT — ACTIVITIES OF DAILY LIVING (ADL): MONEY MANAGEMENT (EXPENSES/BILLS): INDEPENDENT

## 2024-05-08 NOTE — PROGRESS NOTES
RADIATION ONCOLOGY ON-TREATMENT VISIT NOTE  Patient Name:  Juan M Mcrae  MRN:  [id number]  :  1945    Radiation Oncologist: Irish Thompson MD  Referring Provider: No ref. 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), Clinical: Stage III (cT2, cN1, cM0)    Specialty Problems          Radiation Oncology Problems    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)           Treatment Summary:  Radiation Treatments       Active   esophagus (Started on 2024)   Most recent fraction: 164 cGy given on 2024   Total given: 492 cGy / 4,582 cGy  (3 of 28 fractions)   Elapsed Days: 2   Technique: Protons                   IMRT: Lower esophagus    Treatment Period Technique Fraction Dose Fractions Total Dose   Course 1 2024-2024  (days elapsed: 2)         esophagus 2024-2024 Protons 164 / 164 cGy 3 /  492 / 4,582 cGy       SUBJECTIVE: Feels well, most difficulty swallowing in the morning and states it improves by afternoon, no nause.     OBJECTIVE:   Vital Signs:  /76   Pulse 53   Temp 35.9 °C (96.6 °F)   Resp 20   Wt 76.7 kg (169 lb)   SpO2 96%   BMI 27.32 kg/m²    Pain Scale: 0 /10.    Well appearing, NAD VSS wt stable.    Toxicity Assessment          2024    11:23   Toxicity Assessment   Treatment Site Thoracic   Anorexia Grade 0   Anxiety Grade 0   Dehydration Grade 0   Depression Grade 0   Dermatitis Radiation Grade 0   Diarrhea Grade 0   Fatigue Grade 0   Nausea Grade 0   Pain Grade 0   Vomiting Grade 0    Constipation Grade 0   Dyspepsia Grade 0   Esophagitis Grade 1   Aspiration Grade 0   Bronchial Obstruction Grade 0   Cough Grade 0   Dyspnea Grade 0   Hiccups Grade 1   Hypoxia Grade 0        ASSESSMENT/PLAN:  The patient is tolerating radiation therapy as anticipated.  Continue per current treatment plan.

## 2024-05-08 NOTE — PROGRESS NOTES
Research Note Unscheduled Visit     Juan M Mcrae is enrolled on NRG- and  is here today for proton treatment. Patient is currently on C1 and states he is feeling well after receiving his first dose of chemotherapy: denies nausea, pain, diarrhea, change in taste. He does endorse still having dysphagia and what feels like excess mucus in his throat upon awakening in the morning but is continuing to stay hydrated and drink fluids more through out the dayalong with his nightly tube feeds. Dr. Thompson made aware. Con meds assessed and there are no changes.  Will follow accordingly, patient and SO Helga have contact information for needs or concerns.      Education Documentation  Neutropenia During Cancer Treatment, taught by Carine Adan RN at 5/8/2024 11:00 AM.  Learner: Significant Other  Readiness: Acceptance  Method: Explanation  Response: Verbalizes Understanding, Demonstrated Understanding    Chemotherapy : What is chemotherapy, taught by Carine Adan RN at 5/8/2024 11:00 AM.  Learner: Significant Other  Readiness: Acceptance  Method: Explanation  Response: Verbalizes Understanding, Demonstrated Understanding    Chemotherapy : Review, taught by Carine Adan RN at 5/8/2024 11:00 AM.  Learner: Significant Other  Readiness: Acceptance  Method: Explanation  Response: Verbalizes Understanding, Demonstrated Understanding    Chemotherapy : Possible Side Effects, taught by Carine Adan RN at 5/8/2024 11:00 AM.  Learner: Significant Other  Readiness: Acceptance  Method: Explanation  Response: Verbalizes Understanding, Demonstrated Understanding    Chemotherapy : Life During Treatment, taught by Carine Adan RN at 5/8/2024 11:00 AM.  Learner: Significant Other  Readiness: Acceptance  Method: Explanation  Response: Verbalizes Understanding, Demonstrated Understanding    Chemotherapy : Getting Chemotherapy, taught by Carine Adan RN at 5/8/2024 11:00 AM.  Learner:  Significant Other  Readiness: Acceptance  Method: Explanation  Response: Verbalizes Understanding, Demonstrated Understanding    Chemotherapy : Common concerns, taught by Carine Adan RN at 5/8/2024 11:00 AM.  Learner: Significant Other  Readiness: Acceptance  Method: Explanation  Response: Verbalizes Understanding, Demonstrated Understanding    Pain Management, taught by Carine Adan RN at 5/8/2024 11:00 AM.  Learner: Significant Other  Readiness: Acceptance  Method: Explanation  Response: Verbalizes Understanding, Demonstrated Understanding    Preventing Infections, taught by Carine Adan RN at 5/8/2024 11:00 AM.  Learner: Significant Other  Readiness: Acceptance  Method: Explanation  Response: Verbalizes Understanding, Demonstrated Understanding    Fall Precautions, taught by Carine Adan RN at 5/8/2024 11:00 AM.  Learner: Significant Other  Readiness: Acceptance  Method: Explanation  Response: Verbalizes Understanding, Demonstrated Understanding    Equipment, taught by Carine Adan RN at 5/8/2024 11:00 AM.  Learner: Significant Other  Readiness: Acceptance  Method: Explanation  Response: Verbalizes Understanding, Demonstrated Understanding    Orientation to Unit, taught by Carine Adan RN at 5/8/2024 11:00 AM.  Learner: Significant Other  Readiness: Acceptance  Method: Explanation  Response: Verbalizes Understanding, Demonstrated Understanding    Returning to Work/School/Activities, taught by Carine Adan RN at 5/8/2024 11:00 AM.  Learner: Significant Other  Readiness: Acceptance  Method: Explanation  Response: Verbalizes Understanding, Demonstrated Understanding    Stress Management, taught by Carine Adan RN at 5/8/2024 11:00 AM.  Learner: Significant Other  Readiness: Acceptance  Method: Explanation  Response: Verbalizes Understanding, Demonstrated Understanding    Changing Role with Self and Family, taught by Carine Adan RN at  5/8/2024 11:00 AM.  Learner: Significant Other  Readiness: Acceptance  Method: Explanation  Response: Verbalizes Understanding, Demonstrated Understanding    Leisure Education, taught by Carine Adan RN at 5/8/2024 11:00 AM.  Learner: Significant Other  Readiness: Acceptance  Method: Explanation  Response: Verbalizes Understanding, Demonstrated Understanding    Health Systems & Community Resources, taught by Carine Adan RN at 5/8/2024 11:00 AM.  Learner: Significant Other  Readiness: Acceptance  Method: Explanation  Response: Verbalizes Understanding, Demonstrated Understanding    Advanced Medical Directives, taught by Carine Adan RN at 5/8/2024 11:00 AM.  Learner: Significant Other  Readiness: Acceptance  Method: Explanation  Response: Verbalizes Understanding, Demonstrated Understanding    Escort, Parking, Transportation Home, taught by Carine Adan RN at 5/8/2024 11:00 AM.  Learner: Significant Other  Readiness: Acceptance  Method: Explanation  Response: Verbalizes Understanding, Demonstrated Understanding    Referrals to Support Services, taught by Carine Adan RN at 5/8/2024 11:00 AM.  Learner: Significant Other  Readiness: Acceptance  Method: Explanation  Response: Verbalizes Understanding, Demonstrated Understanding    Support Systems, taught by Carine Adan RN at 5/8/2024 11:00 AM.  Learner: Significant Other  Readiness: Acceptance  Method: Explanation  Response: Verbalizes Understanding, Demonstrated Understanding    Financial Assistance Information, taught by Carine Adan RN at 5/8/2024 11:00 AM.  Learner: Significant Other  Readiness: Acceptance  Method: Explanation  Response: Verbalizes Understanding, Demonstrated Understanding    Financial Benefits Summary, taught by Carnie Adan RN at 5/8/2024 11:00 AM.  Learner: Significant Other  Readiness: Acceptance  Method: Explanation  Response: Verbalizes Understanding, Demonstrated  Understanding    Healthy Lifestyle, taught by Carine Adan RN at 5/8/2024 11:00 AM.  Learner: Significant Other  Readiness: Acceptance  Method: Explanation  Response: Verbalizes Understanding, Demonstrated Understanding    Monitoring Weight, taught by Carine Adan RN at 5/8/2024 11:00 AM.  Learner: Significant Other  Readiness: Acceptance  Method: Explanation  Response: Verbalizes Understanding, Demonstrated Understanding    Tips for Daily Living, taught by Carine Adan RN at 5/8/2024 11:00 AM.  Learner: Significant Other  Readiness: Acceptance  Method: Explanation  Response: Verbalizes Understanding, Demonstrated Understanding    Nutrition/Diet, taught by Carine Adan RN at 5/8/2024 11:00 AM.  Learner: Significant Other  Readiness: Acceptance  Method: Explanation  Response: Verbalizes Understanding, Demonstrated Understanding    Food-Drug Interactions, taught by Carine Adan RN at 5/8/2024 11:00 AM.  Learner: Significant Other  Readiness: Acceptance  Method: Explanation  Response: Verbalizes Understanding, Demonstrated Understanding    Nutrition, taught by Carine Adan RN at 5/8/2024 11:00 AM.  Learner: Significant Other  Readiness: Acceptance  Method: Explanation  Response: Verbalizes Understanding, Demonstrated Understanding    Pain Medication Actions & Side Effects, taught by Carine Adan RN at 5/8/2024 11:00 AM.  Learner: Significant Other  Readiness: Acceptance  Method: Explanation  Response: Verbalizes Understanding, Demonstrated Understanding    Patient Controlled Analgesia, taught by Carine Adan RN at 5/8/2024 11:00 AM.  Learner: Significant Other  Readiness: Acceptance  Method: Explanation  Response: Verbalizes Understanding, Demonstrated Understanding    Discuss the Use of Pain Control Measures Before Pain Becomes Severe, taught by Carine Adan RN at 5/8/2024 11:00 AM.  Learner: Significant Other  Readiness:  Acceptance  Method: Explanation  Response: Verbalizes Understanding, Demonstrated Understanding    Pain Management, taught by Carine Adan RN at 5/8/2024 11:00 AM.  Learner: Significant Other  Readiness: Acceptance  Method: Explanation  Response: Verbalizes Understanding, Demonstrated Understanding    Pain Rating Scale, taught by Carine Adan RN at 5/8/2024 11:00 AM.  Learner: Significant Other  Readiness: Acceptance  Method: Explanation  Response: Verbalizes Understanding, Demonstrated Understanding    Shortness of Breath, taught by Carine Adan RN at 5/8/2024 11:00 AM.  Learner: Significant Other  Readiness: Acceptance  Method: Explanation  Response: Verbalizes Understanding, Demonstrated Understanding    Changes in Appetite, taught by Carine Adan RN at 5/8/2024 11:00 AM.  Learner: Significant Other  Readiness: Acceptance  Method: Explanation  Response: Verbalizes Understanding, Demonstrated Understanding    Fertility Issues, taught by Carine Adan RN at 5/8/2024 11:00 AM.  Learner: Significant Other  Readiness: Acceptance  Method: Explanation  Response: Verbalizes Understanding, Demonstrated Understanding    Memory Problems, taught by Carine Adan RN at 5/8/2024 11:00 AM.  Learner: Significant Other  Readiness: Acceptance  Method: Explanation  Response: Verbalizes Understanding, Demonstrated Understanding    Skin and Nail Changes, taught by Carine Adan RN at 5/8/2024 11:00 AM.  Learner: Significant Other  Readiness: Acceptance  Method: Explanation  Response: Verbalizes Understanding, Demonstrated Understanding    Changes in Urination, taught by Carine Adan RN at 5/8/2024 11:00 AM.  Learner: Significant Other  Readiness: Acceptance  Method: Explanation  Response: Verbalizes Understanding, Demonstrated Understanding    Bleeding Precautions, taught by Carine Adan RN at 5/8/2024 11:00 AM.  Learner: Significant Other  Readiness:  Acceptance  Method: Explanation  Response: Verbalizes Understanding, Demonstrated Understanding    Edema Management, taught by Carine Adan RN at 5/8/2024 11:00 AM.  Learner: Significant Other  Readiness: Acceptance  Method: Explanation  Response: Verbalizes Understanding, Demonstrated Understanding    Care of Neuropathy, taught by Carine Adan RN at 5/8/2024 11:00 AM.  Learner: Significant Other  Readiness: Acceptance  Method: Explanation  Response: Verbalizes Understanding, Demonstrated Understanding    Infection Control, taught by Carine Adan RN at 5/8/2024 11:00 AM.  Learner: Significant Other  Readiness: Acceptance  Method: Explanation  Response: Verbalizes Understanding, Demonstrated Understanding    Alopecia, taught by Carine Adan RN at 5/8/2024 11:00 AM.  Learner: Significant Other  Readiness: Acceptance  Method: Explanation  Response: Verbalizes Understanding, Demonstrated Understanding    Diarrhea, taught by Carine Adan RN at 5/8/2024 11:00 AM.  Learner: Significant Other  Readiness: Acceptance  Method: Explanation  Response: Verbalizes Understanding, Demonstrated Understanding    Constipation, taught by Carine Adan RN at 5/8/2024 11:00 AM.  Learner: Significant Other  Readiness: Acceptance  Method: Explanation  Response: Verbalizes Understanding, Demonstrated Understanding    Mouth Sores, taught by Carine Adan RN at 5/8/2024 11:00 AM.  Learner: Significant Other  Readiness: Acceptance  Method: Explanation  Response: Verbalizes Understanding, Demonstrated Understanding    Nausea Management, taught by Carine Adan RN at 5/8/2024 11:00 AM.  Learner: Significant Other  Readiness: Acceptance  Method: Explanation  Response: Verbalizes Understanding, Demonstrated Understanding    Fatigue, taught by Carine Adan RN at 5/8/2024 11:00 AM.  Learner: Significant Other  Readiness: Acceptance  Method: Explanation  Response: Verbalizes  Understanding, Demonstrated Understanding    Radiation Therapy, taught by Carine Adan RN at 5/8/2024 11:00 AM.  Learner: Significant Other  Readiness: Acceptance  Method: Explanation  Response: Verbalizes Understanding, Demonstrated Understanding    Post-Chemotherapy Education, taught by Carine Adan RN at 5/8/2024 11:00 AM.  Learner: Significant Other  Readiness: Acceptance  Method: Explanation  Response: Verbalizes Understanding, Demonstrated Understanding    Chemotherapy Safety at Home, taught by Carine Adan RN at 5/8/2024 11:00 AM.  Learner: Significant Other  Readiness: Acceptance  Method: Explanation  Response: Verbalizes Understanding, Demonstrated Understanding    Treatment Plan and Schedule, taught by Carine Adan RN at 5/8/2024 11:00 AM.  Learner: Significant Other  Readiness: Acceptance  Method: Explanation  Response: Verbalizes Understanding, Demonstrated Understanding    General Medication Information, taught by Carine Adan RN at 5/8/2024 11:00 AM.  Learner: Significant Other  Readiness: Acceptance  Method: Explanation  Response: Verbalizes Understanding, Demonstrated Understanding    Supportive Medications, taught by Carine Adan RN at 5/8/2024 11:00 AM.  Learner: Significant Other  Readiness: Acceptance  Method: Explanation  Response: Verbalizes Understanding, Demonstrated Understanding    Instructions on Use Provided, taught by Carine Adan RN at 5/8/2024 11:00 AM.  Learner: Significant Other  Readiness: Acceptance  Method: Explanation  Response: Verbalizes Understanding, Demonstrated Understanding    Diagnostic Studies, taught by Carine Adan RN at 5/8/2024 11:00 AM.  Learner: Significant Other  Readiness: Acceptance  Method: Explanation  Response: Verbalizes Understanding, Demonstrated Understanding    Tumor Markers, taught by Carine Adan RN at 5/8/2024 11:00 AM.  Learner: Significant Other  Readiness:  Acceptance  Method: Explanation  Response: Verbalizes Understanding, Demonstrated Understanding    Comprehensive Metabolic Panel (CMP), taught by Carine Adan RN at 5/8/2024 11:00 AM.  Learner: Significant Other  Readiness: Acceptance  Method: Explanation  Response: Verbalizes Understanding, Demonstrated Understanding    Complete Blood Count with Differential (CBC w/ Diff), taught by Carine Adan RN at 5/8/2024 11:00 AM.  Learner: Significant Other  Readiness: Acceptance  Method: Explanation  Response: Verbalizes Understanding, Demonstrated Understanding    When and How to Contact Clinic, taught by Carine Adan RN at 5/8/2024 11:00 AM.  Learner: Significant Other  Readiness: Acceptance  Method: Explanation  Response: Verbalizes Understanding, Demonstrated Understanding    Oriented to Facility, taught by Carine Adan RN at 5/8/2024 11:00 AM.  Learner: Significant Other  Readiness: Acceptance  Method: Explanation  Response: Verbalizes Understanding, Demonstrated Understanding    Education Comments  No comments found.

## 2024-05-09 ENCOUNTER — HOSPITAL ENCOUNTER (OUTPATIENT)
Dept: RADIATION ONCOLOGY | Facility: HOSPITAL | Age: 79
Setting detail: RADIATION/ONCOLOGY SERIES
Discharge: HOME | End: 2024-05-09
Payer: MEDICARE

## 2024-05-09 DIAGNOSIS — C16.0 MALIGNANT NEOPLASM OF CARDIA (MULTI): ICD-10-CM

## 2024-05-09 DIAGNOSIS — Z51.0 ENCOUNTER FOR ANTINEOPLASTIC RADIATION THERAPY: ICD-10-CM

## 2024-05-09 LAB
RAD ONC MSQ ACTUAL FRACTIONS DELIVERED: 4
RAD ONC MSQ ACTUAL SESSION BIOLOGICAL DOSE: 180 CCGE
RAD ONC MSQ ACTUAL SESSION DELIVERED DOSE: 164 CGRAY
RAD ONC MSQ ACTUAL TOTAL BIOLOGICAL DOSE: 722 CCGE
RAD ONC MSQ ACTUAL TOTAL DOSE: 656 CGRAY
RAD ONC MSQ ELAPSED DAYS: 3
RAD ONC MSQ LAST DATE: NORMAL
RAD ONC MSQ PRESCRIBED BIOLOGICAL FRACTIONAL DOSE: 180 CCGE
RAD ONC MSQ PRESCRIBED BIOLOGICAL TOTAL DOSE: 5040 CCGE
RAD ONC MSQ PRESCRIBED FRACTIONAL DOSE: 164 CGRAY
RAD ONC MSQ PRESCRIBED NUMBER OF FRACTIONS: 28
RAD ONC MSQ PRESCRIBED TECHNIQUE: NORMAL
RAD ONC MSQ PRESCRIBED TOTAL DOSE: 4582 CGRAY
RAD ONC MSQ PRESCRIPTION PATTERN COMMENT: NORMAL
RAD ONC MSQ START DATE: NORMAL
RAD ONC MSQ TREATMENT COURSE NUMBER: 1
RAD ONC MSQ TREATMENT SITE: NORMAL

## 2024-05-09 PROCEDURE — 77387 GUIDANCE FOR RADJ TX DLVR: CPT | Performed by: RADIOLOGY

## 2024-05-09 PROCEDURE — 1090000001 HH PPS REVENUE CREDIT

## 2024-05-09 PROCEDURE — 77523 PROTON TRMT INTERMEDIATE: CPT | Performed by: RADIOLOGY

## 2024-05-09 PROCEDURE — 1090000002 HH PPS REVENUE DEBIT

## 2024-05-09 PROCEDURE — G6002 STEREOSCOPIC X-RAY GUIDANCE: HCPCS | Performed by: RADIOLOGY

## 2024-05-10 ENCOUNTER — HOSPITAL ENCOUNTER (OUTPATIENT)
Dept: RADIATION ONCOLOGY | Facility: HOSPITAL | Age: 79
Setting detail: RADIATION/ONCOLOGY SERIES
Discharge: HOME | End: 2024-05-10
Payer: MEDICARE

## 2024-05-10 DIAGNOSIS — C16.0 MALIGNANT NEOPLASM OF CARDIA (MULTI): ICD-10-CM

## 2024-05-10 DIAGNOSIS — Z51.0 ENCOUNTER FOR ANTINEOPLASTIC RADIATION THERAPY: ICD-10-CM

## 2024-05-10 LAB
RAD ONC MSQ ACTUAL FRACTIONS DELIVERED: 5
RAD ONC MSQ ACTUAL SESSION BIOLOGICAL DOSE: 180 CCGE
RAD ONC MSQ ACTUAL SESSION DELIVERED DOSE: 164 CGRAY
RAD ONC MSQ ACTUAL TOTAL BIOLOGICAL DOSE: 902 CCGE
RAD ONC MSQ ACTUAL TOTAL DOSE: 820 CGRAY
RAD ONC MSQ ELAPSED DAYS: 4
RAD ONC MSQ LAST DATE: NORMAL
RAD ONC MSQ PRESCRIBED BIOLOGICAL FRACTIONAL DOSE: 180 CCGE
RAD ONC MSQ PRESCRIBED BIOLOGICAL TOTAL DOSE: 5040 CCGE
RAD ONC MSQ PRESCRIBED FRACTIONAL DOSE: 164 CGRAY
RAD ONC MSQ PRESCRIBED NUMBER OF FRACTIONS: 28
RAD ONC MSQ PRESCRIBED TECHNIQUE: NORMAL
RAD ONC MSQ PRESCRIBED TOTAL DOSE: 4582 CGRAY
RAD ONC MSQ PRESCRIPTION PATTERN COMMENT: NORMAL
RAD ONC MSQ START DATE: NORMAL
RAD ONC MSQ TREATMENT COURSE NUMBER: 1
RAD ONC MSQ TREATMENT SITE: NORMAL

## 2024-05-10 PROCEDURE — G6002 STEREOSCOPIC X-RAY GUIDANCE: HCPCS | Performed by: RADIOLOGY

## 2024-05-10 PROCEDURE — 77387 GUIDANCE FOR RADJ TX DLVR: CPT | Performed by: RADIOLOGY

## 2024-05-10 PROCEDURE — 1090000002 HH PPS REVENUE DEBIT

## 2024-05-10 PROCEDURE — 77336 RADIATION PHYSICS CONSULT: CPT | Performed by: RADIOLOGY

## 2024-05-10 PROCEDURE — 77523 PROTON TRMT INTERMEDIATE: CPT | Performed by: RADIOLOGY

## 2024-05-10 PROCEDURE — 1090000001 HH PPS REVENUE CREDIT

## 2024-05-11 PROCEDURE — 1090000001 HH PPS REVENUE CREDIT

## 2024-05-11 PROCEDURE — 1090000002 HH PPS REVENUE DEBIT

## 2024-05-12 PROCEDURE — 1090000002 HH PPS REVENUE DEBIT

## 2024-05-12 PROCEDURE — 1090000001 HH PPS REVENUE CREDIT

## 2024-05-13 ENCOUNTER — INFUSION (OUTPATIENT)
Dept: HEMATOLOGY/ONCOLOGY | Facility: CLINIC | Age: 79
End: 2024-05-13
Payer: MEDICARE

## 2024-05-13 ENCOUNTER — OFFICE VISIT (OUTPATIENT)
Dept: HEMATOLOGY/ONCOLOGY | Facility: CLINIC | Age: 79
End: 2024-05-13
Payer: MEDICARE

## 2024-05-13 ENCOUNTER — APPOINTMENT (OUTPATIENT)
Dept: HEMATOLOGY/ONCOLOGY | Facility: CLINIC | Age: 79
End: 2024-05-13
Payer: MEDICARE

## 2024-05-13 ENCOUNTER — NUTRITION (OUTPATIENT)
Dept: HEMATOLOGY/ONCOLOGY | Facility: CLINIC | Age: 79
End: 2024-05-13

## 2024-05-13 VITALS
RESPIRATION RATE: 18 BRPM | BODY MASS INDEX: 27.07 KG/M2 | SYSTOLIC BLOOD PRESSURE: 123 MMHG | DIASTOLIC BLOOD PRESSURE: 71 MMHG | WEIGHT: 167.44 LBS | TEMPERATURE: 97.2 F | OXYGEN SATURATION: 95 % | HEART RATE: 62 BPM

## 2024-05-13 VITALS — WEIGHT: 167.44 LBS | HEIGHT: 66 IN | BODY MASS INDEX: 26.91 KG/M2

## 2024-05-13 DIAGNOSIS — C15.5 MALIGNANT NEOPLASM OF LOWER THIRD OF ESOPHAGUS (MULTI): ICD-10-CM

## 2024-05-13 DIAGNOSIS — C15.9 ESOPHAGEAL ADENOCARCINOMA (MULTI): ICD-10-CM

## 2024-05-13 DIAGNOSIS — C15.9 ESOPHAGEAL ADENOCARCINOMA (MULTI): Primary | ICD-10-CM

## 2024-05-13 LAB
ALBUMIN SERPL BCP-MCNC: 3.9 G/DL (ref 3.4–5)
ALP SERPL-CCNC: 64 U/L (ref 33–136)
ALT SERPL W P-5'-P-CCNC: 11 U/L (ref 10–52)
ANION GAP SERPL CALC-SCNC: 11 MMOL/L (ref 10–20)
AST SERPL W P-5'-P-CCNC: 10 U/L (ref 9–39)
BASOPHILS # BLD AUTO: 0.05 X10*3/UL (ref 0–0.1)
BASOPHILS NFR BLD AUTO: 0.8 %
BILIRUB SERPL-MCNC: 0.4 MG/DL (ref 0–1.2)
BUN SERPL-MCNC: 27 MG/DL (ref 6–23)
CALCIUM SERPL-MCNC: 9.1 MG/DL (ref 8.6–10.3)
CHLORIDE SERPL-SCNC: 101 MMOL/L (ref 98–107)
CO2 SERPL-SCNC: 30 MMOL/L (ref 21–32)
CREAT SERPL-MCNC: 0.76 MG/DL (ref 0.5–1.3)
EGFRCR SERPLBLD CKD-EPI 2021: >90 ML/MIN/1.73M*2
EOSINOPHIL # BLD AUTO: 0.28 X10*3/UL (ref 0–0.4)
EOSINOPHIL NFR BLD AUTO: 4.5 %
ERYTHROCYTE [DISTWIDTH] IN BLOOD BY AUTOMATED COUNT: 13 % (ref 11.5–14.5)
GLUCOSE SERPL-MCNC: 184 MG/DL (ref 74–99)
HCT VFR BLD AUTO: 42.4 % (ref 41–52)
HGB BLD-MCNC: 14.4 G/DL (ref 13.5–17.5)
IMM GRANULOCYTES # BLD AUTO: 0.06 X10*3/UL (ref 0–0.5)
IMM GRANULOCYTES NFR BLD AUTO: 1 % (ref 0–0.9)
LYMPHOCYTES # BLD AUTO: 0.74 X10*3/UL (ref 0.8–3)
LYMPHOCYTES NFR BLD AUTO: 12 %
MAGNESIUM SERPL-MCNC: 1.89 MG/DL (ref 1.6–2.4)
MCH RBC QN AUTO: 32.7 PG (ref 26–34)
MCHC RBC AUTO-ENTMCNC: 34 G/DL (ref 32–36)
MCV RBC AUTO: 96 FL (ref 80–100)
MONOCYTES # BLD AUTO: 0.48 X10*3/UL (ref 0.05–0.8)
MONOCYTES NFR BLD AUTO: 7.8 %
NEUTROPHILS # BLD AUTO: 4.57 X10*3/UL (ref 1.6–5.5)
NEUTROPHILS NFR BLD AUTO: 73.9 %
NRBC BLD-RTO: 0 /100 WBCS (ref 0–0)
PLATELET # BLD AUTO: 188 X10*3/UL (ref 150–450)
POTASSIUM SERPL-SCNC: 4.5 MMOL/L (ref 3.5–5.3)
PROT SERPL-MCNC: 6.5 G/DL (ref 6.4–8.2)
RAD ONC MSQ ACTUAL FRACTIONS DELIVERED: 6
RAD ONC MSQ ACTUAL SESSION BIOLOGICAL DOSE: 180 CCGE
RAD ONC MSQ ACTUAL SESSION DELIVERED DOSE: 164 CGRAY
RAD ONC MSQ ACTUAL TOTAL BIOLOGICAL DOSE: 1082 CCGE
RAD ONC MSQ ACTUAL TOTAL DOSE: 984 CGRAY
RAD ONC MSQ ELAPSED DAYS: 7
RAD ONC MSQ LAST DATE: NORMAL
RAD ONC MSQ PRESCRIBED BIOLOGICAL FRACTIONAL DOSE: 180 CCGE
RAD ONC MSQ PRESCRIBED BIOLOGICAL TOTAL DOSE: 5040 CCGE
RAD ONC MSQ PRESCRIBED FRACTIONAL DOSE: 164 CGRAY
RAD ONC MSQ PRESCRIBED NUMBER OF FRACTIONS: 28
RAD ONC MSQ PRESCRIBED TECHNIQUE: NORMAL
RAD ONC MSQ PRESCRIBED TOTAL DOSE: 4582 CGRAY
RAD ONC MSQ PRESCRIPTION PATTERN COMMENT: NORMAL
RAD ONC MSQ START DATE: NORMAL
RAD ONC MSQ TREATMENT COURSE NUMBER: 1
RAD ONC MSQ TREATMENT SITE: NORMAL
RBC # BLD AUTO: 4.4 X10*6/UL (ref 4.5–5.9)
SODIUM SERPL-SCNC: 137 MMOL/L (ref 136–145)
WBC # BLD AUTO: 6.2 X10*3/UL (ref 4.4–11.3)

## 2024-05-13 PROCEDURE — 1157F ADVNC CARE PLAN IN RCRD: CPT | Performed by: INTERNAL MEDICINE

## 2024-05-13 PROCEDURE — 2500000004 HC RX 250 GENERAL PHARMACY W/ HCPCS (ALT 636 FOR OP/ED): Performed by: INTERNAL MEDICINE

## 2024-05-13 PROCEDURE — 1036F TOBACCO NON-USER: CPT | Performed by: INTERNAL MEDICINE

## 2024-05-13 PROCEDURE — 83735 ASSAY OF MAGNESIUM: CPT | Performed by: INTERNAL MEDICINE

## 2024-05-13 PROCEDURE — 85025 COMPLETE CBC W/AUTO DIFF WBC: CPT | Performed by: INTERNAL MEDICINE

## 2024-05-13 PROCEDURE — 1090000002 HH PPS REVENUE DEBIT

## 2024-05-13 PROCEDURE — G6002 STEREOSCOPIC X-RAY GUIDANCE: HCPCS | Performed by: RADIOLOGY

## 2024-05-13 PROCEDURE — 96368 THER/DIAG CONCURRENT INF: CPT

## 2024-05-13 PROCEDURE — 77387 GUIDANCE FOR RADJ TX DLVR: CPT | Performed by: RADIOLOGY

## 2024-05-13 PROCEDURE — 80053 COMPREHEN METABOLIC PANEL: CPT | Performed by: INTERNAL MEDICINE

## 2024-05-13 PROCEDURE — 99214 OFFICE O/P EST MOD 30 MIN: CPT | Performed by: INTERNAL MEDICINE

## 2024-05-13 PROCEDURE — 1090000001 HH PPS REVENUE CREDIT

## 2024-05-13 PROCEDURE — 96413 CHEMO IV INFUSION 1 HR: CPT

## 2024-05-13 PROCEDURE — 96375 TX/PRO/DX INJ NEW DRUG ADDON: CPT | Mod: INF

## 2024-05-13 PROCEDURE — 77523 PROTON TRMT INTERMEDIATE: CPT | Performed by: RADIOLOGY

## 2024-05-13 PROCEDURE — 1160F RVW MEDS BY RX/DR IN RCRD: CPT | Performed by: INTERNAL MEDICINE

## 2024-05-13 PROCEDURE — 96417 CHEMO IV INFUS EACH ADDL SEQ: CPT

## 2024-05-13 PROCEDURE — 1159F MED LIST DOCD IN RCRD: CPT | Performed by: INTERNAL MEDICINE

## 2024-05-13 RX ORDER — PROCHLORPERAZINE EDISYLATE 5 MG/ML
10 INJECTION INTRAMUSCULAR; INTRAVENOUS EVERY 6 HOURS PRN
Status: DISCONTINUED | OUTPATIENT
Start: 2024-05-13 | End: 2024-05-13 | Stop reason: HOSPADM

## 2024-05-13 RX ORDER — HEPARIN 100 UNIT/ML
500 SYRINGE INTRAVENOUS AS NEEDED
Status: DISCONTINUED | OUTPATIENT
Start: 2024-05-13 | End: 2024-05-13 | Stop reason: HOSPADM

## 2024-05-13 RX ORDER — PALONOSETRON 0.05 MG/ML
0.25 INJECTION, SOLUTION INTRAVENOUS ONCE
Status: COMPLETED | OUTPATIENT
Start: 2024-05-13 | End: 2024-05-13

## 2024-05-13 RX ORDER — HEPARIN 100 UNIT/ML
500 SYRINGE INTRAVENOUS AS NEEDED
Status: CANCELLED | OUTPATIENT
Start: 2024-05-13

## 2024-05-13 RX ORDER — HEPARIN SODIUM,PORCINE/PF 10 UNIT/ML
50 SYRINGE (ML) INTRAVENOUS AS NEEDED
Status: CANCELLED | OUTPATIENT
Start: 2024-05-13

## 2024-05-13 RX ORDER — FAMOTIDINE 10 MG/ML
20 INJECTION INTRAVENOUS ONCE
Status: COMPLETED | OUTPATIENT
Start: 2024-05-13 | End: 2024-05-13

## 2024-05-13 RX ORDER — ALBUTEROL SULFATE 0.83 MG/ML
3 SOLUTION RESPIRATORY (INHALATION) AS NEEDED
Status: DISCONTINUED | OUTPATIENT
Start: 2024-05-13 | End: 2024-05-13 | Stop reason: HOSPADM

## 2024-05-13 RX ORDER — NALOXONE HYDROCHLORIDE 0.4 MG/ML
0.2 INJECTION, SOLUTION INTRAMUSCULAR; INTRAVENOUS; SUBCUTANEOUS
COMMUNITY
Start: 2024-04-08

## 2024-05-13 RX ORDER — HEPARIN SODIUM,PORCINE/PF 10 UNIT/ML
50 SYRINGE (ML) INTRAVENOUS AS NEEDED
Status: DISCONTINUED | OUTPATIENT
Start: 2024-05-13 | End: 2024-05-13 | Stop reason: HOSPADM

## 2024-05-13 RX ORDER — DIPHENHYDRAMINE HYDROCHLORIDE 50 MG/ML
25 INJECTION INTRAMUSCULAR; INTRAVENOUS ONCE
Status: COMPLETED | OUTPATIENT
Start: 2024-05-13 | End: 2024-05-13

## 2024-05-13 RX ORDER — FAMOTIDINE 10 MG/ML
20 INJECTION INTRAVENOUS ONCE AS NEEDED
Status: DISCONTINUED | OUTPATIENT
Start: 2024-05-13 | End: 2024-05-13 | Stop reason: HOSPADM

## 2024-05-13 RX ORDER — PROCHLORPERAZINE MALEATE 10 MG
10 TABLET ORAL EVERY 6 HOURS PRN
Status: DISCONTINUED | OUTPATIENT
Start: 2024-05-13 | End: 2024-05-13 | Stop reason: HOSPADM

## 2024-05-13 RX ORDER — OLANZAPINE 5 MG/1
5 TABLET ORAL NIGHTLY
Qty: 30 TABLET | Refills: 3 | Status: SHIPPED | OUTPATIENT
Start: 2024-05-13

## 2024-05-13 RX ORDER — EPINEPHRINE 0.3 MG/.3ML
0.3 INJECTION SUBCUTANEOUS EVERY 5 MIN PRN
Status: DISCONTINUED | OUTPATIENT
Start: 2024-05-13 | End: 2024-05-13 | Stop reason: HOSPADM

## 2024-05-13 RX ORDER — DIPHENHYDRAMINE HYDROCHLORIDE 50 MG/ML
50 INJECTION INTRAMUSCULAR; INTRAVENOUS AS NEEDED
Status: DISCONTINUED | OUTPATIENT
Start: 2024-05-13 | End: 2024-05-13 | Stop reason: HOSPADM

## 2024-05-13 RX ORDER — MAGNESIUM SULFATE HEPTAHYDRATE 40 MG/ML
2 INJECTION, SOLUTION INTRAVENOUS ONCE
Status: COMPLETED | OUTPATIENT
Start: 2024-05-13 | End: 2024-05-13

## 2024-05-13 RX ADMIN — HEPARIN 500 UNITS: 100 SYRINGE at 10:41

## 2024-05-13 RX ADMIN — MAGNESIUM SULFATE HEPTAHYDRATE 2 G: 40 INJECTION, SOLUTION INTRAVENOUS at 08:39

## 2024-05-13 RX ADMIN — FAMOTIDINE 20 MG: 10 INJECTION INTRAVENOUS at 08:36

## 2024-05-13 RX ADMIN — SODIUM CHLORIDE 500 ML: 9 INJECTION, SOLUTION INTRAVENOUS at 08:15

## 2024-05-13 RX ADMIN — PALONOSETRON HYDROCHLORIDE 250 MCG: 0.25 INJECTION INTRAVENOUS at 08:35

## 2024-05-13 RX ADMIN — DEXAMETHASONE SODIUM PHOSPHATE 10 MG: 4 INJECTION, SOLUTION INTRA-ARTICULAR; INTRALESIONAL; INTRAMUSCULAR; INTRAVENOUS; SOFT TISSUE at 08:50

## 2024-05-13 RX ADMIN — PACLITAXEL 93 MG: 6 INJECTION, SOLUTION INTRAVENOUS at 09:04

## 2024-05-13 RX ADMIN — DIPHENHYDRAMINE HYDROCHLORIDE 25 MG: 50 INJECTION INTRAMUSCULAR; INTRAVENOUS at 08:36

## 2024-05-13 RX ADMIN — CARBOPLATIN 194 MG: 10 INJECTION, SOLUTION INTRAVENOUS at 10:06

## 2024-05-13 ASSESSMENT — PAIN SCALES - GENERAL: PAINLEVEL: 0-NO PAIN

## 2024-05-13 NOTE — PATIENT INSTRUCTIONS
Recommend to increase enteral feeds to 5.5 cartons daily via pump (5 full cartons plus 125mL from the 6th carton)  Change dosage on pump to 1375mL total.   Work on increasing rate to 100mL/h; start by increasing to 90mL/h, then 95mL/h, then 100mL/h

## 2024-05-13 NOTE — PROGRESS NOTES
Patient ID: Juan M Mcrae is a 78 y.o. male.    Diagnoses:   1.GE junction adenocarcinoma (signet ring cell), proficient MMR.  Localized (clinical stage II/III).  2. Type 2 diabetes mellitus on oral hypoglycemics, hypertension controlled with medications.    Genomic profile:  Proficient MMR status on the biopsy by IHC.    Assessment and Plan:  This is a pleasant 78-year-old man with a diagnosis of GE junction adenocarcinoma.  His staging PET/CT and subsequent imaging studies confirmed localized disease.  He had a port placement and a J-tube placement.  He has seen radiation oncology and he has enrolled in the NRG- (photon versus proton) study.    He is here for week 2 of chemotherapy (weekly carboplatin plus Taxol ).  Overall he is doing well.  He is sleeping better with olanzapine.  He has no discomfort with tube feeding.  He has persistent swallowing difficulties and he is not taking anything by mouth.    Overall he is feeling well.  His labs are unremarkable.  I ordered cycle 2 (week 2) of chemotherapy today.      Follow up plan-I have requested an appointment with me in 1 week for week 3 chemotherapy  at [place].      I have placed all orders as outlined above. I advised the patient to schedule the tests and follow-up appointment as discussed by contacting the  on the way out or calling by phone.    Providers:  Surgeon: Dr. Jovany Solares:  Jennie: Donna.    Chief complaint: GE junction adenocarcinoma.    HPI:  ONCOLOGIC HISTORY-    February 28, 2024: Underwent an EGD for progressive dysphagia and weight loss.  EGD revealed an obstructing mass in the distal esophagus.  Biopsy confirmed adenocarcinoma with signet ring cell features.    March 21, 2024: EUS revealed T2 N1 mass extending from 41 cm to 43 cm and involving less than 1 cm of the gastric cardia.    March 22, 2024: CT scan of chest did not show any metastatic disease.    April 1, 2024 24: PET/CT scan done:   1. Hypermetabolic linear focus  at gastroesophageal junction which is  extending to the gastric cardia/proximal lesser curvature in  correlation with distal esophageal /gastroesophageal junction wall  thickening. These findings are compatible with biopsy-proven  adenocarcinoma.  2. No evidence of hypermetabolic lymphadenopathy.  3. Abnormal focal increase in FDG uptake in the left hepatic lobe in  correlation with hypoattenuating lesion, concerning for metastatic  disease. Further correlation with dedicated CT abdomen is recommended.  4. Large fat containing left inguinal hernia.  Interval history: He has significant dysphagia although he can get by with soup.  He has lost about 60 pounds in the last 6 months or so.  Feels fatigued.  Denies any pain.  Denies fever or chill or any other sign of ongoing infection.    April 3, 2024: CT scan of abdomen and MRI liver ruled out metastatic disease (liver lesions were hemangiomas).    May 6, 2024: Started concurrent radiation and chemotherapy with weekly carboplatin and Taxol.    Interval history:  He is overall doing well.  He is using the J-tube for feeding without any significant difficulty.  Denies any pain.  Denies fever or chill or any other complaints.      Past Medical History: Type 2 diabetes mellitus on oral hypoglycemics, hypertension controlled with medications.  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, unspecified      Comment:  Elevated serum cholesterol  No date: Dysphagia  No date: ED (erectile dysfunction)  No date: Esophageal cancer (Multi)  No date: GERD (gastroesophageal reflux disease)  No date: Hearing aid worn  No date: HL (hearing loss)  No date: Hyperlipidemia  No date: Hypertension  No date: Nephrolithiasis  No date: Other specified diabetes mellitus without complications   (Multi)      Comment:  last A1c= 6.8 on 2/5/2024  No date: Sleep apnea       Comment:  wear CPAP/BiPAP  No date: Vision loss      Comment:  wears glasses   Surgical History:    Past Surgical History:   Procedure Laterality Date    BLADDER SURGERY      COLONOSCOPY      Repeat in one year    EYE SURGERY      LITHOTRIPSY      OTHER SURGICAL HISTORY      Vocal cord surgery    TRANSURETHRAL RESECTION OF PROSTATE      UPPER GASTROINTESTINAL ENDOSCOPY  2024      Family History:    Family History   Problem Relation Name Age of Onset    Stroke Father      Kidney cancer Brother Eliu     Stroke Brother Eliu      Family Oncology History:    Cancer-related family history includes Kidney cancer in his brother.  Social History:    Social History     Tobacco Use    Smoking status: Former     Current packs/day: 0.00     Average packs/day: 2.0 packs/day for 40.0 years (80.0 ttl pk-yrs)     Types: Cigarettes     Start date: 1959     Quit date: 1999     Years since quittin.3    Smokeless tobacco: Never   Vaping Use    Vaping status: Never Used   Substance Use Topics    Alcohol use: Yes     Alcohol/week: 2.0 standard drinks of alcohol     Types: 2 Cans of beer per week     Comment: couple beers a week    Drug use: Not Currently     Comment: gummies-dispensary from [place]        Allergies  Allergies   Allergen Reactions    Omeprazole Itching and Unknown    Shellfish Containing Products Unknown    Sulfa (Sulfonamide Antibiotics) Rash        Medications  Current Outpatient Medications   Medication Instructions    acetaminophen (TYLENOL) 650 mg, j-tube, Every 6 hours PRN    atenolol (Tenormin) 50 mg tablet 1 tablet, j-tube, Daily    dexAMETHasone (DECADRON) 8 mg, oral, Daily, For 3 days per week starting the day after treatment    lidocaine-prilocaine (Emla) 2.5-2.5 % cream Topical, Daily PRN    multivitamin with minerals iron-free (Centrum Silver) 1 tablet, j-tube, Daily    naloxone (NARCAN) 0.2 mg, intravenous    OLANZapine (ZYPREXA) 5 mg, oral, Nightly, For 4 doses per week starting the  evening of treatment    prochlorperazine (COMPAZINE) 10 mg, oral, Every 6 hours PRN    traZODone (DESYREL) 100 mg, j-tube, Nightly          Objective   VS: There were no vitals taken for this visit.  Weight:   There were no vitals filed for this visit.       PHYSICAL EXAMINATION  ECOG performance status-1.  Alert, answers questions appropriately.  Ambulant without any help.  Vitals reviewed.  Eyes- pallor+, no icterus.  Mouth- no thrush or ulceration.  Neck- no mass, thyromegaly.  Chest -bilateral good air entry.  Cardiovascular system- No audible abnormal heart sounds. No murmur.  Abdomen: Soft and nontender.  No mass.  No ascites.  Extremity-no redness in hands.  No edema or swelling.    Labs  Results from last 7 days   Lab Units 05/13/24  0752   WBC AUTO x10*3/uL 6.2   HEMOGLOBIN g/dL 14.4   HEMATOCRIT % 42.4   PLATELETS AUTO x10*3/uL 188   NEUTROS ABS x10*3/uL 4.57   LYMPHS ABS AUTO x10*3/uL 0.74*   MONOS ABS AUTO x10*3/uL 0.48   EOS ABS AUTO x10*3/uL 0.28   NEUTROS PCT AUTO % 73.9   LYMPHS PCT AUTO % 12.0   MONOS PCT AUTO % 7.8   EOS PCT AUTO % 4.5        Results from last 7 days   Lab Units 05/13/24  0752   GLUCOSE mg/dL 184*   SODIUM mmol/L 137   POTASSIUM mmol/L 4.5   CHLORIDE mmol/L 101   CO2 mmol/L 30   BUN mg/dL 27*   CREATININE mg/dL 0.76   EGFR mL/min/1.73m*2 >90   CALCIUM mg/dL 9.1   MAGNESIUM mg/dL 1.89   ALBUMIN g/dL 3.9   PROTEIN TOTAL g/dL 6.5   BILIRUBIN TOTAL mg/dL 0.4   ALK PHOS U/L 64   ALT U/L 11   AST U/L 10                   Image     Catarino Fry MD.

## 2024-05-13 NOTE — PROGRESS NOTES
Patient here for blood draw from [facility]. Accessed, good blood return, sent blood to lab, flushed per orders. Denies questions or needs at this time.  Sent over to see . Ready for infusion.  Tolerated infusion without complication. Reviewed schedule. Sent to [facility] for radiation. Stable and ambulatory at discharge, wife at side.

## 2024-05-13 NOTE — PROGRESS NOTES
Patient here with his wife for follow up with Dr. Fry. Medications and allergies review. Patient states that he is sleeping better with Zyprexa. Patient states that he has phlegm that has been coming up. Patient denies nausea, diarrhea, vomiting.       Per Dr. Fry patient to follow up in 2 weeks.   Patient given refill on Zyprexa and prescription for handicap placard.     Patient verbalized understanding, no further questions at this time.

## 2024-05-13 NOTE — PROGRESS NOTES
"NUTRITION Follow-up NOTE    Nutrition Assessment     Reason for Visit:  Juan M Mcrae is a 78 y.o. male who presents for esophageal cancer (lower esophageal/ GE junction)    Hx: DM, HTN  Allergy: omeprazole  A1c 6.8 (2/5/24) -- pt taken off metformin  Jtube to be placed on 4/8    Nutrition consult per diagnosis; pt s/p J placement on 4/8/24.   CINDI Stovall.     Visited with pt today for follow-up.        Lab Results   Component Value Date/Time    GLUCOSE 184 (H) 05/13/2024 0752     05/13/2024 0752    K 4.5 05/13/2024 0752     05/13/2024 0752    CO2 30 05/13/2024 0752    ANIONGAP 11 05/13/2024 0752    BUN 27 (H) 05/13/2024 0752    CREATININE 0.76 05/13/2024 0752    EGFR >90 05/13/2024 0752    CALCIUM 9.1 05/13/2024 0752    ALBUMIN 3.9 05/13/2024 0752    ALKPHOS 64 05/13/2024 0752    PROT 6.5 05/13/2024 0752    AST 10 05/13/2024 0752    BILITOT 0.4 05/13/2024 0752    ALT 11 05/13/2024 0752     No results found for: \"VITD25\"    Anthropometrics:  Anthropometrics  Height: 167.5 cm (5' 5.95\")  Weight: 76 kg (167 lb 7 oz)  BMI (Calculated): 27.07  IBW/kg (Dietitian Calculated): 64.5 kg  Percent of IBW: 118 %  Weight Change  Weight History / % Weight Change: 7.6% loss in 3 months  Significant Weight Loss: Yes  Interpretation of Weight Loss: >7.5% in 3 months        Wt Readings from Last 10 Encounters:   05/13/24 76 kg (167 lb 7 oz)   05/13/24 76 kg (167 lb 7 oz)   05/08/24 76.7 kg (169 lb)   05/06/24 77.9 kg (171 lb 10.1 oz)   04/25/24 77.1 kg (170 lb)   04/24/24 77.2 kg (170 lb 4.8 oz)   04/17/24 76.2 kg (168 lb)   04/15/24 76.5 kg (168 lb 12.2 oz)   04/15/24 76.5 kg (168 lb 12.2 oz)   04/11/24 79.1 kg (174 lb 6.1 oz)        Food And Nutrient Intake:  Food and Nutrient History  Food and Nutrient History: 87mL/h running for 13hrs; antinausea medication helping with quessy stomach; Bowel mvmt every to every other morning.  Energy Intake: Good > 75 % (additional calories needed to maintain weight in setting of " "increased metabolic demand)  Fluid Intake: adequate between oral and flushes - aiming for 40oz daily.  GI Symptoms: nausea  Oral Problems: dysphagia, odynophagia     Food Intake  Amount of Food: Boost VHC by mouth; and some soup blended; water, flushes 120mL x3; trying to 40oz orally; some tea/ mostly water/ gatorade.                   Enteral Nutrition Intake  Enteral Nutrition Formula/Solution: Isosource 1.5, 5 cartons = 1875kcal, 85g protein, 955mL free water administered via pump at 87mL/h roughly 13hrs  Feeding Tube Flush: 120mL before and after hook up; 40oz orally; 955mL free water from Enteral feeds                                    Nutrition Focused Physical Exam Findings:  Completed 4/1/2024                        Energy Needs  Calculated Energy Needs Using Equations  Height: 167.5 cm (5' 5.95\")  Weight Used for Equation Calculations: 76 kg (167 lb 8.8 oz)  [place] Equation (Overweight or Obese Patients): 1417  Estimated Energy Needs  Total Energy Estimated Needs (kCal): 2280 kCal  Total Estimated Energy Need per Day (kCal/kg): 30 kCal/kg  Method for Estimating Needs: up to 2660kcal/day  Estimated Fluid Needs  Total Fluid Estimated Needs (mL): 2280 mL  Total Fluid Estimated Needs (mL/kg): 30 mL/kg  Method for Estimating Needs: additional fluids based on NIS  Estimated Protein Needs  Total Protein Estimated Needs (g): 91.2 g  Total Protein Estimated Needs (g/kg): 1.2 g/kg  Method for Estimating Needs: up to 114g/day        Nutrition Diagnosis   Malnutrition Diagnosis  Patient has Malnutrition Diagnosis: Yes  Diagnosis Status: Ongoing  Malnutrition Diagnosis: Moderate malnutrition related to chronic disease or condition  As Evidenced by: mild muscle/ fat loss noted and pt with variable intake due to dysphagia requiring altered consistency diet with intake < 75% of needs.  Additional Assessment Information: Continued weight loss noted s/p PEG placement - oral intake has declined, need for increase " in enteral feeds.    Nutrition Diagnosis  Patient has Nutrition Diagnosis: Yes  Diagnosis Status (1): Ongoing  Nutrition Diagnosis 1: Predicted inadequate energy intake  Related to (1): potential for nutrition impact symptoms and altered GI function  As Evidenced by (1): pt with esophageal CA with planned to start treatment, currently with dysphagia and need for altered consistency diet/ enteral feed supplementation to maintain weight.    Nutrition Interventions/Recommendations   Nutrition Prescription  Individualized Nutrition Prescription Provided for : Nutrition for altered consistency diet (purees/ blended) ; enteral nutrition (jtube)    Food and Nutrition Delivery  Food and Nutrition Delivery  Meals & Snacks: Texture-Modified Diet  Goals: Oral diet as able- blended foods  Enteral Nutrition: Modify rate of enteral nutrition, Modify volume of enteral nutrition  Total Nutrition Provided: Pt to increase to Isosource 1.5, 5.5 cartons daily, 1375mL total volume, 2063kcal, 94g protein, 1051mL free water  Goals: 1375mL ran until runs out (~13-14hrs at 100mL/h. continue to work towards goal of 1560mL daily (6.24 cartons)  Medical Food Supplement: Boost Very High Calorie  Goals: Continue consumption orally of 1 Boost VHC daily, additional foods/ fluids as able /tolerated  Other:: Hydration/ Fluids  Goals: Continue with adequate oral hydration; if unable to consume orally, would recommend additional flushes throughout the day of 125mL 6x/day    Nutrition Education  Nutrition Education  Nutrition Education Content: Content related nutrition education  Goals: Enteral nutrition/ modified oral intake    Coordination of Care  Coordination of Nutrition Care by a Nutrition Professional  Collaboration and referral of nutrition care: Collaboration by nutrition professional with other nutrition professionals    Patient Instructions   Recommend to increase enteral feeds to 5.5 cartons daily via pump (5 full cartons plus 125mL from  the 6th carton)  Change dosage on pump to 1375mL total.   Work on increasing rate to 100mL/h; start by increasing to 90mL/h, then 95mL/h, then 100mL/h    Nutrition Monitoring and Evaluation   Food/Nutrient Related History Monitoring  Monitoring and Evaluation Plan: Enteral and parenteral nutrition intake, Fluid intake  Fluid Intake: Estimated fluid intake  Criteria: maintain hydration; 66+oz daily  Enteral and Parenteral Nutrition Intake: Enteral nutrition intake, Enteral nutrition formula/solution  Criteria: Pt to be able to tolerate enteral feed at goal rate with time off pump for ADLs. Pt to tolerate standard formula.  Body Composition/Growth/Weight History  Monitoring and Evaluation Plan: Weight  Weight: Measured weight  Criteria: Maintain weight          Time Spent  Prep time on day of patient encounter: 0 minutes  Time spent directly with patient, family or caregiver: 20 minutes  Additional Time Spent on Patient Care Activities: 5 minutes  Documentation Time: 20 minutes  Other Time Spent: 0 minutes  Total: 45 minutes

## 2024-05-14 ENCOUNTER — HOSPITAL ENCOUNTER (OUTPATIENT)
Dept: RADIATION ONCOLOGY | Facility: HOSPITAL | Age: 79
Setting detail: RADIATION/ONCOLOGY SERIES
Discharge: HOME | End: 2024-05-14
Payer: MEDICARE

## 2024-05-14 ENCOUNTER — APPOINTMENT (OUTPATIENT)
Dept: HEMATOLOGY/ONCOLOGY | Facility: CLINIC | Age: 79
End: 2024-05-14
Payer: MEDICARE

## 2024-05-14 DIAGNOSIS — C16.0 MALIGNANT NEOPLASM OF CARDIA (MULTI): ICD-10-CM

## 2024-05-14 DIAGNOSIS — Z51.0 ENCOUNTER FOR ANTINEOPLASTIC RADIATION THERAPY: ICD-10-CM

## 2024-05-14 LAB
RAD ONC MSQ ACTUAL FRACTIONS DELIVERED: 7
RAD ONC MSQ ACTUAL SESSION BIOLOGICAL DOSE: 180 CCGE
RAD ONC MSQ ACTUAL SESSION DELIVERED DOSE: 164 CGRAY
RAD ONC MSQ ACTUAL TOTAL BIOLOGICAL DOSE: 1263 CCGE
RAD ONC MSQ ACTUAL TOTAL DOSE: 1148 CGRAY
RAD ONC MSQ ELAPSED DAYS: 8
RAD ONC MSQ LAST DATE: NORMAL
RAD ONC MSQ PRESCRIBED BIOLOGICAL FRACTIONAL DOSE: 180 CCGE
RAD ONC MSQ PRESCRIBED BIOLOGICAL TOTAL DOSE: 5040 CCGE
RAD ONC MSQ PRESCRIBED FRACTIONAL DOSE: 164 CGRAY
RAD ONC MSQ PRESCRIBED NUMBER OF FRACTIONS: 28
RAD ONC MSQ PRESCRIBED TECHNIQUE: NORMAL
RAD ONC MSQ PRESCRIBED TOTAL DOSE: 4582 CGRAY
RAD ONC MSQ PRESCRIPTION PATTERN COMMENT: NORMAL
RAD ONC MSQ START DATE: NORMAL
RAD ONC MSQ TREATMENT COURSE NUMBER: 1
RAD ONC MSQ TREATMENT SITE: NORMAL

## 2024-05-14 PROCEDURE — 1090000002 HH PPS REVENUE DEBIT

## 2024-05-14 PROCEDURE — 77523 PROTON TRMT INTERMEDIATE: CPT | Performed by: RADIOLOGY

## 2024-05-14 PROCEDURE — G6002 STEREOSCOPIC X-RAY GUIDANCE: HCPCS | Performed by: RADIOLOGY

## 2024-05-14 PROCEDURE — 1090000001 HH PPS REVENUE CREDIT

## 2024-05-14 PROCEDURE — 77387 GUIDANCE FOR RADJ TX DLVR: CPT | Performed by: RADIOLOGY

## 2024-05-15 ENCOUNTER — HOSPITAL ENCOUNTER (OUTPATIENT)
Dept: RADIATION ONCOLOGY | Facility: HOSPITAL | Age: 79
Setting detail: RADIATION/ONCOLOGY SERIES
Discharge: HOME | End: 2024-05-15
Payer: MEDICARE

## 2024-05-15 DIAGNOSIS — Z51.0 ENCOUNTER FOR ANTINEOPLASTIC RADIATION THERAPY: ICD-10-CM

## 2024-05-15 DIAGNOSIS — C16.0 MALIGNANT NEOPLASM OF CARDIA (MULTI): ICD-10-CM

## 2024-05-15 LAB
RAD ONC MSQ ACTUAL FRACTIONS DELIVERED: 8
RAD ONC MSQ ACTUAL SESSION BIOLOGICAL DOSE: 180 CCGE
RAD ONC MSQ ACTUAL SESSION DELIVERED DOSE: 164 CGRAY
RAD ONC MSQ ACTUAL TOTAL BIOLOGICAL DOSE: 1443 CCGE
RAD ONC MSQ ACTUAL TOTAL DOSE: 1312 CGRAY
RAD ONC MSQ ELAPSED DAYS: 9
RAD ONC MSQ LAST DATE: NORMAL
RAD ONC MSQ PRESCRIBED BIOLOGICAL FRACTIONAL DOSE: 180 CCGE
RAD ONC MSQ PRESCRIBED BIOLOGICAL TOTAL DOSE: 5040 CCGE
RAD ONC MSQ PRESCRIBED FRACTIONAL DOSE: 164 CGRAY
RAD ONC MSQ PRESCRIBED NUMBER OF FRACTIONS: 28
RAD ONC MSQ PRESCRIBED TECHNIQUE: NORMAL
RAD ONC MSQ PRESCRIBED TOTAL DOSE: 4582 CGRAY
RAD ONC MSQ PRESCRIPTION PATTERN COMMENT: NORMAL
RAD ONC MSQ START DATE: NORMAL
RAD ONC MSQ TREATMENT COURSE NUMBER: 1
RAD ONC MSQ TREATMENT SITE: NORMAL

## 2024-05-15 PROCEDURE — 1090000002 HH PPS REVENUE DEBIT

## 2024-05-15 PROCEDURE — 77523 PROTON TRMT INTERMEDIATE: CPT | Performed by: RADIOLOGY

## 2024-05-15 PROCEDURE — 1090000001 HH PPS REVENUE CREDIT

## 2024-05-15 PROCEDURE — 77387 GUIDANCE FOR RADJ TX DLVR: CPT | Performed by: STUDENT IN AN ORGANIZED HEALTH CARE EDUCATION/TRAINING PROGRAM

## 2024-05-15 PROCEDURE — G6002 STEREOSCOPIC X-RAY GUIDANCE: HCPCS | Performed by: STUDENT IN AN ORGANIZED HEALTH CARE EDUCATION/TRAINING PROGRAM

## 2024-05-16 ENCOUNTER — HOSPITAL ENCOUNTER (OUTPATIENT)
Dept: RADIATION ONCOLOGY | Facility: HOSPITAL | Age: 79
Setting detail: RADIATION/ONCOLOGY SERIES
Discharge: HOME | End: 2024-05-16
Payer: MEDICARE

## 2024-05-16 ENCOUNTER — HOME CARE VISIT (OUTPATIENT)
Dept: HOME HEALTH SERVICES | Facility: HOME HEALTH | Age: 79
End: 2024-05-16
Payer: MEDICARE

## 2024-05-16 VITALS
DIASTOLIC BLOOD PRESSURE: 62 MMHG | RESPIRATION RATE: 18 BRPM | SYSTOLIC BLOOD PRESSURE: 118 MMHG | OXYGEN SATURATION: 98 % | HEART RATE: 70 BPM | TEMPERATURE: 98 F

## 2024-05-16 DIAGNOSIS — C16.0 MALIGNANT NEOPLASM OF CARDIA (MULTI): ICD-10-CM

## 2024-05-16 DIAGNOSIS — Z51.0 ENCOUNTER FOR ANTINEOPLASTIC RADIATION THERAPY: ICD-10-CM

## 2024-05-16 LAB
RAD ONC MSQ ACTUAL FRACTIONS DELIVERED: 9
RAD ONC MSQ ACTUAL SESSION BIOLOGICAL DOSE: 180 CCGE
RAD ONC MSQ ACTUAL SESSION DELIVERED DOSE: 164 CGRAY
RAD ONC MSQ ACTUAL TOTAL BIOLOGICAL DOSE: 1624 CCGE
RAD ONC MSQ ACTUAL TOTAL DOSE: 1476 CGRAY
RAD ONC MSQ ELAPSED DAYS: 10
RAD ONC MSQ LAST DATE: NORMAL
RAD ONC MSQ PRESCRIBED BIOLOGICAL FRACTIONAL DOSE: 180 CCGE
RAD ONC MSQ PRESCRIBED BIOLOGICAL TOTAL DOSE: 5040 CCGE
RAD ONC MSQ PRESCRIBED FRACTIONAL DOSE: 164 CGRAY
RAD ONC MSQ PRESCRIBED NUMBER OF FRACTIONS: 28
RAD ONC MSQ PRESCRIBED TECHNIQUE: NORMAL
RAD ONC MSQ PRESCRIBED TOTAL DOSE: 4582 CGRAY
RAD ONC MSQ PRESCRIPTION PATTERN COMMENT: NORMAL
RAD ONC MSQ START DATE: NORMAL
RAD ONC MSQ TREATMENT COURSE NUMBER: 1
RAD ONC MSQ TREATMENT SITE: NORMAL

## 2024-05-16 PROCEDURE — 77336 RADIATION PHYSICS CONSULT: CPT | Performed by: RADIOLOGY

## 2024-05-16 PROCEDURE — 77387 GUIDANCE FOR RADJ TX DLVR: CPT | Performed by: RADIOLOGY

## 2024-05-16 PROCEDURE — 77523 PROTON TRMT INTERMEDIATE: CPT | Performed by: RADIOLOGY

## 2024-05-16 PROCEDURE — 1090000003 HH PPS REVENUE ADJ

## 2024-05-16 PROCEDURE — 1090000001 HH PPS REVENUE CREDIT

## 2024-05-16 PROCEDURE — G0299 HHS/HOSPICE OF RN EA 15 MIN: HCPCS | Mod: HHH

## 2024-05-16 PROCEDURE — 1090000002 HH PPS REVENUE DEBIT

## 2024-05-16 PROCEDURE — 0023 HH SOC

## 2024-05-16 PROCEDURE — G6002 STEREOSCOPIC X-RAY GUIDANCE: HCPCS | Performed by: RADIOLOGY

## 2024-05-16 ASSESSMENT — ACTIVITIES OF DAILY LIVING (ADL)
HOME_HEALTH_OASIS: 00
OASIS_M1830: 00
AMBULATION ASSISTANCE: 1
AMBULATION ASSISTANCE: INDEPENDENT

## 2024-05-16 ASSESSMENT — ENCOUNTER SYMPTOMS: DENIES PAIN: 1

## 2024-05-17 ENCOUNTER — EDUCATION (OUTPATIENT)
Dept: HEMATOLOGY/ONCOLOGY | Facility: HOSPITAL | Age: 79
End: 2024-05-17
Payer: MEDICARE

## 2024-05-17 ENCOUNTER — RADIATION ONCOLOGY OTV (OUTPATIENT)
Dept: RADIATION ONCOLOGY | Facility: HOSPITAL | Age: 79
End: 2024-05-17
Payer: MEDICARE

## 2024-05-17 ENCOUNTER — NUTRITION (OUTPATIENT)
Dept: HEMATOLOGY/ONCOLOGY | Facility: HOSPITAL | Age: 79
End: 2024-05-17
Payer: MEDICARE

## 2024-05-17 ENCOUNTER — HOSPITAL ENCOUNTER (OUTPATIENT)
Dept: RADIATION ONCOLOGY | Facility: HOSPITAL | Age: 79
Setting detail: RADIATION/ONCOLOGY SERIES
Discharge: HOME | End: 2024-05-17
Payer: MEDICARE

## 2024-05-17 VITALS
BODY MASS INDEX: 26.19 KG/M2 | OXYGEN SATURATION: 98 % | HEART RATE: 74 BPM | WEIGHT: 162 LBS | RESPIRATION RATE: 20 BRPM | SYSTOLIC BLOOD PRESSURE: 155 MMHG | TEMPERATURE: 96.6 F | DIASTOLIC BLOOD PRESSURE: 88 MMHG

## 2024-05-17 DIAGNOSIS — Z51.0 ENCOUNTER FOR ANTINEOPLASTIC RADIATION THERAPY: ICD-10-CM

## 2024-05-17 DIAGNOSIS — C16.0 MALIGNANT NEOPLASM OF CARDIA (MULTI): ICD-10-CM

## 2024-05-17 LAB
RAD ONC MSQ ACTUAL FRACTIONS DELIVERED: 10
RAD ONC MSQ ACTUAL SESSION BIOLOGICAL DOSE: 180 CCGE
RAD ONC MSQ ACTUAL SESSION DELIVERED DOSE: 164 CGRAY
RAD ONC MSQ ACTUAL TOTAL BIOLOGICAL DOSE: 1804 CCGE
RAD ONC MSQ ACTUAL TOTAL DOSE: 1640 CGRAY
RAD ONC MSQ ELAPSED DAYS: 11
RAD ONC MSQ LAST DATE: NORMAL
RAD ONC MSQ PRESCRIBED BIOLOGICAL FRACTIONAL DOSE: 180 CCGE
RAD ONC MSQ PRESCRIBED BIOLOGICAL TOTAL DOSE: 5040 CCGE
RAD ONC MSQ PRESCRIBED FRACTIONAL DOSE: 164 CGRAY
RAD ONC MSQ PRESCRIBED NUMBER OF FRACTIONS: 28
RAD ONC MSQ PRESCRIBED TECHNIQUE: NORMAL
RAD ONC MSQ PRESCRIBED TOTAL DOSE: 4582 CGRAY
RAD ONC MSQ PRESCRIPTION PATTERN COMMENT: NORMAL
RAD ONC MSQ START DATE: NORMAL
RAD ONC MSQ TREATMENT COURSE NUMBER: 1
RAD ONC MSQ TREATMENT SITE: NORMAL

## 2024-05-17 PROCEDURE — 77427 RADIATION TX MANAGEMENT X5: CPT | Performed by: RADIOLOGY

## 2024-05-17 PROCEDURE — 77523 PROTON TRMT INTERMEDIATE: CPT | Performed by: RADIOLOGY

## 2024-05-17 PROCEDURE — G6002 STEREOSCOPIC X-RAY GUIDANCE: HCPCS | Performed by: RADIOLOGY

## 2024-05-17 PROCEDURE — 77387 GUIDANCE FOR RADJ TX DLVR: CPT | Performed by: RADIOLOGY

## 2024-05-17 ASSESSMENT — PAIN SCALES - GENERAL: PAINLEVEL: 2

## 2024-05-17 NOTE — PROGRESS NOTES
RADIATION ONCOLOGY ON-TREATMENT VISIT NOTE  Patient Name:  Juan M Mcrae  MRN:  [id number]  :  1945    Radiation Oncologist: Irish Thompson MD  Referring Provider: No ref. 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 Does not yet have MMW. Met with dietary already to discuss additional TF. Mostly liquid/softs intake by po- has new cinnamon flavored protein drink for variety too. No cough, no fevers, no issues with secretions.    OBJECTIVE:   Vital Signs:  /88   Pulse 74   Temp 35.9 °C (96.6 °F)   Resp 20   Wt 73.5 kg (162 lb)   SpO2 98%   BMI 26.19 kg/m²    Pain Scale: 2 /10.    Well appearing, NAD, wt down ~6 lbs this week but he attributes that partly to clothing change. VSS.     Toxicity Assessment          2024    11:23 2024    09:51   Toxicity Assessment   Treatment Site Thoracic Thoracic   Anorexia Grade 0 Grade 1   Anxiety Grade 0 Grade 0   Dehydration Grade 0 Grade 0   Depression Grade 0 Grade 0   Dermatitis Radiation Grade 0 Grade 0   Diarrhea Grade 0 Grade 0    Fatigue Grade 0 Grade 1   Nausea Grade 0 Grade 0   Pain Grade 0 Grade 1   Vomiting Grade 0 Grade 0   Constipation Grade 0 Grade 0   Dyspepsia Grade 0 Grade 0   Dysphagia  Grade 0   Esophagitis Grade 1 Grade 1   Aspiration Grade 0    Esophageal Obstruction  Grade 0   Esophageal Pain  Grade 0   Bronchial Obstruction Grade 0    Cough Grade 0    Dyspnea Grade 0    Hiccups Grade 1    Hypoxia Grade 0         ASSESSMENT/PLAN:  The patient is tolerating radiation therapy as anticipated.  Continue per current treatment plan.    Encouraged aggressive PO intake; MMW paper Rx provided directly to patient.  Irish Thompson MD  5/17/2024

## 2024-05-17 NOTE — PROGRESS NOTES
"NUTRITION Follow-up NOTE    Nutrition Assessment     Reason for Visit:  Juan M Mcrae is a 78 y.o. male who presents for Follow-up    Diagnosis: GE junction adenocarcinoma    Current Treatment: NRG-    -Weekly Carbo/Taxol    Subjective: Pt seen with daughter today. We reviewed the plan to increase nocturnal feeds through J-tube as outlined by RD on 5/13. Pt reports tolerating 90ml/hr well. Will increase to full 6 cartons this weekend. He does endorse some dysphagia but is managing sips and an entire Boost VHC per day.        5/6/2024     8:01 AM 5/8/2024    11:19 AM 5/8/2024     3:54 PM 5/13/2024     7:45 AM 5/13/2024     9:00 AM 5/16/2024     2:42 PM 5/17/2024     9:50 AM   Vitals   Systolic 151 173 122 123  118 155   Diastolic 82 76 82 71  62 88   Heart Rate 60 53 60 62  70 74   Temp 35.8 °C (96.4 °F) 35.9 °C (96.6 °F) 36.8 °C (98.2 °F) 36.2 °C (97.2 °F)  36.7 °C (98 °F) 35.9 °C (96.6 °F)   Resp 14 20 18 18  18 20   Height (in) 1.675 m (5' 5.95\")    1.675 m (5' 5.95\")     Weight (lb) 171.63 169  167.44 167.44  162   BMI 27.75 kg/m2 27.32 kg/m2  27.07 kg/m2 27.07 kg/m2  26.19 kg/m2   BSA (m2) 1.9 m2 1.89 m2  1.88 m2 1.88 m2  1.85 m2   Visit Report Report   Report Report         Wt Readings from Last 10 Encounters:   05/17/24 73.5 kg (162 lb)   05/13/24 76 kg (167 lb 7 oz)   05/13/24 76 kg (167 lb 7 oz)   05/08/24 76.7 kg (169 lb)   05/06/24 77.9 kg (171 lb 10.1 oz)   04/25/24 77.1 kg (170 lb)   04/24/24 77.2 kg (170 lb 4.8 oz)   04/17/24 76.2 kg (168 lb)   04/15/24 76.5 kg (168 lb 12.2 oz)   04/15/24 76.5 kg (168 lb 12.2 oz)     Weight Change:  Weight loss of (7.6%) x 3 months  Significant Weight Change: Yes    Lab Results   Component Value Date/Time    GLUCOSE 184 (H) 05/13/2024 0752     05/13/2024 0752    K 4.5 05/13/2024 0752     05/13/2024 0752    CO2 30 05/13/2024 0752    ANIONGAP 11 05/13/2024 0752    BUN 27 (H) 05/13/2024 0752    CREATININE 0.76 05/13/2024 0752    EGFR >90 05/13/2024 0752 " "   CALCIUM 9.1 05/13/2024 0752    ALBUMIN 3.9 05/13/2024 0752    ALKPHOS 64 05/13/2024 0752    PROT 6.5 05/13/2024 0752    AST 10 05/13/2024 0752    BILITOT 0.4 05/13/2024 0752    ALT 11 05/13/2024 0752     No results found for: \"VITD25\"     Food And Nutrient Intake:  Current Intake: >75%  of estimated energy needs  Via TF + Oral  GI Symptoms: Dysphagia and Odynophagia   Length of symptoms: 1+ month  Allergies: None  Intolerance: None  Appetite: Normal    Nutrition Focused Physical Exam Findings:  Deferred due to follow up - prior assessments still relevant     Malnutrition Present: Moderate Malnutrition   -Based on Weight loss (>7.5% x 3 months) and depletion of fat/muscle stores    ESTIMATED ENERGY NEEDS  Dosing weight: 76 kg  Calories per day: 2280+  determined by 30+ kcal/kg  Protein (g) per day:  determined by 1.2-1.5 g/kg  Estimated fluid needs: 2280 determined by 1 kcal/mL    NUTRITION DIAGNOSIS  Malnutrition Diagnosis  Patient has Malnutrition Diagnosis: Yes  Diagnosis Status: Ongoing  Malnutrition Diagnosis: Moderate malnutrition related to chronic disease or condition  As Evidenced by: mild muscle/ fat loss noted and pt with variable intake due to dysphagia requiring altered consistency diet with intake < 75% of needs.  Additional Assessment Information: Continued weight loss noted s/p PEG placement - oral intake has declined, need for increase in enteral feeds.    NUTRITION INTERVENTION  Enteral Access: J-tube  Enteral Product: Isosource 1.  DME: [employer]  Regimen: Nocturnal  100 mL x 15 hrs (1500 mL total volume or 6 cartons overnight via pump.  Flush pre/post - pt also gives ~400 mL throughout the day via tube    Enteral Feeding Regimen will Provide 2250 Calories, 102 Gms Protein, 1146 ml Total Water,    Pt also consuming Boost VHC orally - 530 kcal, 22g protein + ~40 oz of water    Total calories: 2780 kcal,  124 g protein    Diet Education Materials: none    MONITOR AND EVALUATION  Monitor and " Evaluation: Tolerance to tube feed, Weight trend, and Labs CMP, Mg, Phos    Need for follow-up: Will see in Protons next week    Time spent with patient: 30 minutes

## 2024-05-17 NOTE — RESEARCH NOTES
Research Note Treatment Day    Juan M Mcrae is here today for treatment on NRG-. He is on Week 2 of treatment. Procedures completed per protocol. AE's and con-meds reviewed with patient. Patient is aware of treatment plan. Prescription for BMX written via Dr. Thompson for complaints of continued issues swallowing.  Endorses fatigue that has not worsened, no other issues or complaints.  Will follow accordingly.    [x]   Received treatment as planned   OR  []    Treatment delayed; patient calendar updated as required   Treatment delayed because:    []   AE    []   Physician Discretion    []   Clinical Deterioration or Progression     []   Other    Education Documentation  Chemotherapy : What is chemotherapy, taught by Carine Adan RN at 5/17/2024 10:00 AM.  Learner: Family, Patient  Readiness: Acceptance  Method: Explanation  Response: Verbalizes Understanding, Demonstrated Understanding    Chemotherapy : Review, taught by Carine Adan RN at 5/17/2024 10:00 AM.  Learner: Family, Patient  Readiness: Acceptance  Method: Explanation  Response: Verbalizes Understanding, Demonstrated Understanding    Chemotherapy : Possible Side Effects, taught by Carine Adan RN at 5/17/2024 10:00 AM.  Learner: Family, Patient  Readiness: Acceptance  Method: Explanation  Response: Verbalizes Understanding, Demonstrated Understanding    Chemotherapy : Life During Treatment, taught by Carine Adan RN at 5/17/2024 10:00 AM.  Learner: Family, Patient  Readiness: Acceptance  Method: Explanation  Response: Verbalizes Understanding, Demonstrated Understanding    Chemotherapy : Getting Chemotherapy, taught by Carine Adan RN at 5/17/2024 10:00 AM.  Learner: Family, Patient  Readiness: Acceptance  Method: Explanation  Response: Verbalizes Understanding, Demonstrated Understanding    Chemotherapy : Common concerns, taught by Carine Adan RN at 5/17/2024 10:00 AM.  Learner: Family,  Patient  Readiness: Acceptance  Method: Explanation  Response: Verbalizes Understanding, Demonstrated Understanding    Pain Management, taught by Carine Adan RN at 5/17/2024 10:00 AM.  Learner: Family, Patient  Readiness: Acceptance  Method: Explanation  Response: Verbalizes Understanding, Demonstrated Understanding    Isolation Precautions, taught by Carine Adan RN at 5/17/2024 10:00 AM.  Learner: Family, Patient  Readiness: Acceptance  Method: Explanation  Response: Verbalizes Understanding, Demonstrated Understanding    Preventing Infections, taught by Carine Adan RN at 5/17/2024 10:00 AM.  Learner: Family, Patient  Readiness: Acceptance  Method: Explanation  Response: Verbalizes Understanding, Demonstrated Understanding    Fall Precautions, taught by Carine Adan RN at 5/17/2024 10:00 AM.  Learner: Family, Patient  Readiness: Acceptance  Method: Explanation  Response: Verbalizes Understanding, Demonstrated Understanding    Returning to Work/School/Activities, taught by Carine Adan RN at 5/17/2024 10:00 AM.  Learner: Family, Patient  Readiness: Acceptance  Method: Explanation  Response: Verbalizes Understanding, Demonstrated Understanding    Stress Management, taught by Carine Adan RN at 5/17/2024 10:00 AM.  Learner: Family, Patient  Readiness: Acceptance  Method: Explanation  Response: Verbalizes Understanding, Demonstrated Understanding    Changing Role with Self and Family, taught by Carine Adan RN at 5/17/2024 10:00 AM.  Learner: Family, Patient  Readiness: Acceptance  Method: Explanation  Response: Verbalizes Understanding, Demonstrated Understanding    Leisure Education, taught by Carine Adan RN at 5/17/2024 10:00 AM.  Learner: Family, Patient  Readiness: Acceptance  Method: Explanation  Response: Verbalizes Understanding, Demonstrated Understanding    Health Systems & Community Resources, taught by Carine Adan RN at  5/17/2024 10:00 AM.  Learner: Family, Patient  Readiness: Acceptance  Method: Explanation  Response: Verbalizes Understanding, Demonstrated Understanding    Advanced Medical Directives, taught by Carine Adan RN at 5/17/2024 10:00 AM.  Learner: Family, Patient  Readiness: Acceptance  Method: Explanation  Response: Verbalizes Understanding, Demonstrated Understanding    Escort, Parking, Transportation Home, taught by Carine Adan RN at 5/17/2024 10:00 AM.  Learner: Family, Patient  Readiness: Acceptance  Method: Explanation  Response: Verbalizes Understanding, Demonstrated Understanding    Referrals to Support Services, taught by Carine Adan RN at 5/17/2024 10:00 AM.  Learner: Family, Patient  Readiness: Acceptance  Method: Explanation  Response: Verbalizes Understanding, Demonstrated Understanding    Support Systems, taught by Carine Adan RN at 5/17/2024 10:00 AM.  Learner: Family, Patient  Readiness: Acceptance  Method: Explanation  Response: Verbalizes Understanding, Demonstrated Understanding    Financial Assistance Information, taught by Carine Adan RN at 5/17/2024 10:00 AM.  Learner: Family, Patient  Readiness: Acceptance  Method: Explanation  Response: Verbalizes Understanding, Demonstrated Understanding    Financial Benefits Summary, taught by Carine Adan RN at 5/17/2024 10:00 AM.  Learner: Family, Patient  Readiness: Acceptance  Method: Explanation  Response: Verbalizes Understanding, Demonstrated Understanding    Healthy Lifestyle, taught by Carine Adan RN at 5/17/2024 10:00 AM.  Learner: Family, Patient  Readiness: Acceptance  Method: Explanation  Response: Verbalizes Understanding, Demonstrated Understanding    Monitoring Weight, taught by Carine Adan RN at 5/17/2024 10:00 AM.  Learner: Family, Patient  Readiness: Acceptance  Method: Explanation  Response: Verbalizes Understanding, Demonstrated Understanding    Tips for Daily  Living, taught by Carine Adan RN at 5/17/2024 10:00 AM.  Learner: Family, Patient  Readiness: Acceptance  Method: Explanation  Response: Verbalizes Understanding, Demonstrated Understanding    Nutrition/Diet, taught by Carine Adan RN at 5/17/2024 10:00 AM.  Learner: Family, Patient  Readiness: Acceptance  Method: Explanation  Response: Verbalizes Understanding, Demonstrated Understanding    Food-Drug Interactions, taught by Carine Adan RN at 5/17/2024 10:00 AM.  Learner: Family, Patient  Readiness: Acceptance  Method: Explanation  Response: Verbalizes Understanding, Demonstrated Understanding    Nutrition, taught by Carine Adan RN at 5/17/2024 10:00 AM.  Learner: Family, Patient  Readiness: Acceptance  Method: Explanation  Response: Verbalizes Understanding, Demonstrated Understanding    Pain Medication Actions & Side Effects, taught by Carine Adan RN at 5/17/2024 10:00 AM.  Learner: Family, Patient  Readiness: Acceptance  Method: Explanation  Response: Verbalizes Understanding, Demonstrated Understanding    Patient Controlled Analgesia, taught by Carine Adan RN at 5/17/2024 10:00 AM.  Learner: Family, Patient  Readiness: Acceptance  Method: Explanation  Response: Verbalizes Understanding, Demonstrated Understanding    Discuss the Use of Pain Control Measures Before Pain Becomes Severe, taught by Carine Adan RN at 5/17/2024 10:00 AM.  Learner: Family, Patient  Readiness: Acceptance  Method: Explanation  Response: Verbalizes Understanding, Demonstrated Understanding    Pain Management, taught by Carine Adan RN at 5/17/2024 10:00 AM.  Learner: Family, Patient  Readiness: Acceptance  Method: Explanation  Response: Verbalizes Understanding, Demonstrated Understanding    Pain Rating Scale, taught by Carine Adan RN at 5/17/2024 10:00 AM.  Learner: Family, Patient  Readiness: Acceptance  Method: Explanation  Response: Verbalizes  Understanding, Demonstrated Understanding    Shortness of Breath, taught by Carine Adan RN at 5/17/2024 10:00 AM.  Learner: Family, Patient  Readiness: Acceptance  Method: Explanation  Response: Verbalizes Understanding, Demonstrated Understanding    Changes in Appetite, taught by Craine Adan RN at 5/17/2024 10:00 AM.  Learner: Family, Patient  Readiness: Acceptance  Method: Explanation  Response: Verbalizes Understanding, Demonstrated Understanding    Fertility Issues, taught by Carine Adan RN at 5/17/2024 10:00 AM.  Learner: Family, Patient  Readiness: Acceptance  Method: Explanation  Response: Verbalizes Understanding, Demonstrated Understanding    Memory Problems, taught by Carine Adan RN at 5/17/2024 10:00 AM.  Learner: Family, Patient  Readiness: Acceptance  Method: Explanation  Response: Verbalizes Understanding, Demonstrated Understanding    Skin and Nail Changes, taught by Carine Adan RN at 5/17/2024 10:00 AM.  Learner: Family, Patient  Readiness: Acceptance  Method: Explanation  Response: Verbalizes Understanding, Demonstrated Understanding    Changes in Urination, taught by Carine Adan RN at 5/17/2024 10:00 AM.  Learner: Family, Patient  Readiness: Acceptance  Method: Explanation  Response: Verbalizes Understanding, Demonstrated Understanding    Bleeding Precautions, taught by Carine Adan RN at 5/17/2024 10:00 AM.  Learner: Family, Patient  Readiness: Acceptance  Method: Explanation  Response: Verbalizes Understanding, Demonstrated Understanding    Edema Management, taught by Carine Adan RN at 5/17/2024 10:00 AM.  Learner: Family, Patient  Readiness: Acceptance  Method: Explanation  Response: Verbalizes Understanding, Demonstrated Understanding    Care of Neuropathy, taught by Carine Adan RN at 5/17/2024 10:00 AM.  Learner: Family, Patient  Readiness: Acceptance  Method: Explanation  Response: Verbalizes  Understanding, Demonstrated Understanding    Infection Control, taught by Carine Adan RN at 5/17/2024 10:00 AM.  Learner: Family, Patient  Readiness: Acceptance  Method: Explanation  Response: Verbalizes Understanding, Demonstrated Understanding    Alopecia, taught by Carine Adan RN at 5/17/2024 10:00 AM.  Learner: Family, Patient  Readiness: Acceptance  Method: Explanation  Response: Verbalizes Understanding, Demonstrated Understanding    Diarrhea, taught by Carine Adan RN at 5/17/2024 10:00 AM.  Learner: Family, Patient  Readiness: Acceptance  Method: Explanation  Response: Verbalizes Understanding, Demonstrated Understanding    Constipation, taught by Carine Adan RN at 5/17/2024 10:00 AM.  Learner: Family, Patient  Readiness: Acceptance  Method: Explanation  Response: Verbalizes Understanding, Demonstrated Understanding    Mouth Sores, taught by Carine Adan RN at 5/17/2024 10:00 AM.  Learner: Family, Patient  Readiness: Acceptance  Method: Explanation  Response: Verbalizes Understanding, Demonstrated Understanding    Nausea Management, taught by Carine Adan RN at 5/17/2024 10:00 AM.  Learner: Family, Patient  Readiness: Acceptance  Method: Explanation  Response: Verbalizes Understanding, Demonstrated Understanding    Fatigue, taught by Carine Adan RN at 5/17/2024 10:00 AM.  Learner: Family, Patient  Readiness: Acceptance  Method: Explanation  Response: Verbalizes Understanding, Demonstrated Understanding    Radiation Therapy, taught by Carine Adan RN at 5/17/2024 10:00 AM.  Learner: Family, Patient  Readiness: Acceptance  Method: Explanation  Response: Verbalizes Understanding, Demonstrated Understanding    Post-Chemotherapy Education, taught by Carine Adan RN at 5/17/2024 10:00 AM.  Learner: Family, Patient  Readiness: Acceptance  Method: Explanation  Response: Verbalizes Understanding, Demonstrated  Understanding    Chemotherapy Safety at Home, taught by Carine Adan RN at 5/17/2024 10:00 AM.  Learner: Family, Patient  Readiness: Acceptance  Method: Explanation  Response: Verbalizes Understanding, Demonstrated Understanding    Treatment Plan and Schedule, taught by Carine Adan RN at 5/17/2024 10:00 AM.  Learner: Family, Patient  Readiness: Acceptance  Method: Explanation  Response: Verbalizes Understanding, Demonstrated Understanding    General Medication Information, taught by Carine Adan RN at 5/17/2024 10:00 AM.  Learner: Family, Patient  Readiness: Acceptance  Method: Explanation  Response: Verbalizes Understanding, Demonstrated Understanding    Supportive Medications, taught by Carine Adan RN at 5/17/2024 10:00 AM.  Learner: Family, Patient  Readiness: Acceptance  Method: Explanation  Response: Verbalizes Understanding, Demonstrated Understanding    Diagnostic Studies, taught by Carine Adan RN at 5/17/2024 10:00 AM.  Learner: Family, Patient  Readiness: Acceptance  Method: Explanation  Response: Verbalizes Understanding, Demonstrated Understanding    Tumor Markers, taught by Carine Adan RN at 5/17/2024 10:00 AM.  Learner: Family, Patient  Readiness: Acceptance  Method: Explanation  Response: Verbalizes Understanding, Demonstrated Understanding    Comprehensive Metabolic Panel (CMP), taught by Carine Adan RN at 5/17/2024 10:00 AM.  Learner: Family, Patient  Readiness: Acceptance  Method: Explanation  Response: Verbalizes Understanding, Demonstrated Understanding    Complete Blood Count with Differential (CBC w/ Diff), taught by Carine Adan RN at 5/17/2024 10:00 AM.  Learner: Family, Patient  Readiness: Acceptance  Method: Explanation  Response: Verbalizes Understanding, Demonstrated Understanding    Education Comments  No comments found.

## 2024-05-20 ENCOUNTER — APPOINTMENT (OUTPATIENT)
Dept: HEMATOLOGY/ONCOLOGY | Facility: CLINIC | Age: 79
End: 2024-05-20
Payer: MEDICARE

## 2024-05-20 ENCOUNTER — NUTRITION (OUTPATIENT)
Dept: HEMATOLOGY/ONCOLOGY | Facility: HOSPITAL | Age: 79
End: 2024-05-20

## 2024-05-20 ENCOUNTER — INFUSION (OUTPATIENT)
Dept: HEMATOLOGY/ONCOLOGY | Facility: CLINIC | Age: 79
End: 2024-05-20
Payer: MEDICARE

## 2024-05-20 ENCOUNTER — HOSPITAL ENCOUNTER (OUTPATIENT)
Dept: RADIATION ONCOLOGY | Facility: HOSPITAL | Age: 79
Setting detail: RADIATION/ONCOLOGY SERIES
Discharge: HOME | End: 2024-05-20
Payer: MEDICARE

## 2024-05-20 ENCOUNTER — OFFICE VISIT (OUTPATIENT)
Dept: HEMATOLOGY/ONCOLOGY | Facility: CLINIC | Age: 79
End: 2024-05-20
Payer: MEDICARE

## 2024-05-20 VITALS
WEIGHT: 158.18 LBS | TEMPERATURE: 96.8 F | HEART RATE: 70 BPM | RESPIRATION RATE: 18 BRPM | BODY MASS INDEX: 25.57 KG/M2 | OXYGEN SATURATION: 97 % | SYSTOLIC BLOOD PRESSURE: 123 MMHG | DIASTOLIC BLOOD PRESSURE: 77 MMHG

## 2024-05-20 DIAGNOSIS — C16.0 MALIGNANT NEOPLASM OF CARDIA (MULTI): ICD-10-CM

## 2024-05-20 DIAGNOSIS — Z51.0 ENCOUNTER FOR ANTINEOPLASTIC RADIATION THERAPY: ICD-10-CM

## 2024-05-20 DIAGNOSIS — C15.5 MALIGNANT NEOPLASM OF LOWER THIRD OF ESOPHAGUS (MULTI): ICD-10-CM

## 2024-05-20 LAB
ALBUMIN SERPL BCP-MCNC: 3.9 G/DL (ref 3.4–5)
ALP SERPL-CCNC: 66 U/L (ref 33–136)
ALT SERPL W P-5'-P-CCNC: 14 U/L (ref 10–52)
ANION GAP SERPL CALC-SCNC: 12 MMOL/L (ref 10–20)
AST SERPL W P-5'-P-CCNC: 7 U/L (ref 9–39)
BASOPHILS # BLD AUTO: 0.02 X10*3/UL (ref 0–0.1)
BASOPHILS NFR BLD AUTO: 0.4 %
BILIRUB SERPL-MCNC: 0.5 MG/DL (ref 0–1.2)
BUN SERPL-MCNC: 34 MG/DL (ref 6–23)
CALCIUM SERPL-MCNC: 8.8 MG/DL (ref 8.6–10.3)
CHLORIDE SERPL-SCNC: 102 MMOL/L (ref 98–107)
CO2 SERPL-SCNC: 29 MMOL/L (ref 21–32)
CREAT SERPL-MCNC: 0.82 MG/DL (ref 0.5–1.3)
EGFRCR SERPLBLD CKD-EPI 2021: 89 ML/MIN/1.73M*2
EOSINOPHIL # BLD AUTO: 0.07 X10*3/UL (ref 0–0.4)
EOSINOPHIL NFR BLD AUTO: 1.5 %
ERYTHROCYTE [DISTWIDTH] IN BLOOD BY AUTOMATED COUNT: 13.4 % (ref 11.5–14.5)
GLUCOSE SERPL-MCNC: 419 MG/DL (ref 74–99)
HCT VFR BLD AUTO: 43 % (ref 41–52)
HGB BLD-MCNC: 14.5 G/DL (ref 13.5–17.5)
IMM GRANULOCYTES # BLD AUTO: 0.02 X10*3/UL (ref 0–0.5)
IMM GRANULOCYTES NFR BLD AUTO: 0.4 % (ref 0–0.9)
LYMPHOCYTES # BLD AUTO: 0.33 X10*3/UL (ref 0.8–3)
LYMPHOCYTES NFR BLD AUTO: 7.1 %
MAGNESIUM SERPL-MCNC: 2.17 MG/DL (ref 1.6–2.4)
MCH RBC QN AUTO: 32.8 PG (ref 26–34)
MCHC RBC AUTO-ENTMCNC: 33.7 G/DL (ref 32–36)
MCV RBC AUTO: 97 FL (ref 80–100)
MONOCYTES # BLD AUTO: 0.5 X10*3/UL (ref 0.05–0.8)
MONOCYTES NFR BLD AUTO: 10.7 %
NEUTROPHILS # BLD AUTO: 3.72 X10*3/UL (ref 1.6–5.5)
NEUTROPHILS NFR BLD AUTO: 79.9 %
NRBC BLD-RTO: 0 /100 WBCS (ref 0–0)
PLATELET # BLD AUTO: 155 X10*3/UL (ref 150–450)
POTASSIUM SERPL-SCNC: 4.1 MMOL/L (ref 3.5–5.3)
PROT SERPL-MCNC: 6.3 G/DL (ref 6.4–8.2)
RAD ONC MSQ ACTUAL FRACTIONS DELIVERED: 11
RAD ONC MSQ ACTUAL SESSION BIOLOGICAL DOSE: 180 CCGE
RAD ONC MSQ ACTUAL SESSION DELIVERED DOSE: 164 CGRAY
RAD ONC MSQ ACTUAL TOTAL BIOLOGICAL DOSE: 1984 CCGE
RAD ONC MSQ ACTUAL TOTAL DOSE: 1804 CGRAY
RAD ONC MSQ ELAPSED DAYS: 14
RAD ONC MSQ LAST DATE: NORMAL
RAD ONC MSQ PRESCRIBED BIOLOGICAL FRACTIONAL DOSE: 180 CCGE
RAD ONC MSQ PRESCRIBED BIOLOGICAL TOTAL DOSE: 5040 CCGE
RAD ONC MSQ PRESCRIBED FRACTIONAL DOSE: 164 CGRAY
RAD ONC MSQ PRESCRIBED NUMBER OF FRACTIONS: 28
RAD ONC MSQ PRESCRIBED TECHNIQUE: NORMAL
RAD ONC MSQ PRESCRIBED TOTAL DOSE: 4582 CGRAY
RAD ONC MSQ PRESCRIPTION PATTERN COMMENT: NORMAL
RAD ONC MSQ START DATE: NORMAL
RAD ONC MSQ TREATMENT COURSE NUMBER: 1
RAD ONC MSQ TREATMENT SITE: NORMAL
RBC # BLD AUTO: 4.42 X10*6/UL (ref 4.5–5.9)
SODIUM SERPL-SCNC: 139 MMOL/L (ref 136–145)
WBC # BLD AUTO: 4.7 X10*3/UL (ref 4.4–11.3)

## 2024-05-20 PROCEDURE — 2500000004 HC RX 250 GENERAL PHARMACY W/ HCPCS (ALT 636 FOR OP/ED): Performed by: INTERNAL MEDICINE

## 2024-05-20 PROCEDURE — 83735 ASSAY OF MAGNESIUM: CPT | Performed by: INTERNAL MEDICINE

## 2024-05-20 PROCEDURE — G6002 STEREOSCOPIC X-RAY GUIDANCE: HCPCS | Performed by: RADIOLOGY

## 2024-05-20 PROCEDURE — 77523 PROTON TRMT INTERMEDIATE: CPT | Performed by: RADIOLOGY

## 2024-05-20 PROCEDURE — 84075 ASSAY ALKALINE PHOSPHATASE: CPT | Performed by: INTERNAL MEDICINE

## 2024-05-20 PROCEDURE — 99214 OFFICE O/P EST MOD 30 MIN: CPT | Performed by: INTERNAL MEDICINE

## 2024-05-20 PROCEDURE — 96368 THER/DIAG CONCURRENT INF: CPT

## 2024-05-20 PROCEDURE — 85025 COMPLETE CBC W/AUTO DIFF WBC: CPT | Performed by: INTERNAL MEDICINE

## 2024-05-20 PROCEDURE — 1157F ADVNC CARE PLAN IN RCRD: CPT | Performed by: INTERNAL MEDICINE

## 2024-05-20 PROCEDURE — 77387 GUIDANCE FOR RADJ TX DLVR: CPT | Performed by: RADIOLOGY

## 2024-05-20 PROCEDURE — 96361 HYDRATE IV INFUSION ADD-ON: CPT | Mod: INF

## 2024-05-20 PROCEDURE — 96365 THER/PROPH/DIAG IV INF INIT: CPT

## 2024-05-20 PROCEDURE — 1036F TOBACCO NON-USER: CPT | Performed by: INTERNAL MEDICINE

## 2024-05-20 PROCEDURE — 1159F MED LIST DOCD IN RCRD: CPT | Performed by: INTERNAL MEDICINE

## 2024-05-20 PROCEDURE — 96417 CHEMO IV INFUS EACH ADDL SEQ: CPT

## 2024-05-20 PROCEDURE — 96366 THER/PROPH/DIAG IV INF ADDON: CPT | Mod: INF

## 2024-05-20 PROCEDURE — 96375 TX/PRO/DX INJ NEW DRUG ADDON: CPT | Mod: INF

## 2024-05-20 PROCEDURE — 96413 CHEMO IV INFUSION 1 HR: CPT

## 2024-05-20 PROCEDURE — 1160F RVW MEDS BY RX/DR IN RCRD: CPT | Performed by: INTERNAL MEDICINE

## 2024-05-20 PROCEDURE — 96367 TX/PROPH/DG ADDL SEQ IV INF: CPT

## 2024-05-20 RX ORDER — EPINEPHRINE 0.3 MG/.3ML
0.3 INJECTION SUBCUTANEOUS EVERY 5 MIN PRN
Status: DISCONTINUED | OUTPATIENT
Start: 2024-05-20 | End: 2024-05-20 | Stop reason: HOSPADM

## 2024-05-20 RX ORDER — FAMOTIDINE 10 MG/ML
20 INJECTION INTRAVENOUS ONCE AS NEEDED
Status: DISCONTINUED | OUTPATIENT
Start: 2024-05-20 | End: 2024-05-20 | Stop reason: HOSPADM

## 2024-05-20 RX ORDER — MAGNESIUM SULFATE HEPTAHYDRATE 40 MG/ML
2 INJECTION, SOLUTION INTRAVENOUS ONCE
Status: COMPLETED | OUTPATIENT
Start: 2024-05-20 | End: 2024-05-20

## 2024-05-20 RX ORDER — HEPARIN SODIUM,PORCINE/PF 10 UNIT/ML
50 SYRINGE (ML) INTRAVENOUS AS NEEDED
Status: CANCELLED | OUTPATIENT
Start: 2024-05-20

## 2024-05-20 RX ORDER — HEPARIN 100 UNIT/ML
500 SYRINGE INTRAVENOUS AS NEEDED
Status: DISCONTINUED | OUTPATIENT
Start: 2024-05-20 | End: 2024-05-20 | Stop reason: HOSPADM

## 2024-05-20 RX ORDER — ALBUTEROL SULFATE 0.83 MG/ML
3 SOLUTION RESPIRATORY (INHALATION) AS NEEDED
Status: DISCONTINUED | OUTPATIENT
Start: 2024-05-20 | End: 2024-05-20 | Stop reason: HOSPADM

## 2024-05-20 RX ORDER — FAMOTIDINE 10 MG/ML
20 INJECTION INTRAVENOUS ONCE
Status: COMPLETED | OUTPATIENT
Start: 2024-05-20 | End: 2024-05-20

## 2024-05-20 RX ORDER — HEPARIN SODIUM,PORCINE/PF 10 UNIT/ML
50 SYRINGE (ML) INTRAVENOUS AS NEEDED
Status: DISCONTINUED | OUTPATIENT
Start: 2024-05-20 | End: 2024-05-20 | Stop reason: HOSPADM

## 2024-05-20 RX ORDER — HEPARIN 100 UNIT/ML
500 SYRINGE INTRAVENOUS AS NEEDED
Status: CANCELLED | OUTPATIENT
Start: 2024-05-20

## 2024-05-20 RX ORDER — DIPHENHYDRAMINE HYDROCHLORIDE 50 MG/ML
50 INJECTION INTRAMUSCULAR; INTRAVENOUS AS NEEDED
Status: DISCONTINUED | OUTPATIENT
Start: 2024-05-20 | End: 2024-05-20 | Stop reason: HOSPADM

## 2024-05-20 RX ORDER — DIPHENHYDRAMINE HYDROCHLORIDE 50 MG/ML
25 INJECTION INTRAMUSCULAR; INTRAVENOUS ONCE
Status: COMPLETED | OUTPATIENT
Start: 2024-05-20 | End: 2024-05-20

## 2024-05-20 RX ORDER — PALONOSETRON 0.05 MG/ML
0.25 INJECTION, SOLUTION INTRAVENOUS ONCE
Status: COMPLETED | OUTPATIENT
Start: 2024-05-20 | End: 2024-05-20

## 2024-05-20 RX ORDER — PROCHLORPERAZINE MALEATE 10 MG
10 TABLET ORAL EVERY 6 HOURS PRN
Status: DISCONTINUED | OUTPATIENT
Start: 2024-05-20 | End: 2024-05-20 | Stop reason: HOSPADM

## 2024-05-20 RX ORDER — PROCHLORPERAZINE EDISYLATE 5 MG/ML
10 INJECTION INTRAMUSCULAR; INTRAVENOUS EVERY 6 HOURS PRN
Status: DISCONTINUED | OUTPATIENT
Start: 2024-05-20 | End: 2024-05-20 | Stop reason: HOSPADM

## 2024-05-20 RX ADMIN — DEXAMETHASONE SODIUM PHOSPHATE 4 MG: 4 INJECTION, SOLUTION INTRA-ARTICULAR; INTRALESIONAL; INTRAMUSCULAR; INTRAVENOUS; SOFT TISSUE at 10:38

## 2024-05-20 RX ADMIN — SODIUM CHLORIDE 500 ML: 9 INJECTION, SOLUTION INTRAVENOUS at 09:08

## 2024-05-20 RX ADMIN — PACLITAXEL 93 MG: 6 INJECTION, SOLUTION INTRAVENOUS at 11:08

## 2024-05-20 RX ADMIN — DIPHENHYDRAMINE HYDROCHLORIDE 25 MG: 50 INJECTION INTRAMUSCULAR; INTRAVENOUS at 10:38

## 2024-05-20 RX ADMIN — CARBOPLATIN 194 MG: 10 INJECTION, SOLUTION INTRAVENOUS at 12:14

## 2024-05-20 RX ADMIN — HEPARIN 500 UNITS: 100 SYRINGE at 12:56

## 2024-05-20 RX ADMIN — PALONOSETRON HYDROCHLORIDE 250 MCG: 0.25 INJECTION INTRAVENOUS at 10:38

## 2024-05-20 RX ADMIN — FAMOTIDINE 20 MG: 10 INJECTION INTRAVENOUS at 10:48

## 2024-05-20 RX ADMIN — MAGNESIUM SULFATE HEPTAHYDRATE 2 G: 40 INJECTION, SOLUTION INTRAVENOUS at 10:46

## 2024-05-20 ASSESSMENT — PAIN SCALES - GENERAL: PAINLEVEL: 4

## 2024-05-20 NOTE — PROGRESS NOTES
"NUTRITION Follow-up NOTE    Nutrition Assessment     Reason for Visit:  Juan M Mcrae is a 78 y.o. male who presents for esophageal cancer (lower esophageal/ GE junction)He is recceiving chemotherapy at [place] (carbo / paclitaxel) began 5-6-2024)  Proton at [facility] ( began 5-6-2024 today is 11/28)    [facility] noted and need to potentially change TF.      Hx: DM, HTN  Allergy: omeprazole  A1c 6.8 (2/5/24) -- pt taken off metformin in past - was started on Actos- steroid dose today was decreased     Jtube to be placed on 4/8    Pt with DME [place] -     Last 2 -3 days 400 to 500s- has a glucometer at home   Is thirsty all the time           Lab Results   Component Value Date/Time    GLUCOSE 419 (H) 05/20/2024 0902     05/20/2024 0902    K 4.1 05/20/2024 0902     05/20/2024 0902    CO2 29 05/20/2024 0902    ANIONGAP 12 05/20/2024 0902    BUN 34 (H) 05/20/2024 0902    CREATININE 0.82 05/20/2024 0902    EGFR 89 05/20/2024 0902    CALCIUM 8.8 05/20/2024 0902    ALBUMIN 3.9 05/20/2024 0902    ALKPHOS 66 05/20/2024 0902    PROT 6.3 (L) 05/20/2024 0902    AST 7 (L) 05/20/2024 0902    BILITOT 0.5 05/20/2024 0902    ALT 14 05/20/2024 0902     No results found for: \"VITD25\"    Anthropometrics:     HT:  167.5 cm   IBW: 64.5 kg  111% IBW  BMI: 25.6    In the past month he is down 5.5 kg (7.2%)      Wt Readings from Last 10 Encounters:   05/20/24 71.7 kg (158 lb 2.9 oz)   05/17/24 73.5 kg (162 lb)   05/13/24 76 kg (167 lb 7 oz)   05/13/24 76 kg (167 lb 7 oz)   05/08/24 76.7 kg (169 lb)   05/06/24 77.9 kg (171 lb 10.1 oz)   04/25/24 77.1 kg (170 lb)   04/24/24 77.2 kg (170 lb 4.8 oz)   04/17/24 76.2 kg (168 lb)   04/15/24 76.5 kg (168 lb 12.2 oz)        Food And Nutrient Intake:         Per RDN note rate is 87 ml per hour for 13 hours of isosource 1.5  Taking in 5 cartons   1875 calories   220 g CHO  85 g protein  955 ml of water  Has some " nausea as well as dysphagia and odynophagie   Taking belenderized soup, Boost VHC tea, gatorade and watr (40 ounces)     Currently Tf rate is 92 ml per hour and is tolerating this rate and volume  In addition to TF  is taking   Boost VHC- 530 calories, 22 g protein, 52 g of CHO   [facility] , 20 g CHO and 2.5 g protein   Can't swallow anything additional except pills                                                          Nutrition Focused Physical Exam Findings:  Completed 4/1/2024                        Energy Needs     2280 calories  91.2 g protein   2280 ml of free water       Nutrition Diagnosis   Malnutrition Diagnosis  Patient has Malnutrition Diagnosis: Yes  Diagnosis Status: Ongoing  Malnutrition Diagnosis: Moderate malnutrition related to chronic disease or condition  As Evidenced by: mild muscle/ fat loss noted and pt with variable intake due to dysphagia requiring altered consistency diet with intake < 75% of needs.  Additional Assessment Information: Continued weight loss noted s/p PEG placement - oral intake has declined, need for increase in enteral feeds.    Nutrition Diagnosis  Patient has Nutrition Diagnosis: Yes  Diagnosis Status (1): New  Nutrition Diagnosis 1: Altered nutrition related to laboratory values  Related to (1): potential stress from treatment impacting BS control  As Evidenced by (1): BS    Nutrition Interventions/Recommendations   Nutrition Prescription   Provided with trial of Glucerna 1.5- 5 cartons per day- will stay at rate of 92 ml per hour (about 13 hours to infuse)  and will provide:  98 g protein, 1780 calories, 157.5 g CHO  Will stop Boost VHC  Will continue with 1 can of condensed soup as tolerated and mixed with water  Combined will provide 2030 calories 101 g protein and 177.5 g CHO  Will meet pt in [facility] on Wednesday to assess impact of formula change in BS and change formula order or look at other options as needed.      Food and Nutrition  Delivery   Currently po intake is for pleasure  TF is providing majority of nutrition     Nutrition Education   Business phone provided     Coordination of Care   Medical oncology  [place]  Radiation oncology   RDN covering [place]     There are no Patient Instructions on file for this visit.    Nutrition Monitoring and Evaluation        BS  Weight   tolerance to Glucerna 1.5  Tolerance to Po intake       Time Spent  Prep time on day of patient encounter: 10 minutes  Time spent directly with patient, family or caregiver: 20 minutes  Additional Time Spent on Patient Care Activities: 15 minutes  Documentation Time: 15 minutes  Other Time Spent: 10 minutes  Total: 70 minutes

## 2024-05-20 NOTE — PROGRESS NOTES
Patient here with his daughter for follow up with Dr. Fry. Patient reports to have increased blood glucose levels. He states that he has some difficulty sleeping some difficulty swallowing.     Dr. Fry reviewed diet modifications  Message sent to dietician, April    Patient to follow up in 1 week with Ciro Aranda CNP.     Patient verbalized understanding, no further questions at this time.

## 2024-05-20 NOTE — PROGRESS NOTES
Patient ID: Juan M Mcrae is a 78 y.o. male.    Diagnoses:   1.GE junction adenocarcinoma (signet ring cell), proficient MMR.  Localized (clinical stage II/III).  2. Type 2 diabetes mellitus on oral hypoglycemics, hypertension controlled with medications.    Genomic profile:  Proficient MMR status on the biopsy by IHC.    Assessment and Plan:  This is a pleasant 78-year-old man with a diagnosis of GE junction adenocarcinoma.  His staging PET/CT and subsequent imaging studies confirmed localized disease.  He had a port placement and a J-tube placement.  He has seen radiation oncology and he has enrolled in the NRG- (photon versus proton) study.    He is here for week 3 of chemotherapy (weekly carboplatin plus Taxol ).  Overall he is doing well.  He is sleeping better with olanzapine.  He has no discomfort with tube feeding.  His swallowing is somewhat better this week.    Overall he is feeling well.  His labs are largely okay except elevated blood glucose.  I ordered cycle 3 (week 3) of chemotherapy today.  I cut back on steroid because of his elevated blood glucose and I have sent a message to our dietitian to modify his tube feed so that his blood sugar does not go up that much.  I have also told him to resume his diabetic medication (pioglitazone).    Follow up plan-he has a follow-up with one of our APPs next week before chemotherapy.  I will see him the following week.      I have placed all orders as outlined above. I advised the patient to schedule the tests and follow-up appointment as discussed by contacting the  on the way out or calling by phone.    Providers:  Surgeon: Dr. Jovany Solares:  Jennie: Donna.    Chief complaint: GE junction adenocarcinoma.    HPI:  ONCOLOGIC HISTORY-    February 28, 2024: Underwent an EGD for progressive dysphagia and weight loss.  EGD revealed an obstructing mass in the distal esophagus.  Biopsy confirmed adenocarcinoma with signet ring cell features.    March  21, 2024: EUS revealed T2 N1 mass extending from 41 cm to 43 cm and involving less than 1 cm of the gastric cardia.    March 22, 2024: CT scan of chest did not show any metastatic disease.    April 1, 2024 24: PET/CT scan done:   1. Hypermetabolic linear focus at gastroesophageal junction which is  extending to the gastric cardia/proximal lesser curvature in  correlation with distal esophageal /gastroesophageal junction wall  thickening. These findings are compatible with biopsy-proven  adenocarcinoma.  2. No evidence of hypermetabolic lymphadenopathy.  3. Abnormal focal increase in FDG uptake in the left hepatic lobe in  correlation with hypoattenuating lesion, concerning for metastatic  disease. Further correlation with dedicated CT abdomen is recommended.  4. Large fat containing left inguinal hernia.  Interval history: He has significant dysphagia although he can get by with soup.  He has lost about 60 pounds in the last 6 months or so.  Feels fatigued.  Denies any pain.  Denies fever or chill or any other sign of ongoing infection.    April 3, 2024: CT scan of abdomen and MRI liver ruled out metastatic disease (liver lesions were hemangiomas).    May 6, 2024: Started concurrent radiation and chemotherapy with weekly carboplatin and Taxol.    Interval history:  He is overall doing well.  He is using the J-tube for feeding without any significant difficulty.  Denies any pain.  Denies fever or chill or any other complaints.      Past Medical History: Type 2 diabetes mellitus on oral hypoglycemics, hypertension controlled with medications.  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, unspecified      Comment:  Elevated serum cholesterol  No date: Dysphagia  No date: ED (erectile dysfunction)  No date: Esophageal cancer (Multi)  No date: GERD (gastroesophageal reflux disease)  No date:  Hearing aid worn  No date: HL (hearing loss)  No date: Hyperlipidemia  No date: Hypertension  No date: Nephrolithiasis  No date: Other specified diabetes mellitus without complications   (Multi)      Comment:  last A1c= 6.8 on 2024  No date: Sleep apnea      Comment:  wear CPAP/BiPAP  No date: Vision loss      Comment:  wears glasses   Surgical History:    Past Surgical History:   Procedure Laterality Date    BLADDER SURGERY      COLONOSCOPY      Repeat in one year    EYE SURGERY      LITHOTRIPSY      OTHER SURGICAL HISTORY      Vocal cord surgery    TRANSURETHRAL RESECTION OF PROSTATE      UPPER GASTROINTESTINAL ENDOSCOPY  2024      Family History:    Family History   Problem Relation Name Age of Onset    Stroke Father      Kidney cancer Brother Eliu     Stroke Brother Eliu      Family Oncology History:    Cancer-related family history includes Kidney cancer in his brother.  Social History:    Social History     Tobacco Use    Smoking status: Former     Current packs/day: 0.00     Average packs/day: 2.0 packs/day for 40.0 years (80.0 ttl pk-yrs)     Types: Cigarettes     Start date: 1959     Quit date: 1999     Years since quittin.4    Smokeless tobacco: Never   Vaping Use    Vaping status: Never Used   Substance Use Topics    Alcohol use: Yes     Alcohol/week: 2.0 standard drinks of alcohol     Types: 2 Cans of beer per week     Comment: couple beers a week    Drug use: Not Currently     Comment: gummies-dispensary from [place]        Allergies  Allergies   Allergen Reactions    Omeprazole Itching and Unknown    Shellfish Containing Products Unknown    Sulfa (Sulfonamide Antibiotics) Rash        Medications  Current Outpatient Medications   Medication Instructions    acetaminophen (TYLENOL) 650 mg, j-tube, Every 6 hours PRN    atenolol (Tenormin) 50 mg tablet 1 tablet, j-tube, Daily    dexAMETHasone (DECADRON) 8 mg, oral, Daily, For 3 days per week starting the day after treatment     lidocaine-prilocaine (Emla) 2.5-2.5 % cream Topical, Daily PRN    multivitamin with minerals iron-free (Centrum Silver) 1 tablet, j-tube, Daily    naloxone (NARCAN) 0.2 mg, intravenous    OLANZapine (ZYPREXA) 5 mg, oral, Nightly, For 4 doses per week starting the evening of treatment    prochlorperazine (COMPAZINE) 10 mg, oral, Every 6 hours PRN    traZODone (DESYREL) 100 mg, j-tube, Nightly          Objective   VS: There were no vitals taken for this visit.  Weight:   There were no vitals filed for this visit.       PHYSICAL EXAMINATION  ECOG performance status-1.  Alert, answers questions appropriately.  Ambulant without any help.  Vitals reviewed.  Eyes- pallor+, no icterus.  Mouth- no thrush or ulceration.  Neck- no mass, thyromegaly.  Chest -bilateral good air entry.  Cardiovascular system- No audible abnormal heart sounds. No murmur.  Abdomen: Soft and nontender.  No mass.  No ascites.  Extremity-no redness in hands.  No edema or swelling.    Labs  Results from last 7 days   Lab Units 05/20/24  0902   WBC AUTO x10*3/uL 4.7   HEMOGLOBIN g/dL 14.5   HEMATOCRIT % 43.0   PLATELETS AUTO x10*3/uL 155   NEUTROS ABS x10*3/uL 3.72   LYMPHS ABS AUTO x10*3/uL 0.33*   MONOS ABS AUTO x10*3/uL 0.50   EOS ABS AUTO x10*3/uL 0.07   NEUTROS PCT AUTO % 79.9   LYMPHS PCT AUTO % 7.1   MONOS PCT AUTO % 10.7   EOS PCT AUTO % 1.5        Results from last 7 days   Lab Units 05/20/24  0902   GLUCOSE mg/dL 419*   SODIUM mmol/L 139   POTASSIUM mmol/L 4.1   CHLORIDE mmol/L 102   CO2 mmol/L 29   BUN mg/dL 34*   CREATININE mg/dL 0.82   EGFR mL/min/1.73m*2 89   CALCIUM mg/dL 8.8   MAGNESIUM mg/dL 2.17   ALBUMIN g/dL 3.9   PROTEIN TOTAL g/dL 6.3*   BILIRUBIN TOTAL mg/dL 0.5   ALK PHOS U/L 66   ALT U/L 14   AST U/L 7*                   Image     Catarino Fry MD.

## 2024-05-20 NOTE — PROGRESS NOTES
Pt arrived for treatment. Port site c/d/I. Site easily accessed. Brisk blood return present. Labs obtained and sent. IVF infusing per MD order. Pt waiting to be seen by Dr. Fry prior to initiating the rest of the treatment. Continuing to monitor.   1108: Infusion initiated. Pt educated on when to notify RN if needed. Will continue to monitor.   1259: Infusion completed. Pt tolerated well. Port flushed per order. Port deaccessed and band aid applied. Pt tolerated well. AVS given to patient. Follow up appts discussed. Pt left infusion ambulatory.

## 2024-05-21 ENCOUNTER — RADIATION ONCOLOGY OTV (OUTPATIENT)
Dept: RADIATION ONCOLOGY | Facility: HOSPITAL | Age: 79
End: 2024-05-21
Payer: MEDICARE

## 2024-05-21 ENCOUNTER — HOSPITAL ENCOUNTER (OUTPATIENT)
Dept: RADIATION ONCOLOGY | Facility: HOSPITAL | Age: 79
Setting detail: RADIATION/ONCOLOGY SERIES
Discharge: HOME | End: 2024-05-21
Payer: MEDICARE

## 2024-05-21 VITALS
DIASTOLIC BLOOD PRESSURE: 79 MMHG | HEART RATE: 76 BPM | OXYGEN SATURATION: 95 % | RESPIRATION RATE: 18 BRPM | TEMPERATURE: 96.8 F | WEIGHT: 160.5 LBS | BODY MASS INDEX: 25.95 KG/M2 | SYSTOLIC BLOOD PRESSURE: 156 MMHG

## 2024-05-21 PROCEDURE — 77427 RADIATION TX MANAGEMENT X5: CPT | Performed by: RADIOLOGY

## 2024-05-21 PROCEDURE — 77523 PROTON TRMT INTERMEDIATE: CPT | Performed by: RADIOLOGY

## 2024-05-21 PROCEDURE — G6002 STEREOSCOPIC X-RAY GUIDANCE: HCPCS | Performed by: RADIOLOGY

## 2024-05-21 PROCEDURE — 77387 GUIDANCE FOR RADJ TX DLVR: CPT | Performed by: RADIOLOGY

## 2024-05-21 RX ORDER — PIOGLITAZONEHYDROCHLORIDE 30 MG/1
30 TABLET ORAL DAILY
COMMUNITY
End: 2024-06-03 | Stop reason: WASHOUT

## 2024-05-21 ASSESSMENT — PAIN SCALES - GENERAL: PAINLEVEL: 2

## 2024-05-21 NOTE — PROGRESS NOTES
RADIATION ONCOLOGY ON-TREATMENT VISIT NOTE  Patient Name:  Juan M Mcrae  MRN:  [id number]  :  1945    Radiation Oncologist: Irish Thompson MD  Referring Provider: No ref. 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 Got new TF formula 2/2 hyperglycemia issues yesterday (BG 400s), working with PCP and has new med, BG down to 300s today, monitoring closely with his PCP. No pain no fevers, no cough, no chills, no issues besides the dysphagia.     OBJECTIVE:   Vital Signs:  /79   Pulse 76   Temp 36 °C (96.8 °F)   Resp 18   Wt 72.8 kg (160 lb 8 oz)   SpO2 95%   BMI 25.95 kg/m²    Pain Scale: 0/10.    Wt stable 160, VSS, well appearing,  NAD.     Toxicity Assessment          2024    11:23 2024    09:51 2024    09:49   Toxicity Assessment   Treatment Site Thoracic Thoracic Thoracic   Anorexia Grade 0 Grade 1 Grade 0   Anxiety Grade 0 Grade 0 Grade 0   Dehydration Grade 0 Grade 0 Grade 0   Depression Grade 0 Grade 0 Grade 0    Dermatitis Radiation Grade 0 Grade 0 Grade 0   Diarrhea Grade 0 Grade 0 Grade 0   Fatigue Grade 0 Grade 1 Grade 0   Nausea Grade 0 Grade 0 Grade 0   Pain Grade 0 Grade 1 Grade 1   Vomiting Grade 0 Grade 0 Grade 0   Constipation Grade 0 Grade 0    Dyspepsia Grade 0 Grade 0    Dysphagia  Grade 0    Esophagitis Grade 1 Grade 1    Aspiration Grade 0     Esophageal Obstruction  Grade 0    Esophageal Pain  Grade 0 Grade 0   Bronchial Obstruction Grade 0     Cough Grade 0     Dyspnea Grade 0     Hiccups Grade 1  Grade 1   Hypoxia Grade 0  Grade 0        ASSESSMENT/PLAN:  The patient is tolerating radiation therapy as anticipated.  Continue per current treatment plan.      Irish Thompson MD  5/21/2024

## 2024-05-22 ENCOUNTER — HOSPITAL ENCOUNTER (OUTPATIENT)
Dept: RADIATION ONCOLOGY | Facility: HOSPITAL | Age: 79
Setting detail: RADIATION/ONCOLOGY SERIES
Discharge: HOME | End: 2024-05-22
Payer: MEDICARE

## 2024-05-22 ENCOUNTER — NUTRITION (OUTPATIENT)
Dept: HEMATOLOGY/ONCOLOGY | Facility: HOSPITAL | Age: 79
End: 2024-05-22
Payer: MEDICARE

## 2024-05-22 DIAGNOSIS — C16.0 MALIGNANT NEOPLASM OF CARDIA (MULTI): ICD-10-CM

## 2024-05-22 DIAGNOSIS — Z51.0 ENCOUNTER FOR ANTINEOPLASTIC RADIATION THERAPY: ICD-10-CM

## 2024-05-22 LAB
RAD ONC MSQ ACTUAL FRACTIONS DELIVERED: 13
RAD ONC MSQ ACTUAL SESSION BIOLOGICAL DOSE: 180 CCGE
RAD ONC MSQ ACTUAL SESSION DELIVERED DOSE: 164 CGRAY
RAD ONC MSQ ACTUAL TOTAL BIOLOGICAL DOSE: 2345 CCGE
RAD ONC MSQ ACTUAL TOTAL DOSE: 2132 CGRAY
RAD ONC MSQ ELAPSED DAYS: 16
RAD ONC MSQ LAST DATE: NORMAL
RAD ONC MSQ PRESCRIBED BIOLOGICAL FRACTIONAL DOSE: 180 CCGE
RAD ONC MSQ PRESCRIBED BIOLOGICAL TOTAL DOSE: 5040 CCGE
RAD ONC MSQ PRESCRIBED FRACTIONAL DOSE: 164 CGRAY
RAD ONC MSQ PRESCRIBED NUMBER OF FRACTIONS: 28
RAD ONC MSQ PRESCRIBED TECHNIQUE: NORMAL
RAD ONC MSQ PRESCRIBED TOTAL DOSE: 4582 CGRAY
RAD ONC MSQ PRESCRIPTION PATTERN COMMENT: NORMAL
RAD ONC MSQ START DATE: NORMAL
RAD ONC MSQ TREATMENT COURSE NUMBER: 1
RAD ONC MSQ TREATMENT SITE: NORMAL

## 2024-05-22 PROCEDURE — 77387 GUIDANCE FOR RADJ TX DLVR: CPT | Performed by: RADIOLOGY

## 2024-05-22 PROCEDURE — 77523 PROTON TRMT INTERMEDIATE: CPT | Performed by: RADIOLOGY

## 2024-05-22 PROCEDURE — G6002 STEREOSCOPIC X-RAY GUIDANCE: HCPCS | Performed by: RADIOLOGY

## 2024-05-22 NOTE — PROGRESS NOTES
"NUTRITION Follow-up NOTE    Nutrition Assessment     Reason for Visit:  Juan M Mcrae is a 78 y.o. male who presents for esophageal cancer (lower esophageal/ GE junction)He is recceiving chemotherapy at [facility] (carbo / paclitaxel) began 5-6-2024)  Proton at [facility] ( began 5-6-2024 today is 13/28)    Am following up with him today to assess impact of TF product change to Glucerna 1.5 on BS .      Hx: DM, HTN  Allergy: omeprazole  A1c 6.8 (2/5/24) -- pt taken off metformin in past with weight loss  - was started back on Actos recently - steroid dose today was decreased with last infusion     Jtube to be placed on 4/8    Pt with CINDI Stovall -             Lab Results   Component Value Date/Time    GLUCOSE 419 (H) 05/20/2024 0902     05/20/2024 0902    K 4.1 05/20/2024 0902     05/20/2024 0902    CO2 29 05/20/2024 0902    ANIONGAP 12 05/20/2024 0902    BUN 34 (H) 05/20/2024 0902    CREATININE 0.82 05/20/2024 0902    EGFR 89 05/20/2024 0902    CALCIUM 8.8 05/20/2024 0902    ALBUMIN 3.9 05/20/2024 0902    ALKPHOS 66 05/20/2024 0902    PROT 6.3 (L) 05/20/2024 0902    AST 7 (L) 05/20/2024 0902    BILITOT 0.5 05/20/2024 0902    ALT 14 05/20/2024 0902     No results found for: \"VITD25\"    Anthropometrics:     HT:  167.5 cm   IBW: 64.5 kg  111% IBW  BMI: 25.6    In the past month he is down 5.5 kg (7.2%)      Wt Readings from Last 10 Encounters:   05/21/24 72.8 kg (160 lb 8 oz)   05/20/24 71.7 kg (158 lb 2.9 oz)   05/17/24 73.5 kg (162 lb)   05/13/24 76 kg (167 lb 7 oz)   05/13/24 76 kg (167 lb 7 oz)   05/08/24 76.7 kg (169 lb)   05/06/24 77.9 kg (171 lb 10.1 oz)   04/25/24 77.1 kg (170 lb)   04/24/24 77.2 kg (170 lb 4.8 oz)   04/17/24 76.2 kg (168 lb)        Food And Nutrient Intake:           Yesterday intake was 5 cartons of Glucerna 1.5 and Mrs Grass soup  BS on Tuesday morning was 350 and this morning was 430  Change to Glucerna 1.5 from Isosource 1.5 made little change in BS control  Asking if he should " start back on metformin- was taking 1000 mg two times per day  Message team about this and MD endorsed starting back on metformin at previous dose    Additionally pt is swallowing better  He had noodles in his soup and all went well  He would like to expand diet and assess tolerance  Will add the following- cottage cheese, yogurt, cheerios in milk, oatmeal- thinned as needed, eggs, applesauce  Depending on eating will decrease TF volume to 3 to 4 cartons per night  3 cartons will be 8 hours at a rate of 93 ml per hour  4 cartons will be 10 hours at a rate of 93 ml per hour  MD is also aware of this and endorsed complex CHO paired with protein to help in BS management as well                                                           Nutrition Focused Physical Exam Findings:  Completed 4/1/2024                        Energy Needs     2280 calories  91.2 g protein   2280 ml of free water       Nutrition Diagnosis             Nutrition Interventions/Recommendations   Nutrition Prescription   TF- will not change to  Glucerna 1.5- he will use this up and then will go back to Isosource 1.5 supply he has at home- depending on po intake range of intake will be 3-5 cartons per day currently  Will try soft moist complex CHO foods paired with protein 3-5 times per day   Will follow up tomorrow to assess tolerance of po intake, TF intake and BS    Food and Nutrition Delivery   po intake appears better tolerated with treatment- will assess ability to take needed nutrition orally   At this time TF is providing majority of nutrition     Nutrition Education       Coordination of Care   Medical oncology  [place]  Radiation oncology   RDN covering [place]     There are no Patient Instructions on file for this visit.    Nutrition Monitoring and Evaluation        BS  Weight   tolerance to Glucerna 1.5 / Isosource 1.5  Tolerance to Po intake

## 2024-05-23 ENCOUNTER — HOSPITAL ENCOUNTER (OUTPATIENT)
Dept: RADIATION ONCOLOGY | Facility: HOSPITAL | Age: 79
Setting detail: RADIATION/ONCOLOGY SERIES
Discharge: HOME | End: 2024-05-23
Payer: MEDICARE

## 2024-05-23 ENCOUNTER — APPOINTMENT (OUTPATIENT)
Dept: HEMATOLOGY/ONCOLOGY | Facility: HOSPITAL | Age: 79
End: 2024-05-23
Payer: MEDICARE

## 2024-05-23 ENCOUNTER — NUTRITION (OUTPATIENT)
Dept: HEMATOLOGY/ONCOLOGY | Facility: HOSPITAL | Age: 79
End: 2024-05-23
Payer: MEDICARE

## 2024-05-23 DIAGNOSIS — Z51.0 ENCOUNTER FOR ANTINEOPLASTIC RADIATION THERAPY: ICD-10-CM

## 2024-05-23 DIAGNOSIS — C16.0 MALIGNANT NEOPLASM OF CARDIA (MULTI): ICD-10-CM

## 2024-05-23 LAB
RAD ONC MSQ ACTUAL FRACTIONS DELIVERED: 14
RAD ONC MSQ ACTUAL SESSION BIOLOGICAL DOSE: 180 CCGE
RAD ONC MSQ ACTUAL SESSION DELIVERED DOSE: 164 CGRAY
RAD ONC MSQ ACTUAL TOTAL BIOLOGICAL DOSE: 2526 CCGE
RAD ONC MSQ ACTUAL TOTAL DOSE: 2296 CGRAY
RAD ONC MSQ ELAPSED DAYS: 17
RAD ONC MSQ LAST DATE: NORMAL
RAD ONC MSQ PRESCRIBED BIOLOGICAL FRACTIONAL DOSE: 180 CCGE
RAD ONC MSQ PRESCRIBED BIOLOGICAL TOTAL DOSE: 5040 CCGE
RAD ONC MSQ PRESCRIBED FRACTIONAL DOSE: 164 CGRAY
RAD ONC MSQ PRESCRIBED NUMBER OF FRACTIONS: 28
RAD ONC MSQ PRESCRIBED TECHNIQUE: NORMAL
RAD ONC MSQ PRESCRIBED TOTAL DOSE: 4582 CGRAY
RAD ONC MSQ PRESCRIPTION PATTERN COMMENT: NORMAL
RAD ONC MSQ START DATE: NORMAL
RAD ONC MSQ TREATMENT COURSE NUMBER: 1
RAD ONC MSQ TREATMENT SITE: NORMAL

## 2024-05-23 PROCEDURE — 77387 GUIDANCE FOR RADJ TX DLVR: CPT | Performed by: RADIOLOGY

## 2024-05-23 PROCEDURE — G6002 STEREOSCOPIC X-RAY GUIDANCE: HCPCS | Performed by: RADIOLOGY

## 2024-05-23 PROCEDURE — 77523 PROTON TRMT INTERMEDIATE: CPT | Performed by: RADIOLOGY

## 2024-05-23 NOTE — PROGRESS NOTES
"NUTRITION Follow-up NOTE    Nutrition Assessment     Reason for Visit:  Juan M Mcrae is a 78 y.o. male who presents for esophageal cancer (lower esophageal/ GE junction)He is recceiving chemotherapy at [facility] (carbo / paclitaxel) began 5-6-2024)  Proton at [facility] ( began 5-6-2024 today is 13/28)    Am following up with him today to assess BS, TF intake and po intake.    Pt is seen in proton - his daughter is with him       Hx: DM, HTN  Allergy: omeprazole  A1c 6.8 (2/5/24) -- pt taken off metformin in past with weight loss  - was started back on Actos recently - steroid dose today was decreased with last infusion     Jtube to be placed on 4/8    Pt with CINDI Stovall -             Lab Results   Component Value Date/Time    GLUCOSE 419 (H) 05/20/2024 0902     05/20/2024 0902    K 4.1 05/20/2024 0902     05/20/2024 0902    CO2 29 05/20/2024 0902    ANIONGAP 12 05/20/2024 0902    BUN 34 (H) 05/20/2024 0902    CREATININE 0.82 05/20/2024 0902    EGFR 89 05/20/2024 0902    CALCIUM 8.8 05/20/2024 0902    ALBUMIN 3.9 05/20/2024 0902    ALKPHOS 66 05/20/2024 0902    PROT 6.3 (L) 05/20/2024 0902    AST 7 (L) 05/20/2024 0902    BILITOT 0.5 05/20/2024 0902    ALT 14 05/20/2024 0902     No results found for: \"VITD25\"    Anthropometrics:     HT:  167.5 cm   IBW: 64.5 kg  111% IBW  BMI: 25.6    In the past month he is down 5.5 kg (7.2%)  Pt reports his weight today on his home scale is 156# - his weight at baseline was 167#       Wt Readings from Last 10 Encounters:   05/21/24 72.8 kg (160 lb 8 oz)   05/20/24 71.7 kg (158 lb 2.9 oz)   05/17/24 73.5 kg (162 lb)   05/13/24 76 kg (167 lb 7 oz)   05/13/24 76 kg (167 lb 7 oz)   05/08/24 76.7 kg (169 lb)   05/06/24 77.9 kg (171 lb 10.1 oz)   04/25/24 77.1 kg (170 lb)   04/24/24 77.2 kg (170 lb 4.8 oz)   04/17/24 76.2 kg (168 lb)        Food And Nutrient Intake:           He reports he was not able to eat well  Yesterday  Ate a little cereal   1/3 can of chili  BS was " 385 this morning   Was not able to eat cereal this morning- had regurgitation     Still taking 5 cans of the Glucerna 1.5    There seemed to be some potential improvement with BS with use of the Glucerna 1.5 - with a very small sample size- Additionally- pt has added back Metformin 1000mg 2 times per day    He would prefer Tf to be changed to a diabetic formula to achieve optimal BS control.    Order will be changed today  Goal would be 6 cartons per day of Glucerna 1.5  2136 calories  117.6 g protein   22.8 g fiber  189 g CHO  1080 ml of free water    Rate is at 93 ml per hour- infusion time for this would be about 15 hours    Pt is only able to eat or pleasure at best at this time                                                                Nutrition Focused Physical Exam Findings:  Completed 4/1/2024                        Energy Needs     2280 calories  91.2 g protein   2280 ml of free water       Nutrition Diagnosis             Nutrition Interventions/Recommendations   Nutrition Prescription   TF- will change to  Glucerna 1.5- since po intake was not well tolerated   Will continue with soft moist complex CHO foods paired with protein for pleasure   Will follow up with pt on Tuesday 5-  to assess TF intake, weight and BS    Food and Nutrition Delivery   At this time TF is providing essentially sole of nutrition     Nutrition Education       Coordination of Care   Medical oncology  [place]  Radiation oncology   RDN covering [place]     There are no Patient Instructions on file for this visit.    Nutrition Monitoring and Evaluation        BS  Weight   tolerance to Glucerna 1.5 / Isosource 1.5  Tolerance to Po intake - fluids

## 2024-05-23 NOTE — PROGRESS NOTES
NUTRITION COMMUNICATION NOTE    Juan M Mcrae     REASON FOR COMMUNICATION:     Patient seen in RT  Eating was not easy this morning  Ate a little cereal   1/3 can of chili  BS was 385 this morning   Was not able to eat cereal this morning- had regurgitation     Still taking     156# this morning at home   This is down

## 2024-05-24 ENCOUNTER — APPOINTMENT (OUTPATIENT)
Dept: RADIATION ONCOLOGY | Facility: HOSPITAL | Age: 79
End: 2024-05-24
Payer: MEDICARE

## 2024-05-24 PROCEDURE — 77336 RADIATION PHYSICS CONSULT: CPT | Performed by: RADIOLOGY

## 2024-05-28 ENCOUNTER — NUTRITION (OUTPATIENT)
Dept: HEMATOLOGY/ONCOLOGY | Facility: HOSPITAL | Age: 79
End: 2024-05-28
Payer: MEDICARE

## 2024-05-28 ENCOUNTER — OFFICE VISIT (OUTPATIENT)
Dept: HEMATOLOGY/ONCOLOGY | Facility: CLINIC | Age: 79
End: 2024-05-28
Payer: MEDICARE

## 2024-05-28 ENCOUNTER — INFUSION (OUTPATIENT)
Dept: HEMATOLOGY/ONCOLOGY | Facility: CLINIC | Age: 79
End: 2024-05-28
Payer: MEDICARE

## 2024-05-28 ENCOUNTER — HOSPITAL ENCOUNTER (OUTPATIENT)
Dept: RADIATION ONCOLOGY | Facility: HOSPITAL | Age: 79
Setting detail: RADIATION/ONCOLOGY SERIES
Discharge: HOME | End: 2024-05-28
Payer: MEDICARE

## 2024-05-28 VITALS
DIASTOLIC BLOOD PRESSURE: 76 MMHG | WEIGHT: 158.51 LBS | RESPIRATION RATE: 18 BRPM | TEMPERATURE: 97 F | HEART RATE: 74 BPM | SYSTOLIC BLOOD PRESSURE: 122 MMHG | BODY MASS INDEX: 25.63 KG/M2 | OXYGEN SATURATION: 98 %

## 2024-05-28 DIAGNOSIS — C15.5 MALIGNANT NEOPLASM OF LOWER THIRD OF ESOPHAGUS (MULTI): ICD-10-CM

## 2024-05-28 DIAGNOSIS — Z51.0 ENCOUNTER FOR ANTINEOPLASTIC RADIATION THERAPY: ICD-10-CM

## 2024-05-28 DIAGNOSIS — C15.5 MALIGNANT NEOPLASM OF LOWER THIRD OF ESOPHAGUS (MULTI): Primary | ICD-10-CM

## 2024-05-28 DIAGNOSIS — C16.0 MALIGNANT NEOPLASM OF CARDIA (MULTI): ICD-10-CM

## 2024-05-28 LAB
ALBUMIN SERPL BCP-MCNC: 3.6 G/DL (ref 3.4–5)
ALP SERPL-CCNC: 58 U/L (ref 33–136)
ALT SERPL W P-5'-P-CCNC: 11 U/L (ref 10–52)
ANION GAP SERPL CALC-SCNC: 11 MMOL/L (ref 10–20)
AST SERPL W P-5'-P-CCNC: 9 U/L (ref 9–39)
BASOPHILS # BLD AUTO: 0.03 X10*3/UL (ref 0–0.1)
BASOPHILS NFR BLD AUTO: 0.7 %
BILIRUB SERPL-MCNC: 0.4 MG/DL (ref 0–1.2)
BUN SERPL-MCNC: 23 MG/DL (ref 6–23)
CALCIUM SERPL-MCNC: 9 MG/DL (ref 8.6–10.3)
CHLORIDE SERPL-SCNC: 103 MMOL/L (ref 98–107)
CO2 SERPL-SCNC: 29 MMOL/L (ref 21–32)
CREAT SERPL-MCNC: 0.69 MG/DL (ref 0.5–1.3)
EGFRCR SERPLBLD CKD-EPI 2021: >90 ML/MIN/1.73M*2
EOSINOPHIL # BLD AUTO: 0.04 X10*3/UL (ref 0–0.4)
EOSINOPHIL NFR BLD AUTO: 0.9 %
ERYTHROCYTE [DISTWIDTH] IN BLOOD BY AUTOMATED COUNT: 14.1 % (ref 11.5–14.5)
GLUCOSE SERPL-MCNC: 305 MG/DL (ref 74–99)
HCT VFR BLD AUTO: 41.6 % (ref 41–52)
HGB BLD-MCNC: 14.2 G/DL (ref 13.5–17.5)
IMM GRANULOCYTES # BLD AUTO: 0.04 X10*3/UL (ref 0–0.5)
IMM GRANULOCYTES NFR BLD AUTO: 0.9 % (ref 0–0.9)
LYMPHOCYTES # BLD AUTO: 0.34 X10*3/UL (ref 0.8–3)
LYMPHOCYTES NFR BLD AUTO: 7.5 %
MAGNESIUM SERPL-MCNC: 1.91 MG/DL (ref 1.6–2.4)
MCH RBC QN AUTO: 33.2 PG (ref 26–34)
MCHC RBC AUTO-ENTMCNC: 34.1 G/DL (ref 32–36)
MCV RBC AUTO: 97 FL (ref 80–100)
MONOCYTES # BLD AUTO: 0.59 X10*3/UL (ref 0.05–0.8)
MONOCYTES NFR BLD AUTO: 13 %
NEUTROPHILS # BLD AUTO: 3.51 X10*3/UL (ref 1.6–5.5)
NEUTROPHILS NFR BLD AUTO: 77 %
NRBC BLD-RTO: 0 /100 WBCS (ref 0–0)
PLATELET # BLD AUTO: 136 X10*3/UL (ref 150–450)
POTASSIUM SERPL-SCNC: 4 MMOL/L (ref 3.5–5.3)
PROT SERPL-MCNC: 6.1 G/DL (ref 6.4–8.2)
RAD ONC MSQ ACTUAL FRACTIONS DELIVERED: 15
RAD ONC MSQ ACTUAL SESSION BIOLOGICAL DOSE: 180 CCGE
RAD ONC MSQ ACTUAL SESSION DELIVERED DOSE: 164 CGRAY
RAD ONC MSQ ACTUAL TOTAL BIOLOGICAL DOSE: 2706 CCGE
RAD ONC MSQ ACTUAL TOTAL DOSE: 2460 CGRAY
RAD ONC MSQ ELAPSED DAYS: 22
RAD ONC MSQ LAST DATE: NORMAL
RAD ONC MSQ PRESCRIBED BIOLOGICAL FRACTIONAL DOSE: 180 CCGE
RAD ONC MSQ PRESCRIBED BIOLOGICAL TOTAL DOSE: 5040 CCGE
RAD ONC MSQ PRESCRIBED FRACTIONAL DOSE: 164 CGRAY
RAD ONC MSQ PRESCRIBED NUMBER OF FRACTIONS: 28
RAD ONC MSQ PRESCRIBED TECHNIQUE: NORMAL
RAD ONC MSQ PRESCRIBED TOTAL DOSE: 4582 CGRAY
RAD ONC MSQ PRESCRIPTION PATTERN COMMENT: NORMAL
RAD ONC MSQ START DATE: NORMAL
RAD ONC MSQ TREATMENT COURSE NUMBER: 1
RAD ONC MSQ TREATMENT SITE: NORMAL
RBC # BLD AUTO: 4.28 X10*6/UL (ref 4.5–5.9)
SODIUM SERPL-SCNC: 139 MMOL/L (ref 136–145)
WBC # BLD AUTO: 4.6 X10*3/UL (ref 4.4–11.3)

## 2024-05-28 PROCEDURE — 96375 TX/PRO/DX INJ NEW DRUG ADDON: CPT | Mod: INF

## 2024-05-28 PROCEDURE — 1159F MED LIST DOCD IN RCRD: CPT | Performed by: NURSE PRACTITIONER

## 2024-05-28 PROCEDURE — 96417 CHEMO IV INFUS EACH ADDL SEQ: CPT

## 2024-05-28 PROCEDURE — 96367 TX/PROPH/DG ADDL SEQ IV INF: CPT

## 2024-05-28 PROCEDURE — 83735 ASSAY OF MAGNESIUM: CPT | Performed by: INTERNAL MEDICINE

## 2024-05-28 PROCEDURE — 1126F AMNT PAIN NOTED NONE PRSNT: CPT | Performed by: NURSE PRACTITIONER

## 2024-05-28 PROCEDURE — 1157F ADVNC CARE PLAN IN RCRD: CPT | Performed by: NURSE PRACTITIONER

## 2024-05-28 PROCEDURE — 2500000004 HC RX 250 GENERAL PHARMACY W/ HCPCS (ALT 636 FOR OP/ED): Performed by: INTERNAL MEDICINE

## 2024-05-28 PROCEDURE — 96365 THER/PROPH/DIAG IV INF INIT: CPT

## 2024-05-28 PROCEDURE — 99214 OFFICE O/P EST MOD 30 MIN: CPT | Performed by: NURSE PRACTITIONER

## 2024-05-28 PROCEDURE — 85025 COMPLETE CBC W/AUTO DIFF WBC: CPT | Performed by: INTERNAL MEDICINE

## 2024-05-28 PROCEDURE — 96413 CHEMO IV INFUSION 1 HR: CPT

## 2024-05-28 PROCEDURE — 1160F RVW MEDS BY RX/DR IN RCRD: CPT | Performed by: NURSE PRACTITIONER

## 2024-05-28 PROCEDURE — 96368 THER/DIAG CONCURRENT INF: CPT

## 2024-05-28 PROCEDURE — 77523 PROTON TRMT INTERMEDIATE: CPT | Performed by: RADIOLOGY

## 2024-05-28 PROCEDURE — 80053 COMPREHEN METABOLIC PANEL: CPT | Performed by: INTERNAL MEDICINE

## 2024-05-28 PROCEDURE — 77387 GUIDANCE FOR RADJ TX DLVR: CPT | Performed by: RADIOLOGY

## 2024-05-28 PROCEDURE — 96366 THER/PROPH/DIAG IV INF ADDON: CPT | Mod: INF

## 2024-05-28 PROCEDURE — 96361 HYDRATE IV INFUSION ADD-ON: CPT | Mod: INF

## 2024-05-28 RX ORDER — HEPARIN 100 UNIT/ML
500 SYRINGE INTRAVENOUS AS NEEDED
Status: DISCONTINUED | OUTPATIENT
Start: 2024-05-28 | End: 2024-05-28 | Stop reason: HOSPADM

## 2024-05-28 RX ORDER — NYSTATIN 100000 [USP'U]/ML
500000 SUSPENSION ORAL 4 TIMES DAILY
Qty: 280 ML | Refills: 0 | Status: SHIPPED | OUTPATIENT
Start: 2024-05-28 | End: 2024-06-07

## 2024-05-28 RX ORDER — HEPARIN SODIUM,PORCINE/PF 10 UNIT/ML
50 SYRINGE (ML) INTRAVENOUS AS NEEDED
Status: CANCELLED | OUTPATIENT
Start: 2024-05-28

## 2024-05-28 RX ORDER — DIPHENHYDRAMINE HYDROCHLORIDE 50 MG/ML
25 INJECTION INTRAMUSCULAR; INTRAVENOUS ONCE
Status: COMPLETED | OUTPATIENT
Start: 2024-05-28 | End: 2024-05-28

## 2024-05-28 RX ORDER — ALBUTEROL SULFATE 0.83 MG/ML
3 SOLUTION RESPIRATORY (INHALATION) AS NEEDED
Status: DISCONTINUED | OUTPATIENT
Start: 2024-05-28 | End: 2024-05-28 | Stop reason: HOSPADM

## 2024-05-28 RX ORDER — FAMOTIDINE 10 MG/ML
20 INJECTION INTRAVENOUS ONCE AS NEEDED
Status: DISCONTINUED | OUTPATIENT
Start: 2024-05-28 | End: 2024-05-28 | Stop reason: HOSPADM

## 2024-05-28 RX ORDER — HEPARIN SODIUM,PORCINE/PF 10 UNIT/ML
50 SYRINGE (ML) INTRAVENOUS AS NEEDED
Status: DISCONTINUED | OUTPATIENT
Start: 2024-05-28 | End: 2024-05-28 | Stop reason: HOSPADM

## 2024-05-28 RX ORDER — FAMOTIDINE 10 MG/ML
20 INJECTION INTRAVENOUS ONCE
Status: COMPLETED | OUTPATIENT
Start: 2024-05-28 | End: 2024-05-28

## 2024-05-28 RX ORDER — METFORMIN HYDROCHLORIDE 500 MG/1
1000 TABLET ORAL
COMMUNITY

## 2024-05-28 RX ORDER — MAGNESIUM SULFATE HEPTAHYDRATE 40 MG/ML
2 INJECTION, SOLUTION INTRAVENOUS ONCE
Status: COMPLETED | OUTPATIENT
Start: 2024-05-28 | End: 2024-05-28

## 2024-05-28 RX ORDER — PALONOSETRON 0.05 MG/ML
0.25 INJECTION, SOLUTION INTRAVENOUS ONCE
Status: COMPLETED | OUTPATIENT
Start: 2024-05-28 | End: 2024-05-28

## 2024-05-28 RX ORDER — HEPARIN 100 UNIT/ML
500 SYRINGE INTRAVENOUS AS NEEDED
Status: CANCELLED | OUTPATIENT
Start: 2024-05-28

## 2024-05-28 RX ORDER — EPINEPHRINE 0.3 MG/.3ML
0.3 INJECTION SUBCUTANEOUS EVERY 5 MIN PRN
Status: DISCONTINUED | OUTPATIENT
Start: 2024-05-28 | End: 2024-05-28 | Stop reason: HOSPADM

## 2024-05-28 RX ORDER — DIPHENHYDRAMINE HYDROCHLORIDE 50 MG/ML
50 INJECTION INTRAMUSCULAR; INTRAVENOUS AS NEEDED
Status: DISCONTINUED | OUTPATIENT
Start: 2024-05-28 | End: 2024-05-28 | Stop reason: HOSPADM

## 2024-05-28 RX ORDER — PROCHLORPERAZINE EDISYLATE 5 MG/ML
10 INJECTION INTRAMUSCULAR; INTRAVENOUS EVERY 6 HOURS PRN
Status: DISCONTINUED | OUTPATIENT
Start: 2024-05-28 | End: 2024-05-28 | Stop reason: HOSPADM

## 2024-05-28 RX ORDER — PROCHLORPERAZINE MALEATE 10 MG
10 TABLET ORAL EVERY 6 HOURS PRN
Status: DISCONTINUED | OUTPATIENT
Start: 2024-05-28 | End: 2024-05-28 | Stop reason: HOSPADM

## 2024-05-28 RX ADMIN — SODIUM CHLORIDE 500 ML: 9 INJECTION, SOLUTION INTRAVENOUS at 13:03

## 2024-05-28 RX ADMIN — DIPHENHYDRAMINE HYDROCHLORIDE 25 MG: 50 INJECTION INTRAMUSCULAR; INTRAVENOUS at 13:44

## 2024-05-28 RX ADMIN — DEXAMETHASONE SODIUM PHOSPHATE 4 MG: 4 INJECTION, SOLUTION INTRA-ARTICULAR; INTRALESIONAL; INTRAMUSCULAR; INTRAVENOUS; SOFT TISSUE at 13:44

## 2024-05-28 RX ADMIN — PACLITAXEL 93 MG: 6 INJECTION, SOLUTION INTRAVENOUS at 14:15

## 2024-05-28 RX ADMIN — FAMOTIDINE 20 MG: 10 INJECTION INTRAVENOUS at 13:44

## 2024-05-28 RX ADMIN — MAGNESIUM SULFATE HEPTAHYDRATE 2 G: 40 INJECTION, SOLUTION INTRAVENOUS at 13:42

## 2024-05-28 RX ADMIN — HEPARIN 500 UNITS: 100 SYRINGE at 15:40

## 2024-05-28 RX ADMIN — PALONOSETRON HYDROCHLORIDE 250 MCG: 0.25 INJECTION INTRAVENOUS at 13:44

## 2024-05-28 RX ADMIN — CARBOPLATIN 194 MG: 10 INJECTION, SOLUTION INTRAVENOUS at 15:15

## 2024-05-28 ASSESSMENT — PAIN SCALES - GENERAL: PAINLEVEL: 0-NO PAIN

## 2024-05-28 NOTE — PROGRESS NOTES
"Patient here for follow up with Ciro Aranda accompanied by his significant other Arlene. Medications and allergies reviewed. Patient stated he restarted on his metformin and actos due to increased blood sugar. Patient provided a list of his AM blood glucose levels that he checks before he eats anything which they range from 354 to 472.     \"Upset stomach, everything I seem to eat upsets my stomach even my pills\". Diarrhea once per day. Is not taking imodium, stated he is eating.       "

## 2024-05-28 NOTE — PROGRESS NOTES
Patient ID: Juan M Mcrae is a 78 y.o. male.    Diagnoses:   1.GE junction adenocarcinoma (signet ring cell), proficient MMR.  Localized (clinical stage II/III).  2. Type 2 diabetes mellitus on oral hypoglycemics, hypertension controlled with medications.    Genomic profile:  Proficient MMR status on the biopsy by IHC.    Assessment and Plan:  This is a pleasant 78-year-old man with a diagnosis of GE junction adenocarcinoma.  His staging PET/CT and subsequent imaging studies confirmed localized disease.  He had a port placement and a J-tube placement.  He has seen radiation oncology and he has enrolled in the NRG- (photon versus proton) study.    He is here for week 3 of chemotherapy (weekly carboplatin plus Taxol ).  Overall he is doing well.  He is sleeping better with olanzapine.  He has no discomfort with tube feeding.  His swallowing is somewhat better this week.    Overall he is feeling well.  His labs are largely okay except elevated blood glucose.  I ordered cycle 3 (week 3) of chemotherapy today.  I cut back on steroid because of his elevated blood glucose and I have sent a message to our dietitian to modify his tube feed so that his blood sugar does not go up that much.  I have also told him to resume his diabetic medication (pioglitazone).    Follow up plan-he has a follow-up with one of our APPs next week before chemotherapy.  I will see him the following week.      I have placed all orders as outlined above. I advised the patient to schedule the tests and follow-up appointment as discussed by contacting the  on the way out or calling by phone.    Providers:  Surgeon: Dr. Jovany Solares:  Jennie: Donna.    Chief complaint: GE junction adenocarcinoma.    HPI:  ONCOLOGIC HISTORY-    February 28, 2024: Underwent an EGD for progressive dysphagia and weight loss.  EGD revealed an obstructing mass in the distal esophagus.  Biopsy confirmed adenocarcinoma with signet ring cell features.    March  "21, 2024: EUS revealed T2 N1 mass extending from 41 cm to 43 cm and involving less than 1 cm of the gastric cardia.    March 22, 2024: CT scan of chest did not show any metastatic disease.    April 1, 2024 24: PET/CT scan done:   1. Hypermetabolic linear focus at gastroesophageal junction which is  extending to the gastric cardia/proximal lesser curvature in  correlation with distal esophageal /gastroesophageal junction wall  thickening. These findings are compatible with biopsy-proven  adenocarcinoma.  2. No evidence of hypermetabolic lymphadenopathy.  3. Abnormal focal increase in FDG uptake in the left hepatic lobe in  correlation with hypoattenuating lesion, concerning for metastatic  disease. Further correlation with dedicated CT abdomen is recommended.  4. Large fat containing left inguinal hernia.  Interval history: He has significant dysphagia although he can get by with soup.  He has lost about 60 pounds in the last 6 months or so.  Feels fatigued.  Denies any pain.  Denies fever or chill or any other sign of ongoing infection.    April 3, 2024: CT scan of abdomen and MRI liver ruled out metastatic disease (liver lesions were hemangiomas).    May 6, 2024: Started concurrent radiation and chemotherapy with weekly carboplatin and Taxol.    Interval history:  May 28, 2024  Patient presents today for routine follow-up evaluation for GE junction adenocarcinoma. He is due for day 22 of weekly does Carboplatin/Paclitaxel with concurrent radiation therapy. He is receiving his radiation on main campus. Last radiation was Thursday May 23, 2024 - machine is down. He is scheduled to go this afternoon.   On presentation today he denies any fevers, chills or night sweats. No cough, chest pain or shortness of breath. Chronic \"unsettled\" feeling reports not exactly nausea and he has not tried antiemetics. Elevated glucose has been his primary issue in the past week. His metformin and Actos had been discontinued.   Recent " glucose readings:  May 21 - 354  May 22 - 460  May 23 - 385  May 24 - 472  May 25 - 430  May 26 - 430  May 27 - 428    He had been on Metformin and Actos. He restarted Actos and Metformin 500mg on May 21 then 500mg BID and then May 22 he resumed 1000mg BID. Primary care team manages his diabetes has never seen endocrine. He has complaints of thirst and dysgeusia. Dysgeusia maybe due to thrush.   He is taking TF nocturnally (5 cartons of Isosource but in process of changing to Glucerna) He is taking is little po- maybe 600 calories (taking most of these calories from Boost VHC)   He has been losing weight, this might be most related to his high glucose as calorie intake is sufficient.       Review of Systems:  A review of systems has been completed and are negative for complaints except what is stated in the HPI and/or past medical history      Allergies  Omeprazole, Sulfa, shellfish    Medications:  Acetaminophen, atenolol, dexamethasone, Emla cream, metformin 1000 mg twice daily, multivitamin, naloxone, nystatin, olanzapine, Actos, prochlorperazine, trazodone     Past Medical History:   Type 2 diabetes mellitus on oral hypoglycemics, hypertension controlled with medications.  Cataract, CKD (chronic kidney disease), Colon polyp, Deviated septum, ED (erectile dysfunction), Esophageal cancer, GERD (gastroesophageal reflux disease), Hyperlipidemia, Nephrolithiasis, Sleep apnea (wear CPAP/BiPAP)    Surgical History:    Past Surgical History:   Procedure Laterality Date    BLADDER SURGERY      COLONOSCOPY  2023    Repeat in one year    EYE SURGERY      LITHOTRIPSY      OTHER SURGICAL HISTORY      Vocal cord surgery    TRANSURETHRAL RESECTION OF PROSTATE      UPPER GASTROINTESTINAL ENDOSCOPY  03/02/2024      Family History:    Family History   Problem Relation Name Age of Onset    Stroke Father      Kidney cancer Brother Eliu     Stroke Brother Eliu      Family Oncology History:    Cancer-related family history  includes Kidney cancer in his brother.    Social History:      Tobacco Use    Smoking status: Former     Current packs/day: 0.00     Average packs/day: 2.0 packs/day for 40.0 years (80.0 ttl pk-yrs)     Types: Cigarettes     Start date: 1959     Quit date: 1999     Years since quittin.4    Smokeless tobacco: Never   Vaping Use    Vaping status: Never Used   Substance Use Topics    Alcohol use: Yes     Alcohol/week: 2.0 standard drinks of alcohol     Types: 2 Cans of beer per week     Comment: couple beers a week    Drug use: Not Currently     Comment: gummies-dispensary from [place]        Vital Signs:  /76 (BP Location: Left arm, Patient Position: Sitting, BP Cuff Size: Adult long)   Pulse 74   Temp 36.1 °C (97 °F) (Temporal)   Resp 18   Wt 71.9 kg (158 lb 8.2 oz)   SpO2 98%   BMI 25.63 kg/m²     Physical Exam:  ECO  Pain: 0  Constitutional: Well developed, awake/alert/oriented x3, no distress, alert and cooperative  Eyes: PER. sclera anicteric  ENMT: Oral mucosa moist  Respiratory/Thorax: Breathing is non-labored. Lungs are clear to auscultation bilaterally. No adventitious breath sounds  Cardiovascular: S1-S2. Regular rate and rhythm. No murmurs, rubs, or gallops appreciated  Gastrointestinal: Abdomen soft nontender, nondistended, normal active bowel sounds.  Musculoskeletal: ROM intact, no joint swelling, normal strength  Extremities: normal extremities, no cyanosis, no edema, no clubbing  Neurologic: alert and oriented x3. Nonfocal exam. No myoclonus  Psychological: Pleasant, appropriate and easily engaged     Lab Results:  WBC 4.6, hemoglobin 14.2, hematocrit 41.6, platelets 136,000, ANC 3510  Creatinine 0.69, EGFR> 90  Alkaline phosphatase 58, ALT 11, AST 9  Potassium 4.0, magnesium 1.91  Glucose 305    Assessment:  Patient presents today for routine follow-up evaluation with plans to receive day 22 of Carboplatin/Paclitaxel with concurrent radiation therapy.  Patient has had elevated  glucose levels over the past week.  He had previously been taken off his metformin and Actos.  This was restarted in the past week as described above and glucose levels are slowly trending downward.  He is also working with our dietitian to rotate from Isosource to Glucerna.  Due to his glucose levels he has noted increased thirst.  He noticed dysgeusia this may be related to thought thrush, we will start him on nystatin swish and swallow 4 times daily.  He will continue to monitor his blood glucose levels.    Plan:  Proceed with treatment today. Continue daily radiation as per radiation oncology team.   Medical Oncology follow-up in 1 week.             Ciro Aranda, APRN-CNP

## 2024-05-28 NOTE — PROGRESS NOTES
"NUTRITION Follow-up NOTE    Nutrition Assessment     Reason for Visit:  Juan M Mcrae is a 78 y.o. male who presents for esophageal cancer (lower esophageal/ GE junction)He is recceiving chemotherapy at [facility] (carbo / paclitaxel) began 5-6-2024)  Proton at [facility] ( began 5-6-2024 today is 15/28)    Am following up with him today to assess BS, TF intake and po intake.    Pt is seen in [facility] - his significant other is with him       Hx: DM, HTN  Allergy: omeprazole  A1c 6.8 (2/5/24) -- pt taken off metformin in past with weight loss  - was started back on Actos recently - steroid dose today was decreased with last infusion - added back metformin 5- (1000 mg 2 times per day)     Jtube to be placed on 4/8    Pt with CINDI Stovall -         Lab Results   Component Value Date/Time    GLUCOSE 419 (H) 05/20/2024 0902     05/20/2024 0902    K 4.1 05/20/2024 0902     05/20/2024 0902    CO2 29 05/20/2024 0902    ANIONGAP 12 05/20/2024 0902    BUN 34 (H) 05/20/2024 0902    CREATININE 0.82 05/20/2024 0902    EGFR 89 05/20/2024 0902    CALCIUM 8.8 05/20/2024 0902    ALBUMIN 3.9 05/20/2024 0902    ALKPHOS 66 05/20/2024 0902    PROT 6.3 (L) 05/20/2024 0902    AST 7 (L) 05/20/2024 0902    BILITOT 0.5 05/20/2024 0902    ALT 14 05/20/2024 0902     No results found for: \"VITD25\"    5- BS was 184, 5-3 was 160, 4- was 113    BS readings at home taken at 7am - when getting off nocturnal TF- range from 5-22 until today are 354 to 472.  (This includes addition of metformin     Labs from today and vitals pending- is leaving [facility] to go to [facility] for visit with NP and infusion     Anthropometrics:     HT:  167.5 cm   IBW: 64.5 kg  111% IBW  BMI: 25.6    In the past month he is down 5.5 kg (7.2%)  Pt reports his weight today on his home scale is 156# - his weight at baseline was 167#       Wt Readings from Last 10 Encounters:   05/21/24 72.8 kg (160 lb 8 oz)   05/20/24 71.7 kg (158 lb 2.9 oz)   05/17/24 " 73.5 kg (162 lb)   05/13/24 76 kg (167 lb 7 oz)   05/13/24 76 kg (167 lb 7 oz)   05/08/24 76.7 kg (169 lb)   05/06/24 77.9 kg (171 lb 10.1 oz)   04/25/24 77.1 kg (170 lb)   04/24/24 77.2 kg (170 lb 4.8 oz)   04/17/24 76.2 kg (168 lb)        Food And Nutrient Intake:           He reports po intake  Added back Boost VHC- 530 calories, 52 g CHO  and 22 g protein   1 egg mixed with carrasquillo  1/3 of a sausage carmen    TF  Back to Quantified Skin 1.5- 5 cartons as order not yet processed for the Glucerna 1.5  1875 calories  220 g CHO  85 g protein  Rate is at 93 ml per hour    Total on average from above:  2555 calories  272 g CHO (42.6% calories)   117 g protein (      Pt is only able to eat or pleasure at best at this time complaining of taste of Boost VHC and changes in taste in general.  I sonly able to tolerate room temperature water.  He does reports some nausea- nothing helping was going to try tums.  Has increased thirst.                                                                 Nutrition Focused Physical Exam Findings:  Completed 4/1/2024                        Energy Needs     2280 calories  91.2 g protein   2280 ml of free water       Nutrition Diagnosis   Malnutrition Diagnosis  Patient has Malnutrition Diagnosis: Yes  Diagnosis Status: Ongoing  Malnutrition Diagnosis: Moderate malnutrition related to chronic disease or condition  As Evidenced by: mild muscle/ fat loss noted and pt with variable intake due to dysphagia requiring altered consistency diet with intake < 75% of needs.  Additional Assessment Information: Continued weight loss noted s/p j-tube  placement - oral intake has declined, need for increase in enteral feeds and improved BS control    Nutrition Diagnosis  Patient has Nutrition Diagnosis: Yes  Diagnosis Status (1): Ongoing  Nutrition Diagnosis 1: Altered nutrition related to laboratory values  Related to (1): potential stress from treatment impacting BS control  As Evidenced by (1):  BS    Nutrition Interventions/Recommendations   Nutrition Prescription   TF- changed to   Glucerna 1.5- since po intake was not well tolerated - awaiting processing of order and delivery   Will continue with soft moist complex CHO foods paired with protein for pleasure       Food and Nutrition Delivery   At this time TF is providing essentially sole of nutrition     Nutrition Education       Coordination of Care   Medical oncology- reached out to NP today per message alexandre to discuss BS and diet changes that have been made.    Wil[place]- will call tomorrow to check on delivery of order   Radiation oncology   RDN covering [place]     There are no Patient Instructions on file for this visit.    Nutrition Monitoring and Evaluation        BS  Weight   tolerance to Glucerna 1.5 / Isosource 1.5  Tolerance to Po intake - fluids       Time Spent  Prep time on day of patient encounter: 5 minutes  Time spent directly with patient, family or caregiver: 15 minutes  Additional Time Spent on Patient Care Activities: 15 minutes  Documentation Time: 15 minutes  Other Time Spent: 0 minutes  Total: 50 minutes

## 2024-05-29 ENCOUNTER — HOSPITAL ENCOUNTER (OUTPATIENT)
Dept: RADIATION ONCOLOGY | Facility: HOSPITAL | Age: 79
Setting detail: RADIATION/ONCOLOGY SERIES
Discharge: HOME | End: 2024-05-29
Payer: MEDICARE

## 2024-05-29 DIAGNOSIS — Z51.0 ENCOUNTER FOR ANTINEOPLASTIC RADIATION THERAPY: ICD-10-CM

## 2024-05-29 DIAGNOSIS — C16.0 MALIGNANT NEOPLASM OF CARDIA (MULTI): ICD-10-CM

## 2024-05-29 LAB
RAD ONC MSQ ACTUAL FRACTIONS DELIVERED: 16
RAD ONC MSQ ACTUAL SESSION BIOLOGICAL DOSE: 180 CCGE
RAD ONC MSQ ACTUAL SESSION DELIVERED DOSE: 164 CGRAY
RAD ONC MSQ ACTUAL TOTAL BIOLOGICAL DOSE: 2886 CCGE
RAD ONC MSQ ACTUAL TOTAL DOSE: 2624 CGRAY
RAD ONC MSQ ELAPSED DAYS: 23
RAD ONC MSQ LAST DATE: NORMAL
RAD ONC MSQ PRESCRIBED BIOLOGICAL FRACTIONAL DOSE: 180 CCGE
RAD ONC MSQ PRESCRIBED BIOLOGICAL TOTAL DOSE: 5040 CCGE
RAD ONC MSQ PRESCRIBED FRACTIONAL DOSE: 164 CGRAY
RAD ONC MSQ PRESCRIBED NUMBER OF FRACTIONS: 28
RAD ONC MSQ PRESCRIBED TECHNIQUE: NORMAL
RAD ONC MSQ PRESCRIBED TOTAL DOSE: 4582 CGRAY
RAD ONC MSQ PRESCRIPTION PATTERN COMMENT: NORMAL
RAD ONC MSQ START DATE: NORMAL
RAD ONC MSQ TREATMENT COURSE NUMBER: 1
RAD ONC MSQ TREATMENT SITE: NORMAL

## 2024-05-29 PROCEDURE — 77523 PROTON TRMT INTERMEDIATE: CPT | Performed by: RADIOLOGY

## 2024-05-29 PROCEDURE — 77387 GUIDANCE FOR RADJ TX DLVR: CPT | Performed by: RADIOLOGY

## 2024-05-30 ENCOUNTER — OFFICE VISIT (OUTPATIENT)
Dept: PRIMARY CARE | Facility: CLINIC | Age: 79
End: 2024-05-30
Payer: MEDICARE

## 2024-05-30 ENCOUNTER — NUTRITION (OUTPATIENT)
Dept: HEMATOLOGY/ONCOLOGY | Facility: HOSPITAL | Age: 79
End: 2024-05-30
Payer: MEDICARE

## 2024-05-30 ENCOUNTER — HOSPITAL ENCOUNTER (OUTPATIENT)
Dept: RADIATION ONCOLOGY | Facility: HOSPITAL | Age: 79
Setting detail: RADIATION/ONCOLOGY SERIES
Discharge: HOME | End: 2024-05-30
Payer: MEDICARE

## 2024-05-30 VITALS
WEIGHT: 159.6 LBS | BODY MASS INDEX: 25.8 KG/M2 | DIASTOLIC BLOOD PRESSURE: 70 MMHG | SYSTOLIC BLOOD PRESSURE: 118 MMHG | OXYGEN SATURATION: 95 % | HEART RATE: 79 BPM

## 2024-05-30 DIAGNOSIS — C16.0 MALIGNANT NEOPLASM OF CARDIA (MULTI): ICD-10-CM

## 2024-05-30 DIAGNOSIS — Z51.0 ENCOUNTER FOR ANTINEOPLASTIC RADIATION THERAPY: ICD-10-CM

## 2024-05-30 DIAGNOSIS — C15.5 MALIGNANT NEOPLASM OF LOWER THIRD OF ESOPHAGUS (MULTI): ICD-10-CM

## 2024-05-30 DIAGNOSIS — E11.65 TYPE 2 DIABETES MELLITUS WITH HYPERGLYCEMIA, WITHOUT LONG-TERM CURRENT USE OF INSULIN (MULTI): Primary | ICD-10-CM

## 2024-05-30 LAB
RAD ONC MSQ ACTUAL FRACTIONS DELIVERED: 17
RAD ONC MSQ ACTUAL SESSION BIOLOGICAL DOSE: 180 CCGE
RAD ONC MSQ ACTUAL SESSION DELIVERED DOSE: 164 CGRAY
RAD ONC MSQ ACTUAL TOTAL BIOLOGICAL DOSE: 3067 CCGE
RAD ONC MSQ ACTUAL TOTAL DOSE: 2788 CGRAY
RAD ONC MSQ ELAPSED DAYS: 24
RAD ONC MSQ LAST DATE: NORMAL
RAD ONC MSQ PRESCRIBED BIOLOGICAL FRACTIONAL DOSE: 180 CCGE
RAD ONC MSQ PRESCRIBED BIOLOGICAL TOTAL DOSE: 5040 CCGE
RAD ONC MSQ PRESCRIBED FRACTIONAL DOSE: 164 CGRAY
RAD ONC MSQ PRESCRIBED NUMBER OF FRACTIONS: 28
RAD ONC MSQ PRESCRIBED TECHNIQUE: NORMAL
RAD ONC MSQ PRESCRIBED TOTAL DOSE: 4582 CGRAY
RAD ONC MSQ PRESCRIPTION PATTERN COMMENT: NORMAL
RAD ONC MSQ START DATE: NORMAL
RAD ONC MSQ TREATMENT COURSE NUMBER: 1
RAD ONC MSQ TREATMENT SITE: NORMAL

## 2024-05-30 PROCEDURE — 1157F ADVNC CARE PLAN IN RCRD: CPT | Performed by: STUDENT IN AN ORGANIZED HEALTH CARE EDUCATION/TRAINING PROGRAM

## 2024-05-30 PROCEDURE — 1126F AMNT PAIN NOTED NONE PRSNT: CPT | Performed by: STUDENT IN AN ORGANIZED HEALTH CARE EDUCATION/TRAINING PROGRAM

## 2024-05-30 PROCEDURE — 1123F ACP DISCUSS/DSCN MKR DOCD: CPT | Performed by: STUDENT IN AN ORGANIZED HEALTH CARE EDUCATION/TRAINING PROGRAM

## 2024-05-30 PROCEDURE — 77387 GUIDANCE FOR RADJ TX DLVR: CPT | Performed by: RADIOLOGY

## 2024-05-30 PROCEDURE — 1159F MED LIST DOCD IN RCRD: CPT | Performed by: STUDENT IN AN ORGANIZED HEALTH CARE EDUCATION/TRAINING PROGRAM

## 2024-05-30 PROCEDURE — 77523 PROTON TRMT INTERMEDIATE: CPT | Performed by: RADIOLOGY

## 2024-05-30 PROCEDURE — 99214 OFFICE O/P EST MOD 30 MIN: CPT | Performed by: STUDENT IN AN ORGANIZED HEALTH CARE EDUCATION/TRAINING PROGRAM

## 2024-05-30 PROCEDURE — 1158F ADVNC CARE PLAN TLK DOCD: CPT | Performed by: STUDENT IN AN ORGANIZED HEALTH CARE EDUCATION/TRAINING PROGRAM

## 2024-05-30 RX ORDER — ONDANSETRON HYDROCHLORIDE 8 MG/1
8 TABLET, FILM COATED ORAL EVERY 8 HOURS PRN
COMMUNITY

## 2024-05-30 RX ORDER — OXYCODONE HYDROCHLORIDE 5 MG/1
CAPSULE ORAL
COMMUNITY

## 2024-05-30 ASSESSMENT — PAIN SCALES - GENERAL: PAINLEVEL: 0-NO PAIN

## 2024-05-30 NOTE — PROGRESS NOTES
GENERAL PROGRESS NOTE    Chief Complaint   Patient presents with    Follow-up     Higher blood sugar lately. Currently doing chemo and radiation        HPI  Juan M Mcrae is a 78 y.o. male that presents today to discuss elevated blood sugar readings. He is here today with his girlfriend Helga. Recently started on chemo and radiation for esophageal cancer. Has had 4 chemo sessions and 20 radiation treatments. Started treatment 3 weeks ago. Blood sugars have been running high since starting chemo. Says oncologist took him off all of his diabetes medications prior to starting treatment due to weight loss. He was previously taking metformin and pioglitazone. Was asked last week by dietician to start tracking his blood sugars and has been in the 300-400's. Since then he has started back taking pioglitazone and metformin 1000mg BID. Is quite concerned as blood sugars still running in the 200-300s. Has been drinking Boost 500 calorie drink once daily to help him gain some of his weight back. He has feeding tube but is also taking a little in orally. Will be having surgery sometime late June or July.     Vital signs   /70 (BP Location: Left arm)   Pulse 79   Wt 72.4 kg (159 lb 9.6 oz)   SpO2 95%   BMI 25.80 kg/m²      Current Outpatient Medications   Medication Sig Dispense Refill    acetaminophen (Tylenol) 650 mg/20.3 mL solution oral liquid 20.3 mL (650 mg) by j-tube route every 6 hours if needed for pain.      atenolol (Tenormin) 50 mg tablet 1 tablet (50 mg) by j-tube route once daily.      dexAMETHasone (Decadron) 4 mg tablet Take 2 tablets (8 mg) by mouth once daily. For 3 days per week starting the day after treatment 36 tablet 0    lidocaine-prilocaine (Emla) 2.5-2.5 % cream Apply topically once daily as needed for mild pain (1 - 3). 30 g 2    metFORMIN (Glucophage) 500 mg tablet Take 2 tablets  (1,000 mg) by mouth 2 times daily (morning and late afternoon).      multivitamin with minerals iron-free (Centrum Silver) 1 tablet by j-tube route once daily.      naloxone (Narcan) 0.4 mg/mL injection Infuse 0.5 mL (0.2 mg) into a venous catheter.      nystatin (Mycostatin) 100,000 unit/mL suspension Take 5 mL (500,000 Units) by mouth 4 times a day for 40 doses. Swish in mouth and swallow. 280 mL 0    OLANZapine (ZyPREXA) 5 mg tablet Take 1 tablet (5 mg) by mouth once daily at bedtime. For 4 doses per week starting the evening of treatment 30 tablet 3    ondansetron (Zofran) 8 mg tablet Take 1 tablet (8 mg) by mouth every 8 hours if needed for nausea or vomiting.      oxyCODONE (Oxy-IR) 5 mg immediate release capsule Take by mouth.      pioglitazone (Actos) 30 mg tablet Take 1 tablet (30 mg) by mouth once daily.      prochlorperazine (Compazine) 10 mg tablet Take 1 tablet (10 mg) by mouth every 6 hours if needed for nausea or vomiting. 30 tablet 5    traZODone (Desyrel) 100 mg tablet 1 tablet (100 mg) by j-tube route once daily at bedtime. (Patient taking differently: 0.5 tablets (50 mg) by j-tube route once daily at bedtime.) 90 tablet 0     No current facility-administered medications for this visit.     Facility-Administered Medications Ordered in Other Visits   Medication Dose Route Frequency Provider Last Rate Last Admin    heparin flush 100 unit/mL syringe 500 Units  500 Units intra-catheter PRN Catarino Fry MD   500 Units at 05/03/24 1151       Review of Systems   Constitutional:  Negative for chills and fever.   Respiratory:  Negative for shortness of breath.    Cardiovascular:  Negative for chest pain.   Endocrine: Negative for polydipsia and polyuria.   Neurological:  Negative for dizziness and headaches.        Physical Exam  Nursing notes and vitals reviewed  General appearance - AAOx3, non distressed  CVS - Warm and well perfused  RESP - No respiratory distress  PSYCH - Normal thought content,  fluent and appropriate speech      Assessment/Plan   1. Type 2 diabetes mellitus with hyperglycemia, without long-term current use of insulin (Multi)  Elevated blood sugars in setting of medication discontinuation and steroid administration in conjunction with his chemotherapy treatment. Agree with resumption of medication. He will continue checking his blood sugars. Reassured his blood sugars should improve in time as he just started back on medication. Will refer to clinical pharmacist Melina Leroy to set up with CGM for ease of blood sugar monitoring. Will further adjust medication as indicated. He is instructed to follow up with me in 2 months.     2. 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)        Candy Grossman MD  05/30/24  3:51 PM

## 2024-05-30 NOTE — PROGRESS NOTES
"NUTRITION Follow-up NOTE    Nutrition Assessment     Reason for Visit:  Juan M Mcrae is a 78 y.o. male who presents for esophageal cancer (lower esophageal/ GE junction)He is recceiving chemotherapy at [place] (carbo / paclitaxel) began 5-6-2024)  Proton at [facility] ( began 5-6-2024 today is 15/28)    Am following up with him today to assess BS, TF intake and po intake.    I am calling pt as I missed him in [place]    He reports he was given a number to call to set up an appointment for endocrine  He tells me it is not until October  He is going to reach out to his PCP    He tells me the Glucerna 1.5 is set to deliver today    I will plan on seeing him Monday in [facility] to check on BS- appointments and weight.        Hx: DM, HTN  Allergy: omeprazole  A1c 6.8 (2/5/24) -- pt taken off metformin in past with weight loss  - was started back on Actos recently - steroid dose today was decreased with last infusion - added back metformin 5- (1000 mg 2 times per day)     Jtube to be placed on 4/8    Pt with CINDI Stovall -         Lab Results   Component Value Date/Time    GLUCOSE 305 (H) 05/28/2024 1251     05/28/2024 1251    K 4.0 05/28/2024 1251     05/28/2024 1251    CO2 29 05/28/2024 1251    ANIONGAP 11 05/28/2024 1251    BUN 23 05/28/2024 1251    CREATININE 0.69 05/28/2024 1251    EGFR >90 05/28/2024 1251    CALCIUM 9.0 05/28/2024 1251    ALBUMIN 3.6 05/28/2024 1251    ALKPHOS 58 05/28/2024 1251    PROT 6.1 (L) 05/28/2024 1251    AST 9 05/28/2024 1251    BILITOT 0.4 05/28/2024 1251    ALT 11 05/28/2024 1251     No results found for: \"VITD25\"    5- BS was 184, 5-3 was 160, 4- was 113    BS readings at home taken at 7am - when getting off nocturnal TF- range from 5-22 until today are 354 to 472.  (This includes addition of metformin         Anthropometrics:     HT:  167.5 cm   IBW: 64.5 kg  111% IBW  BMI: 25.6    In the past month he is down 5.5 kg (7.2%)  Pt reports his weight today on his " home scale is 156# - his weight at baseline was 167#   Weight potentially stable in last week       Wt Readings from Last 10 Encounters:   05/28/24 71.9 kg (158 lb 8.2 oz)   05/21/24 72.8 kg (160 lb 8 oz)   05/20/24 71.7 kg (158 lb 2.9 oz)   05/17/24 73.5 kg (162 lb)   05/13/24 76 kg (167 lb 7 oz)   05/13/24 76 kg (167 lb 7 oz)   05/08/24 76.7 kg (169 lb)   05/06/24 77.9 kg (171 lb 10.1 oz)   04/25/24 77.1 kg (170 lb)   04/24/24 77.2 kg (170 lb 4.8 oz)        Food And Nutrient Intake:           TF  1.5- 5 cartons as order not yet processed for the Glucerna 1.5  1875 calories  220 g CHO  85 g protein  Rate is at 93 ml per hour        Pt is only able to eat or pleasure at best at this time complaining of taste of Boost VHC and changes in taste in general.  Is only able to tolerate room temperature water.                                                               Nutrition Focused Physical Exam Findings:  Completed 4/1/2024                        Energy Needs     2280 calories  91.2 g protein   2280 ml of free water       Nutrition Diagnosis             Nutrition Interventions/Recommendations   Nutrition Prescription   TF- changed to   Glucerna 1.5- will begin tonight once he receives delivery    Will continue with soft moist complex CHO foods paired with protein for pleasure       Food and Nutrition Delivery   At this time TF is providing essentially sole of nutrition     Nutrition Education       Coordination of Care   Medical oncology- reached out to NP today per message alexandre to discuss BS and diet changes that have been made.  - he will call PCP regarding BS control due to time frame of Endocrine appointment   [place]  Radiation oncology   RDN covering [place]     There are no Patient Instructions on file for this visit.    Nutrition Monitoring and Evaluation        BS  Weight   tolerance to Glucerna 1.5 / Isosource 1.5  Tolerance to Po intake - fluids       Time Spent  Prep time on day of patient encounter: 5  minutes  Time spent directly with patient, family or caregiver: 5 minutes  Additional Time Spent on Patient Care Activities: 5 minutes  Documentation Time: 10 minutes  Other Time Spent: 5 minutes  Total: 30 minutes

## 2024-05-31 ENCOUNTER — EDUCATION (OUTPATIENT)
Dept: HEMATOLOGY/ONCOLOGY | Facility: HOSPITAL | Age: 79
End: 2024-05-31
Payer: MEDICARE

## 2024-05-31 ENCOUNTER — HOSPITAL ENCOUNTER (OUTPATIENT)
Dept: RADIATION ONCOLOGY | Facility: HOSPITAL | Age: 79
Setting detail: RADIATION/ONCOLOGY SERIES
Discharge: HOME | End: 2024-05-31
Payer: MEDICARE

## 2024-05-31 DIAGNOSIS — Z51.0 ENCOUNTER FOR ANTINEOPLASTIC RADIATION THERAPY: ICD-10-CM

## 2024-05-31 DIAGNOSIS — C16.0 MALIGNANT NEOPLASM OF CARDIA (MULTI): ICD-10-CM

## 2024-05-31 LAB
RAD ONC MSQ ACTUAL FRACTIONS DELIVERED: 18
RAD ONC MSQ ACTUAL SESSION BIOLOGICAL DOSE: 180 CCGE
RAD ONC MSQ ACTUAL SESSION DELIVERED DOSE: 164 CGRAY
RAD ONC MSQ ACTUAL TOTAL BIOLOGICAL DOSE: 3247 CCGE
RAD ONC MSQ ACTUAL TOTAL DOSE: 2952 CGRAY
RAD ONC MSQ ELAPSED DAYS: 25
RAD ONC MSQ LAST DATE: NORMAL
RAD ONC MSQ PRESCRIBED BIOLOGICAL FRACTIONAL DOSE: 180 CCGE
RAD ONC MSQ PRESCRIBED BIOLOGICAL TOTAL DOSE: 5040 CCGE
RAD ONC MSQ PRESCRIBED FRACTIONAL DOSE: 164 CGRAY
RAD ONC MSQ PRESCRIBED NUMBER OF FRACTIONS: 28
RAD ONC MSQ PRESCRIBED TECHNIQUE: NORMAL
RAD ONC MSQ PRESCRIBED TOTAL DOSE: 4582 CGRAY
RAD ONC MSQ PRESCRIPTION PATTERN COMMENT: NORMAL
RAD ONC MSQ START DATE: NORMAL
RAD ONC MSQ TREATMENT COURSE NUMBER: 1
RAD ONC MSQ TREATMENT SITE: NORMAL

## 2024-05-31 PROCEDURE — 77523 PROTON TRMT INTERMEDIATE: CPT | Performed by: RADIOLOGY

## 2024-05-31 PROCEDURE — 77387 GUIDANCE FOR RADJ TX DLVR: CPT | Performed by: RADIOLOGY

## 2024-05-31 PROCEDURE — 77336 RADIATION PHYSICS CONSULT: CPT | Performed by: RADIOLOGY

## 2024-05-31 NOTE — PROGRESS NOTES
Research Note Unscheduled Visit     Juan M Mcrae is enrolled on [employer]- is here today for Week 4. Patient states he is able to eat chili and is swallowing that adequately now.  He has followed up with his PCP regarding his BG levels and has a change to his TF formula. No other new issues or concerns, all questions answered and follow accordingly.    Education Documentation  pioglitazone HCl, taught by Carine Adan RN at 5/31/2024  9:40 AM.  Learner: Family, Patient  Readiness: Acceptance  Method: Explanation, Demonstration  Response: Verbalizes Understanding, Demonstrated Understanding    Signs and Symptoms of Infection, taught by Carine Adan RN at 5/31/2024  9:40 AM.  Learner: Family, Patient  Readiness: Acceptance  Method: Explanation, Demonstration  Response: Verbalizes Understanding, Demonstrated Understanding    Associated Bloodstream Infection Prevention, taught by Carine Adan RN at 5/31/2024  9:40 AM.  Learner: Family, Patient  Readiness: Acceptance  Method: Explanation, Demonstration  Response: Verbalizes Understanding, Demonstrated Understanding    How to Prevent Central Line Infections, taught by Carine Adan RN at 5/31/2024  9:40 AM.  Learner: Family, Patient  Readiness: Acceptance  Method: Explanation, Demonstration  Response: Verbalizes Understanding, Demonstrated Understanding    Central Line Infections, taught by Carine Adan RN at 5/31/2024  9:40 AM.  Learner: Family, Patient  Readiness: Acceptance  Method: Explanation, Demonstration  Response: Verbalizes Understanding, Demonstrated Understanding    How to Care for a Central Line Catheter, taught by Carine Adan RN at 5/31/2024  9:40 AM.  Learner: Family, Patient  Readiness: Acceptance  Method: Explanation, Demonstration  Response: Verbalizes Understanding, Demonstrated Understanding    Alternatives to a Central Line, taught by Carine Adan RN at 5/31/2024  9:40 AM.  Learner:  Family, Patient  Readiness: Acceptance  Method: Explanation, Demonstration  Response: Verbalizes Understanding, Demonstrated Understanding    Central Line Catheter, taught by Carine Adan RN at 5/31/2024  9:40 AM.  Learner: Family, Patient  Readiness: Acceptance  Method: Explanation, Demonstration  Response: Verbalizes Understanding, Demonstrated Understanding    Neutropenia During Cancer Treatment, taught by Carine Adan RN at 5/31/2024  9:40 AM.  Learner: Family, Patient  Readiness: Acceptance  Method: Explanation, Demonstration  Response: Verbalizes Understanding, Demonstrated Understanding    Chemotherapy : What is chemotherapy, taught by Carine Adan RN at 5/31/2024  9:40 AM.  Learner: Family, Patient  Readiness: Acceptance  Method: Explanation, Demonstration  Response: Verbalizes Understanding, Demonstrated Understanding    Chemotherapy : Review, taught by Carine Adan RN at 5/31/2024  9:40 AM.  Learner: Family, Patient  Readiness: Acceptance  Method: Explanation, Demonstration  Response: Verbalizes Understanding, Demonstrated Understanding    Chemotherapy : Possible Side Effects, taught by Carine Adan RN at 5/31/2024  9:40 AM.  Learner: Family, Patient  Readiness: Acceptance  Method: Explanation, Demonstration  Response: Verbalizes Understanding, Demonstrated Understanding    Chemotherapy : Life During Treatment, taught by Carine Adan RN at 5/31/2024  9:40 AM.  Learner: Family, Patient  Readiness: Acceptance  Method: Explanation, Demonstration  Response: Verbalizes Understanding, Demonstrated Understanding    Chemotherapy : Getting Chemotherapy, taught by Carine Adan RN at 5/31/2024  9:40 AM.  Learner: Family, Patient  Readiness: Acceptance  Method: Explanation, Demonstration  Response: Verbalizes Understanding, Demonstrated Understanding    Chemotherapy : Common concerns, taught by Carine Adan RN at 5/31/2024  9:40 AM.  Learner:  Family, Patient  Readiness: Acceptance  Method: Explanation, Demonstration  Response: Verbalizes Understanding, Demonstrated Understanding    Pain Management, taught by Carine Adan RN at 5/31/2024  9:40 AM.  Learner: Family, Patient  Readiness: Acceptance  Method: Explanation, Demonstration  Response: Verbalizes Understanding, Demonstrated Understanding    Isolation Precautions, taught by Carine Adan RN at 5/31/2024  9:40 AM.  Learner: Family, Patient  Readiness: Acceptance  Method: Explanation, Demonstration  Response: Verbalizes Understanding, Demonstrated Understanding    Preventing Infections, taught by Carine Adan RN at 5/31/2024  9:40 AM.  Learner: Family, Patient  Readiness: Acceptance  Method: Explanation, Demonstration  Response: Verbalizes Understanding, Demonstrated Understanding    Fall Precautions, taught by Carine Adan RN at 5/31/2024  9:40 AM.  Learner: Family, Patient  Readiness: Acceptance  Method: Explanation, Demonstration  Response: Verbalizes Understanding, Demonstrated Understanding    Equipment, taught by Carine Adan RN at 5/31/2024  9:40 AM.  Learner: Family, Patient  Readiness: Acceptance  Method: Explanation, Demonstration  Response: Verbalizes Understanding, Demonstrated Understanding    Orientation to Unit, taught by Carine Adan RN at 5/31/2024  9:40 AM.  Learner: Family, Patient  Readiness: Acceptance  Method: Explanation, Demonstration  Response: Verbalizes Understanding, Demonstrated Understanding    Returning to Work/School/Activities, taught by Carine Adan RN at 5/31/2024  9:40 AM.  Learner: Family, Patient  Readiness: Acceptance  Method: Explanation, Demonstration  Response: Verbalizes Understanding, Demonstrated Understanding    Stress Management, taught by Carine Adan RN at 5/31/2024  9:40 AM.  Learner: Family, Patient  Readiness: Acceptance  Method: Explanation, Demonstration  Response: Verbalizes  Understanding, Demonstrated Understanding    Changing Role with Self and Family, taught by Carine Adan RN at 5/31/2024  9:40 AM.  Learner: Family, Patient  Readiness: Acceptance  Method: Explanation, Demonstration  Response: Verbalizes Understanding, Demonstrated Understanding    Leisure Education, taught by Carine Adan RN at 5/31/2024  9:40 AM.  Learner: Family, Patient  Readiness: Acceptance  Method: Explanation, Demonstration  Response: Verbalizes Understanding, Demonstrated Understanding    Health Systems & Community Resources, taught by Carine Adan RN at 5/31/2024  9:40 AM.  Learner: Family, Patient  Readiness: Acceptance  Method: Explanation, Demonstration  Response: Verbalizes Understanding, Demonstrated Understanding    Advanced Medical Directives, taught by Carine Adan RN at 5/31/2024  9:40 AM.  Learner: Family, Patient  Readiness: Acceptance  Method: Explanation, Demonstration  Response: Verbalizes Understanding, Demonstrated Understanding    Escort, Parking, Transportation Home, taught by Carine Adan RN at 5/31/2024  9:40 AM.  Learner: Family, Patient  Readiness: Acceptance  Method: Explanation, Demonstration  Response: Verbalizes Understanding, Demonstrated Understanding    Referrals to Support Services, taught by Carine Adan RN at 5/31/2024  9:40 AM.  Learner: Family, Patient  Readiness: Acceptance  Method: Explanation, Demonstration  Response: Verbalizes Understanding, Demonstrated Understanding    Support Systems, taught by Carine Adan RN at 5/31/2024  9:40 AM.  Learner: Family, Patient  Readiness: Acceptance  Method: Explanation, Demonstration  Response: Verbalizes Understanding, Demonstrated Understanding    Financial Assistance Information, taught by Carine Adan RN at 5/31/2024  9:40 AM.  Learner: Family, Patient  Readiness: Acceptance  Method: Explanation, Demonstration  Response: Verbalizes Understanding, Demonstrated  Understanding    Financial Benefits Summary, taught by Carine Adan RN at 5/31/2024  9:40 AM.  Learner: Family, Patient  Readiness: Acceptance  Method: Explanation, Demonstration  Response: Verbalizes Understanding, Demonstrated Understanding    Healthy Lifestyle, taught by Carine Adan RN at 5/31/2024  9:40 AM.  Learner: Family, Patient  Readiness: Acceptance  Method: Explanation, Demonstration  Response: Verbalizes Understanding, Demonstrated Understanding    Monitoring Weight, taught by Carine Adan RN at 5/31/2024  9:40 AM.  Learner: Family, Patient  Readiness: Acceptance  Method: Explanation, Demonstration  Response: Verbalizes Understanding, Demonstrated Understanding    Tips for Daily Living, taught by Carine Adan RN at 5/31/2024  9:40 AM.  Learner: Family, Patient  Readiness: Acceptance  Method: Explanation, Demonstration  Response: Verbalizes Understanding, Demonstrated Understanding    Nutrition/Diet, taught by Carine Adan RN at 5/31/2024  9:40 AM.  Learner: Family, Patient  Readiness: Acceptance  Method: Explanation, Demonstration  Response: Verbalizes Understanding, Demonstrated Understanding    Food-Drug Interactions, taught by Carine Adan RN at 5/31/2024  9:40 AM.  Learner: Family, Patient  Readiness: Acceptance  Method: Explanation, Demonstration  Response: Verbalizes Understanding, Demonstrated Understanding    Nutrition, taught by Carine Adan RN at 5/31/2024  9:40 AM.  Learner: Family, Patient  Readiness: Acceptance  Method: Explanation, Demonstration  Response: Verbalizes Understanding, Demonstrated Understanding    Pain Medication Actions & Side Effects, taught by Carine Adan RN at 5/31/2024  9:40 AM.  Learner: Family, Patient  Readiness: Acceptance  Method: Explanation, Demonstration  Response: Verbalizes Understanding, Demonstrated Understanding    Patient Controlled Analgesia, taught by Carine Adan RN at  5/31/2024  9:40 AM.  Learner: Family, Patient  Readiness: Acceptance  Method: Explanation, Demonstration  Response: Verbalizes Understanding, Demonstrated Understanding    Discuss the Use of Pain Control Measures Before Pain Becomes Severe, taught by Carine Adan RN at 5/31/2024  9:40 AM.  Learner: Family, Patient  Readiness: Acceptance  Method: Explanation, Demonstration  Response: Verbalizes Understanding, Demonstrated Understanding    Pain Management, taught by Carine Adan RN at 5/31/2024  9:40 AM.  Learner: Family, Patient  Readiness: Acceptance  Method: Explanation, Demonstration  Response: Verbalizes Understanding, Demonstrated Understanding    Pain Rating Scale, taught by Carine Adan RN at 5/31/2024  9:40 AM.  Learner: Family, Patient  Readiness: Acceptance  Method: Explanation, Demonstration  Response: Verbalizes Understanding, Demonstrated Understanding    Shortness of Breath, taught by Carine Adan RN at 5/31/2024  9:40 AM.  Learner: Family, Patient  Readiness: Acceptance  Method: Explanation, Demonstration  Response: Verbalizes Understanding, Demonstrated Understanding    Changes in Appetite, taught by Carine Adan RN at 5/31/2024  9:40 AM.  Learner: Family, Patient  Readiness: Acceptance  Method: Explanation, Demonstration  Response: Verbalizes Understanding, Demonstrated Understanding    Fertility Issues, taught by Carine Adan RN at 5/31/2024  9:40 AM.  Learner: Family, Patient  Readiness: Acceptance  Method: Explanation, Demonstration  Response: Verbalizes Understanding, Demonstrated Understanding    Memory Problems, taught by Carine Adan RN at 5/31/2024  9:40 AM.  Learner: Family, Patient  Readiness: Acceptance  Method: Explanation, Demonstration  Response: Verbalizes Understanding, Demonstrated Understanding    Skin and Nail Changes, taught by Carine Adan RN at 5/31/2024  9:40 AM.  Learner: Family, Patient  Readiness:  Acceptance  Method: Explanation, Demonstration  Response: Verbalizes Understanding, Demonstrated Understanding    Changes in Urination, taught by Carine Adan RN at 5/31/2024  9:40 AM.  Learner: Family, Patient  Readiness: Acceptance  Method: Explanation, Demonstration  Response: Verbalizes Understanding, Demonstrated Understanding    Bleeding Precautions, taught by Carine Adan RN at 5/31/2024  9:40 AM.  Learner: Family, Patient  Readiness: Acceptance  Method: Explanation, Demonstration  Response: Verbalizes Understanding, Demonstrated Understanding    Edema Management, taught by Carine Adan RN at 5/31/2024  9:40 AM.  Learner: Family, Patient  Readiness: Acceptance  Method: Explanation, Demonstration  Response: Verbalizes Understanding, Demonstrated Understanding    Care of Neuropathy, taught by Carine Adan RN at 5/31/2024  9:40 AM.  Learner: Family, Patient  Readiness: Acceptance  Method: Explanation, Demonstration  Response: Verbalizes Understanding, Demonstrated Understanding    Infection Control, taught by Carine Adan RN at 5/31/2024  9:40 AM.  Learner: Family, Patient  Readiness: Acceptance  Method: Explanation, Demonstration  Response: Verbalizes Understanding, Demonstrated Understanding    Alopecia, taught by Carine Adan RN at 5/31/2024  9:40 AM.  Learner: Family, Patient  Readiness: Acceptance  Method: Explanation, Demonstration  Response: Verbalizes Understanding, Demonstrated Understanding    Diarrhea, taught by Carine Adan RN at 5/31/2024  9:40 AM.  Learner: Family, Patient  Readiness: Acceptance  Method: Explanation, Demonstration  Response: Verbalizes Understanding, Demonstrated Understanding    Constipation, taught by Carine Adan RN at 5/31/2024  9:40 AM.  Learner: Family, Patient  Readiness: Acceptance  Method: Explanation, Demonstration  Response: Verbalizes Understanding, Demonstrated Understanding    Mouth Sores, taught  by Carine Adan RN at 5/31/2024  9:40 AM.  Learner: Family, Patient  Readiness: Acceptance  Method: Explanation, Demonstration  Response: Verbalizes Understanding, Demonstrated Understanding    Nausea Management, taught by Carine Adan RN at 5/31/2024  9:40 AM.  Learner: Family, Patient  Readiness: Acceptance  Method: Explanation, Demonstration  Response: Verbalizes Understanding, Demonstrated Understanding    Fatigue, taught by Carine Adan RN at 5/31/2024  9:40 AM.  Learner: Family, Patient  Readiness: Acceptance  Method: Explanation, Demonstration  Response: Verbalizes Understanding, Demonstrated Understanding    Radiation Therapy, taught by Carine Adan RN at 5/31/2024  9:40 AM.  Learner: Family, Patient  Readiness: Acceptance  Method: Explanation, Demonstration  Response: Verbalizes Understanding, Demonstrated Understanding    Post-Chemotherapy Education, taught by Carine Adan RN at 5/31/2024  9:40 AM.  Learner: Family, Patient  Readiness: Acceptance  Method: Explanation, Demonstration  Response: Verbalizes Understanding, Demonstrated Understanding    Chemotherapy Safety at Home, taught by Carine Adan RN at 5/31/2024  9:40 AM.  Learner: Family, Patient  Readiness: Acceptance  Method: Explanation, Demonstration  Response: Verbalizes Understanding, Demonstrated Understanding    Treatment Plan and Schedule, taught by Carine Adan RN at 5/31/2024  9:40 AM.  Learner: Family, Patient  Readiness: Acceptance  Method: Explanation, Demonstration  Response: Verbalizes Understanding, Demonstrated Understanding    General Medication Information, taught by Carine Adan RN at 5/31/2024  9:40 AM.  Learner: Family, Patient  Readiness: Acceptance  Method: Explanation, Demonstration  Response: Verbalizes Understanding, Demonstrated Understanding    Supportive Medications, taught by Carine Adan RN at 5/31/2024  9:40 AM.  Learner: Family,  Patient  Readiness: Acceptance  Method: Explanation, Demonstration  Response: Verbalizes Understanding, Demonstrated Understanding    Instructions on Use Provided, taught by Carine Adan RN at 5/31/2024  9:40 AM.  Learner: Family, Patient  Readiness: Acceptance  Method: Explanation, Demonstration  Response: Verbalizes Understanding, Demonstrated Understanding    Diagnostic Studies, taught by Carine Adan RN at 5/31/2024  9:40 AM.  Learner: Family, Patient  Readiness: Acceptance  Method: Explanation, Demonstration  Response: Verbalizes Understanding, Demonstrated Understanding    Tumor Markers, taught by Carine Adan RN at 5/31/2024  9:40 AM.  Learner: Family, Patient  Readiness: Acceptance  Method: Explanation, Demonstration  Response: Verbalizes Understanding, Demonstrated Understanding    Comprehensive Metabolic Panel (CMP), taught by Carine Adan RN at 5/31/2024  9:40 AM.  Learner: Family, Patient  Readiness: Acceptance  Method: Explanation, Demonstration  Response: Verbalizes Understanding, Demonstrated Understanding    Complete Blood Count with Differential (CBC w/ Diff), taught by Carine Adan RN at 5/31/2024  9:40 AM.  Learner: Family, Patient  Readiness: Acceptance  Method: Explanation, Demonstration  Response: Verbalizes Understanding, Demonstrated Understanding    When and How to Contact Clinic, taught by Carine Adan RN at 5/31/2024  9:40 AM.  Learner: Family, Patient  Readiness: Acceptance  Method: Explanation, Demonstration  Response: Verbalizes Understanding, Demonstrated Understanding    Material on New Diagnosis Provided, taught by Carine Adan RN at 5/31/2024  9:40 AM.  Learner: Family, Patient  Readiness: Acceptance  Method: Explanation, Demonstration  Response: Verbalizes Understanding, Demonstrated Understanding    Oriented to Facility, taught by Carine Adan RN at 5/31/2024  9:40 AM.  Learner: Family, Patient  Readiness:  Acceptance  Method: Explanation, Demonstration  Response: Verbalizes Understanding, Demonstrated Understanding    Education Comments  No comments found.

## 2024-06-03 ENCOUNTER — NUTRITION (OUTPATIENT)
Dept: HEMATOLOGY/ONCOLOGY | Facility: HOSPITAL | Age: 79
End: 2024-06-03

## 2024-06-03 ENCOUNTER — OFFICE VISIT (OUTPATIENT)
Dept: HEMATOLOGY/ONCOLOGY | Facility: CLINIC | Age: 79
End: 2024-06-03
Payer: MEDICARE

## 2024-06-03 ENCOUNTER — INFUSION (OUTPATIENT)
Dept: HEMATOLOGY/ONCOLOGY | Facility: CLINIC | Age: 79
End: 2024-06-03
Payer: MEDICARE

## 2024-06-03 ENCOUNTER — HOSPITAL ENCOUNTER (OUTPATIENT)
Dept: RADIATION ONCOLOGY | Facility: HOSPITAL | Age: 79
Setting detail: RADIATION/ONCOLOGY SERIES
Discharge: HOME | End: 2024-06-03
Payer: MEDICARE

## 2024-06-03 VITALS
WEIGHT: 158.73 LBS | OXYGEN SATURATION: 97 % | BODY MASS INDEX: 25.66 KG/M2 | SYSTOLIC BLOOD PRESSURE: 104 MMHG | TEMPERATURE: 98 F | DIASTOLIC BLOOD PRESSURE: 69 MMHG | HEART RATE: 84 BPM | RESPIRATION RATE: 18 BRPM

## 2024-06-03 DIAGNOSIS — Z51.0 ENCOUNTER FOR ANTINEOPLASTIC RADIATION THERAPY: ICD-10-CM

## 2024-06-03 DIAGNOSIS — C16.0 MALIGNANT NEOPLASM OF CARDIA (MULTI): ICD-10-CM

## 2024-06-03 DIAGNOSIS — C15.5 MALIGNANT NEOPLASM OF LOWER THIRD OF ESOPHAGUS (MULTI): Primary | ICD-10-CM

## 2024-06-03 DIAGNOSIS — C15.5 MALIGNANT NEOPLASM OF LOWER THIRD OF ESOPHAGUS (MULTI): ICD-10-CM

## 2024-06-03 LAB
ALBUMIN SERPL BCP-MCNC: 3.5 G/DL (ref 3.4–5)
ALP SERPL-CCNC: 56 U/L (ref 33–136)
ALT SERPL W P-5'-P-CCNC: 12 U/L (ref 10–52)
ANION GAP SERPL CALC-SCNC: 13 MMOL/L (ref 10–20)
AST SERPL W P-5'-P-CCNC: 10 U/L (ref 9–39)
BASOPHILS # BLD AUTO: 0.04 X10*3/UL (ref 0–0.1)
BASOPHILS NFR BLD AUTO: 0.7 %
BILIRUB SERPL-MCNC: 0.3 MG/DL (ref 0–1.2)
BUN SERPL-MCNC: 28 MG/DL (ref 6–23)
CALCIUM SERPL-MCNC: 8.2 MG/DL (ref 8.6–10.3)
CHLORIDE SERPL-SCNC: 101 MMOL/L (ref 98–107)
CO2 SERPL-SCNC: 27 MMOL/L (ref 21–32)
CREAT SERPL-MCNC: 0.75 MG/DL (ref 0.5–1.3)
DACRYOCYTES BLD QL SMEAR: NORMAL
EGFRCR SERPLBLD CKD-EPI 2021: >90 ML/MIN/1.73M*2
EOSINOPHIL # BLD AUTO: 0.06 X10*3/UL (ref 0–0.4)
EOSINOPHIL NFR BLD AUTO: 1.1 %
ERYTHROCYTE [DISTWIDTH] IN BLOOD BY AUTOMATED COUNT: 13.9 % (ref 11.5–14.5)
GIANT PLATELETS BLD QL SMEAR: NORMAL
GLUCOSE SERPL-MCNC: 297 MG/DL (ref 74–99)
HCT VFR BLD AUTO: 41.7 % (ref 41–52)
HGB BLD-MCNC: 14 G/DL (ref 13.5–17.5)
IMM GRANULOCYTES # BLD AUTO: 0.05 X10*3/UL (ref 0–0.5)
IMM GRANULOCYTES NFR BLD AUTO: 0.9 % (ref 0–0.9)
LYMPHOCYTES # BLD AUTO: 0.68 X10*3/UL (ref 0.8–3)
LYMPHOCYTES NFR BLD AUTO: 12.7 %
MAGNESIUM SERPL-MCNC: 1.89 MG/DL (ref 1.6–2.4)
MCH RBC QN AUTO: 32.6 PG (ref 26–34)
MCHC RBC AUTO-ENTMCNC: 33.6 G/DL (ref 32–36)
MCV RBC AUTO: 97 FL (ref 80–100)
MONOCYTES # BLD AUTO: 0.53 X10*3/UL (ref 0.05–0.8)
MONOCYTES NFR BLD AUTO: 9.9 %
NEUTROPHILS # BLD AUTO: 4.01 X10*3/UL (ref 1.6–5.5)
NEUTROPHILS NFR BLD AUTO: 74.7 %
NRBC BLD-RTO: 0 /100 WBCS (ref 0–0)
PLATELET # BLD AUTO: 135 X10*3/UL (ref 150–450)
POLYCHROMASIA BLD QL SMEAR: NORMAL
POTASSIUM SERPL-SCNC: 4.2 MMOL/L (ref 3.5–5.3)
PROT SERPL-MCNC: 5.9 G/DL (ref 6.4–8.2)
RAD ONC MSQ ACTUAL FRACTIONS DELIVERED: 19
RAD ONC MSQ ACTUAL SESSION BIOLOGICAL DOSE: 180 CCGE
RAD ONC MSQ ACTUAL SESSION DELIVERED DOSE: 164 CGRAY
RAD ONC MSQ ACTUAL TOTAL BIOLOGICAL DOSE: 3428 CCGE
RAD ONC MSQ ACTUAL TOTAL DOSE: 3116 CGRAY
RAD ONC MSQ ELAPSED DAYS: 28
RAD ONC MSQ LAST DATE: NORMAL
RAD ONC MSQ PRESCRIBED BIOLOGICAL FRACTIONAL DOSE: 180 CCGE
RAD ONC MSQ PRESCRIBED BIOLOGICAL TOTAL DOSE: 5040 CCGE
RAD ONC MSQ PRESCRIBED FRACTIONAL DOSE: 164 CGRAY
RAD ONC MSQ PRESCRIBED NUMBER OF FRACTIONS: 28
RAD ONC MSQ PRESCRIBED TECHNIQUE: NORMAL
RAD ONC MSQ PRESCRIBED TOTAL DOSE: 4582 CGRAY
RAD ONC MSQ PRESCRIPTION PATTERN COMMENT: NORMAL
RAD ONC MSQ START DATE: NORMAL
RAD ONC MSQ TREATMENT COURSE NUMBER: 1
RAD ONC MSQ TREATMENT SITE: NORMAL
RBC # BLD AUTO: 4.29 X10*6/UL (ref 4.5–5.9)
RBC MORPH BLD: NORMAL
SCHISTOCYTES BLD QL SMEAR: NORMAL
SODIUM SERPL-SCNC: 137 MMOL/L (ref 136–145)
WBC # BLD AUTO: 5.4 X10*3/UL (ref 4.4–11.3)

## 2024-06-03 PROCEDURE — 96375 TX/PRO/DX INJ NEW DRUG ADDON: CPT | Mod: INF

## 2024-06-03 PROCEDURE — 2500000004 HC RX 250 GENERAL PHARMACY W/ HCPCS (ALT 636 FOR OP/ED): Performed by: INTERNAL MEDICINE

## 2024-06-03 PROCEDURE — 80053 COMPREHEN METABOLIC PANEL: CPT | Performed by: INTERNAL MEDICINE

## 2024-06-03 PROCEDURE — 96417 CHEMO IV INFUS EACH ADDL SEQ: CPT

## 2024-06-03 PROCEDURE — 83735 ASSAY OF MAGNESIUM: CPT | Performed by: INTERNAL MEDICINE

## 2024-06-03 PROCEDURE — 96413 CHEMO IV INFUSION 1 HR: CPT

## 2024-06-03 PROCEDURE — 85025 COMPLETE CBC W/AUTO DIFF WBC: CPT | Performed by: INTERNAL MEDICINE

## 2024-06-03 PROCEDURE — 1126F AMNT PAIN NOTED NONE PRSNT: CPT | Performed by: INTERNAL MEDICINE

## 2024-06-03 PROCEDURE — 96365 THER/PROPH/DIAG IV INF INIT: CPT

## 2024-06-03 PROCEDURE — 1123F ACP DISCUSS/DSCN MKR DOCD: CPT | Performed by: INTERNAL MEDICINE

## 2024-06-03 PROCEDURE — 96366 THER/PROPH/DIAG IV INF ADDON: CPT | Mod: INF

## 2024-06-03 PROCEDURE — 1157F ADVNC CARE PLAN IN RCRD: CPT | Performed by: INTERNAL MEDICINE

## 2024-06-03 PROCEDURE — 99214 OFFICE O/P EST MOD 30 MIN: CPT | Performed by: INTERNAL MEDICINE

## 2024-06-03 PROCEDURE — 96361 HYDRATE IV INFUSION ADD-ON: CPT | Mod: INF

## 2024-06-03 PROCEDURE — 77387 GUIDANCE FOR RADJ TX DLVR: CPT | Performed by: RADIOLOGY

## 2024-06-03 PROCEDURE — 96368 THER/DIAG CONCURRENT INF: CPT

## 2024-06-03 PROCEDURE — 1159F MED LIST DOCD IN RCRD: CPT | Performed by: INTERNAL MEDICINE

## 2024-06-03 PROCEDURE — 77523 PROTON TRMT INTERMEDIATE: CPT | Performed by: RADIOLOGY

## 2024-06-03 RX ORDER — PALONOSETRON 0.05 MG/ML
0.25 INJECTION, SOLUTION INTRAVENOUS ONCE
Status: COMPLETED | OUTPATIENT
Start: 2024-06-03 | End: 2024-06-03

## 2024-06-03 RX ORDER — ALBUTEROL SULFATE 0.83 MG/ML
3 SOLUTION RESPIRATORY (INHALATION) AS NEEDED
Status: DISCONTINUED | OUTPATIENT
Start: 2024-06-03 | End: 2024-06-03 | Stop reason: HOSPADM

## 2024-06-03 RX ORDER — EPINEPHRINE 0.3 MG/.3ML
0.3 INJECTION SUBCUTANEOUS EVERY 5 MIN PRN
Status: DISCONTINUED | OUTPATIENT
Start: 2024-06-03 | End: 2024-06-03 | Stop reason: HOSPADM

## 2024-06-03 RX ORDER — GLIPIZIDE 10 MG/1
5 TABLET ORAL
Qty: 15 TABLET | Refills: 1 | Status: SHIPPED | OUTPATIENT
Start: 2024-06-03 | End: 2025-06-03

## 2024-06-03 RX ORDER — FAMOTIDINE 10 MG/ML
20 INJECTION INTRAVENOUS ONCE AS NEEDED
Status: DISCONTINUED | OUTPATIENT
Start: 2024-06-03 | End: 2024-06-03 | Stop reason: HOSPADM

## 2024-06-03 RX ORDER — MAGNESIUM SULFATE HEPTAHYDRATE 40 MG/ML
2 INJECTION, SOLUTION INTRAVENOUS ONCE
Status: COMPLETED | OUTPATIENT
Start: 2024-06-03 | End: 2024-06-03

## 2024-06-03 RX ORDER — FAMOTIDINE 10 MG/ML
20 INJECTION INTRAVENOUS ONCE
Status: COMPLETED | OUTPATIENT
Start: 2024-06-03 | End: 2024-06-03

## 2024-06-03 RX ORDER — PROCHLORPERAZINE EDISYLATE 5 MG/ML
10 INJECTION INTRAMUSCULAR; INTRAVENOUS EVERY 6 HOURS PRN
Status: DISCONTINUED | OUTPATIENT
Start: 2024-06-03 | End: 2024-06-03 | Stop reason: HOSPADM

## 2024-06-03 RX ORDER — DIPHENHYDRAMINE HYDROCHLORIDE 50 MG/ML
50 INJECTION INTRAMUSCULAR; INTRAVENOUS AS NEEDED
Status: DISCONTINUED | OUTPATIENT
Start: 2024-06-03 | End: 2024-06-03 | Stop reason: HOSPADM

## 2024-06-03 RX ORDER — HEPARIN 100 UNIT/ML
500 SYRINGE INTRAVENOUS AS NEEDED
Status: DISCONTINUED | OUTPATIENT
Start: 2024-06-03 | End: 2024-06-03 | Stop reason: HOSPADM

## 2024-06-03 RX ORDER — PROCHLORPERAZINE MALEATE 10 MG
10 TABLET ORAL EVERY 6 HOURS PRN
Status: DISCONTINUED | OUTPATIENT
Start: 2024-06-03 | End: 2024-06-03 | Stop reason: HOSPADM

## 2024-06-03 RX ORDER — DIPHENHYDRAMINE HYDROCHLORIDE 50 MG/ML
25 INJECTION INTRAMUSCULAR; INTRAVENOUS ONCE
Status: COMPLETED | OUTPATIENT
Start: 2024-06-03 | End: 2024-06-03

## 2024-06-03 RX ORDER — HEPARIN SODIUM,PORCINE/PF 10 UNIT/ML
50 SYRINGE (ML) INTRAVENOUS AS NEEDED
Status: CANCELLED | OUTPATIENT
Start: 2024-06-03

## 2024-06-03 RX ORDER — HEPARIN 100 UNIT/ML
500 SYRINGE INTRAVENOUS AS NEEDED
Status: CANCELLED | OUTPATIENT
Start: 2024-06-03

## 2024-06-03 RX ADMIN — PACLITAXEL 93 MG: 6 INJECTION, SOLUTION INTRAVENOUS at 09:41

## 2024-06-03 RX ADMIN — MAGNESIUM SULFATE HEPTAHYDRATE 2 G: 40 INJECTION, SOLUTION INTRAVENOUS at 09:18

## 2024-06-03 RX ADMIN — FAMOTIDINE 20 MG: 10 INJECTION INTRAVENOUS at 09:14

## 2024-06-03 RX ADMIN — HEPARIN 500 UNITS: 100 SYRINGE at 11:19

## 2024-06-03 RX ADMIN — CARBOPLATIN 194 MG: 10 INJECTION, SOLUTION INTRAVENOUS at 10:44

## 2024-06-03 RX ADMIN — PALONOSETRON HYDROCHLORIDE 250 MCG: 0.25 INJECTION INTRAVENOUS at 09:15

## 2024-06-03 RX ADMIN — DIPHENHYDRAMINE HYDROCHLORIDE 25 MG: 50 INJECTION INTRAMUSCULAR; INTRAVENOUS at 09:12

## 2024-06-03 RX ADMIN — DEXAMETHASONE SODIUM PHOSPHATE 2 MG: 4 INJECTION, SOLUTION INTRA-ARTICULAR; INTRALESIONAL; INTRAMUSCULAR; INTRAVENOUS; SOFT TISSUE at 09:10

## 2024-06-03 RX ADMIN — SODIUM CHLORIDE 500 ML: 9 INJECTION, SOLUTION INTRAVENOUS at 08:25

## 2024-06-03 ASSESSMENT — PAIN SCALES - GENERAL: PAINLEVEL: 0-NO PAIN

## 2024-06-03 NOTE — PROGRESS NOTES
"NUTRITION Follow-up NOTE    Nutrition Assessment     Reason for Visit:  Juan M Mcrae is a 78 y.o. male who presents for esophageal cancer (lower esophageal/ GE junction)He is recceiving chemotherapy at [place] (carbo / paclitaxel) began 5-6-2024)  Proton at [facility] ( began 5-6-2024 today is 19/28)    Am following up with him today to assess BS, TF intake and po intake.    He met with Dr. Fry today  As well as received his 5th cycle of chemotherapy     He has switched to the Glucerna 1.5      Hx: DM, HTN  Allergy: omeprazole  A1c 6.8 (2/5/24) -- pt taken off metformin in past with weight loss  -  added back metformin 5- (1000 mg 2 times per day) - noted actos changed to glipizide today     Jtube to be placed on 4/8    Pt with CINDI Stovall -         Lab Results   Component Value Date/Time    GLUCOSE 297 (H) 06/03/2024 0756     06/03/2024 0756    K 4.2 06/03/2024 0756     06/03/2024 0756    CO2 27 06/03/2024 0756    ANIONGAP 13 06/03/2024 0756    BUN 28 (H) 06/03/2024 0756    CREATININE 0.75 06/03/2024 0756    EGFR >90 06/03/2024 0756    CALCIUM 8.2 (L) 06/03/2024 0756    ALBUMIN 3.5 06/03/2024 0756    ALKPHOS 56 06/03/2024 0756    PROT 5.9 (L) 06/03/2024 0756    AST 10 06/03/2024 0756    BILITOT 0.3 06/03/2024 0756    ALT 12 06/03/2024 0756     No results found for: \"VITD25\"    BS are beginning to trend down more       Anthropometrics:     HT:  167.5 cm   IBW: 64.5 kg  111% IBW  BMI: 25.6    In the past month he is down 5.5 kg (7.2%)  Weight is stabilizing       Wt Readings from Last 10 Encounters:   06/03/24 72 kg (158 lb 11.7 oz)   05/30/24 72.4 kg (159 lb 9.6 oz)   05/28/24 71.9 kg (158 lb 8.2 oz)   05/21/24 72.8 kg (160 lb 8 oz)   05/20/24 71.7 kg (158 lb 2.9 oz)   05/17/24 73.5 kg (162 lb)   05/13/24 76 kg (167 lb 7 oz)   05/13/24 76 kg (167 lb 7 oz)   05/08/24 76.7 kg (169 lb)   05/06/24 77.9 kg (171 lb 10.1 oz)        Food And Nutrient Intake:           TF  1.5- 5 cartons as " order not yet processed for the Glucerna 1.5  1875 calories  220 g CHO  85 g protein  Rate is at 93 ml per hour        This weekend had some chicken parm with spaghetti  If he eats does not take the Boost VHC      Still feeling thirsty                                                             Nutrition Focused Physical Exam Findings:  Completed 4/1/2024                        Energy Needs     2280 calories  91.2 g protein   2280 ml of free water       Nutrition Diagnosis        Nutrition Diagnosis  Patient has Nutrition Diagnosis: Yes  Diagnosis Status (1): Ongoing  Nutrition Diagnosis 1: Altered nutrition related to laboratory values  Related to (1): potential stress from treatment impacting BS control  As Evidenced by (1): BS    Nutrition Interventions/Recommendations   Nutrition Prescription   TF- changed to   Glucerna 1.5-   Will continue with soft moist complex CHO foods paired with protein for pleasure   Will use Boost VHC as needed       Food and Nutrition Delivery   At this time TF is providing essentially sole of nutrition     Nutrition Education       Coordination of Care   Medical oncology-   [place]  Radiation oncology   RDN covering [place]     There are no Patient Instructions on file for this visit.    Nutrition Monitoring and Evaluation        BS  Weight   tolerance to Glucerna 1.5   Tolerance to Po intake - fluids

## 2024-06-03 NOTE — PROGRESS NOTES
Patient here with his daughter for follow up with Dr. Fry. Medications and allergies reviewed with patient. Patient states that he started back on his metformin and actos in addition to glucerna. He reports his blood sugar levels are still elevated but have come down some.     Dr. Fry spoke with patient regarding diet and medication to help control blood glucose levels. Per Dr. Fry patient to start glipizide 1/2 tablet twice a day.     Glipizide information sheet given to patient.     Per orders patient to follow up in 1 week    Patient verbalized understanding, no further questions at this time.

## 2024-06-03 NOTE — PROGRESS NOTES
Patient ID: Juan M Mcrae is a 78 y.o. male.    Diagnoses:   1.GE junction adenocarcinoma (signet ring cell), proficient MMR.  Localized (clinical stage II/III).  2. Type 2 diabetes mellitus on oral hypoglycemics, hypertension controlled with medications.    Genomic profile:  Proficient MMR status on the biopsy by IHC.    Assessment and Plan:  This is a pleasant 78-year-old man with a diagnosis of GE junction adenocarcinoma.  His staging PET/CT and subsequent imaging studies confirmed localized disease.  He had a port placement and a J-tube placement.  He has seen radiation oncology and he has enrolled in the NRG- (photon versus proton) study.    He is here for week 5 of chemotherapy (weekly carboplatin plus Taxol ).  Overall he is doing well.  He has no discomfort with tube feeding.  His swallowing continues to get better.    Overall he is feeling well.  His labs are largely okay except elevated blood glucose which is getting better with adjustment of his tube feed (has been switched to glucerna).  I have stopped Actos and prescribed glipizide 5 mg p.o. twice daily.    I ordered cycle 5 (week 5) of chemotherapy today.  I cut back on steroid to 2 mg of intravenous dexamethasone because of his elevated blood glucose.    Follow up plan-he has a follow-up next week before chemotherapy.        I have placed all orders as outlined above. I advised the patient to schedule the tests and follow-up appointment as discussed by contacting the  on the way out or calling by phone.    Providers:  Surgeon: Dr. Jovany Bucknerc:  Jennie: Donna.    Chief complaint: GE junction adenocarcinoma.    HPI:  ONCOLOGIC HISTORY-    February 28, 2024: Underwent an EGD for progressive dysphagia and weight loss.  EGD revealed an obstructing mass in the distal esophagus.  Biopsy confirmed adenocarcinoma with signet ring cell features.    March 21, 2024: EUS revealed T2 N1 mass extending from 41 cm to 43 cm and involving less than 1  "cm of the gastric cardia.    March 22, 2024: CT scan of chest did not show any metastatic disease.    April 1, 2024 24: PET/CT scan done:   1. Hypermetabolic linear focus at gastroesophageal junction which is  extending to the gastric cardia/proximal lesser curvature in  correlation with distal esophageal /gastroesophageal junction wall  thickening. These findings are compatible with biopsy-proven  adenocarcinoma.  2. No evidence of hypermetabolic lymphadenopathy.  3. Abnormal focal increase in FDG uptake in the left hepatic lobe in  correlation with hypoattenuating lesion, concerning for metastatic  disease. Further correlation with dedicated CT abdomen is recommended.  4. Large fat containing left inguinal hernia.  Interval history: He has significant dysphagia although he can get by with soup.  He has lost about 60 pounds in the last 6 months or so.  Feels fatigued.  Denies any pain.  Denies fever or chill or any other sign of ongoing infection.    April 3, 2024: CT scan of abdomen and MRI liver ruled out metastatic disease (liver lesions were hemangiomas).    May 6, 2024: Started concurrent radiation and chemotherapy with weekly carboplatin and Taxol.  Will complete concurrent chemoradiation on June 14, 2024.    Interval history:  Patient presents today for an evaluation before his day 29 chemotherapy with Carboplatin/Paclitaxel and concurrent radiation therapy. He is receiving his radiation on main campus. He denies any fevers, chills or night sweats. No cough, chest pain or shortness of breath. Chronic \"unsettled\" feeling reports not exactly nausea and he has not tried antiemetics. He is taking TF nocturnally.  He is swallowing is improving.    Allergies  Omeprazole, Sulfa, shellfish    Medications:  Acetaminophen, atenolol, dexamethasone, Emla cream, metformin 1000 mg twice daily, multivitamin, naloxone, nystatin, olanzapine, Actos, prochlorperazine, trazodone     Past Medical History:   Type 2 diabetes " mellitus on oral hypoglycemics, hypertension controlled with medications.  Cataract, CKD (chronic kidney disease), Colon polyp, Deviated septum, ED (erectile dysfunction), Esophageal cancer, GERD (gastroesophageal reflux disease), Hyperlipidemia, Nephrolithiasis, Sleep apnea (wear CPAP/BiPAP)    Surgical History:    Past Surgical History:   Procedure Laterality Date    BLADDER SURGERY      COLONOSCOPY      Repeat in one year    EYE SURGERY      LITHOTRIPSY      OTHER SURGICAL HISTORY      Vocal cord surgery    TRANSURETHRAL RESECTION OF PROSTATE      UPPER GASTROINTESTINAL ENDOSCOPY  2024      Family History:    Family History   Problem Relation Name Age of Onset    Stroke Father      Kidney cancer Brother Eliu     Stroke Brother Eliu      Family Oncology History:    Cancer-related family history includes Kidney cancer in his brother.    Social History:      Tobacco Use    Smoking status: Former     Current packs/day: 0.00     Average packs/day: 2.0 packs/day for 40.0 years (80.0 ttl pk-yrs)     Types: Cigarettes     Start date: 1959     Quit date: 1999     Years since quittin.4    Smokeless tobacco: Never   Vaping Use    Vaping status: Never Used   Substance Use Topics    Alcohol use: Yes     Alcohol/week: 2.0 standard drinks of alcohol     Types: 2 Cans of beer per week     Comment: couple beers a week    Drug use: Not Currently     Comment: gummies-dispensary from [place]        Vital Signs:  /69 (BP Location: Left arm, Patient Position: Sitting, BP Cuff Size: Adult long)   Pulse 84   Temp 36.7 °C (98 °F) (Temporal)   Resp 18   Wt 72 kg (158 lb 11.7 oz)   SpO2 97%   BMI 25.66 kg/m²     Physical Exam:  ECO  Pain: 0  Constitutional: Well developed, awake/alert/oriented x3, no distress, alert and cooperative  Eyes: PER. sclera anicteric  ENMT: Oral mucosa moist  Respiratory/Thorax: Breathing is non-labored. Lungs are clear to auscultation bilaterally. No adventitious breath  sounds  Cardiovascular: S1-S2. Regular rate and rhythm. No murmurs, rubs, or gallops appreciated  Gastrointestinal: Abdomen soft nontender, nondistended, normal active bowel sounds.  Musculoskeletal: ROM intact, no joint swelling, normal strength  Extremities: normal extremities, no cyanosis, no edema, no clubbing  Neurologic: alert and oriented x3. Nonfocal exam. No myoclonus  Psychological: Pleasant, appropriate and easily engaged     Lab Results:  WBC 4.6, hemoglobin 14.2, hematocrit 41.6, platelets 136,000, ANC 3510  Creatinine 0.69, EGFR> 90  Alkaline phosphatase 58, ALT 11, AST 9  Potassium 4.0, magnesium 1.91  Glucose 305           Catarino Fry MD

## 2024-06-04 ENCOUNTER — HOSPITAL ENCOUNTER (OUTPATIENT)
Dept: RADIATION ONCOLOGY | Facility: HOSPITAL | Age: 79
Setting detail: RADIATION/ONCOLOGY SERIES
Discharge: HOME | End: 2024-06-04
Payer: MEDICARE

## 2024-06-04 ENCOUNTER — RADIATION ONCOLOGY OTV (OUTPATIENT)
Dept: RADIATION ONCOLOGY | Facility: HOSPITAL | Age: 79
End: 2024-06-04
Payer: MEDICARE

## 2024-06-04 VITALS
DIASTOLIC BLOOD PRESSURE: 75 MMHG | SYSTOLIC BLOOD PRESSURE: 128 MMHG | RESPIRATION RATE: 20 BRPM | OXYGEN SATURATION: 94 % | BODY MASS INDEX: 26.05 KG/M2 | HEART RATE: 93 BPM | WEIGHT: 161.1 LBS | TEMPERATURE: 97.3 F

## 2024-06-04 DIAGNOSIS — C16.0 MALIGNANT NEOPLASM OF CARDIA (MULTI): ICD-10-CM

## 2024-06-04 DIAGNOSIS — Z51.0 ENCOUNTER FOR ANTINEOPLASTIC RADIATION THERAPY: ICD-10-CM

## 2024-06-04 LAB
RAD ONC MSQ ACTUAL FRACTIONS DELIVERED: 20
RAD ONC MSQ ACTUAL SESSION BIOLOGICAL DOSE: 180 CCGE
RAD ONC MSQ ACTUAL SESSION DELIVERED DOSE: 164 CGRAY
RAD ONC MSQ ACTUAL TOTAL BIOLOGICAL DOSE: 3608 CCGE
RAD ONC MSQ ACTUAL TOTAL DOSE: 3280 CGRAY
RAD ONC MSQ ELAPSED DAYS: 29
RAD ONC MSQ LAST DATE: NORMAL
RAD ONC MSQ PRESCRIBED BIOLOGICAL FRACTIONAL DOSE: 180 CCGE
RAD ONC MSQ PRESCRIBED BIOLOGICAL TOTAL DOSE: 5040 CCGE
RAD ONC MSQ PRESCRIBED FRACTIONAL DOSE: 164 CGRAY
RAD ONC MSQ PRESCRIBED NUMBER OF FRACTIONS: 28
RAD ONC MSQ PRESCRIBED TECHNIQUE: NORMAL
RAD ONC MSQ PRESCRIBED TOTAL DOSE: 4582 CGRAY
RAD ONC MSQ PRESCRIPTION PATTERN COMMENT: NORMAL
RAD ONC MSQ START DATE: NORMAL
RAD ONC MSQ TREATMENT COURSE NUMBER: 1
RAD ONC MSQ TREATMENT SITE: NORMAL

## 2024-06-04 PROCEDURE — 77387 GUIDANCE FOR RADJ TX DLVR: CPT | Performed by: RADIOLOGY

## 2024-06-04 PROCEDURE — 77523 PROTON TRMT INTERMEDIATE: CPT | Performed by: RADIOLOGY

## 2024-06-04 ASSESSMENT — PAIN SCALES - GENERAL: PAINLEVEL: 0-NO PAIN

## 2024-06-04 NOTE — PROGRESS NOTES
RADIATION ONCOLOGY ON-TREATMENT VISIT NOTE  Patient Name:  Juan M Mcrae  MRN:  [id number]  :  1945    Radiation Oncologist: Irish Thompson MD  Referring Provider: No ref. 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 Eating more solid food, had chicken parm, able to swallow pills easier. Having some intermittent pre-nausea/poor appetite, using anti emetic. Altered sense of taste makes him unenthusiastic about eating but he understands eating is his chore and he's doing okay with it. Fatigue.    OBJECTIVE:   Vital Signs:  /75   Pulse 93   Temp 36.3 °C (97.3 °F)   Resp 20   Wt 73.1 kg (161 lb 1.6 oz)   SpO2 94%   BMI 26.05 kg/m²    Pain Scale: 0/10.    Well appearing, NAD, wt stable 161 today 159 last week.    Toxicity Assessment          2024    11:23 2024    09:51 2024    09:49 2024    09:19   Toxicity Assessment   Treatment Site Thoracic Thoracic Thoracic Thoracic   Anorexia Grade 0 Grade 1 Grade 0 Grade 0   Anxiety Grade 0 Grade 0 Grade 0 Grade 0   Dehydration Grade 0  Grade 0 Grade 0 Grade 0   Depression Grade 0 Grade 0 Grade 0 Grade 0   Dermatitis Radiation Grade 0 Grade 0 Grade 0 Grade 0   Diarrhea Grade 0 Grade 0 Grade 0 Grade 1   Fatigue Grade 0 Grade 1 Grade 0 Grade 1   Nausea Grade 0 Grade 0 Grade 0 Grade 0   Pain Grade 0 Grade 1 Grade 1 Grade 0   Vomiting Grade 0 Grade 0 Grade 0 Grade 0   Constipation Grade 0 Grade 0     Dyspepsia Grade 0 Grade 0     Dysphagia  Grade 0     Esophagitis Grade 1 Grade 1     Aspiration Grade 0      Esophageal Obstruction  Grade 0     Esophageal Pain  Grade 0 Grade 0 Grade 1       with water   Bronchial Obstruction Grade 0      Cough Grade 0   Grade 0   Dyspnea Grade 0   Grade 0   Hiccups Grade 1  Grade 1 Grade 0   Hypoxia Grade 0  Grade 0 Grade 0        ASSESSMENT/PLAN:  The patient is tolerating radiation therapy as anticipated.  Continue per current treatment plan.      Irish Thompson MD  6/4/2024

## 2024-06-05 ENCOUNTER — HOSPITAL ENCOUNTER (OUTPATIENT)
Dept: RADIATION ONCOLOGY | Facility: HOSPITAL | Age: 79
Setting detail: RADIATION/ONCOLOGY SERIES
Discharge: HOME | End: 2024-06-05
Payer: MEDICARE

## 2024-06-05 DIAGNOSIS — C16.0 MALIGNANT NEOPLASM OF CARDIA (MULTI): ICD-10-CM

## 2024-06-05 DIAGNOSIS — Z51.0 ENCOUNTER FOR ANTINEOPLASTIC RADIATION THERAPY: ICD-10-CM

## 2024-06-05 LAB
RAD ONC MSQ ACTUAL FRACTIONS DELIVERED: 21
RAD ONC MSQ ACTUAL SESSION BIOLOGICAL DOSE: 180 CCGE
RAD ONC MSQ ACTUAL SESSION DELIVERED DOSE: 164 CGRAY
RAD ONC MSQ ACTUAL TOTAL BIOLOGICAL DOSE: 3788 CCGE
RAD ONC MSQ ACTUAL TOTAL DOSE: 3444 CGRAY
RAD ONC MSQ ELAPSED DAYS: 30
RAD ONC MSQ LAST DATE: NORMAL
RAD ONC MSQ PRESCRIBED BIOLOGICAL FRACTIONAL DOSE: 180 CCGE
RAD ONC MSQ PRESCRIBED BIOLOGICAL TOTAL DOSE: 5040 CCGE
RAD ONC MSQ PRESCRIBED FRACTIONAL DOSE: 164 CGRAY
RAD ONC MSQ PRESCRIBED NUMBER OF FRACTIONS: 28
RAD ONC MSQ PRESCRIBED TECHNIQUE: NORMAL
RAD ONC MSQ PRESCRIBED TOTAL DOSE: 4582 CGRAY
RAD ONC MSQ PRESCRIPTION PATTERN COMMENT: NORMAL
RAD ONC MSQ START DATE: NORMAL
RAD ONC MSQ TREATMENT COURSE NUMBER: 1
RAD ONC MSQ TREATMENT SITE: NORMAL

## 2024-06-05 PROCEDURE — 77387 GUIDANCE FOR RADJ TX DLVR: CPT | Performed by: RADIOLOGY

## 2024-06-05 PROCEDURE — 77523 PROTON TRMT INTERMEDIATE: CPT | Performed by: RADIOLOGY

## 2024-06-06 ENCOUNTER — HOSPITAL ENCOUNTER (OUTPATIENT)
Dept: RADIATION ONCOLOGY | Facility: HOSPITAL | Age: 79
Setting detail: RADIATION/ONCOLOGY SERIES
Discharge: HOME | End: 2024-06-06
Payer: MEDICARE

## 2024-06-06 DIAGNOSIS — Z51.0 ENCOUNTER FOR ANTINEOPLASTIC RADIATION THERAPY: ICD-10-CM

## 2024-06-06 DIAGNOSIS — C16.0 MALIGNANT NEOPLASM OF CARDIA (MULTI): ICD-10-CM

## 2024-06-06 LAB
RAD ONC MSQ ACTUAL FRACTIONS DELIVERED: 22
RAD ONC MSQ ACTUAL SESSION BIOLOGICAL DOSE: 180 CCGE
RAD ONC MSQ ACTUAL SESSION DELIVERED DOSE: 164 CGRAY
RAD ONC MSQ ACTUAL TOTAL BIOLOGICAL DOSE: 3969 CCGE
RAD ONC MSQ ACTUAL TOTAL DOSE: 3608 CGRAY
RAD ONC MSQ ELAPSED DAYS: 31
RAD ONC MSQ LAST DATE: NORMAL
RAD ONC MSQ PRESCRIBED BIOLOGICAL FRACTIONAL DOSE: 180 CCGE
RAD ONC MSQ PRESCRIBED BIOLOGICAL TOTAL DOSE: 5040 CCGE
RAD ONC MSQ PRESCRIBED FRACTIONAL DOSE: 164 CGRAY
RAD ONC MSQ PRESCRIBED NUMBER OF FRACTIONS: 28
RAD ONC MSQ PRESCRIBED TECHNIQUE: NORMAL
RAD ONC MSQ PRESCRIBED TOTAL DOSE: 4582 CGRAY
RAD ONC MSQ PRESCRIPTION PATTERN COMMENT: NORMAL
RAD ONC MSQ START DATE: NORMAL
RAD ONC MSQ TREATMENT COURSE NUMBER: 1
RAD ONC MSQ TREATMENT SITE: NORMAL

## 2024-06-06 PROCEDURE — 77523 PROTON TRMT INTERMEDIATE: CPT | Performed by: RADIOLOGY

## 2024-06-06 PROCEDURE — 77387 GUIDANCE FOR RADJ TX DLVR: CPT | Performed by: RADIOLOGY

## 2024-06-07 ENCOUNTER — EDUCATION (OUTPATIENT)
Dept: HEMATOLOGY/ONCOLOGY | Facility: HOSPITAL | Age: 79
End: 2024-06-07
Payer: MEDICARE

## 2024-06-07 ENCOUNTER — HOSPITAL ENCOUNTER (OUTPATIENT)
Dept: RADIATION ONCOLOGY | Facility: HOSPITAL | Age: 79
Setting detail: RADIATION/ONCOLOGY SERIES
Discharge: HOME | End: 2024-06-07
Payer: MEDICARE

## 2024-06-07 DIAGNOSIS — Z51.0 ENCOUNTER FOR ANTINEOPLASTIC RADIATION THERAPY: ICD-10-CM

## 2024-06-07 DIAGNOSIS — C16.0 MALIGNANT NEOPLASM OF CARDIA (MULTI): ICD-10-CM

## 2024-06-07 LAB
RAD ONC MSQ ACTUAL FRACTIONS DELIVERED: 23
RAD ONC MSQ ACTUAL SESSION BIOLOGICAL DOSE: 180 CCGE
RAD ONC MSQ ACTUAL SESSION DELIVERED DOSE: 164 CGRAY
RAD ONC MSQ ACTUAL TOTAL BIOLOGICAL DOSE: 4149 CCGE
RAD ONC MSQ ACTUAL TOTAL DOSE: 3772 CGRAY
RAD ONC MSQ ELAPSED DAYS: 32
RAD ONC MSQ LAST DATE: NORMAL
RAD ONC MSQ PRESCRIBED BIOLOGICAL FRACTIONAL DOSE: 180 CCGE
RAD ONC MSQ PRESCRIBED BIOLOGICAL TOTAL DOSE: 5040 CCGE
RAD ONC MSQ PRESCRIBED FRACTIONAL DOSE: 164 CGRAY
RAD ONC MSQ PRESCRIBED NUMBER OF FRACTIONS: 28
RAD ONC MSQ PRESCRIBED TECHNIQUE: NORMAL
RAD ONC MSQ PRESCRIBED TOTAL DOSE: 4582 CGRAY
RAD ONC MSQ PRESCRIPTION PATTERN COMMENT: NORMAL
RAD ONC MSQ START DATE: NORMAL
RAD ONC MSQ TREATMENT COURSE NUMBER: 1
RAD ONC MSQ TREATMENT SITE: NORMAL

## 2024-06-07 PROCEDURE — 77523 PROTON TRMT INTERMEDIATE: CPT | Performed by: RADIOLOGY

## 2024-06-07 PROCEDURE — 77336 RADIATION PHYSICS CONSULT: CPT | Performed by: RADIOLOGY

## 2024-06-07 PROCEDURE — 77387 GUIDANCE FOR RADJ TX DLVR: CPT | Performed by: RADIOLOGY

## 2024-06-07 NOTE — PROGRESS NOTES
Research Note Unscheduled Visit     Juan M Mcrae is enrolled on NRG- and is here today during Week 5. Aes and con meds assessed. Patient states he is eating and swallowing better, now able to tolerate swallowing peaches, oatmeal and eggs.  He is still having fatigue but able to complete all of his daily activities as usual regardless.  Updated on follow-up course, all questions answered.  Will follow accordingly.      Education Documentation  Signs and Symptoms of Infection, taught by Carine Adan RN at 6/7/2024  9:30 AM.  Learner: Family, Patient  Readiness: Acceptance  Method: Explanation  Response: Verbalizes Understanding, Demonstrated Understanding    Associated Bloodstream Infection Prevention, taught by Carine Adan RN at 6/7/2024  9:30 AM.  Learner: Family, Patient  Readiness: Acceptance  Method: Explanation  Response: Verbalizes Understanding, Demonstrated Understanding    How to Prevent Central Line Infections, taught by Carine Adan RN at 6/7/2024  9:30 AM.  Learner: Family, Patient  Readiness: Acceptance  Method: Explanation  Response: Verbalizes Understanding, Demonstrated Understanding    Central Line Infections, taught by Carine Adan RN at 6/7/2024  9:30 AM.  Learner: Family, Patient  Readiness: Acceptance  Method: Explanation  Response: Verbalizes Understanding, Demonstrated Understanding    How to Care for a Central Line Catheter, taught by Carine Adan RN at 6/7/2024  9:30 AM.  Learner: Family, Patient  Readiness: Acceptance  Method: Explanation  Response: Verbalizes Understanding, Demonstrated Understanding    Alternatives to a Central Line, taught by Carine Adan RN at 6/7/2024  9:30 AM.  Learner: Family, Patient  Readiness: Acceptance  Method: Explanation  Response: Verbalizes Understanding, Demonstrated Understanding    Central Line Catheter, taught by Carine Adan RN at 6/7/2024  9:30 AM.  Learner: Family,  Patient  Readiness: Acceptance  Method: Explanation  Response: Verbalizes Understanding, Demonstrated Understanding    Neutropenia During Cancer Treatment, taught by Carine Adan RN at 6/7/2024  9:30 AM.  Learner: Family, Patient  Readiness: Acceptance  Method: Explanation  Response: Verbalizes Understanding, Demonstrated Understanding    Chemotherapy : What is chemotherapy, taught by Carine Adan RN at 6/7/2024  9:30 AM.  Learner: Family, Patient  Readiness: Acceptance  Method: Explanation  Response: Verbalizes Understanding, Demonstrated Understanding    Chemotherapy : Review, taught by Carine Adan RN at 6/7/2024  9:30 AM.  Learner: Family, Patient  Readiness: Acceptance  Method: Explanation  Response: Verbalizes Understanding, Demonstrated Understanding    Chemotherapy : Possible Side Effects, taught by Carine Adan RN at 6/7/2024  9:30 AM.  Learner: Family, Patient  Readiness: Acceptance  Method: Explanation  Response: Verbalizes Understanding, Demonstrated Understanding    Chemotherapy : Life During Treatment, taught by Carine Adan RN at 6/7/2024  9:30 AM.  Learner: Family, Patient  Readiness: Acceptance  Method: Explanation  Response: Verbalizes Understanding, Demonstrated Understanding    Chemotherapy : Getting Chemotherapy, taught by Carine Adan RN at 6/7/2024  9:30 AM.  Learner: Family, Patient  Readiness: Acceptance  Method: Explanation  Response: Verbalizes Understanding, Demonstrated Understanding    Chemotherapy : Common concerns, taught by Carine Adan RN at 6/7/2024  9:30 AM.  Learner: Family, Patient  Readiness: Acceptance  Method: Explanation  Response: Verbalizes Understanding, Demonstrated Understanding    Returning to Work/School/Activities, taught by Carine Adan RN at 6/7/2024  9:30 AM.  Learner: Family, Patient  Readiness: Acceptance  Method: Explanation  Response: Verbalizes Understanding, Demonstrated  Understanding    Stress Management, taught by Carine Adan RN at 6/7/2024  9:30 AM.  Learner: Family, Patient  Readiness: Acceptance  Method: Explanation  Response: Verbalizes Understanding, Demonstrated Understanding    Changing Role with Self and Family, taught by Carine Adan RN at 6/7/2024  9:30 AM.  Learner: Family, Patient  Readiness: Acceptance  Method: Explanation  Response: Verbalizes Understanding, Demonstrated Understanding    Leisure Education, taught by Carine Adan RN at 6/7/2024  9:30 AM.  Learner: Family, Patient  Readiness: Acceptance  Method: Explanation  Response: Verbalizes Understanding, Demonstrated Understanding    Health Systems & Community Resources, taught by Carine Adan RN at 6/7/2024  9:30 AM.  Learner: Family, Patient  Readiness: Acceptance  Method: Explanation  Response: Verbalizes Understanding, Demonstrated Understanding    Advanced Medical Directives, taught by Carine Adan RN at 6/7/2024  9:30 AM.  Learner: Family, Patient  Readiness: Acceptance  Method: Explanation  Response: Verbalizes Understanding, Demonstrated Understanding    Escort, Parking, Transportation Home, taught by Carine Adan RN at 6/7/2024  9:30 AM.  Learner: Family, Patient  Readiness: Acceptance  Method: Explanation  Response: Verbalizes Understanding, Demonstrated Understanding    Referrals to Support Services, taught by Carine Adan RN at 6/7/2024  9:30 AM.  Learner: Family, Patient  Readiness: Acceptance  Method: Explanation  Response: Verbalizes Understanding, Demonstrated Understanding    Support Systems, taught by Carine Adan RN at 6/7/2024  9:30 AM.  Learner: Family, Patient  Readiness: Acceptance  Method: Explanation  Response: Verbalizes Understanding, Demonstrated Understanding    Financial Assistance Information, taught by Carine Adan RN at 6/7/2024  9:30 AM.  Learner: Family, Patient  Readiness: Acceptance  Method:  Explanation  Response: Verbalizes Understanding, Demonstrated Understanding    Financial Benefits Summary, taught by Carine Adan RN at 6/7/2024  9:30 AM.  Learner: Family, Patient  Readiness: Acceptance  Method: Explanation  Response: Verbalizes Understanding, Demonstrated Understanding    Healthy Lifestyle, taught by Carine Adan RN at 6/7/2024  9:30 AM.  Learner: Family, Patient  Readiness: Acceptance  Method: Explanation  Response: Verbalizes Understanding, Demonstrated Understanding    Monitoring Weight, taught by Carine Adan RN at 6/7/2024  9:30 AM.  Learner: Family, Patient  Readiness: Acceptance  Method: Explanation  Response: Verbalizes Understanding, Demonstrated Understanding    Tips for Daily Living, taught by Carine Adan RN at 6/7/2024  9:30 AM.  Learner: Family, Patient  Readiness: Acceptance  Method: Explanation  Response: Verbalizes Understanding, Demonstrated Understanding    Nutrition/Diet, taught by Carine Adan RN at 6/7/2024  9:30 AM.  Learner: Family, Patient  Readiness: Acceptance  Method: Explanation  Response: Verbalizes Understanding, Demonstrated Understanding    Food-Drug Interactions, taught by Carine Adan RN at 6/7/2024  9:30 AM.  Learner: Family, Patient  Readiness: Acceptance  Method: Explanation  Response: Verbalizes Understanding, Demonstrated Understanding    Nutrition, taught by Carine Adan RN at 6/7/2024  9:30 AM.  Learner: Family, Patient  Readiness: Acceptance  Method: Explanation  Response: Verbalizes Understanding, Demonstrated Understanding    Discuss the Use of Pain Control Measures Before Pain Becomes Severe, taught by Carine Adan RN at 6/7/2024  9:30 AM.  Learner: Family, Patient  Readiness: Acceptance  Method: Explanation  Response: Verbalizes Understanding, Demonstrated Understanding    Pain Management, taught by Carine Adan RN at 6/7/2024  9:30 AM.  Learner: Family,  Patient  Readiness: Acceptance  Method: Explanation  Response: Verbalizes Understanding, Demonstrated Understanding    Pain Rating Scale, taught by Carine Adan RN at 6/7/2024  9:30 AM.  Learner: Family, Patient  Readiness: Acceptance  Method: Explanation  Response: Verbalizes Understanding, Demonstrated Understanding    Shortness of Breath, taught by Carine Adan RN at 6/7/2024  9:30 AM.  Learner: Family, Patient  Readiness: Acceptance  Method: Explanation  Response: Verbalizes Understanding, Demonstrated Understanding    Changes in Appetite, taught by Carine Adan RN at 6/7/2024  9:30 AM.  Learner: Family, Patient  Readiness: Acceptance  Method: Explanation  Response: Verbalizes Understanding, Demonstrated Understanding    Fertility Issues, taught by Carine Adan RN at 6/7/2024  9:30 AM.  Learner: Family, Patient  Readiness: Acceptance  Method: Explanation  Response: Verbalizes Understanding, Demonstrated Understanding    Memory Problems, taught by Carine Adan RN at 6/7/2024  9:30 AM.  Learner: Family, Patient  Readiness: Acceptance  Method: Explanation  Response: Verbalizes Understanding, Demonstrated Understanding    Skin and Nail Changes, taught by Carine Adan RN at 6/7/2024  9:30 AM.  Learner: Family, Patient  Readiness: Acceptance  Method: Explanation  Response: Verbalizes Understanding, Demonstrated Understanding    Changes in Urination, taught by Carine Adan RN at 6/7/2024  9:30 AM.  Learner: Family, Patient  Readiness: Acceptance  Method: Explanation  Response: Verbalizes Understanding, Demonstrated Understanding    Bleeding Precautions, taught by Carine Adan RN at 6/7/2024  9:30 AM.  Learner: Family, Patient  Readiness: Acceptance  Method: Explanation  Response: Verbalizes Understanding, Demonstrated Understanding    Edema Management, taught by Carine Adan RN at 6/7/2024  9:30 AM.  Learner: Family, Patient  Readiness:  Acceptance  Method: Explanation  Response: Verbalizes Understanding, Demonstrated Understanding    Care of Neuropathy, taught by Carine Adan RN at 6/7/2024  9:30 AM.  Learner: Family, Patient  Readiness: Acceptance  Method: Explanation  Response: Verbalizes Understanding, Demonstrated Understanding    Infection Control, taught by Carine Adan RN at 6/7/2024  9:30 AM.  Learner: Family, Patient  Readiness: Acceptance  Method: Explanation  Response: Verbalizes Understanding, Demonstrated Understanding    Alopecia, taught by Carine Adan RN at 6/7/2024  9:30 AM.  Learner: Family, Patient  Readiness: Acceptance  Method: Explanation  Response: Verbalizes Understanding, Demonstrated Understanding    Diarrhea, taught by Carine Adan RN at 6/7/2024  9:30 AM.  Learner: Family, Patient  Readiness: Acceptance  Method: Explanation  Response: Verbalizes Understanding, Demonstrated Understanding    Constipation, taught by Carine Adan RN at 6/7/2024  9:30 AM.  Learner: Family, Patient  Readiness: Acceptance  Method: Explanation  Response: Verbalizes Understanding, Demonstrated Understanding    Mouth Sores, taught by Carine Adan RN at 6/7/2024  9:30 AM.  Learner: Family, Patient  Readiness: Acceptance  Method: Explanation  Response: Verbalizes Understanding, Demonstrated Understanding    Nausea Management, taught by Carine Adan RN at 6/7/2024  9:30 AM.  Learner: Family, Patient  Readiness: Acceptance  Method: Explanation  Response: Verbalizes Understanding, Demonstrated Understanding    Fatigue, taught by Carine Adan RN at 6/7/2024  9:30 AM.  Learner: Family, Patient  Readiness: Acceptance  Method: Explanation  Response: Verbalizes Understanding, Demonstrated Understanding    Radiation Therapy, taught by Carine Adan RN at 6/7/2024  9:30 AM.  Learner: Family, Patient  Readiness: Acceptance  Method: Explanation  Response: Verbalizes Understanding,  Demonstrated Understanding    Post-Chemotherapy Education, taught by Carine Adan RN at 6/7/2024  9:30 AM.  Learner: Family, Patient  Readiness: Acceptance  Method: Explanation  Response: Verbalizes Understanding, Demonstrated Understanding    Pregnancy Prevention, taught by Carine Adan RN at 6/7/2024  9:30 AM.  Learner: Family, Patient  Readiness: Acceptance  Method: Explanation  Response: Verbalizes Understanding, Demonstrated Understanding    Chemotherapy Safety at Home, taught by Carine Adan RN at 6/7/2024  9:30 AM.  Learner: Family, Patient  Readiness: Acceptance  Method: Explanation  Response: Verbalizes Understanding, Demonstrated Understanding    Treatment Plan and Schedule, taught by Carine Adan RN at 6/7/2024  9:30 AM.  Learner: Family, Patient  Readiness: Acceptance  Method: Explanation  Response: Verbalizes Understanding, Demonstrated Understanding    General Medication Information, taught by Carine Adan RN at 6/7/2024  9:30 AM.  Learner: Family, Patient  Readiness: Acceptance  Method: Explanation  Response: Verbalizes Understanding, Demonstrated Understanding    Supportive Medications, taught by Carine Adan RN at 6/7/2024  9:30 AM.  Learner: Family, Patient  Readiness: Acceptance  Method: Explanation  Response: Verbalizes Understanding, Demonstrated Understanding    Diagnostic Studies, taught by Carine Adan RN at 6/7/2024  9:30 AM.  Learner: Family, Patient  Readiness: Acceptance  Method: Explanation  Response: Verbalizes Understanding, Demonstrated Understanding    Tumor Markers, taught by Carine Adan RN at 6/7/2024  9:30 AM.  Learner: Family, Patient  Readiness: Acceptance  Method: Explanation  Response: Verbalizes Understanding, Demonstrated Understanding    Comprehensive Metabolic Panel (CMP), taught by Carine Adan RN at 6/7/2024  9:30 AM.  Learner: Family, Patient  Readiness: Acceptance  Method:  Explanation  Response: Verbalizes Understanding, Demonstrated Understanding    Complete Blood Count with Differential (CBC w/ Diff), taught by Carine Adan RN at 6/7/2024  9:30 AM.  Learner: Family, Patient  Readiness: Acceptance  Method: Explanation  Response: Verbalizes Understanding, Demonstrated Understanding    Education Comments  No comments found.

## 2024-06-10 ENCOUNTER — INFUSION (OUTPATIENT)
Dept: HEMATOLOGY/ONCOLOGY | Facility: CLINIC | Age: 79
End: 2024-06-10
Payer: MEDICARE

## 2024-06-10 ENCOUNTER — APPOINTMENT (OUTPATIENT)
Dept: HEMATOLOGY/ONCOLOGY | Facility: CLINIC | Age: 79
End: 2024-06-10
Payer: MEDICARE

## 2024-06-10 ENCOUNTER — HOSPITAL ENCOUNTER (OUTPATIENT)
Dept: RADIATION ONCOLOGY | Facility: HOSPITAL | Age: 79
Setting detail: RADIATION/ONCOLOGY SERIES
Discharge: HOME | End: 2024-06-10
Payer: MEDICARE

## 2024-06-10 ENCOUNTER — NUTRITION (OUTPATIENT)
Dept: HEMATOLOGY/ONCOLOGY | Facility: HOSPITAL | Age: 79
End: 2024-06-10

## 2024-06-10 ENCOUNTER — OFFICE VISIT (OUTPATIENT)
Dept: HEMATOLOGY/ONCOLOGY | Facility: CLINIC | Age: 79
End: 2024-06-10
Payer: MEDICARE

## 2024-06-10 VITALS
RESPIRATION RATE: 18 BRPM | DIASTOLIC BLOOD PRESSURE: 77 MMHG | HEART RATE: 90 BPM | TEMPERATURE: 96.8 F | WEIGHT: 157.96 LBS | BODY MASS INDEX: 25.54 KG/M2 | SYSTOLIC BLOOD PRESSURE: 122 MMHG

## 2024-06-10 DIAGNOSIS — Z51.0 ENCOUNTER FOR ANTINEOPLASTIC RADIATION THERAPY: ICD-10-CM

## 2024-06-10 DIAGNOSIS — C15.5 MALIGNANT NEOPLASM OF LOWER THIRD OF ESOPHAGUS (MULTI): Primary | ICD-10-CM

## 2024-06-10 DIAGNOSIS — C15.9 ESOPHAGEAL ADENOCARCINOMA (MULTI): ICD-10-CM

## 2024-06-10 DIAGNOSIS — C15.5 MALIGNANT NEOPLASM OF LOWER THIRD OF ESOPHAGUS (MULTI): ICD-10-CM

## 2024-06-10 DIAGNOSIS — C16.0 MALIGNANT NEOPLASM OF CARDIA (MULTI): ICD-10-CM

## 2024-06-10 DIAGNOSIS — Z00.6 RESEARCH STUDY PATIENT: ICD-10-CM

## 2024-06-10 LAB
ALBUMIN SERPL BCP-MCNC: 3.4 G/DL (ref 3.4–5)
ALP SERPL-CCNC: 60 U/L (ref 33–136)
ALT SERPL W P-5'-P-CCNC: 15 U/L (ref 10–52)
ANION GAP SERPL CALC-SCNC: 13 MMOL/L (ref 10–20)
AST SERPL W P-5'-P-CCNC: 12 U/L (ref 9–39)
BASOPHILS # BLD AUTO: 0.03 X10*3/UL (ref 0–0.1)
BASOPHILS NFR BLD AUTO: 0.9 %
BILIRUB SERPL-MCNC: 0.3 MG/DL (ref 0–1.2)
BUN SERPL-MCNC: 22 MG/DL (ref 6–23)
CALCIUM SERPL-MCNC: 8.4 MG/DL (ref 8.6–10.3)
CHLORIDE SERPL-SCNC: 102 MMOL/L (ref 98–107)
CO2 SERPL-SCNC: 26 MMOL/L (ref 21–32)
CREAT SERPL-MCNC: 0.66 MG/DL (ref 0.5–1.3)
EGFRCR SERPLBLD CKD-EPI 2021: >90 ML/MIN/1.73M*2
EOSINOPHIL # BLD AUTO: 0.05 X10*3/UL (ref 0–0.4)
EOSINOPHIL NFR BLD AUTO: 1.5 %
ERYTHROCYTE [DISTWIDTH] IN BLOOD BY AUTOMATED COUNT: 14.3 % (ref 11.5–14.5)
GLUCOSE SERPL-MCNC: 211 MG/DL (ref 74–99)
HCT VFR BLD AUTO: 38.9 % (ref 41–52)
HGB BLD-MCNC: 13.4 G/DL (ref 13.5–17.5)
IMM GRANULOCYTES # BLD AUTO: 0.02 X10*3/UL (ref 0–0.5)
IMM GRANULOCYTES NFR BLD AUTO: 0.6 % (ref 0–0.9)
LYMPHOCYTES # BLD AUTO: 0.52 X10*3/UL (ref 0.8–3)
LYMPHOCYTES NFR BLD AUTO: 16.1 %
MAGNESIUM SERPL-MCNC: 1.91 MG/DL (ref 1.6–2.4)
MCH RBC QN AUTO: 33.3 PG (ref 26–34)
MCHC RBC AUTO-ENTMCNC: 34.4 G/DL (ref 32–36)
MCV RBC AUTO: 97 FL (ref 80–100)
MONOCYTES # BLD AUTO: 0.4 X10*3/UL (ref 0.05–0.8)
MONOCYTES NFR BLD AUTO: 12.4 %
NEUTROPHILS # BLD AUTO: 2.21 X10*3/UL (ref 1.6–5.5)
NEUTROPHILS NFR BLD AUTO: 68.5 %
NRBC BLD-RTO: 0 /100 WBCS (ref 0–0)
PLATELET # BLD AUTO: 148 X10*3/UL (ref 150–450)
POTASSIUM SERPL-SCNC: 3.8 MMOL/L (ref 3.5–5.3)
PROT SERPL-MCNC: 5.8 G/DL (ref 6.4–8.2)
RAD ONC MSQ ACTUAL FRACTIONS DELIVERED: 24
RAD ONC MSQ ACTUAL SESSION BIOLOGICAL DOSE: 180 CCGE
RAD ONC MSQ ACTUAL SESSION DELIVERED DOSE: 164 CGRAY
RAD ONC MSQ ACTUAL TOTAL BIOLOGICAL DOSE: 4330 CCGE
RAD ONC MSQ ACTUAL TOTAL DOSE: 3936 CGRAY
RAD ONC MSQ ELAPSED DAYS: 35
RAD ONC MSQ LAST DATE: NORMAL
RAD ONC MSQ PRESCRIBED BIOLOGICAL FRACTIONAL DOSE: 180 CCGE
RAD ONC MSQ PRESCRIBED BIOLOGICAL TOTAL DOSE: 5040 CCGE
RAD ONC MSQ PRESCRIBED FRACTIONAL DOSE: 164 CGRAY
RAD ONC MSQ PRESCRIBED NUMBER OF FRACTIONS: 28
RAD ONC MSQ PRESCRIBED TECHNIQUE: NORMAL
RAD ONC MSQ PRESCRIBED TOTAL DOSE: 4582 CGRAY
RAD ONC MSQ PRESCRIPTION PATTERN COMMENT: NORMAL
RAD ONC MSQ START DATE: NORMAL
RAD ONC MSQ TREATMENT COURSE NUMBER: 1
RAD ONC MSQ TREATMENT SITE: NORMAL
RBC # BLD AUTO: 4.03 X10*6/UL (ref 4.5–5.9)
SODIUM SERPL-SCNC: 137 MMOL/L (ref 136–145)
WBC # BLD AUTO: 3.2 X10*3/UL (ref 4.4–11.3)

## 2024-06-10 PROCEDURE — 83735 ASSAY OF MAGNESIUM: CPT | Performed by: INTERNAL MEDICINE

## 2024-06-10 PROCEDURE — 96413 CHEMO IV INFUSION 1 HR: CPT

## 2024-06-10 PROCEDURE — 77523 PROTON TRMT INTERMEDIATE: CPT | Performed by: RADIOLOGY

## 2024-06-10 PROCEDURE — 77387 GUIDANCE FOR RADJ TX DLVR: CPT | Performed by: RADIOLOGY

## 2024-06-10 PROCEDURE — 96368 THER/DIAG CONCURRENT INF: CPT

## 2024-06-10 PROCEDURE — 96375 TX/PRO/DX INJ NEW DRUG ADDON: CPT | Mod: INF

## 2024-06-10 PROCEDURE — 1123F ACP DISCUSS/DSCN MKR DOCD: CPT | Performed by: INTERNAL MEDICINE

## 2024-06-10 PROCEDURE — 96415 CHEMO IV INFUSION ADDL HR: CPT

## 2024-06-10 PROCEDURE — 1159F MED LIST DOCD IN RCRD: CPT | Performed by: INTERNAL MEDICINE

## 2024-06-10 PROCEDURE — 84075 ASSAY ALKALINE PHOSPHATASE: CPT | Performed by: INTERNAL MEDICINE

## 2024-06-10 PROCEDURE — 99214 OFFICE O/P EST MOD 30 MIN: CPT | Mod: 25 | Performed by: INTERNAL MEDICINE

## 2024-06-10 PROCEDURE — 96365 THER/PROPH/DIAG IV INF INIT: CPT

## 2024-06-10 PROCEDURE — 1036F TOBACCO NON-USER: CPT | Performed by: INTERNAL MEDICINE

## 2024-06-10 PROCEDURE — 2500000004 HC RX 250 GENERAL PHARMACY W/ HCPCS (ALT 636 FOR OP/ED): Performed by: INTERNAL MEDICINE

## 2024-06-10 PROCEDURE — 96417 CHEMO IV INFUS EACH ADDL SEQ: CPT

## 2024-06-10 PROCEDURE — 99214 OFFICE O/P EST MOD 30 MIN: CPT | Performed by: INTERNAL MEDICINE

## 2024-06-10 PROCEDURE — 96366 THER/PROPH/DIAG IV INF ADDON: CPT | Mod: INF

## 2024-06-10 PROCEDURE — 96360 HYDRATION IV INFUSION INIT: CPT | Mod: INF

## 2024-06-10 PROCEDURE — 1157F ADVNC CARE PLAN IN RCRD: CPT | Performed by: INTERNAL MEDICINE

## 2024-06-10 PROCEDURE — 85025 COMPLETE CBC W/AUTO DIFF WBC: CPT | Performed by: INTERNAL MEDICINE

## 2024-06-10 RX ORDER — HEPARIN SODIUM,PORCINE/PF 10 UNIT/ML
50 SYRINGE (ML) INTRAVENOUS AS NEEDED
Status: DISCONTINUED | OUTPATIENT
Start: 2024-06-10 | End: 2024-06-10 | Stop reason: HOSPADM

## 2024-06-10 RX ORDER — PROCHLORPERAZINE EDISYLATE 5 MG/ML
10 INJECTION INTRAMUSCULAR; INTRAVENOUS EVERY 6 HOURS PRN
Status: DISCONTINUED | OUTPATIENT
Start: 2024-06-10 | End: 2024-06-10 | Stop reason: HOSPADM

## 2024-06-10 RX ORDER — HEPARIN 100 UNIT/ML
500 SYRINGE INTRAVENOUS AS NEEDED
Status: DISCONTINUED | OUTPATIENT
Start: 2024-06-10 | End: 2024-06-10 | Stop reason: HOSPADM

## 2024-06-10 RX ORDER — DIPHENHYDRAMINE HYDROCHLORIDE 50 MG/ML
50 INJECTION INTRAMUSCULAR; INTRAVENOUS AS NEEDED
Status: DISCONTINUED | OUTPATIENT
Start: 2024-06-10 | End: 2024-06-10 | Stop reason: HOSPADM

## 2024-06-10 RX ORDER — FAMOTIDINE 10 MG/ML
20 INJECTION INTRAVENOUS ONCE AS NEEDED
Status: DISCONTINUED | OUTPATIENT
Start: 2024-06-10 | End: 2024-06-10 | Stop reason: HOSPADM

## 2024-06-10 RX ORDER — FAMOTIDINE 10 MG/ML
20 INJECTION INTRAVENOUS ONCE
Status: COMPLETED | OUTPATIENT
Start: 2024-06-10 | End: 2024-06-10

## 2024-06-10 RX ORDER — EPINEPHRINE 0.3 MG/.3ML
0.3 INJECTION SUBCUTANEOUS EVERY 5 MIN PRN
Status: DISCONTINUED | OUTPATIENT
Start: 2024-06-10 | End: 2024-06-10 | Stop reason: HOSPADM

## 2024-06-10 RX ORDER — PALONOSETRON 0.05 MG/ML
0.25 INJECTION, SOLUTION INTRAVENOUS ONCE
Status: COMPLETED | OUTPATIENT
Start: 2024-06-10 | End: 2024-06-10

## 2024-06-10 RX ORDER — MAGNESIUM SULFATE HEPTAHYDRATE 40 MG/ML
2 INJECTION, SOLUTION INTRAVENOUS ONCE
Status: CANCELLED | OUTPATIENT
Start: 2024-06-10 | End: 2024-06-10

## 2024-06-10 RX ORDER — PROCHLORPERAZINE MALEATE 10 MG
10 TABLET ORAL EVERY 6 HOURS PRN
Status: DISCONTINUED | OUTPATIENT
Start: 2024-06-10 | End: 2024-06-10 | Stop reason: HOSPADM

## 2024-06-10 RX ORDER — HEPARIN 100 UNIT/ML
500 SYRINGE INTRAVENOUS AS NEEDED
OUTPATIENT
Start: 2024-06-10

## 2024-06-10 RX ORDER — MAGNESIUM SULFATE HEPTAHYDRATE 40 MG/ML
2 INJECTION, SOLUTION INTRAVENOUS ONCE
Status: COMPLETED | OUTPATIENT
Start: 2024-06-10 | End: 2024-06-10

## 2024-06-10 RX ORDER — DIPHENHYDRAMINE HYDROCHLORIDE 50 MG/ML
25 INJECTION INTRAMUSCULAR; INTRAVENOUS ONCE
Status: COMPLETED | OUTPATIENT
Start: 2024-06-10 | End: 2024-06-10

## 2024-06-10 RX ORDER — HEPARIN SODIUM,PORCINE/PF 10 UNIT/ML
50 SYRINGE (ML) INTRAVENOUS AS NEEDED
OUTPATIENT
Start: 2024-06-10

## 2024-06-10 RX ORDER — ALBUTEROL SULFATE 0.83 MG/ML
3 SOLUTION RESPIRATORY (INHALATION) AS NEEDED
Status: DISCONTINUED | OUTPATIENT
Start: 2024-06-10 | End: 2024-06-10 | Stop reason: HOSPADM

## 2024-06-10 RX ADMIN — PACLITAXEL 93 MG: 6 INJECTION, SOLUTION INTRAVENOUS at 10:17

## 2024-06-10 RX ADMIN — CARBOPLATIN 194 MG: 10 INJECTION, SOLUTION INTRAVENOUS at 11:21

## 2024-06-10 RX ADMIN — PALONOSETRON HYDROCHLORIDE 250 MCG: 0.25 INJECTION INTRAVENOUS at 09:50

## 2024-06-10 RX ADMIN — SODIUM CHLORIDE 500 ML: 9 INJECTION, SOLUTION INTRAVENOUS at 09:42

## 2024-06-10 RX ADMIN — FAMOTIDINE 20 MG: 10 INJECTION INTRAVENOUS at 09:50

## 2024-06-10 RX ADMIN — HEPARIN 500 UNITS: 100 SYRINGE at 11:10

## 2024-06-10 RX ADMIN — DIPHENHYDRAMINE HYDROCHLORIDE 25 MG: 50 INJECTION INTRAMUSCULAR; INTRAVENOUS at 09:44

## 2024-06-10 RX ADMIN — DEXAMETHASONE SODIUM PHOSPHATE 2 MG: 4 INJECTION, SOLUTION INTRA-ARTICULAR; INTRALESIONAL; INTRAMUSCULAR; INTRAVENOUS; SOFT TISSUE at 09:50

## 2024-06-10 RX ADMIN — MAGNESIUM SULFATE HEPTAHYDRATE 2 G: 40 INJECTION, SOLUTION INTRAVENOUS at 09:45

## 2024-06-10 NOTE — PROGRESS NOTES
Patient here for blood draw from [facility]. Accessed, good blood return, sent blood to lab,  gave blood to [facility] research nurse, flushed per orders. Denies questions or needs at this time.  Went over to see Dr. Fry. Patient did say he was very tired, some numbness and tingling in fingers and toes and is having a hard time falling asleep and staying asleep, also has some diarrhea. MD aware.   Ready for infusion. Emotional support given.  Tolerated infusion without complication. Stable and mabulatory

## 2024-06-10 NOTE — PROGRESS NOTES
Patient here with his wife for follow up with Dr. Fry. Medications and allergies reviewed with patient. Patient states that he has been having diarrhea. He also reports some pain while swallowing.      Per Dr. Fry patient to take 1/2 tablet imodium in the morning and 1/2 tablet at night.         Patient to follow up in 1 week with labs.     Patient verbalized understanding, no further questions at this time.

## 2024-06-10 NOTE — PROGRESS NOTES
Patient ID: Juan M Mcrae is a 78 y.o. male.    Diagnoses:   1.GE junction adenocarcinoma (signet ring cell), proficient MMR.  Localized (clinical stage II/III).  2. Type 2 diabetes mellitus on oral hypoglycemics, hypertension controlled with medications.    Genomic profile:  Proficient MMR status on the biopsy by IHC.    Assessment and Plan:  This is a pleasant 78-year-old man with a diagnosis of GE junction adenocarcinoma.  His staging PET/CT and subsequent imaging studies confirmed localized disease.  He had a port placement and a J-tube placement.  He has seen radiation oncology and he has been enrolled in the NRG- (photon versus proton) study.    He is here for week 6 of chemotherapy (weekly carboplatin plus Taxol ).  Overall he is doing well.  He has some mild diarrhea, 2-3 small loose bowel movements a day without any abdominal cramping.  No nausea.  He has no discomfort with tube feeding.  His swallowing continues to get better except slight soreness on swallowing pills.    I ordered cycle 6 (week 6) of chemotherapy today which will be given if he is labs are acceptable.  For diarrhea, I asked him to take Imodium as needed.    Follow up plan-he has a follow-up next week with labs.          I have placed all orders as outlined above. I advised the patient to schedule the tests and follow-up appointment as discussed by contacting the  on the way out or calling by phone.    Providers:  Surgeon: Dr. Jovany Bucknerc:  Jennie: Donna.    Chief complaint: GE junction adenocarcinoma.    HPI:  ONCOLOGIC HISTORY-    February 28, 2024: Underwent an EGD for progressive dysphagia and weight loss.  EGD revealed an obstructing mass in the distal esophagus.  Biopsy confirmed adenocarcinoma with signet ring cell features.    March 21, 2024: EUS revealed T2 N1 mass extending from 41 cm to 43 cm and involving less than 1 cm of the gastric cardia.    March 22, 2024: CT scan of chest did not show any metastatic  disease.    April 1, 2024 24: PET/CT scan done:   1. Hypermetabolic linear focus at gastroesophageal junction which is  extending to the gastric cardia/proximal lesser curvature in  correlation with distal esophageal /gastroesophageal junction wall  thickening. These findings are compatible with biopsy-proven  adenocarcinoma.  2. No evidence of hypermetabolic lymphadenopathy.  3. Abnormal focal increase in FDG uptake in the left hepatic lobe in  correlation with hypoattenuating lesion, concerning for metastatic  disease. Further correlation with dedicated CT abdomen is recommended.  4. Large fat containing left inguinal hernia.  Interval history: He has significant dysphagia although he can get by with soup.  He has lost about 60 pounds in the last 6 months or so.  Feels fatigued.  Denies any pain.  Denies fever or chill or any other sign of ongoing infection.    April 3, 2024: CT scan of abdomen and MRI liver ruled out metastatic disease (liver lesions were hemangiomas).    May 6, 2024: Started concurrent radiation and chemotherapy with weekly carboplatin and Taxol.  Will complete concurrent chemoradiation on June 14, 2024.    6/10/24: RECEIVED THE LAST DOSE OF CHEMO. LAST DOSE OF RADIATION IS SCHEDULED ON 6/14/2024.    Interval history:  Patient presents today for an evaluation before cycle 6 of chemotherapy with Carboplatin/Paclitaxel and concurrent radiation therapy. He is receiving his radiation on main campus.  He has some mild diarrhea, 2-3 small loose bowel movements a day without any abdominal cramping.  No nausea.  He has no discomfort with tube feeding.  His swallowing continues to get better except slight soreness on swallowing pills. He denies any fevers, chills or night sweats. No cough, chest pain or shortness of breath.     Allergies  Omeprazole, Sulfa, shellfish    Medications:  Acetaminophen, atenolol, dexamethasone, Emla cream, metformin 1000 mg twice daily, multivitamin, naloxone, nystatin,  olanzapine, Actos, prochlorperazine, trazodone     Past Medical History:   Type 2 diabetes mellitus on oral hypoglycemics, hypertension controlled with medications.  Cataract, CKD (chronic kidney disease), Colon polyp, Deviated septum, ED (erectile dysfunction), Esophageal cancer, GERD (gastroesophageal reflux disease), Hyperlipidemia, Nephrolithiasis, Sleep apnea (wear CPAP/BiPAP)    Surgical History:    Past Surgical History:   Procedure Laterality Date    BLADDER SURGERY      COLONOSCOPY      Repeat in one year    EYE SURGERY      LITHOTRIPSY      OTHER SURGICAL HISTORY      Vocal cord surgery    TRANSURETHRAL RESECTION OF PROSTATE      UPPER GASTROINTESTINAL ENDOSCOPY  2024      Family History:    Family History   Problem Relation Name Age of Onset    Stroke Father      Kidney cancer Brother Eliu     Stroke Brother Eliu      Family Oncology History:    Cancer-related family history includes Kidney cancer in his brother.    Social History:      Tobacco Use    Smoking status: Former     Current packs/day: 0.00     Average packs/day: 2.0 packs/day for 40.0 years (80.0 ttl pk-yrs)     Types: Cigarettes     Start date: 1959     Quit date: 1999     Years since quittin.4    Smokeless tobacco: Never   Vaping Use    Vaping status: Never Used   Substance Use Topics    Alcohol use: Yes     Alcohol/week: 2.0 standard drinks of alcohol     Types: 2 Cans of beer per week     Comment: couple beers a week    Drug use: Not Currently     Comment: gummies-dispensary from [place]        Vital Signs:  There were no vitals taken for this visit.    Physical Exam:  ECO  Pain: 0  Constitutional: Well developed, awake/alert/oriented x3, no distress, alert and cooperative  Eyes: PER. sclera anicteric  ENMT: Oral mucosa moist  Respiratory/Thorax: Breathing is non-labored. Lungs are clear to auscultation bilaterally. No adventitious breath sounds  Cardiovascular: S1-S2. Regular rate and rhythm. No murmurs, rubs, or  gallops appreciated  Gastrointestinal: Abdomen soft nontender, nondistended, normal active bowel sounds.  Musculoskeletal: ROM intact, no joint swelling, normal strength  Extremities: normal extremities, no cyanosis, no edema, no clubbing  Neurologic: alert and oriented x3. Nonfocal exam. No myoclonus  Psychological: Pleasant, appropriate and easily engaged     Lab Results:  Labs from today reviewed.           Catarino Fry MD

## 2024-06-10 NOTE — PROGRESS NOTES
"NUTRITION Follow-up NOTE    Nutrition Assessment     Reason for Visit:  Juan M Mcrae is a 78 y.o. male who presents for esophageal cancer (lower esophageal/ GE junction)He is recceiving chemotherapy at [facility] (carbo / paclitaxel) began 5-6-2024 to 6-)  Proton at [facility] ( began 5-6-2024 and is scheduled to be completed on 6-)    Am following up with him today to assess BS, TF intake and po intake.    He met with Dr. Fry today  As well as received his last  chemotherapy today     He has switched to the Glucerna 1.5- he is reporting that his tube is flushing a little hard some mornings.    This became a problem on Sunday.        Hx: DM, HTN  Allergy: omeprazole  A1c 6.8 (2/5/24) -- pt taken off metformin in past with weight loss  -  added back metformin 5- (1000 mg 2 times per day) - noted actos changed to glipizide 6-3-2024    Jtube to be placed on 4/8    Pt with CINDI Stovall -         Lab Results   Component Value Date/Time    GLUCOSE 211 (H) 06/10/2024 0907     06/10/2024 0907    K 3.8 06/10/2024 0907     06/10/2024 0907    CO2 26 06/10/2024 0907    ANIONGAP 13 06/10/2024 0907    BUN 22 06/10/2024 0907    CREATININE 0.66 06/10/2024 0907    EGFR >90 06/10/2024 0907    CALCIUM 8.4 (L) 06/10/2024 0907    ALBUMIN 3.4 06/10/2024 0907    ALKPHOS 60 06/10/2024 0907    PROT 5.8 (L) 06/10/2024 0907    AST 12 06/10/2024 0907    BILITOT 0.3 06/10/2024 0907    ALT 15 06/10/2024 0907     No results found for: \"VITD25\"    BS are beginning to trend down more       Anthropometrics:     HT:  167.5 cm   IBW: 64.5 kg  111% IBW  BMI: 25.6    In the past month he is down 5.5 kg (7.2%)  Weight is stabilizing       Wt Readings from Last 10 Encounters:   06/10/24 71.6 kg (157 lb 15.4 oz)   06/04/24 73.1 kg (161 lb 1.6 oz)   06/03/24 72 kg (158 lb 11.7 oz)   05/30/24 72.4 kg (159 lb 9.6 oz)   05/28/24 71.9 kg (158 lb 8.2 oz)   05/21/24 72.8 kg (160 lb 8 oz)   05/20/24 71.7 kg (158 lb 2.9 oz) "   05/17/24 73.5 kg (162 lb)   05/13/24 76 kg (167 lb 7 oz)   05/13/24 76 kg (167 lb 7 oz)        Food And Nutrient Intake:      Nibbling-   Has an appetite  Looking for food to eat  Cookies  1/4 of a waffle - trying to not to do too much at a time- does  get a little pain with eating   No regurgitation but sometimes feels like he has too - uses anti emetics     Tube is getting hard to flush some mornings  He called [facility] over the weekend- they told him to use gingerale and/or warm water- this is working  We also discussed using   20-30 ml to flush more frequently  Also utilizing coffee and coke if needed  They also know that thor[facility] team has a de-clogger and we can arrange them to meet with him if this becomes clogged      TF  1.5- 5 cartons as order not yet processed for the Glucerna 1.5  1875 calories  220 g CHO  85 g protein  Rate is at 93 ml per hour    Pt reports he is having diarrhea- he is having 3-4 loose / watery Bms per day  Today at [facility] he was instructed to take imodium  He purchased pills today but has not yet begun- he was going to start with 1 pill with first episode of diarrhea and go from there         With BS feels less foggy and is not as thirsty as he had been     Had some questions about blood draws and scans- reached out to clinic research RN regarding this                                                          Nutrition Focused Physical Exam Findings:  Completed 4/1/2024                        Energy Needs     2280 calories  91.2 g protein   2280 ml of free water       Nutrition Diagnosis             Nutrition Interventions/Recommendations   Nutrition Prescription   TF- Glucerna 1.5- this along with change in meds has appeared to help with BS control   Will continue with soft moist complex CHO foods paired with protein for pleasure   Will use imodium as needed     Food and Nutrition Delivery   At this time TF is providing essentially sole of nutrition     Nutrition Education   Further  discussed techniques for unclogging his j-tube     Coordination of Care   Medical oncology-   [place]  Radiation oncology   Clinical trials RN  Thoracic surgeon     There are no Patient Instructions on file for this visit.    Nutrition Monitoring and Evaluation        BS  Weight   tolerance to Glucerna 1.5   Tolerance to Po intake - fluids       Time Spent  Prep time on day of patient encounter: 10 minutes  Time spent directly with patient, family or caregiver: 10 minutes  Additional Time Spent on Patient Care Activities: 10 minutes  Documentation Time: 15 minutes  Other Time Spent: 5 minutes  Total: 50 minutes

## 2024-06-11 ENCOUNTER — HOSPITAL ENCOUNTER (OUTPATIENT)
Dept: RADIATION ONCOLOGY | Facility: HOSPITAL | Age: 79
Setting detail: RADIATION/ONCOLOGY SERIES
Discharge: HOME | End: 2024-06-11
Payer: MEDICARE

## 2024-06-11 ENCOUNTER — RADIATION ONCOLOGY OTV (OUTPATIENT)
Dept: RADIATION ONCOLOGY | Facility: HOSPITAL | Age: 79
End: 2024-06-11
Payer: MEDICARE

## 2024-06-11 VITALS
WEIGHT: 161.4 LBS | HEART RATE: 79 BPM | SYSTOLIC BLOOD PRESSURE: 115 MMHG | TEMPERATURE: 96.6 F | RESPIRATION RATE: 20 BRPM | OXYGEN SATURATION: 91 % | BODY MASS INDEX: 26.09 KG/M2 | DIASTOLIC BLOOD PRESSURE: 75 MMHG

## 2024-06-11 DIAGNOSIS — Z51.0 ENCOUNTER FOR ANTINEOPLASTIC RADIATION THERAPY: ICD-10-CM

## 2024-06-11 DIAGNOSIS — C16.0 MALIGNANT NEOPLASM OF CARDIA (MULTI): ICD-10-CM

## 2024-06-11 LAB
RAD ONC MSQ ACTUAL FRACTIONS DELIVERED: 25
RAD ONC MSQ ACTUAL SESSION BIOLOGICAL DOSE: 180 CCGE
RAD ONC MSQ ACTUAL SESSION DELIVERED DOSE: 164 CGRAY
RAD ONC MSQ ACTUAL TOTAL BIOLOGICAL DOSE: 4510 CCGE
RAD ONC MSQ ACTUAL TOTAL DOSE: 4100 CGRAY
RAD ONC MSQ ELAPSED DAYS: 36
RAD ONC MSQ LAST DATE: NORMAL
RAD ONC MSQ PRESCRIBED BIOLOGICAL FRACTIONAL DOSE: 180 CCGE
RAD ONC MSQ PRESCRIBED BIOLOGICAL TOTAL DOSE: 5040 CCGE
RAD ONC MSQ PRESCRIBED FRACTIONAL DOSE: 164 CGRAY
RAD ONC MSQ PRESCRIBED NUMBER OF FRACTIONS: 28
RAD ONC MSQ PRESCRIBED TECHNIQUE: NORMAL
RAD ONC MSQ PRESCRIBED TOTAL DOSE: 4582 CGRAY
RAD ONC MSQ PRESCRIPTION PATTERN COMMENT: NORMAL
RAD ONC MSQ START DATE: NORMAL
RAD ONC MSQ TREATMENT COURSE NUMBER: 1
RAD ONC MSQ TREATMENT SITE: NORMAL

## 2024-06-11 PROCEDURE — 77387 GUIDANCE FOR RADJ TX DLVR: CPT | Performed by: RADIOLOGY

## 2024-06-11 PROCEDURE — 77523 PROTON TRMT INTERMEDIATE: CPT | Performed by: RADIOLOGY

## 2024-06-11 ASSESSMENT — PAIN SCALES - GENERAL: PAINLEVEL: 0-NO PAIN
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
## 2024-06-12 ENCOUNTER — NUTRITION (OUTPATIENT)
Dept: HEMATOLOGY/ONCOLOGY | Facility: HOSPITAL | Age: 79
End: 2024-06-12
Payer: MEDICARE

## 2024-06-12 ENCOUNTER — HOSPITAL ENCOUNTER (OUTPATIENT)
Dept: RADIATION ONCOLOGY | Facility: HOSPITAL | Age: 79
Setting detail: RADIATION/ONCOLOGY SERIES
Discharge: HOME | End: 2024-06-12
Payer: MEDICARE

## 2024-06-12 DIAGNOSIS — Z51.0 ENCOUNTER FOR ANTINEOPLASTIC RADIATION THERAPY: ICD-10-CM

## 2024-06-12 DIAGNOSIS — C16.0 MALIGNANT NEOPLASM OF CARDIA (MULTI): ICD-10-CM

## 2024-06-12 LAB
RAD ONC MSQ ACTUAL FRACTIONS DELIVERED: 26
RAD ONC MSQ ACTUAL SESSION BIOLOGICAL DOSE: 180 CCGE
RAD ONC MSQ ACTUAL SESSION DELIVERED DOSE: 164 CGRAY
RAD ONC MSQ ACTUAL TOTAL BIOLOGICAL DOSE: 4690 CCGE
RAD ONC MSQ ACTUAL TOTAL DOSE: 4264 CGRAY
RAD ONC MSQ ELAPSED DAYS: 37
RAD ONC MSQ LAST DATE: NORMAL
RAD ONC MSQ PRESCRIBED BIOLOGICAL FRACTIONAL DOSE: 180 CCGE
RAD ONC MSQ PRESCRIBED BIOLOGICAL TOTAL DOSE: 5040 CCGE
RAD ONC MSQ PRESCRIBED FRACTIONAL DOSE: 164 CGRAY
RAD ONC MSQ PRESCRIBED NUMBER OF FRACTIONS: 28
RAD ONC MSQ PRESCRIBED TECHNIQUE: NORMAL
RAD ONC MSQ PRESCRIBED TOTAL DOSE: 4582 CGRAY
RAD ONC MSQ PRESCRIPTION PATTERN COMMENT: NORMAL
RAD ONC MSQ START DATE: NORMAL
RAD ONC MSQ TREATMENT COURSE NUMBER: 1
RAD ONC MSQ TREATMENT SITE: NORMAL

## 2024-06-12 PROCEDURE — 77387 GUIDANCE FOR RADJ TX DLVR: CPT | Performed by: RADIOLOGY

## 2024-06-12 PROCEDURE — 77523 PROTON TRMT INTERMEDIATE: CPT | Performed by: RADIOLOGY

## 2024-06-12 ASSESSMENT — ENCOUNTER SYMPTOMS
SHORTNESS OF BREATH: 0
HEADACHES: 0
FEVER: 0
DIZZINESS: 0
CHILLS: 0
POLYDIPSIA: 0

## 2024-06-12 NOTE — PROGRESS NOTES
NUTRITION COMMUNICATION NOTE    Juan M Mcrae     REASON FOR COMMUNICATION:     Was asked to see patient today to help with tube unclogging.  Pt is in an exam room  He tells me since they have been using warm water to flush there has not been the resistance he has been having with flushing at disconnect.  I had reached out to thoracic prior to today but no appointment was available for him to be seen and additionally it did not appear that the tube would need to be de-clogged  At this time asked him to continue to flush with warm water  He was reassured that if something does come up to call and we can help facilitate the thoracic team seeing him   He was comfortable with this plan             Time Spent  Prep time on day of patient encounter: 10 minutes  Time spent directly with patient, family or caregiver: 10 minutes  Additional Time Spent on Patient Care Activities: 10 minutes  Documentation Time: 5 minutes  Other Time Spent: 5 minutes  Total: 40 minutes

## 2024-06-13 ENCOUNTER — APPOINTMENT (OUTPATIENT)
Dept: RADIOLOGY | Facility: HOSPITAL | Age: 79
End: 2024-06-13
Payer: MEDICARE

## 2024-06-13 ENCOUNTER — HOSPITAL ENCOUNTER (OUTPATIENT)
Dept: RADIATION ONCOLOGY | Facility: HOSPITAL | Age: 79
Setting detail: RADIATION/ONCOLOGY SERIES
Discharge: HOME | End: 2024-06-13
Payer: MEDICARE

## 2024-06-13 DIAGNOSIS — C16.0 MALIGNANT NEOPLASM OF CARDIA (MULTI): ICD-10-CM

## 2024-06-13 DIAGNOSIS — Z51.0 ENCOUNTER FOR ANTINEOPLASTIC RADIATION THERAPY: ICD-10-CM

## 2024-06-13 LAB
RAD ONC MSQ ACTUAL FRACTIONS DELIVERED: 27
RAD ONC MSQ ACTUAL SESSION BIOLOGICAL DOSE: 180 CCGE
RAD ONC MSQ ACTUAL SESSION DELIVERED DOSE: 164 CGRAY
RAD ONC MSQ ACTUAL TOTAL BIOLOGICAL DOSE: 4871 CCGE
RAD ONC MSQ ACTUAL TOTAL DOSE: 4428 CGRAY
RAD ONC MSQ ELAPSED DAYS: 38
RAD ONC MSQ LAST DATE: NORMAL
RAD ONC MSQ PRESCRIBED BIOLOGICAL FRACTIONAL DOSE: 180 CCGE
RAD ONC MSQ PRESCRIBED BIOLOGICAL TOTAL DOSE: 5040 CCGE
RAD ONC MSQ PRESCRIBED FRACTIONAL DOSE: 164 CGRAY
RAD ONC MSQ PRESCRIBED NUMBER OF FRACTIONS: 28
RAD ONC MSQ PRESCRIBED TECHNIQUE: NORMAL
RAD ONC MSQ PRESCRIBED TOTAL DOSE: 4582 CGRAY
RAD ONC MSQ PRESCRIPTION PATTERN COMMENT: NORMAL
RAD ONC MSQ START DATE: NORMAL
RAD ONC MSQ TREATMENT COURSE NUMBER: 1
RAD ONC MSQ TREATMENT SITE: NORMAL

## 2024-06-13 PROCEDURE — 77523 PROTON TRMT INTERMEDIATE: CPT | Performed by: RADIOLOGY

## 2024-06-13 PROCEDURE — 77387 GUIDANCE FOR RADJ TX DLVR: CPT | Performed by: RADIOLOGY

## 2024-06-14 ENCOUNTER — DOCUMENTATION (OUTPATIENT)
Dept: RADIATION ONCOLOGY | Facility: HOSPITAL | Age: 79
End: 2024-06-14
Payer: MEDICARE

## 2024-06-14 ENCOUNTER — EDUCATION (OUTPATIENT)
Dept: HEMATOLOGY/ONCOLOGY | Facility: HOSPITAL | Age: 79
End: 2024-06-14
Payer: MEDICARE

## 2024-06-14 ENCOUNTER — APPOINTMENT (OUTPATIENT)
Dept: HEMATOLOGY/ONCOLOGY | Facility: HOSPITAL | Age: 79
End: 2024-06-14
Payer: MEDICARE

## 2024-06-14 ENCOUNTER — RADIATION ONCOLOGY OTV (OUTPATIENT)
Dept: RADIATION ONCOLOGY | Facility: HOSPITAL | Age: 79
End: 2024-06-14
Payer: MEDICARE

## 2024-06-14 ENCOUNTER — HOSPITAL ENCOUNTER (OUTPATIENT)
Dept: RADIATION ONCOLOGY | Facility: HOSPITAL | Age: 79
Setting detail: RADIATION/ONCOLOGY SERIES
Discharge: HOME | End: 2024-06-14
Payer: MEDICARE

## 2024-06-14 DIAGNOSIS — Z51.0 ENCOUNTER FOR ANTINEOPLASTIC RADIATION THERAPY: ICD-10-CM

## 2024-06-14 DIAGNOSIS — C16.0 MALIGNANT NEOPLASM OF CARDIA (MULTI): ICD-10-CM

## 2024-06-14 LAB
RAD ONC MSQ ACTUAL FRACTIONS DELIVERED: 28
RAD ONC MSQ ACTUAL SESSION BIOLOGICAL DOSE: 180 CCGE
RAD ONC MSQ ACTUAL SESSION DELIVERED DOSE: 164 CGRAY
RAD ONC MSQ ACTUAL TOTAL BIOLOGICAL DOSE: 5051 CCGE
RAD ONC MSQ ACTUAL TOTAL DOSE: 4592 CGRAY
RAD ONC MSQ ELAPSED DAYS: 39
RAD ONC MSQ LAST DATE: NORMAL
RAD ONC MSQ PRESCRIBED BIOLOGICAL FRACTIONAL DOSE: 180 CCGE
RAD ONC MSQ PRESCRIBED BIOLOGICAL TOTAL DOSE: 5040 CCGE
RAD ONC MSQ PRESCRIBED FRACTIONAL DOSE: 164 CGRAY
RAD ONC MSQ PRESCRIBED NUMBER OF FRACTIONS: 28
RAD ONC MSQ PRESCRIBED TECHNIQUE: NORMAL
RAD ONC MSQ PRESCRIBED TOTAL DOSE: 4582 CGRAY
RAD ONC MSQ PRESCRIPTION PATTERN COMMENT: NORMAL
RAD ONC MSQ START DATE: NORMAL
RAD ONC MSQ TREATMENT COURSE NUMBER: 1
RAD ONC MSQ TREATMENT SITE: NORMAL

## 2024-06-14 PROCEDURE — 77387 GUIDANCE FOR RADJ TX DLVR: CPT | Performed by: RADIOLOGY

## 2024-06-14 PROCEDURE — 77523 PROTON TRMT INTERMEDIATE: CPT | Performed by: RADIOLOGY

## 2024-06-14 NOTE — PROGRESS NOTES
Radiation Oncology Treatment Summary    Patient Name:  Juan M Mcrae  MRN:  [id number]  :  1945    Radiation Oncologist:No care team member to display   Referring Provider: No ref. provider found  Primary Care Provider: Candy Grossman MD    Brief History: Juan M Mcrae is a 78 y.o. 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  The patient completed radiotherapy as outlined below.    Radiation Treatment Summary:    IMRT: Lower esophagus    Treatment Period Technique Fraction Dose Fractions Total Dose   Course 1 2024-2024  (days elapsed: 39)         esophagus 2024-2024 Protons 164 / 164 cGy  4592 / 4,582 cGy     Concurrent Chemotherapy:  ([facility]) NRG- - PACLitaxel / CARBOplatin with Concurrent Radiation (Weekly), 6 Week Cycle   Treatment goal Curative   Treatment line Neoadjuvant   Status Active   Start Date 2024   End Date 6/10/2024   Treatment Medications CARBOplatin (Paraplatin) 194 mg in sodium chloride 0.9% 129 mL IV, 194 mg, intravenous, Once, 1 of 1 cycle  Administration: 194 mg (2024), 194 mg (2024), 194 mg (6/3/2024), 194 mg (6/10/2024), 194 mg (2024), 194 mg (2024)    PACLitaxeL (Taxol) 93 mg in dextrose 5 % in water (D5W) 126 mL IV, 50 mg/m2 = 93 mg, intravenous, Once, 1 of 1 cycle  Administration: 93 mg (2024), 93 mg (2024), 93 mg (6/3/2024), 93 mg (6/10/2024), 93 mg (2024), 93 mg (2024)    methylPREDNISolone sod succinate (SOLU-Medrol) 40 mg/mL injection 40 mg, 40 mg, intravenous, As needed, 1 of 1 cycle         CTCAE Toxicity Overview:   Toxicity Assessment          2024    11:23 2024    09:51 2024    09:49 2024    09:19 2024    09:23 2024    11:43   Toxicity Assessment   Treatment Site Thoracic Thoracic Thoracic Thoracic Thoracic Thoracic   Anorexia Grade 0 Grade 1 Grade 0 Grade 0 Grade 0 Grade 0   Anxiety Grade 0 Grade 0 Grade 0 Grade 0 Grade  0 Grade 0   Dehydration Grade 0 Grade 0 Grade 0 Grade 0 Grade 0 Grade 0   Depression Grade 0 Grade 0 Grade 0 Grade 0 Grade 0 Grade 0   Dermatitis Radiation Grade 0 Grade 0 Grade 0 Grade 0 Grade 0 Grade 0   Diarrhea Grade 0 Grade 0 Grade 0 Grade 1 Grade 2 Grade 1   Fatigue Grade 0 Grade 1 Grade 0 Grade 1 Grade 1 Grade 1   Nausea Grade 0 Grade 0 Grade 0 Grade 0 Grade 0 Grade 0   Pain Grade 0 Grade 1 Grade 1 Grade 0 Grade 0 Grade 0   Tumor Pain      Grade 0   Vomiting Grade 0 Grade 0 Grade 0 Grade 0 Grade 0 Grade 0   Constipation Grade 0 Grade 0       Dyspepsia Grade 0 Grade 0       Dysphagia  Grade 0       Esophagitis Grade 1 Grade 1       Aspiration Grade 0        Esophageal Obstruction  Grade 0   Grade 1       feels likie things are tighter Grade 1   Esophageal Pain  Grade 0 Grade 0 Grade 1       with water Grade 0    Bronchial Obstruction Grade 0        Cough Grade 0   Grade 0 Grade 0 Grade 0   Dyspnea Grade 0   Grade 0 Grade 0 Grade 0   Epistaxis     Grade 0 Grade 0   Hiccups Grade 1  Grade 1 Grade 0 Grade 0 Grade 0   Hypoxia Grade 0  Grade 0 Grade 0 Grade 0 Grade 0       Patient Disposition:   Future Appointments       Date / Time Provider Department Dept Phone    6/19/2024 3:00 PM Melina Leroy [facility]; Candy Grossman MD [facility] [phone]    7/15/2024 10:00 AM [facility] CENTRAL LINE 01 [facility] [phone]    7/15/2024 11:30 AM (Arrive by 11:15 AM) [facility] SCC ADMIN ROOM PET CT 1 [facility] [phone]    7/15/2024 12:30 PM (Arrive by 12:15 PM) [facility] SCC PET CT 2 [facility] [phone]    7/17/2024 10:00 AM Macho Manzo MD [facility] [phone]    7/18/2024 3:20 PM (Arrive by 3:05 PM) Catarino Fry MD [facility] [phone]    7/30/2024 3:30 PM Dario Smith MD  [facility] [phone]    8/12/2024 10:00 AM Candy Grossman MD [facility]  [phone]    10/9/2024 9:20 AM Jenna Blunt MD [facility] [place] [phone]          Follow up per clinical trial  no follow up with Donna at this time

## 2024-06-14 NOTE — PROGRESS NOTES
RADIATION ONCOLOGY ON-TREATMENT VISIT NOTE  Patient Name:  Juan M Mcrae  MRN:  [id number]  :  1945    Radiation Oncologist: Irish Thompson MD  Referring Provider: No ref. 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     OBJECTIVE:   Vital Signs:  There were no vitals taken for this visit.   Pain Scale: 0/10.    Well appearing, NAD VSS wt stable.    Toxicity Assessment          2024    11:23 2024    09:51 2024    09:49 2024    09:19 2024    09:23 2024    11:43   Toxicity Assessment   Treatment Site Thoracic Thoracic Thoracic Thoracic Thoracic Thoracic   Anorexia Grade 0 Grade 1 Grade 0 Grade 0 Grade 0 Grade 0   Anxiety Grade 0 Grade 0 Grade 0 Grade 0 Grade 0 Grade 0   Dehydration Grade 0 Grade 0 Grade 0 Grade 0 Grade 0 Grade 0   Depression Grade 0 Grade 0 Grade 0 Grade 0 Grade 0 Grade 0   Dermatitis Radiation Grade 0 Grade 0 Grade 0 Grade 0 Grade 0 Grade 0   Diarrhea Grade 0 Grade 0 Grade 0 Grade 1 Grade 2 Grade 1   Fatigue  Grade 0 Grade 1 Grade 0 Grade 1 Grade 1 Grade 1   Nausea Grade 0 Grade 0 Grade 0 Grade 0 Grade 0 Grade 0   Pain Grade 0 Grade 1 Grade 1 Grade 0 Grade 0 Grade 0   Tumor Pain      Grade 0   Vomiting Grade 0 Grade 0 Grade 0 Grade 0 Grade 0 Grade 0   Constipation Grade 0 Grade 0       Dyspepsia Grade 0 Grade 0       Dysphagia  Grade 0       Esophagitis Grade 1 Grade 1       Aspiration Grade 0        Esophageal Obstruction  Grade 0   Grade 1       feels likie things are tighter Grade 1   Esophageal Pain  Grade 0 Grade 0 Grade 1       with water Grade 0    Bronchial Obstruction Grade 0        Cough Grade 0   Grade 0 Grade 0 Grade 0   Dyspnea Grade 0   Grade 0 Grade 0 Grade 0   Epistaxis     Grade 0 Grade 0   Hiccups Grade 1  Grade 1 Grade 0 Grade 0 Grade 0   Hypoxia Grade 0  Grade 0 Grade 0 Grade 0 Grade 0        ASSESSMENT/PLAN:  The patient is tolerating radiation therapy as anticipated.  Continue per current treatment plan.      Irish Thompson MD  6/14/2024

## 2024-06-14 NOTE — PROGRESS NOTES
Research Note EOT Visit    Juan M Mcrae is here today for an EOT visit on NRG-.   EOT procedures completed per protocol. AE's and con-meds reviewed with patient.   Follow up plan discussed with patient. Schedule given to patient and SO.    Patient discontinued treatment due to:  []    Disease Progression  []   Worsening AEs  []   Physician Decision  []   Patient Decision  []  Consent Withdrawal  []   Completed per Protocol  []   Other: <please explain>    Education Documentation  Neutropenia During Cancer Treatment, taught by Carine Adan RN at 6/14/2024 11:46 AM.  Learner: Family, Significant Other, Patient  Readiness: Acceptance  Method: Explanation, Handout  Response: Verbalizes Understanding, Demonstrated Understanding    Chemotherapy : What is chemotherapy, taught by Carine Adan RN at 6/14/2024 11:46 AM.  Learner: Family, Significant Other, Patient  Readiness: Acceptance  Method: Explanation, Handout  Response: Verbalizes Understanding, Demonstrated Understanding    Chemotherapy : Review, taught by Carine Adan RN at 6/14/2024 11:46 AM.  Learner: Family, Significant Other, Patient  Readiness: Acceptance  Method: Explanation, Handout  Response: Verbalizes Understanding, Demonstrated Understanding    Chemotherapy : Possible Side Effects, taught by Carine Adan RN at 6/14/2024 11:46 AM.  Learner: Family, Significant Other, Patient  Readiness: Acceptance  Method: Explanation, Handout  Response: Verbalizes Understanding, Demonstrated Understanding    Chemotherapy : Life During Treatment, taught by Carine Adan RN at 6/14/2024 11:46 AM.  Learner: Family, Significant Other, Patient  Readiness: Acceptance  Method: Explanation, Handout  Response: Verbalizes Understanding, Demonstrated Understanding    Chemotherapy : Getting Chemotherapy, taught by Carine Adan RN at 6/14/2024 11:46 AM.  Learner: Family, Significant Other, Patient  Readiness:  Acceptance  Method: Explanation, Handout  Response: Verbalizes Understanding, Demonstrated Understanding    Chemotherapy : Common concerns, taught by Carine Adan RN at 6/14/2024 11:46 AM.  Learner: Family, Significant Other, Patient  Readiness: Acceptance  Method: Explanation, Handout  Response: Verbalizes Understanding, Demonstrated Understanding    Returning to Work/School/Activities, taught by Carine Adan RN at 6/14/2024 11:46 AM.  Learner: Family, Significant Other, Patient  Readiness: Acceptance  Method: Explanation, Handout  Response: Verbalizes Understanding, Demonstrated Understanding    Stress Management, taught by Carine Adan RN at 6/14/2024 11:46 AM.  Learner: Family, Significant Other, Patient  Readiness: Acceptance  Method: Explanation, Handout  Response: Verbalizes Understanding, Demonstrated Understanding    Changing Role with Self and Family, taught by Carine Adan RN at 6/14/2024 11:46 AM.  Learner: Family, Significant Other, Patient  Readiness: Acceptance  Method: Explanation, Handout  Response: Verbalizes Understanding, Demonstrated Understanding    Leisure Education, taught by Carine Adan RN at 6/14/2024 11:46 AM.  Learner: Family, Significant Other, Patient  Readiness: Acceptance  Method: Explanation, Handout  Response: Verbalizes Understanding, Demonstrated Understanding    Health Systems & Community Resources, taught by Carine Adan RN at 6/14/2024 11:46 AM.  Learner: Family, Significant Other, Patient  Readiness: Acceptance  Method: Explanation, Handout  Response: Verbalizes Understanding, Demonstrated Understanding    Advanced Medical Directives, taught by Carine Adan RN at 6/14/2024 11:46 AM.  Learner: Family, Significant Other, Patient  Readiness: Acceptance  Method: Explanation, Handout  Response: Verbalizes Understanding, Demonstrated Understanding    Escort, Parking, Transportation Home, taught by Carine Adan  RN at 6/14/2024 11:46 AM.  Learner: Family, Significant Other, Patient  Readiness: Acceptance  Method: Explanation, Handout  Response: Verbalizes Understanding, Demonstrated Understanding    Referrals to Support Services, taught by Carine Adan RN at 6/14/2024 11:46 AM.  Learner: Family, Significant Other, Patient  Readiness: Acceptance  Method: Explanation, Handout  Response: Verbalizes Understanding, Demonstrated Understanding    Support Systems, taught by Carine Adan RN at 6/14/2024 11:46 AM.  Learner: Family, Significant Other, Patient  Readiness: Acceptance  Method: Explanation, Handout  Response: Verbalizes Understanding, Demonstrated Understanding    Financial Assistance Information, taught by Carine Adan RN at 6/14/2024 11:46 AM.  Learner: Family, Significant Other, Patient  Readiness: Acceptance  Method: Explanation, Handout  Response: Verbalizes Understanding, Demonstrated Understanding    Financial Benefits Summary, taught by Carine Adan RN at 6/14/2024 11:46 AM.  Learner: Family, Significant Other, Patient  Readiness: Acceptance  Method: Explanation, Handout  Response: Verbalizes Understanding, Demonstrated Understanding    Healthy Lifestyle, taught by Carine Adan RN at 6/14/2024 11:46 AM.  Learner: Family, Significant Other, Patient  Readiness: Acceptance  Method: Explanation, Handout  Response: Verbalizes Understanding, Demonstrated Understanding    Monitoring Weight, taught by Carine Adan RN at 6/14/2024 11:46 AM.  Learner: Family, Significant Other, Patient  Readiness: Acceptance  Method: Explanation, Handout  Response: Verbalizes Understanding, Demonstrated Understanding    Tips for Daily Living, taught by Carine Adan RN at 6/14/2024 11:46 AM.  Learner: Family, Significant Other, Patient  Readiness: Acceptance  Method: Explanation, Handout  Response: Verbalizes Understanding, Demonstrated Understanding    Nutrition/Diet, taught by  Carine Adan RN at 6/14/2024 11:46 AM.  Learner: Family, Significant Other, Patient  Readiness: Acceptance  Method: Explanation, Handout  Response: Verbalizes Understanding, Demonstrated Understanding    Food-Drug Interactions, taught by Carine Adan RN at 6/14/2024 11:46 AM.  Learner: Family, Significant Other, Patient  Readiness: Acceptance  Method: Explanation, Handout  Response: Verbalizes Understanding, Demonstrated Understanding    Nutrition, taught by Carine Adan RN at 6/14/2024 11:46 AM.  Learner: Family, Significant Other, Patient  Readiness: Acceptance  Method: Explanation, Handout  Response: Verbalizes Understanding, Demonstrated Understanding    Pain Medication Actions & Side Effects, taught by Carine Adan RN at 6/14/2024 11:46 AM.  Learner: Family, Significant Other, Patient  Readiness: Acceptance  Method: Explanation, Handout  Response: Verbalizes Understanding, Demonstrated Understanding    Patient Controlled Analgesia, taught by Carine Adan RN at 6/14/2024 11:46 AM.  Learner: Family, Significant Other, Patient  Readiness: Acceptance  Method: Explanation, Handout  Response: Verbalizes Understanding, Demonstrated Understanding    Discuss the Use of Pain Control Measures Before Pain Becomes Severe, taught by Carine Adan RN at 6/14/2024 11:46 AM.  Learner: Family, Significant Other, Patient  Readiness: Acceptance  Method: Explanation, Handout  Response: Verbalizes Understanding, Demonstrated Understanding    Pain Management, taught by Carine Adan RN at 6/14/2024 11:46 AM.  Learner: Family, Significant Other, Patient  Readiness: Acceptance  Method: Explanation, Handout  Response: Verbalizes Understanding, Demonstrated Understanding    Pain Rating Scale, taught by Carine Adan RN at 6/14/2024 11:46 AM.  Learner: Family, Significant Other, Patient  Readiness: Acceptance  Method: Explanation, Handout  Response: Verbalizes  Understanding, Demonstrated Understanding    Shortness of Breath, taught by Carine Adan RN at 6/14/2024 11:46 AM.  Learner: Family, Significant Other, Patient  Readiness: Acceptance  Method: Explanation, Handout  Response: Verbalizes Understanding, Demonstrated Understanding    Changes in Appetite, taught by Carine Adan RN at 6/14/2024 11:46 AM.  Learner: Family, Significant Other, Patient  Readiness: Acceptance  Method: Explanation, Handout  Response: Verbalizes Understanding, Demonstrated Understanding    Fertility Issues, taught by Carine Adan RN at 6/14/2024 11:46 AM.  Learner: Family, Significant Other, Patient  Readiness: Acceptance  Method: Explanation, Handout  Response: Verbalizes Understanding, Demonstrated Understanding    Memory Problems, taught by Carine Adan RN at 6/14/2024 11:46 AM.  Learner: Family, Significant Other, Patient  Readiness: Acceptance  Method: Explanation, Handout  Response: Verbalizes Understanding, Demonstrated Understanding    Skin and Nail Changes, taught by Carine Adan RN at 6/14/2024 11:46 AM.  Learner: Family, Significant Other, Patient  Readiness: Acceptance  Method: Explanation, Handout  Response: Verbalizes Understanding, Demonstrated Understanding    Changes in Urination, taught by Carine Adan RN at 6/14/2024 11:46 AM.  Learner: Family, Significant Other, Patient  Readiness: Acceptance  Method: Explanation, Handout  Response: Verbalizes Understanding, Demonstrated Understanding    Bleeding Precautions, taught by Carine Adan RN at 6/14/2024 11:46 AM.  Learner: Family, Significant Other, Patient  Readiness: Acceptance  Method: Explanation, Handout  Response: Verbalizes Understanding, Demonstrated Understanding    Edema Management, taught by Carine Adan RN at 6/14/2024 11:46 AM.  Learner: Family, Significant Other, Patient  Readiness: Acceptance  Method: Explanation, Handout  Response: Verbalizes  Understanding, Demonstrated Understanding    Care of Neuropathy, taught by Carine Adan RN at 6/14/2024 11:46 AM.  Learner: Family, Significant Other, Patient  Readiness: Acceptance  Method: Explanation, Handout  Response: Verbalizes Understanding, Demonstrated Understanding    Infection Control, taught by Carine Adan RN at 6/14/2024 11:46 AM.  Learner: Family, Significant Other, Patient  Readiness: Acceptance  Method: Explanation, Handout  Response: Verbalizes Understanding, Demonstrated Understanding    Alopecia, taught by Carine Adan RN at 6/14/2024 11:46 AM.  Learner: Family, Significant Other, Patient  Readiness: Acceptance  Method: Explanation, Handout  Response: Verbalizes Understanding, Demonstrated Understanding    Diarrhea, taught by Carine Adan RN at 6/14/2024 11:46 AM.  Learner: Family, Significant Other, Patient  Readiness: Acceptance  Method: Explanation, Handout  Response: Verbalizes Understanding, Demonstrated Understanding    Constipation, taught by Carine Adan RN at 6/14/2024 11:46 AM.  Learner: Family, Significant Other, Patient  Readiness: Acceptance  Method: Explanation, Handout  Response: Verbalizes Understanding, Demonstrated Understanding    Mouth Sores, taught by Carine Adan RN at 6/14/2024 11:46 AM.  Learner: Family, Significant Other, Patient  Readiness: Acceptance  Method: Explanation, Handout  Response: Verbalizes Understanding, Demonstrated Understanding    Nausea Management, taught by Carine Adan RN at 6/14/2024 11:46 AM.  Learner: Family, Significant Other, Patient  Readiness: Acceptance  Method: Explanation, Handout  Response: Verbalizes Understanding, Demonstrated Understanding    Fatigue, taught by Carine Adan RN at 6/14/2024 11:46 AM.  Learner: Family, Significant Other, Patient  Readiness: Acceptance  Method: Explanation, Handout  Response: Verbalizes Understanding, Demonstrated  Understanding    Radiation Therapy, taught by Carine Adan RN at 6/14/2024 11:46 AM.  Learner: Family, Significant Other, Patient  Readiness: Acceptance  Method: Explanation, Handout  Response: Verbalizes Understanding, Demonstrated Understanding    Post-Chemotherapy Education, taught by Carine Adan RN at 6/14/2024 11:46 AM.  Learner: Family, Significant Other, Patient  Readiness: Acceptance  Method: Explanation, Handout  Response: Verbalizes Understanding, Demonstrated Understanding    Pregnancy Prevention, taught by Carine Adan RN at 6/14/2024 11:46 AM.  Learner: Family, Significant Other, Patient  Readiness: Acceptance  Method: Explanation, Handout  Response: Verbalizes Understanding, Demonstrated Understanding    Chemotherapy Safety at Home, taught by Carine Adan RN at 6/14/2024 11:46 AM.  Learner: Family, Significant Other, Patient  Readiness: Acceptance  Method: Explanation, Handout  Response: Verbalizes Understanding, Demonstrated Understanding    Treatment Plan and Schedule, taught by Carine Adan RN at 6/14/2024 11:46 AM.  Learner: Family, Significant Other, Patient  Readiness: Acceptance  Method: Explanation, Handout  Response: Verbalizes Understanding, Demonstrated Understanding    General Medication Information, taught by Carine Adan RN at 6/14/2024 11:46 AM.  Learner: Family, Significant Other, Patient  Readiness: Acceptance  Method: Explanation, Handout  Response: Verbalizes Understanding, Demonstrated Understanding    Supportive Medications, taught by Carine Adan RN at 6/14/2024 11:46 AM.  Learner: Family, Significant Other, Patient  Readiness: Acceptance  Method: Explanation, Handout  Response: Verbalizes Understanding, Demonstrated Understanding    Instructions on Use Provided, taught by Carine Adan RN at 6/14/2024 11:46 AM.  Learner: Family, Significant Other, Patient  Readiness: Acceptance  Method: Explanation,  Handout  Response: Verbalizes Understanding, Demonstrated Understanding    Diagnostic Studies, taught by Carine Adan RN at 6/14/2024 11:46 AM.  Learner: Family, Significant Other, Patient  Readiness: Acceptance  Method: Explanation, Handout  Response: Verbalizes Understanding, Demonstrated Understanding    Tumor Markers, taught by Carine Adan RN at 6/14/2024 11:46 AM.  Learner: Family, Significant Other, Patient  Readiness: Acceptance  Method: Explanation, Handout  Response: Verbalizes Understanding, Demonstrated Understanding    Comprehensive Metabolic Panel (CMP), taught by Carine Adan RN at 6/14/2024 11:46 AM.  Learner: Family, Significant Other, Patient  Readiness: Acceptance  Method: Explanation, Handout  Response: Verbalizes Understanding, Demonstrated Understanding    Complete Blood Count with Differential (CBC w/ Diff), taught by Carine Adan RN at 6/14/2024 11:46 AM.  Learner: Family, Significant Other, Patient  Readiness: Acceptance  Method: Explanation, Handout  Response: Verbalizes Understanding, Demonstrated Understanding    Education Comments  No comments found.

## 2024-06-17 ENCOUNTER — APPOINTMENT (OUTPATIENT)
Dept: HEMATOLOGY/ONCOLOGY | Facility: CLINIC | Age: 79
End: 2024-06-17
Payer: MEDICARE

## 2024-06-19 ENCOUNTER — APPOINTMENT (OUTPATIENT)
Dept: PRIMARY CARE | Facility: CLINIC | Age: 79
End: 2024-06-19
Payer: MEDICARE

## 2024-06-19 ENCOUNTER — APPOINTMENT (OUTPATIENT)
Dept: RADIOLOGY | Facility: HOSPITAL | Age: 79
End: 2024-06-19
Payer: MEDICARE

## 2024-06-19 ENCOUNTER — HOSPITAL ENCOUNTER (EMERGENCY)
Facility: HOSPITAL | Age: 79
Discharge: HOME | End: 2024-06-19
Payer: MEDICARE

## 2024-06-19 VITALS
HEART RATE: 86 BPM | RESPIRATION RATE: 18 BRPM | SYSTOLIC BLOOD PRESSURE: 128 MMHG | DIASTOLIC BLOOD PRESSURE: 70 MMHG | BODY MASS INDEX: 25.26 KG/M2 | WEIGHT: 160.94 LBS | HEIGHT: 67 IN | TEMPERATURE: 97.5 F | OXYGEN SATURATION: 95 %

## 2024-06-19 VITALS
SYSTOLIC BLOOD PRESSURE: 108 MMHG | WEIGHT: 160 LBS | DIASTOLIC BLOOD PRESSURE: 62 MMHG | HEART RATE: 72 BPM | BODY MASS INDEX: 25.87 KG/M2 | OXYGEN SATURATION: 95 % | TEMPERATURE: 97.9 F

## 2024-06-19 DIAGNOSIS — E11.9 TYPE 2 DIABETES MELLITUS WITHOUT COMPLICATION, WITHOUT LONG-TERM CURRENT USE OF INSULIN (MULTI): Primary | ICD-10-CM

## 2024-06-19 DIAGNOSIS — F51.01 PRIMARY INSOMNIA: ICD-10-CM

## 2024-06-19 DIAGNOSIS — K94.13 MALFUNCTION OF JEJUNOSTOMY TUBE (MULTI): Primary | ICD-10-CM

## 2024-06-19 LAB — POC HEMOGLOBIN A1C: 11 % (ref 4.2–6.5)

## 2024-06-19 PROCEDURE — 74018 RADEX ABDOMEN 1 VIEW: CPT

## 2024-06-19 PROCEDURE — 74018 RADEX ABDOMEN 1 VIEW: CPT | Performed by: STUDENT IN AN ORGANIZED HEALTH CARE EDUCATION/TRAINING PROGRAM

## 2024-06-19 PROCEDURE — 2550000001 HC RX 255 CONTRASTS: Performed by: NURSE PRACTITIONER

## 2024-06-19 PROCEDURE — 99284 EMERGENCY DEPT VISIT MOD MDM: CPT

## 2024-06-19 RX ORDER — TRAZODONE HYDROCHLORIDE 100 MG/1
100 TABLET ORAL NIGHTLY
Qty: 90 TABLET | Refills: 1 | Status: SHIPPED | OUTPATIENT
Start: 2024-06-19

## 2024-06-19 ASSESSMENT — PAIN SCALES - GENERAL
PAINLEVEL_OUTOF10: 0 - NO PAIN
PAINLEVEL: 0-NO PAIN

## 2024-06-19 ASSESSMENT — PAIN - FUNCTIONAL ASSESSMENT: PAIN_FUNCTIONAL_ASSESSMENT: 0-10

## 2024-06-19 NOTE — PROGRESS NOTES
Subjective   Juan M Mcrae is a 79 y.o. male who presents for Diabetes Follow up.   Now finished with chemo and radiation. Will be having appt to talk about surgery 7/17/2024.   Current medication regimen: Metformin 1000mg BID and glipizide 5mg BID  POC A1c 11 today   Blood sugars still running 210-220's  Boost 530 calorie drinks -- not sure what sugars have been  Glucerna at night 1200 cals through g tube   Pain with eating so doing mostly J tube and boost  Getting 2000 calories/day            LAB REVIEW   Hemoglobin A1C (%)   Date Value   02/05/2024 6.8 (H)   08/01/2023 6.1 (H)   01/30/2023 6.6 (H)   07/27/2022 7.0 (H)     Glucose (mg/dL)   Date Value   06/10/2024 211 (H)   06/03/2024 297 (H)   05/28/2024 305 (H)     Creatinine (mg/dL)   Date Value   06/10/2024 0.66   06/03/2024 0.75   05/28/2024 0.69     eGFR (mL/min/1.73m*2)   Date Value   06/10/2024 >90   06/03/2024 >90   05/28/2024 >90     Lab Results   Component Value Date    ALBUR 21 08/01/2023    CREATININE 0.66 06/10/2024     Lab Results   Component Value Date    CHOL 142 02/05/2024    CHOL 157 08/01/2023    CHOL 125 (L) 01/30/2023     Lab Results   Component Value Date    HDL 76.0 02/05/2024    HDL 82 08/01/2023    HDL 65 01/30/2023     Lab Results   Component Value Date    LDLCALC 55 (L) 02/05/2024    LDLCALC 63 (L) 08/01/2023    LDLCALC 47 (L) 01/30/2023     Lab Results   Component Value Date    TRIG 57 02/05/2024    TRIG 61 08/01/2023    TRIG 66 01/30/2023       ROS otherwise negative aside from what was mentioned above in HPI.      Objective   /62 (BP Location: Left arm)   Pulse 72   Temp 36.6 °C (97.9 °F) (Temporal)   Wt 72.6 kg (160 lb)   SpO2 95%   BMI 25.87 kg/m²     Physical Exam    Assessment/Plan

## 2024-06-19 NOTE — PROGRESS NOTES
CGM Personal Start     Juan M Mcrae presents to the office for his CGM personal start education and training. Referring Provider: Candy Grossman MD    POC HEMOGLOBIN A1c (%)   Date Value   06/19/2024 11.0 (A)     Hemoglobin A1C (%)   Date Value   02/05/2024 6.8 (H)   08/01/2023 6.1 (H)   01/30/2023 6.6 (H)   07/27/2022 7.0 (H)     Glucose (mg/dL)   Date Value   06/10/2024 211 (H)   06/03/2024 297 (H)   05/28/2024 305 (H)     Creatinine (mg/dL)   Date Value   06/10/2024 0.66   06/03/2024 0.75   05/28/2024 0.69     eGFR (mL/min/1.73m*2)   Date Value   06/10/2024 >90   06/03/2024 >90   05/28/2024 >90       CURRENT PHARMACOTHERAPY  Medication Reconciliation completed with patient in office today   Current Outpatient Medications on File Prior to Visit   Medication Sig Dispense Refill    acetaminophen (Tylenol) 650 mg/20.3 mL solution oral liquid 20.3 mL (650 mg) by j-tube route every 6 hours if needed for pain.      atenolol (Tenormin) 50 mg tablet 1 tablet (50 mg) by j-tube route once daily.      glipiZIDE (GlucotroL) 10 mg tablet Take 0.5 tablets (5 mg) by mouth 2 times a day before meals. 15 tablet 1    lidocaine-prilocaine (Emla) 2.5-2.5 % cream Apply topically once daily as needed for mild pain (1 - 3). 30 g 2    metFORMIN (Glucophage) 500 mg tablet Take 2 tablets (1,000 mg) by mouth 2 times daily (morning and late afternoon).      multivitamin with minerals iron-free (Centrum Silver) 1 tablet by j-tube route once daily.      naloxone (Narcan) 0.4 mg/mL injection Infuse 0.5 mL (0.2 mg) into a venous catheter.      [DISCONTINUED] dexAMETHasone (Decadron) 4 mg tablet Take 2 tablets (8 mg) by mouth once daily. For 3 days per week starting the day after treatment (Patient taking differently: Take 1 tablet (4 mg) by mouth once daily. For 3 days per week starting the day after treatment) 36 tablet 0    [DISCONTINUED] traZODone (Desyrel) 100 mg tablet 1 tablet (100 mg) by j-tube route once daily at bedtime. (Patient  taking differently: 0.5 tablets (50 mg) by j-tube route once daily at bedtime.) 90 tablet 0    [DISCONTINUED] OLANZapine (ZyPREXA) 5 mg tablet Take 1 tablet (5 mg) by mouth once daily at bedtime. For 4 doses per week starting the evening of treatment 30 tablet 3    [DISCONTINUED] ondansetron (Zofran) 8 mg tablet Take 1 tablet (8 mg) by mouth every 8 hours if needed for nausea or vomiting.      [DISCONTINUED] oxyCODONE (Oxy-IR) 5 mg immediate release capsule Take by mouth.      [DISCONTINUED] prochlorperazine (Compazine) 10 mg tablet Take 1 tablet (10 mg) by mouth every 6 hours if needed for nausea or vomiting. 30 tablet 5     Current Facility-Administered Medications on File Prior to Visit   Medication Dose Route Frequency Provider Last Rate Last Admin    heparin flush 100 unit/mL syringe 500 Units  500 Units intra-catheter PRN Catarino Fry MD   500 Units at 05/03/24 1151       Allergies   Allergen Reactions    Omeprazole Itching and Unknown    Shellfish Containing Products Unknown    Sulfa (Sulfonamide Antibiotics) Rash         SMBG:  Testing at home? Yes  Recent FBS: 200's to 210's    CGM:  Device started: DEXCOM G7 and WITH SMARTPHONE    Hypoglycemia tx/sx/prevention:   denies hypoglycemia  Reviewed sx and treatment  Advised to carry glucose source at all times    Assessment/Plan:  Patient received the following information for Dexcom personal start today:  Installed Dexcom and Clarity apps. Patient applied sensor (must be changed in 10 days). Informed re: expected differences in sensor glucose and blood glucose; check with fingerstick if symptoms do not match sensor glucose or if magnifying glass appears on display. Trend arrows. Products for better adhesion: skintac and provided overpatch. Arleth functions: target ranges set to ; alerts set if applicable; adding events (carbs, insulin, exercise). Remove for MRI, CT scan and Xray. Call Dexcom for problems with sensor, they will replace. Patient  correctly applied sensor.     Patient will benefit from insight using CGM and learning which meals or snacks cause high sugars. We will also be able to make necessary adjustments to the patient’s insulin/medication regimen based on CGM data at next follow-up. Patient has been using a blood glucose monitor and performing frequent testing. Patient is treated with daily insulin injections and the treatment regimen requires frequent adjustment based on blood sugars. CGM is medically necessary. It karson allow us to remotely monitor patient's blood sugar and make necessary adjustments. Patient is adherent to diabetic treatment plan and participates in ongoing education and support. He/She is also motivated and knowledgeable about the use of CGM.      Plan:  Check alexandre often - when you wake up, before/after meals, bedtime, etc.  Follow alexandre closely, learning from glucose readings which meals/snacks cause higher blood sugars.  Call [employer] at [phone] for any problems with sensor readings or adhesion.  Follow up with Clinical Pharmacist as scheduled.      [employer] personal start training provided by ARMANI Leroy RP, [facility]

## 2024-06-19 NOTE — ED PROVIDER NOTES
HPI   Chief Complaint   Patient presents with    Abdominal Pain     Feeding tube blocked unable to flush       HPI  See my MDM                  [place] Coma Scale Score: 15                     Patient History   Past Medical History:   Diagnosis Date    Cataract     CKD (chronic kidney disease)     Colon polyp     Deviated septum     Disorder of lipoprotein metabolism, unspecified     Elevated serum cholesterol    Dysphagia     ED (erectile dysfunction)     Esophageal cancer (Multi)     GERD (gastroesophageal reflux disease)     Hearing aid worn     HL (hearing loss)     Hypercholesterolemia     Hyperlipidemia     Hypertension     Nephrolithiasis     Obesity     Other specified diabetes mellitus without complications (Multi)     last A1c= 6.8 on 2024    Sleep apnea     wear CPAP/BiPAP    Vision loss     wears glasses     Past Surgical History:   Procedure Laterality Date    BLADDER SURGERY      COLONOSCOPY      Repeat in one year    COLONOSCOPY  2018    EYE SURGERY      LITHOTRIPSY      OTHER SURGICAL HISTORY      Vocal cord surgery    TRANSURETHRAL RESECTION OF PROSTATE      UPPER GASTROINTESTINAL ENDOSCOPY  2024     Family History   Problem Relation Name Age of Onset    Stroke Father      Kidney cancer Brother Eliu     Stroke Brother Eliu      Social History     Tobacco Use    Smoking status: Former     Current packs/day: 0.00     Average packs/day: 2.0 packs/day for 40.0 years (80.0 ttl pk-yrs)     Types: Cigarettes     Start date: 1959     Quit date: 1999     Years since quittin.4    Smokeless tobacco: Never   Vaping Use    Vaping status: Never Used   Substance Use Topics    Alcohol use: Yes     Alcohol/week: 2.0 standard drinks of alcohol     Types: 2 Cans of beer per week     Comment: couple beers a week    Drug use: Not Currently     Comment: gummies-dispensary from [place]       Physical Exam   ED Triage Vitals [24 1711]   Temperature Heart Rate Respirations BP   36.4 °C  (97.5 °F) 86 18 128/70      Pulse Ox Temp Source Heart Rate Source Patient Position   95 % Oral Monitor Sitting      BP Location FiO2 (%)     Right arm --       Physical Exam  CONSTITUTIONAL: Vital signs reviewed as charted, well-developed and in no distress  Eyes: Extraocular muscles are intact. Pupils equal round and reactive to light. Conjunctiva are pink.    ENT: Mucous membranes are moist. Tongue in the midline. Pharynx was without erythema or exudates, uvula midline  LUNGS: Breath sounds equal and clear to auscultation. Good air exchange, no wheezes rales or retractions, pulse oximetry is charted.  HEART: Regular rate and rhythm without murmur thrill or rub, strong tones, auscultation is normal.  ABDOMEN: Soft and nontender without guarding rebound rigidity or mass. Bowel sounds are present and normal in all quadrants. There is no palpable masses or aneurysms identified. No hepatosplenomegaly, normal abdominal exam.  J-tube in the left mid abdomen.  Suture dislodged.  Neuro: The patient is awake, alert and oriented ×3. Moving all 4 extremities and answering questions appropriately.   MUSCULOSKELETAL: The calves are nontender to palpation. Full gross active range of motion.   PSYCH: Awake alert oriented, normal mood and affect.  Skin:  Dry, normal color, warm to the touch, no rash present.      ED Course & MDM   ED Course as of 06/19/24 2015 Wed Jun 19, 2024 1849 I spoke with Dr. Price seeking direction with this patient's case.  She was recommended trying to flush the J-tube and if we can do a gastro gram with contrast to verify placement.  We are unable to do this patient might need to be replaced by interventional radiology. [RJ]   1902 Nursing staff was able to flush the patient's line will send over for Gastrografin to verify placement. [RJ]      ED Course User Index  [RJ] Eliu Welch, OBIE-CNP         Diagnoses as of 06/19/24 2015   Malfunction of jejunostomy tube (Multi)       Medical Decision  Making  History obtained from: patient    Vital signs, nursing notes, current medications, past medical history, Surgical history, allergies, social history, family History were reviewed.         HPI:  Patient 79-year-old gentleman with a J-tube in the left side of the abdomen presenting to ED today stating it is not flushing for his nighttime feeding.  No other complaints.  Was placed about 2 months ago at [facility] for interventional radiology.      10 point ROS was reviewed and negative except Noted above in HPI.  DDX: as listed above          MDM Summary/considerations:    Labs Reviewed - No data to display  XR abdomen w contrast for peg tube placement   Final Result   Intraluminal positioning of the percutaneous jejunostomy tube with   opacification of right lower quadrant small bowel loops and no   evidence of free contrast in the abdomen.             MACRO:   None        Signed by: Mesfin Lanza 6/19/2024 8:11 PM   Dictation workstation:   [id number]        Medications   iohexol (OMNIPaque) 350 mg iodine/mL solution 75 mL (60 mL intravenous Given 6/19/24 1936)     New Prescriptions    No medications on file       Consulted general surgery they recommended verifying tube placement.  Staff was able to get the tube flushed placement was verified patient was discharged home to resume his tube feedings.  Will contact his doctor tomorrow.  Was discharged home stable condition.    All of the patient's questions were answered to the best of my ability.  Patient states understanding that they have been screened for an emergency today and we have not found any etiology of symptoms that requires emergent treatment or admission to the hospital at this point. They understand that they have not had definitive care day and require follow-up for treatment of their condition. They also state understanding that they may have an emergent condition that may potentially have not of detected at this visit and they must  return to the emergency department if they develop any worsening of symptoms or new complaints.      I have evaluated this patient, my supervising physician was available for consultation.                Critical Care: Not warranted at this time        This chart was completed using voice recognition transcription software. Please excuse any errors of transcription including grammatical, punctuation, syntax and spelling errors.  Please contact me with any questions regarding this chart.    Procedure  Procedures     OBIE Puri-CNP  06/19/24 2016

## 2024-06-22 ENCOUNTER — APPOINTMENT (OUTPATIENT)
Dept: RADIOLOGY | Facility: HOSPITAL | Age: 79
End: 2024-06-22
Payer: MEDICARE

## 2024-06-22 ENCOUNTER — HOSPITAL ENCOUNTER (EMERGENCY)
Facility: HOSPITAL | Age: 79
Discharge: HOME | End: 2024-06-22
Attending: EMERGENCY MEDICINE
Payer: MEDICARE

## 2024-06-22 VITALS
SYSTOLIC BLOOD PRESSURE: 129 MMHG | DIASTOLIC BLOOD PRESSURE: 86 MMHG | HEIGHT: 67 IN | OXYGEN SATURATION: 96 % | WEIGHT: 155 LBS | HEART RATE: 79 BPM | BODY MASS INDEX: 24.33 KG/M2 | RESPIRATION RATE: 16 BRPM | TEMPERATURE: 97.5 F

## 2024-06-22 DIAGNOSIS — Z93.4 JEJUNOSTOMY TUBE PRESENT (MULTI): Primary | ICD-10-CM

## 2024-06-22 PROCEDURE — 99284 EMERGENCY DEPT VISIT MOD MDM: CPT | Mod: 25

## 2024-06-22 PROCEDURE — 74018 RADEX ABDOMEN 1 VIEW: CPT

## 2024-06-22 PROCEDURE — 2500000004 HC RX 250 GENERAL PHARMACY W/ HCPCS (ALT 636 FOR OP/ED): Performed by: EMERGENCY MEDICINE

## 2024-06-22 PROCEDURE — 99284 EMERGENCY DEPT VISIT MOD MDM: CPT | Performed by: EMERGENCY MEDICINE

## 2024-06-22 PROCEDURE — 99283 EMERGENCY DEPT VISIT LOW MDM: CPT

## 2024-06-22 PROCEDURE — 74018 RADEX ABDOMEN 1 VIEW: CPT | Mod: FOREIGN READ | Performed by: RADIOLOGY

## 2024-06-22 PROCEDURE — 44799 UNLISTED PX SMALL INTESTINE: CPT

## 2024-06-22 RX ADMIN — PANCRELIPASE: 10440; 39150; 39150 TABLET ORAL at 13:18

## 2024-06-22 ASSESSMENT — PAIN - FUNCTIONAL ASSESSMENT: PAIN_FUNCTIONAL_ASSESSMENT: 0-10

## 2024-06-22 ASSESSMENT — PAIN SCALES - GENERAL: PAINLEVEL_OUTOF10: 0 - NO PAIN

## 2024-06-22 NOTE — ED PROVIDER NOTES
"Limitations to History: None     HPI:      Juan M Mcrae is a 79 y.o. who presents to the ED with a clogged J-tube.  His wife was able to flush 1 water through it this morning and then was unable to flush the second.  He got his tube feeds last night without difficulty.  He does not eat secondary to an esophageal mass.  He gets all his fluids and meds through the J-tube as well.  He had this clogged on the 19th as well..     Additional History Obtained from: His wife who is at bedside    ------------------------------------------------------------------------------------------------------------------------------------------    VS: /86   Pulse 79   Temp 36.4 °C (97.5 °F) (Tympanic)   Resp 16   Ht 1.702 m (5' 7\")   Wt 70.3 kg (155 lb)   SpO2 96%   BMI 24.28 kg/m²     Physical Exam:  Gen: Alert, NAD  Head/Neck: NCAT, neck w/ FROM  Eyes: EOMI, PERRL, anicteric sclerae, noninjected conjunctivae  Mouth:  MMM, no OP lesions noted  Heart: RRR no MRG  Lungs: CTA b/l no RRW, no increased work of breathing  Abdomen: soft, NT, ND, no HSM, no palpable masses  Musculoskeletal: no joint swelling noted  Extremities: WWP, no c/c/e, cap refill <2sec  Neurologic: Alert, symmetrical facies, phonates clearly, moves all extremities equally, responsive to touch, ambulates normally   Skin: no rashes noted  Psychological: calm, no SI/HI      ------------------------------------------------------------------------------------------------------------------------------------------    Medical Decision Making: Multiple attempts were made to unclog the tube including warm water and coke, IR consult placed in order to facilitate changing out the tube.  They are unable to do it today, so I did speak with thoracic surgery who came down and facilitated the switch out themselves.  Radiology confirmed that a Gastrografin study was in the right place.    Diagnoses as of 06/22/24 1635   Jejunostomy tube present (Multi)       Medications "   pancrelipase (Lip-Prot-Amyl) 2 tablet, sodium bicarbonate 650 mg (has no administration in time range)       Chronic Medical Conditions Significantly Affecting Care: Esophageal cancer    External Records Reviewed: I reviewed recent and relevant outside records including: OP note from J tube placement in April    Discussion of Management with Other Providers:   I discussed the patient/results with: IR/Thoracic Surgery        Dustin Gayle MD  06/22/24 3986

## 2024-06-22 NOTE — SIGNIFICANT EVENT
Per IR department policy, we would be happy to exchange the J-tube on Monday 06/24/2024.     This case was discussed with Dr. Carlitos Bain.

## 2024-06-22 NOTE — DISCHARGE INSTRUCTIONS
Please follow-up with Dr. Manzo as previously planned and continue to care for the tube as previous.

## 2024-06-22 NOTE — ED TRIAGE NOTES
Pt states his j-tube is blocked, pt states this happened on Wednesday also and had [facility]

## 2024-06-26 ENCOUNTER — APPOINTMENT (OUTPATIENT)
Dept: SURGERY | Facility: HOSPITAL | Age: 79
End: 2024-06-26
Payer: MEDICARE

## 2024-07-01 ENCOUNTER — APPOINTMENT (OUTPATIENT)
Dept: PRIMARY CARE | Facility: CLINIC | Age: 79
End: 2024-07-01
Payer: MEDICARE

## 2024-07-01 DIAGNOSIS — Z79.4 TYPE 2 DIABETES MELLITUS WITHOUT COMPLICATION, WITH LONG-TERM CURRENT USE OF INSULIN (MULTI): Primary | ICD-10-CM

## 2024-07-01 DIAGNOSIS — E11.9 TYPE 2 DIABETES MELLITUS WITHOUT COMPLICATION, WITH LONG-TERM CURRENT USE OF INSULIN (MULTI): Primary | ICD-10-CM

## 2024-07-01 RX ORDER — INSULIN GLARGINE 100 [IU]/ML
INJECTION, SOLUTION SUBCUTANEOUS
Qty: 15 ML | Refills: 2 | Status: SHIPPED | OUTPATIENT
Start: 2024-07-01 | End: 2024-07-05

## 2024-07-01 RX ORDER — PEN NEEDLE, DIABETIC 30 GX3/16"
NEEDLE, DISPOSABLE MISCELLANEOUS
Qty: 100 EACH | Refills: 2 | Status: SHIPPED | OUTPATIENT
Start: 2024-07-01

## 2024-07-01 NOTE — PROGRESS NOTES
DM FOLLOW UP  E11.9    Juan M Mcrae presents to the office for his CGM review. He is using Dexcom G7. He obtains his supplies from retail pharmacy .      Referring Provider: Candy Grossman MD    Reviewed all medications by prescribing practitioner (such as prescriptions, OTCs, herbal therapies and supplements) and documented in the medical record.   Pt denies SE/intolerances.       CURRENT DM PHARMACOTHERAPY   Glipizide 10mg bid  Metformin 1000mg bid  Pt has feeding tube, starts at 8pm each night       Summary of CGM Findings:  CGM wear time 14 days   % Active 100%   Avg Glucose 209   GMI 8.3%   Variability 15.3%   TAR 80%   TIR 20%   TBR 0%     CGM Reports reviewed with patient  *See attached documents*  Summary of CGM Findings:  Avg glucose = 209mg/dl   Total frequency of hypoglycemia: 0%  Postprandial hyperglycemia noted on daily CGM report. Likely due to overnight nutritional feeding      POC HEMOGLOBIN A1c (%)   Date Value   06/19/2024 11.0 (A)     Hemoglobin A1C (%)   Date Value   02/05/2024 6.8 (H)   08/01/2023 6.1 (H)   01/30/2023 6.6 (H)   07/27/2022 7.0 (H)     Glucose (mg/dL)   Date Value   06/10/2024 211 (H)     Creatinine (mg/dL)   Date Value   06/10/2024 0.66     eGFR (mL/min/1.73m*2)   Date Value   06/10/2024 >90       Current Outpatient Medications on File Prior to Visit   Medication Sig Dispense Refill    acetaminophen (Tylenol) 650 mg/20.3 mL solution oral liquid 20.3 mL (650 mg) by j-tube route every 6 hours if needed for pain.      atenolol (Tenormin) 50 mg tablet 1 tablet (50 mg) by j-tube route once daily.      glipiZIDE (GlucotroL) 10 mg tablet Take 0.5 tablets (5 mg) by mouth 2 times a day before meals. 15 tablet 1    lidocaine-prilocaine (Emla) 2.5-2.5 % cream Apply topically once daily as needed for mild pain (1 - 3). 30 g 2    metFORMIN (Glucophage) 500 mg tablet Take 2 tablets (1,000 mg) by mouth 2 times daily (morning and late afternoon).      multivitamin with minerals iron-free  (Centrum Silver) 1 tablet by j-tube route once daily.      naloxone (Narcan) 0.4 mg/mL injection Infuse 0.5 mL (0.2 mg) into a venous catheter.      traZODone (Desyrel) 100 mg tablet Take 1 tablet (100 mg) by mouth once daily at bedtime. 90 tablet 1     Current Facility-Administered Medications on File Prior to Visit   Medication Dose Route Frequency Provider Last Rate Last Admin    heparin flush 100 unit/mL syringe 500 Units  500 Units intra-catheter PRN Catarino Fry MD   500 Units at 05/03/24 1151       Assessment/Plan:   Discussed need for additional medication for glycemic control.  SE profile of SGLT2/GLP-1 not ideal due to esophageal cancer.  (Pt has experienced weight loss, not much solid food intake, concerns of dehydration)  Suggest add daily low dose basal insulin and discontinue SHAH.  Pt is agreeable.  Provided injection teaching for basal insulin. Education provided:  Prepare for injection, check label  Let pen come to room temperature 15-30 minutes prior to injection   Administration of autoinjector  4.   Site rotation and selection  5.   Storage  6.   Disposal    Plan:  1.  Start Lantus 10 units once daily in the evening  2.  Clinical Pharmacist will call in 2 weeks to assess insulin dose  3.  Stop Glipizide    Data reviewed and evaluated by ARMANI Leroy RP, [facility]  Treatment and plan changes discussed with Candy Grossman MD

## 2024-07-03 ENCOUNTER — APPOINTMENT (OUTPATIENT)
Dept: SURGERY | Facility: HOSPITAL | Age: 79
End: 2024-07-03
Payer: MEDICARE

## 2024-07-03 ENCOUNTER — APPOINTMENT (OUTPATIENT)
Dept: RADIOLOGY | Facility: HOSPITAL | Age: 79
End: 2024-07-03
Payer: MEDICARE

## 2024-07-05 ENCOUNTER — TELEPHONE (OUTPATIENT)
Dept: PRIMARY CARE | Facility: CLINIC | Age: 79
End: 2024-07-05
Payer: MEDICARE

## 2024-07-05 DIAGNOSIS — Z79.4 TYPE 2 DIABETES MELLITUS WITHOUT COMPLICATION, WITH LONG-TERM CURRENT USE OF INSULIN (MULTI): ICD-10-CM

## 2024-07-05 DIAGNOSIS — E11.9 TYPE 2 DIABETES MELLITUS WITHOUT COMPLICATION, WITH LONG-TERM CURRENT USE OF INSULIN (MULTI): ICD-10-CM

## 2024-07-05 RX ORDER — INSULIN GLARGINE 100 [IU]/ML
INJECTION, SOLUTION SUBCUTANEOUS
Qty: 15 ML | Refills: 2 | Status: SHIPPED | OUTPATIENT
Start: 2024-07-05

## 2024-07-08 DIAGNOSIS — C15.5 MALIGNANT NEOPLASM OF LOWER THIRD OF ESOPHAGUS (MULTI): ICD-10-CM

## 2024-07-15 ENCOUNTER — HOSPITAL ENCOUNTER (OUTPATIENT)
Dept: RADIOLOGY | Facility: HOSPITAL | Age: 79
Discharge: HOME | End: 2024-07-15
Payer: MEDICARE

## 2024-07-15 ENCOUNTER — NUTRITION (OUTPATIENT)
Dept: HEMATOLOGY/ONCOLOGY | Facility: HOSPITAL | Age: 79
End: 2024-07-15
Payer: MEDICARE

## 2024-07-15 ENCOUNTER — HOSPITAL ENCOUNTER (OUTPATIENT)
Dept: RADIOLOGY | Facility: HOSPITAL | Age: 79
End: 2024-07-15
Payer: MEDICARE

## 2024-07-15 ENCOUNTER — LAB (OUTPATIENT)
Dept: HEMATOLOGY/ONCOLOGY | Facility: HOSPITAL | Age: 79
End: 2024-07-15
Payer: MEDICARE

## 2024-07-15 DIAGNOSIS — Z00.6 RESEARCH STUDY PATIENT: ICD-10-CM

## 2024-07-15 DIAGNOSIS — C15.5 MALIGNANT NEOPLASM OF LOWER THIRD OF ESOPHAGUS (MULTI): ICD-10-CM

## 2024-07-15 DIAGNOSIS — C15.9 ESOPHAGEAL ADENOCARCINOMA (MULTI): ICD-10-CM

## 2024-07-15 LAB
ALBUMIN SERPL BCP-MCNC: 3.7 G/DL (ref 3.4–5)
ALP SERPL-CCNC: 64 U/L (ref 33–136)
ALT SERPL W P-5'-P-CCNC: 12 U/L (ref 10–52)
ANION GAP SERPL CALC-SCNC: 14 MMOL/L (ref 10–20)
AST SERPL W P-5'-P-CCNC: 12 U/L (ref 9–39)
BASOPHILS # BLD AUTO: 0.09 X10*3/UL (ref 0–0.1)
BASOPHILS NFR BLD AUTO: 1.3 %
BILIRUB SERPL-MCNC: 0.3 MG/DL (ref 0–1.2)
BUN SERPL-MCNC: 22 MG/DL (ref 6–23)
CALCIUM SERPL-MCNC: 9.2 MG/DL (ref 8.6–10.3)
CHLORIDE SERPL-SCNC: 100 MMOL/L (ref 98–107)
CO2 SERPL-SCNC: 27 MMOL/L (ref 21–32)
CREAT SERPL-MCNC: 0.58 MG/DL (ref 0.5–1.3)
EGFRCR SERPLBLD CKD-EPI 2021: >90 ML/MIN/1.73M*2
EOSINOPHIL # BLD AUTO: 0.2 X10*3/UL (ref 0–0.4)
EOSINOPHIL NFR BLD AUTO: 3 %
ERYTHROCYTE [DISTWIDTH] IN BLOOD BY AUTOMATED COUNT: 16.9 % (ref 11.5–14.5)
GLUCOSE SERPL-MCNC: 101 MG/DL (ref 74–99)
HCT VFR BLD AUTO: 35.9 % (ref 41–52)
HGB BLD-MCNC: 11.6 G/DL (ref 13.5–17.5)
HOLD SPECIMEN: NORMAL
IMM GRANULOCYTES # BLD AUTO: 0.03 X10*3/UL (ref 0–0.5)
IMM GRANULOCYTES NFR BLD AUTO: 0.4 % (ref 0–0.9)
LYMPHOCYTES # BLD AUTO: 0.68 X10*3/UL (ref 0.8–3)
LYMPHOCYTES NFR BLD AUTO: 10.1 %
MAGNESIUM SERPL-MCNC: 1.94 MG/DL (ref 1.6–2.4)
MCH RBC QN AUTO: 32.4 PG (ref 26–34)
MCHC RBC AUTO-ENTMCNC: 32.3 G/DL (ref 32–36)
MCV RBC AUTO: 100 FL (ref 80–100)
MONOCYTES # BLD AUTO: 0.97 X10*3/UL (ref 0.05–0.8)
MONOCYTES NFR BLD AUTO: 14.3 %
NEUTROPHILS # BLD AUTO: 4.79 X10*3/UL (ref 1.6–5.5)
NEUTROPHILS NFR BLD AUTO: 70.9 %
NRBC BLD-RTO: 0 /100 WBCS (ref 0–0)
PLATELET # BLD AUTO: 331 X10*3/UL (ref 150–450)
POTASSIUM SERPL-SCNC: 4.4 MMOL/L (ref 3.5–5.3)
PROT SERPL-MCNC: 6.8 G/DL (ref 6.4–8.2)
RBC # BLD AUTO: 3.58 X10*6/UL (ref 4.5–5.9)
SODIUM SERPL-SCNC: 137 MMOL/L (ref 136–145)
WBC # BLD AUTO: 6.8 X10*3/UL (ref 4.4–11.3)

## 2024-07-15 PROCEDURE — 80053 COMPREHEN METABOLIC PANEL: CPT

## 2024-07-15 PROCEDURE — 36591 DRAW BLOOD OFF VENOUS DEVICE: CPT

## 2024-07-15 PROCEDURE — 83735 ASSAY OF MAGNESIUM: CPT

## 2024-07-15 PROCEDURE — 85025 COMPLETE CBC W/AUTO DIFF WBC: CPT

## 2024-07-15 PROCEDURE — 2500000004 HC RX 250 GENERAL PHARMACY W/ HCPCS (ALT 636 FOR OP/ED): Performed by: INTERNAL MEDICINE

## 2024-07-15 RX ORDER — HEPARIN 100 UNIT/ML
500 SYRINGE INTRAVENOUS AS NEEDED
Status: ACTIVE | OUTPATIENT
Start: 2024-07-15

## 2024-07-15 RX ORDER — HEPARIN 100 UNIT/ML
500 SYRINGE INTRAVENOUS AS NEEDED
OUTPATIENT
Start: 2024-07-15

## 2024-07-15 RX ORDER — HEPARIN SODIUM,PORCINE/PF 10 UNIT/ML
50 SYRINGE (ML) INTRAVENOUS AS NEEDED
OUTPATIENT
Start: 2024-07-15

## 2024-07-15 NOTE — PROGRESS NOTES
NUTRITION Follow-up NOTE    Nutrition Assessment     Reason for Visit:  Juan M Mcrae is a 79 y.o. male who presents for esophageal cancer (lower esophageal/ GE junction)He received chemotherapy at [place] (carbo / paclitaxel) began 5-6-2024 to 6-)  Proton at [facility] ( began 5-6-2024 and completed on 6-)    I am emailed today by thoracic team - pt with clogged j-tube  This happened this morning with second flush  Unable to get any water / coke/ anything to budge at this time.  When I last saw pt (6-) the tube was giving him issues but lasted until 6-19- when he went to ED at [facility] and they were able to unclog  6- when he came to [facility] and had tube replaced by thoracic team    I was asked to call pt today to see if he could do without TF.      Since I last saw him he was started on insulin- 7/6/204  Taking Lantus 10 units ( max 20 units)  He tells me he does have  CGM but currently not wearing since he was scheduled for a PET scan today (7-) but this did not happen due to his TF running too late. So he will have it tomorrow 7-      Hx: DM, HTN  Allergy: omeprazole  A1c 6.8 (2/5/24) -- pt taken off metformin in past with weight loss  -  added back metformin 5- (1000 mg 2 times per day) - noted actos changed to glipizide 6-3-2024  7/5/2024 was told to begin lantus 10 units (max 20)     Jtube to be placed on 4/8 and replaced on 6- by thoracici in ED    Pt with CINDI Stovall - Is taking Glucerna 1.5- 5 cartons at night over 12 hours - rate 93 to 100 ml per hour   Last feeding was 7/14/2024        Lab Results   Component Value Date/Time    GLUCOSE 101 (H) 07/15/2024 1038     07/15/2024 1038    K 4.4 07/15/2024 1038     07/15/2024 1038    CO2 27 07/15/2024 1038    ANIONGAP 14 07/15/2024 1038    BUN 22 07/15/2024 1038    CREATININE 0.58 07/15/2024 1038    EGFR >90 07/15/2024 1038    CALCIUM 9.2 07/15/2024 1038    ALBUMIN 3.7 07/15/2024 1038     "ALKPHOS 64 07/15/2024 1038    PROT 6.8 07/15/2024 1038    AST 12 07/15/2024 1038    BILITOT 0.3 07/15/2024 1038    ALT 12 07/15/2024 1038     No results found for: \"VITD25\"        Anthropometrics:     HT:  167.5 cm   IBW: 64.5 kg  113.4 % IBW  BMI: 25.1    Pt reports weight at home 161# (73.2 kg)  so this is stable to increased from end of treatment weight but still 10# down from start of treatment weight       Wt Readings from Last 10 Encounters:   06/22/24 70.3 kg (155 lb)   06/19/24 73 kg (160 lb 15 oz)   06/19/24 72.6 kg (160 lb)   06/11/24 73.2 kg (161 lb 6.4 oz)   06/10/24 71.6 kg (157 lb 15.4 oz)   06/04/24 73.1 kg (161 lb 1.6 oz)   06/03/24 72 kg (158 lb 11.7 oz)   05/30/24 72.4 kg (159 lb 9.6 oz)   05/28/24 71.9 kg (158 lb 8.2 oz)   05/21/24 72.8 kg (160 lb 8 oz)      5-6-2024  was 77.9 kg (171 lb 10.1 oz- start date     Food And Nutrient Intake:        TF had been providing    Glucerna 1.5  5 cartons per day `  1780 calories  157.5 g CHO  98 g protein  Rate is at 93 - 100 ml per hour    Po intake   Still painful (feel like it is burning) at times especially with fruit  So far today has had  1/4 of a cinnamon roll   1/2 of a stuffed pepper  1 slice of pizza  Does not have any issues with above foods  Does not have dysphagia or regurgitation  Takes his time and chews well- does not need additional fluids / moisture with eating    Tonight was going to have steak and mashed potatoes  He does report he has Boost VHC at home and was going to drink this as well (530 calories and 22 g protein)     BS  With no CGM- he report BS had been trending down since beginning of insulin- was initially in the 200's and then went down to 130s- today is the lowest at 101  Will be able to replace CGM tomorrow                                                              Nutrition Focused Physical Exam Findings:  Completed 4/1/2024                        Energy Needs     2280 calories  91.2 g protein   2280 ml of free water "       Nutrition Diagnosis             Nutrition Interventions/Recommendations   Nutrition Prescription   TF- Glucerna 1.5- on hold now due to non-functioning tube- this was providing close to 1800 calories with stable to slightly increased weight  BS better controlled - concerned about lack of CHO supply- but addition of Boost VHC should help tonight (each carton has 52 g CHO)   Will plan on eating dinner tonight - cautioned intake of steak- pt reports he is careful  Will plan on seeing pt tomorrow in radiology to assess tolerance to Boost VHC- work on flushing tube if able and assess po intake of solid food       Food and Nutrition Delivery       Nutrition Education       Coordination of Care   Medical oncology- meets Dr. Fry 7/22 at [place]   [place]  Radiation oncology   Clinical trials RN  Thoracic surgeon - communicated with team about plan- he will see Dr. Manzo on 7/16/2024 and will plan on seeing pt there as well- pt is requesting feeding tube be replaced at this visit     There are no Patient Instructions on file for this visit.    Nutrition Monitoring and Evaluation        BS  Weight   Tolerance to Po intake - food and supplements   Ability of j-tube to function       Time Spent  Prep time on day of patient encounter: 15 minutes  Time spent directly with patient, family or caregiver: 15 minutes  Additional Time Spent on Patient Care Activities: 5 minutes  Documentation Time: 20 minutes  Other Time Spent: 0 minutes  Total: 55 minutes

## 2024-07-16 ENCOUNTER — HOSPITAL ENCOUNTER (OUTPATIENT)
Dept: RADIOLOGY | Facility: HOSPITAL | Age: 79
Discharge: HOME | End: 2024-07-16
Payer: MEDICARE

## 2024-07-16 ENCOUNTER — NUTRITION (OUTPATIENT)
Dept: HEMATOLOGY/ONCOLOGY | Facility: HOSPITAL | Age: 79
End: 2024-07-16
Payer: MEDICARE

## 2024-07-16 PROCEDURE — 78816 PET IMAGE W/CT FULL BODY: CPT | Mod: PS

## 2024-07-16 PROCEDURE — 3430000001 HC RX 343 DIAGNOSTIC RADIOPHARMACEUTICALS: Performed by: INTERNAL MEDICINE

## 2024-07-16 PROCEDURE — A9552 F18 FDG: HCPCS | Performed by: INTERNAL MEDICINE

## 2024-07-16 RX ORDER — FLUDEOXYGLUCOSE F 18 200 MCI/ML
11.7 INJECTION, SOLUTION INTRAVENOUS
Status: COMPLETED | OUTPATIENT
Start: 2024-07-16 | End: 2024-07-16

## 2024-07-16 NOTE — PROGRESS NOTES
NUTRITION COMMUNICATION NOTE    Juan M Mcrae     REASON FOR COMMUNICATION:     Pt and S/O are seen in radiology waiting area.    He reports good intake of dinner last night  Had 6 ounces of steak  Broccoli and cauliflower  3/4 cup of mashed potatoes    1/2 cup ice cream    He did not take a Boost VHC because he ate so well    He did not have any issues with po intake  He did report he is pushing self to eat    Due to PET no po intake this am  CGM remains off and will replace after test  Took lantus last night at 7p  Unsure of BS today at this time     Noted that glipizide was stopped   Time Spent  Prep time on day of patient encounter: 5 minutes  Time spent directly with patient, family or caregiver: 15 minutes  Additional Time Spent on Patient Care Activities: 5 minutes  Documentation Time: 10 minutes  Other Time Spent: 0 minutes  Total: 35 minutes    Does continue on metformin     Provided with some samples of Boost VHC  At this time encouraged intake of 3 per day to help mirror calories and CHO provided from TF    Noted he did not try to flush tube this am- I did not either since we were in waiting room    Will see pt tomorrow with Dr. Manzo  See if tube is replaced and if it is might consider decreasing TF volume  Please remember he is taking Glucerna 1.5 at 91 ml per hour for 12 hours or so.

## 2024-07-17 ENCOUNTER — LAB (OUTPATIENT)
Dept: LAB | Facility: HOSPITAL | Age: 79
End: 2024-07-17
Payer: MEDICARE

## 2024-07-17 ENCOUNTER — OFFICE VISIT (OUTPATIENT)
Dept: SURGERY | Facility: HOSPITAL | Age: 79
End: 2024-07-17
Payer: MEDICARE

## 2024-07-17 ENCOUNTER — NUTRITION (OUTPATIENT)
Dept: HEMATOLOGY/ONCOLOGY | Facility: HOSPITAL | Age: 79
End: 2024-07-17

## 2024-07-17 ENCOUNTER — PREP FOR PROCEDURE (OUTPATIENT)
Dept: CARDIOTHORACIC SURGERY | Facility: HOSPITAL | Age: 79
End: 2024-07-17

## 2024-07-17 VITALS
TEMPERATURE: 97.2 F | OXYGEN SATURATION: 98 % | BODY MASS INDEX: 25.1 KG/M2 | DIASTOLIC BLOOD PRESSURE: 63 MMHG | HEART RATE: 68 BPM | RESPIRATION RATE: 18 BRPM | SYSTOLIC BLOOD PRESSURE: 124 MMHG | WEIGHT: 160.27 LBS

## 2024-07-17 DIAGNOSIS — Z01.818 PREOP TESTING: ICD-10-CM

## 2024-07-17 DIAGNOSIS — C15.5 MALIGNANT NEOPLASM OF LOWER THIRD OF ESOPHAGUS (MULTI): ICD-10-CM

## 2024-07-17 DIAGNOSIS — Z01.818 PREOP TESTING: Primary | ICD-10-CM

## 2024-07-17 DIAGNOSIS — E11.9 TYPE 2 DIABETES MELLITUS WITHOUT COMPLICATION, UNSPECIFIED WHETHER LONG TERM INSULIN USE (MULTI): ICD-10-CM

## 2024-07-17 DIAGNOSIS — C15.5 MALIGNANT NEOPLASM OF LOWER THIRD OF ESOPHAGUS (MULTI): Primary | ICD-10-CM

## 2024-07-17 DIAGNOSIS — E78.2 MIXED HYPERLIPIDEMIA: ICD-10-CM

## 2024-07-17 LAB
ABO GROUP (TYPE) IN BLOOD: NORMAL
ALBUMIN SERPL BCP-MCNC: 4 G/DL (ref 3.4–5)
ALP SERPL-CCNC: 55 U/L (ref 33–136)
ALT SERPL W P-5'-P-CCNC: 12 U/L (ref 10–52)
ANION GAP SERPL CALC-SCNC: 13 MMOL/L (ref 10–20)
ANTIBODY SCREEN: NORMAL
APPEARANCE UR: CLEAR
AST SERPL W P-5'-P-CCNC: 12 U/L (ref 9–39)
BASOPHILS # BLD AUTO: 0.09 X10*3/UL (ref 0–0.1)
BASOPHILS NFR BLD AUTO: 1.5 %
BILIRUB SERPL-MCNC: 0.4 MG/DL (ref 0–1.2)
BILIRUB UR STRIP.AUTO-MCNC: NEGATIVE MG/DL
BUN SERPL-MCNC: 19 MG/DL (ref 6–23)
CALCIUM SERPL-MCNC: 9.7 MG/DL (ref 8.6–10.3)
CAOX CRY #/AREA UR COMP ASSIST: ABNORMAL /HPF
CHLORIDE SERPL-SCNC: 101 MMOL/L (ref 98–107)
CHOLEST SERPL-MCNC: 172 MG/DL (ref 0–199)
CHOLESTEROL/HDL RATIO: 4
CO2 SERPL-SCNC: 29 MMOL/L (ref 21–32)
COLOR UR: YELLOW
CREAT SERPL-MCNC: 0.75 MG/DL (ref 0.5–1.3)
EGFRCR SERPLBLD CKD-EPI 2021: >90 ML/MIN/1.73M*2
EOSINOPHIL # BLD AUTO: 0.15 X10*3/UL (ref 0–0.4)
EOSINOPHIL NFR BLD AUTO: 2.6 %
ERYTHROCYTE [DISTWIDTH] IN BLOOD BY AUTOMATED COUNT: 17.2 % (ref 11.5–14.5)
EST. AVERAGE GLUCOSE BLD GHB EST-MCNC: 206 MG/DL
GLUCOSE SERPL-MCNC: 134 MG/DL (ref 74–99)
GLUCOSE UR STRIP.AUTO-MCNC: NORMAL MG/DL
HBA1C MFR BLD: 8.8 %
HCT VFR BLD AUTO: 39.3 % (ref 41–52)
HDLC SERPL-MCNC: 42.6 MG/DL
HGB BLD-MCNC: 12.9 G/DL (ref 13.5–17.5)
HYALINE CASTS #/AREA URNS AUTO: ABNORMAL /LPF
IMM GRANULOCYTES # BLD AUTO: 0.02 X10*3/UL (ref 0–0.5)
IMM GRANULOCYTES NFR BLD AUTO: 0.3 % (ref 0–0.9)
INR PPP: 1.1 (ref 0.9–1.1)
KETONES UR STRIP.AUTO-MCNC: NEGATIVE MG/DL
LDLC SERPL CALC-MCNC: 107 MG/DL
LEUKOCYTE ESTERASE UR QL STRIP.AUTO: NEGATIVE
LYMPHOCYTES # BLD AUTO: 0.55 X10*3/UL (ref 0.8–3)
LYMPHOCYTES NFR BLD AUTO: 9.4 %
MCH RBC QN AUTO: 33.2 PG (ref 26–34)
MCHC RBC AUTO-ENTMCNC: 32.8 G/DL (ref 32–36)
MCV RBC AUTO: 101 FL (ref 80–100)
MONOCYTES # BLD AUTO: 0.8 X10*3/UL (ref 0.05–0.8)
MONOCYTES NFR BLD AUTO: 13.7 %
NEUTROPHILS # BLD AUTO: 4.24 X10*3/UL (ref 1.6–5.5)
NEUTROPHILS NFR BLD AUTO: 72.5 %
NITRITE UR QL STRIP.AUTO: NEGATIVE
NON HDL CHOLESTEROL: 129 MG/DL (ref 0–149)
NRBC BLD-RTO: 0 /100 WBCS (ref 0–0)
PH UR STRIP.AUTO: 6 [PH]
PLATELET # BLD AUTO: 319 X10*3/UL (ref 150–450)
POTASSIUM SERPL-SCNC: 5.1 MMOL/L (ref 3.5–5.3)
PROT SERPL-MCNC: 7.3 G/DL (ref 6.4–8.2)
PROT UR STRIP.AUTO-MCNC: ABNORMAL MG/DL
PROTHROMBIN TIME: 11.9 SECONDS (ref 9.8–12.8)
RBC # BLD AUTO: 3.88 X10*6/UL (ref 4.5–5.9)
RBC # UR STRIP.AUTO: NEGATIVE /UL
RBC #/AREA URNS AUTO: ABNORMAL /HPF
RH FACTOR (ANTIGEN D): NORMAL
SODIUM SERPL-SCNC: 138 MMOL/L (ref 136–145)
SP GR UR STRIP.AUTO: 1.03
TRIGL SERPL-MCNC: 113 MG/DL (ref 0–149)
UROBILINOGEN UR STRIP.AUTO-MCNC: ABNORMAL MG/DL
VLDL: 23 MG/DL (ref 0–40)
WBC # BLD AUTO: 5.9 X10*3/UL (ref 4.4–11.3)
WBC #/AREA URNS AUTO: ABNORMAL /HPF

## 2024-07-17 PROCEDURE — 84075 ASSAY ALKALINE PHOSPHATASE: CPT

## 2024-07-17 PROCEDURE — 85025 COMPLETE CBC W/AUTO DIFF WBC: CPT

## 2024-07-17 PROCEDURE — 86901 BLOOD TYPING SEROLOGIC RH(D): CPT | Performed by: THORACIC SURGERY (CARDIOTHORACIC VASCULAR SURGERY)

## 2024-07-17 PROCEDURE — 85610 PROTHROMBIN TIME: CPT | Performed by: THORACIC SURGERY (CARDIOTHORACIC VASCULAR SURGERY)

## 2024-07-17 PROCEDURE — 83036 HEMOGLOBIN GLYCOSYLATED A1C: CPT | Performed by: STUDENT IN AN ORGANIZED HEALTH CARE EDUCATION/TRAINING PROGRAM

## 2024-07-17 PROCEDURE — 80061 LIPID PANEL: CPT | Performed by: STUDENT IN AN ORGANIZED HEALTH CARE EDUCATION/TRAINING PROGRAM

## 2024-07-17 PROCEDURE — 81001 URINALYSIS AUTO W/SCOPE: CPT | Performed by: STUDENT IN AN ORGANIZED HEALTH CARE EDUCATION/TRAINING PROGRAM

## 2024-07-17 PROCEDURE — 99215 OFFICE O/P EST HI 40 MIN: CPT | Mod: 57 | Performed by: THORACIC SURGERY (CARDIOTHORACIC VASCULAR SURGERY)

## 2024-07-17 ASSESSMENT — PAIN SCALES - GENERAL: PAINLEVEL: 0-NO PAIN

## 2024-07-17 NOTE — PROGRESS NOTES
Juan M Mcrae is a 78 y.o.male referred with esophageal cancer.  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  He underwent endoscopic ultrasound on March 21, 2024 that showed T2 N1 extending from 41 cm to 43 cm with minimal extension on the cardia. He underwent jtube placement and laparoscopy on 4/8/24 .  He completed CRT on 6/14/24.  He has done well through his treatments and he is now eating solid foods including steak.    His PET scan yesterday showed significant improvement in the FDG uptake in the esophagus and no evidence of metastatic disease.    I talked to Mr. Mcrae today about esophagectomy including alternatives of observation.  All of his questions were answered and he consents to the esophagectomy which will be Tuesday.    I discussed the risks (3-10% mortality, pneumonia, blood clots, bleeding, leak from esophagus possibly causing a life threatening infection, narrowing of new esophagus, vocal cord injury, and respiratory difficulty) as well as benefits (potential for cure) of esophagectomy. I explained a 1% risk that the new esophagus would be nonfucntional requiring an additional large operation.  I explained the alternatives of chemotherapy and/or radiation. The patient had the opportunity to ask questions which were answered and consents to esophagectomy.

## 2024-07-17 NOTE — PROGRESS NOTES
"NUTRITION Follow-up NOTE    Nutrition Assessment     Reason for Visit:  Juan M Mcrae is a 79 y.o. male who presents for esophageal cancer (lower esophageal/ GE junction)He received chemotherapy at [place] (carbo / paclitaxel) began 5-6-2024 to 6-)  Proton at [facility] ( began 5-6-2024 and completed on 6-)    I am seeing pt, daughter and s/o after appointment with thoracis surgery.    I am told tube was not declogged or changed- he was encouraged to eat.  Jtube to be placed on 4/8 and replaced on 6- by thoracic in ED  TF had been Glucerna 1.5- 5 cartons at night over 12 hours - rate 91ml per hour   Last feeding was 7/14/2024    CINDI Stovall       His surgery is scheduled for 7/23/2024    Started on insulin- 7/6/204  Taking Lantus 10 units ( max 20 units)  CGM will be replaced today   Continues with Metformin               Lab Results   Component Value Date/Time    GLUCOSE 101 (H) 07/15/2024 1038     07/15/2024 1038    K 4.4 07/15/2024 1038     07/15/2024 1038    CO2 27 07/15/2024 1038    ANIONGAP 14 07/15/2024 1038    BUN 22 07/15/2024 1038    CREATININE 0.58 07/15/2024 1038    EGFR >90 07/15/2024 1038    CALCIUM 9.2 07/15/2024 1038    ALBUMIN 3.7 07/15/2024 1038    ALKPHOS 64 07/15/2024 1038    PROT 6.8 07/15/2024 1038    AST 12 07/15/2024 1038    BILITOT 0.3 07/15/2024 1038    ALT 12 07/15/2024 1038     No results found for: \"VITD25\"        Anthropometrics:     HT:  167.5 cm   IBW: 64.5 kg  113.4 % IBW  BMI: 25.1    Pt reports weight at home 161# (73.2 kg)  so this is stable to increased from end of treatment weight but still 10# down from start of treatment weight       Wt Readings from Last 10 Encounters:   07/17/24 72.7 kg (160 lb 4.4 oz)   06/22/24 70.3 kg (155 lb)   06/19/24 73 kg (160 lb 15 oz)   06/19/24 72.6 kg (160 lb)   06/11/24 73.2 kg (161 lb 6.4 oz)   06/10/24 71.6 kg (157 lb 15.4 oz)   06/04/24 73.1 kg (161 lb 1.6 oz)   06/03/24 72 kg (158 lb 11.7 oz)   05/30/24 " 72.4 kg (159 lb 9.6 oz)   05/28/24 71.9 kg (158 lb 8.2 oz)      5-6-2024  was 77.9 kg (171 lb 10.1 oz- start date     Food And Nutrient Intake:        TF had been providing    Glucerna 1.5  5 cartons per day `  1780 calories  157.5 g CHO  98 g protein  Rate is at 93 - 100 ml per hour    Po intake does not indicate any dysphagia but  noted pain with some foods  No regurgitation/ vomiting  Intake of 3 meals plus Boost VHC-  2 per day should provide adequate calories and protein for pt    He plans on purchasing a case from [employer] prior to leaving today.    Again we do not have BS readings- as CGM will be placed on him today  (daughter is helping)                                                         Nutrition Focused Physical Exam Findings:  Completed 4/1/2024                        Energy Needs     2280 calories  91.2 g protein   2280 ml of free water       Nutrition Diagnosis        Nutrition Diagnosis  Patient has Nutrition Diagnosis: Yes  Diagnosis Status (1): Ongoing  Nutrition Diagnosis 1: Altered nutrition related to laboratory values  Related to (1): potential stress from treatment impacting BS control  As Evidenced by (1): BS      Nutrition Interventions/Recommendations   Nutrition Prescription   TF- Glucerna 1.5- on hold now due to non-functioning tube- this was providing close to 1800 calories with stable to slightly increased weight  BS better controlled with Lantus- addition of increased frequency of  po intake as well as Boost VHC 2 times per day - should be appropriate  (each carton of Boost VHC has 52 g CHO)   Will plan on eating dinner tonight - cautioned intake of steak- pt reports he is careful  Pt knows of diet plan during hospitalization and will keep TF supplies at home       Food and Nutrition Delivery       Nutrition Education       Coordination of Care   Medical oncology- meets Dr. Fry 7/22 at [facility]   [place]  Radiation oncology   Clinical trials RN  Thoracic surgeon -  Dr. Manzo-  surgery  on 7/23/2024     There are no Patient Instructions on file for this visit.    Nutrition Monitoring and Evaluation        BS  Weight   Tolerance to Po intake - food and supplements         Time Spent  Prep time on day of patient encounter: 5 minutes  Time spent directly with patient, family or caregiver: 5 minutes  Additional Time Spent on Patient Care Activities: 5 minutes  Documentation Time: 10 minutes  Other Time Spent: 0 minutes  Total: 25 minutes

## 2024-07-18 ENCOUNTER — APPOINTMENT (OUTPATIENT)
Dept: HEMATOLOGY/ONCOLOGY | Facility: HOSPITAL | Age: 79
End: 2024-07-18
Payer: MEDICARE

## 2024-07-19 ENCOUNTER — PREP FOR PROCEDURE (OUTPATIENT)
Dept: CARDIOTHORACIC SURGERY | Facility: HOSPITAL | Age: 79
End: 2024-07-19
Payer: MEDICARE

## 2024-07-19 DIAGNOSIS — C15.9 MALIGNANT NEOPLASM OF ESOPHAGUS, UNSPECIFIED LOCATION (MULTI): Primary | ICD-10-CM

## 2024-07-21 ENCOUNTER — TELEPHONE (OUTPATIENT)
Dept: PRIMARY CARE | Facility: CLINIC | Age: 79
End: 2024-07-21
Payer: MEDICARE

## 2024-07-21 NOTE — TELEPHONE ENCOUNTER
Reviewed CGM report:    -----------------------------  [employer]  -----------------------------  Juan M Mcrae  YOB: 1945  Generated at: Sun, Jul 21, 2024 6:44 PM EDT  Reporting period: Mon Jul 8, 2024 - Sun Jul 21, 2024  -----------------------------  Glucose Details  Average glucose: 155 mg/dL  Standard deviation: 40 mg/dL  GMI: N/A  -----------------------------  Time in Range  Very High: <1%  High: 29%  In Range: 70%  Low: <1%  Very Low: 0%    Target Range   mg/dL    -----------------------------  CGM Details  Sensor usage: 64%  Days with CGM data: 9/14

## 2024-07-22 ENCOUNTER — OFFICE VISIT (OUTPATIENT)
Dept: HEMATOLOGY/ONCOLOGY | Facility: CLINIC | Age: 79
End: 2024-07-22
Payer: MEDICARE

## 2024-07-22 ENCOUNTER — ANESTHESIA EVENT (OUTPATIENT)
Dept: OPERATING ROOM | Facility: HOSPITAL | Age: 79
End: 2024-07-22
Payer: MEDICARE

## 2024-07-22 ENCOUNTER — EDUCATION (OUTPATIENT)
Dept: HEMATOLOGY/ONCOLOGY | Facility: CLINIC | Age: 79
End: 2024-07-22

## 2024-07-22 VITALS
HEART RATE: 60 BPM | BODY MASS INDEX: 25.15 KG/M2 | DIASTOLIC BLOOD PRESSURE: 74 MMHG | OXYGEN SATURATION: 97 % | TEMPERATURE: 98 F | WEIGHT: 160.6 LBS | SYSTOLIC BLOOD PRESSURE: 137 MMHG | RESPIRATION RATE: 16 BRPM

## 2024-07-22 DIAGNOSIS — C15.5 MALIGNANT NEOPLASM OF LOWER THIRD OF ESOPHAGUS (MULTI): Primary | ICD-10-CM

## 2024-07-22 PROCEDURE — 99214 OFFICE O/P EST MOD 30 MIN: CPT | Performed by: INTERNAL MEDICINE

## 2024-07-22 PROCEDURE — 1157F ADVNC CARE PLAN IN RCRD: CPT | Performed by: INTERNAL MEDICINE

## 2024-07-22 PROCEDURE — 1159F MED LIST DOCD IN RCRD: CPT | Performed by: INTERNAL MEDICINE

## 2024-07-22 PROCEDURE — 1123F ACP DISCUSS/DSCN MKR DOCD: CPT | Performed by: INTERNAL MEDICINE

## 2024-07-22 PROCEDURE — 1126F AMNT PAIN NOTED NONE PRSNT: CPT | Performed by: INTERNAL MEDICINE

## 2024-07-22 ASSESSMENT — PAIN SCALES - GENERAL: PAINLEVEL: 0-NO PAIN

## 2024-07-22 NOTE — PROGRESS NOTES
Patient ID: Juan M Mcrae is a 79 y.o. male.    Diagnoses:   1.GE junction adenocarcinoma (signet ring cell), proficient MMR.  Localized (clinical stage II/III).  2. Type 2 diabetes mellitus on oral hypoglycemics, hypertension controlled with medications.    Genomic profile:  Proficient MMR status on the biopsy by IHC.    Assessment and Plan:  This is a pleasant 78-year-old man with a diagnosis of GE junction adenocarcinoma.  His staging PET/CT and subsequent imaging studies confirmed localized disease.  He had a port placement and a J-tube placement.  He has seen radiation oncology and he has been enrolled in the NRG- (photon versus proton) study.    He completed concurrent chemoradiation in early June 2024 (last chemo was on Bettie 10, 2024).  His surgery is scheduled for July 23, 2024. Currently he is doing well with minimum symptoms.    Plan: Follow-up after surgery in the later part of August 2024.    I have placed all orders as outlined above. I advised the patient to schedule the tests and follow-up appointment as discussed by contacting the  on the way out or calling by phone.    Providers:  Surgeon: Dr. Jovany Bucknerc:  RadOnc: Donna.    Chief complaint: GE junction adenocarcinoma.    HPI:  ONCOLOGIC HISTORY-    February 28, 2024: Underwent an EGD for progressive dysphagia and weight loss.  EGD revealed an obstructing mass in the distal esophagus.  Biopsy confirmed adenocarcinoma with signet ring cell features.    March 21, 2024: EUS revealed T2 N1 mass extending from 41 cm to 43 cm and involving less than 1 cm of the gastric cardia.    March 22, 2024: CT scan of chest did not show any metastatic disease.    April 1, 2024 24: PET/CT scan done:   1. Hypermetabolic linear focus at gastroesophageal junction which is  extending to the gastric cardia/proximal lesser curvature in  correlation with distal esophageal /gastroesophageal junction wall  thickening. These findings are compatible with  biopsy-proven  adenocarcinoma.  2. No evidence of hypermetabolic lymphadenopathy.  3. Abnormal focal increase in FDG uptake in the left hepatic lobe in  correlation with hypoattenuating lesion, concerning for metastatic  disease. Further correlation with dedicated CT abdomen is recommended.  4. Large fat containing left inguinal hernia.  Interval history: He has significant dysphagia although he can get by with soup.  He has lost about 60 pounds in the last 6 months or so.  Feels fatigued.  Denies any pain.  Denies fever or chill or any other sign of ongoing infection.    April 3, 2024: CT scan of abdomen and MRI liver ruled out metastatic disease (liver lesions were hemangiomas).    May 6, 2024: Started concurrent radiation and chemotherapy with weekly carboplatin and Taxol.  Will complete concurrent chemoradiation on June 14, 2024.    6/10/24: RECEIVED THE LAST DOSE OF CHEMO. LAST DOSE OF RADIATION WAS ON 6/14/2024.    July 23, 2024: Scheduled for surgery.    Interval history:  Patient presents today for an evaluation after chemotherapy with Carboplatin/Paclitaxel and concurrent radiation therapy.  He has no nausea or vomiting or diarrhea.  Denies any pain.  He has mild (grade 1) fatigue.  He complains of some hair loss.  His J-tube is clogged and eating by mouth without much difficulties.  He denies any fevers, chills or night sweats. No cough, chest pain or shortness of breath.     Allergies  Omeprazole, Sulfa, shellfish    Medications:  Acetaminophen, atenolol, dexamethasone, Emla cream, metformin 1000 mg twice daily, multivitamin, naloxone, nystatin, olanzapine, Actos, prochlorperazine, trazodone     Past Medical History:   Type 2 diabetes mellitus on oral hypoglycemics, hypertension controlled with medications.  Cataract, CKD (chronic kidney disease), Colon polyp, Deviated septum, ED (erectile dysfunction), Esophageal cancer, GERD (gastroesophageal reflux disease), Hyperlipidemia, Nephrolithiasis, Sleep apnea  (wear CPAP/BiPAP)    Surgical History:    Past Surgical History:   Procedure Laterality Date    BLADDER SURGERY      COLONOSCOPY      Repeat in one year    EYE SURGERY      LITHOTRIPSY      OTHER SURGICAL HISTORY      Vocal cord surgery    TRANSURETHRAL RESECTION OF PROSTATE      UPPER GASTROINTESTINAL ENDOSCOPY  2024      Family History:    Family History   Problem Relation Name Age of Onset    Stroke Father      Kidney cancer Brother Eliu     Stroke Brother Eliu      Family Oncology History:    Cancer-related family history includes Kidney cancer in his brother.    Social History:      Tobacco Use    Smoking status: Former     Current packs/day: 0.00     Average packs/day: 2.0 packs/day for 40.0 years (80.0 ttl pk-yrs)     Types: Cigarettes     Start date: 1959     Quit date: 1999     Years since quittin.4    Smokeless tobacco: Never   Vaping Use    Vaping status: Never Used   Substance Use Topics    Alcohol use: Yes     Alcohol/week: 2.0 standard drinks of alcohol     Types: 2 Cans of beer per week     Comment: couple beers a week    Drug use: Not Currently     Comment: gummies-dispensary from [place]        Vital Signs:  /74 (BP Location: Left arm, Patient Position: Sitting, BP Cuff Size: Adult)   Pulse 60   Temp 36.7 °C (98 °F) (Temporal)   Resp 16   Wt 72.8 kg (160 lb 9.7 oz)   SpO2 97%   BMI 25.15 kg/m²     Physical Exam:  ECO  Pain: 0  Constitutional: Well developed, awake/alert/oriented x3, no distress, alert and cooperative  Eyes: PER. sclera anicteric  ENMT: Oral mucosa moist  Respiratory/Thorax: Breathing is non-labored. Lungs are clear to auscultation bilaterally. No adventitious breath sounds  Cardiovascular: S1-S2. Regular rate and rhythm. No murmurs, rubs, or gallops appreciated  Gastrointestinal: Abdomen soft nontender, nondistended, normal active bowel sounds.  Musculoskeletal: ROM intact, no joint swelling, normal strength  Extremities: normal extremities, no  cyanosis, no edema, no clubbing  Neurologic: alert and oriented x3. Nonfocal exam. No myoclonus  Psychological: Pleasant, appropriate and easily engaged     Lab Results:  Labs from today reviewed.           Catarino Fry MD

## 2024-07-22 NOTE — PROGRESS NOTES
Pharmacy Medication History Review    Juan M Mcrae is a 79 y.o. male who is planned to be admitted for 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). Pharmacy called the patient prior to their scheduled procedure and reviewed the patient's epyvx-ly-yurzfaelg medications for accuracy.    Medications ADDED:  none  Medications CHANGED:  none  Medications REMOVED:   Acetaminophen 650mg/20.3ml    Please review updated prior to admission medication list and comments regarding how patient may be taking medications differently by going to Admission tab --> Admission Orders --> Admit Orders / Review prior to admission medications.     Preferred pharmacy and allergies to be confirmed with patient by nursing the day of procedure.     Sources used to complete the med history include spoke with patient, surescripts, oarrs, care everywhere    Below are additional concerns with the patient's PTA list.  Patient was just recently started on basaglar injections. They stopped pioglitazone and glipizide once they started injection. Patient still takes metformin (shows NO recent fill history) but stated they still take medication.     Felicia Montoya    [facility]eds Amb[facility]  Please reach out via Secure Chat for questions

## 2024-07-22 NOTE — PROGRESS NOTES
Patient ambulated into clinic for follow up with Dr. Fry. Medications and allergies reviewed.     Patient stated he will be having surgery tomorrow.     CT scan reviewed with Juan M by Dr. Fry.

## 2024-07-23 ENCOUNTER — ANESTHESIA (OUTPATIENT)
Dept: OPERATING ROOM | Facility: HOSPITAL | Age: 79
End: 2024-07-23
Payer: MEDICARE

## 2024-07-23 ENCOUNTER — HOSPITAL ENCOUNTER (INPATIENT)
Facility: HOSPITAL | Age: 79
DRG: 327 | End: 2024-07-23
Attending: THORACIC SURGERY (CARDIOTHORACIC VASCULAR SURGERY) | Admitting: THORACIC SURGERY (CARDIOTHORACIC VASCULAR SURGERY)
Payer: MEDICARE

## 2024-07-23 ENCOUNTER — APPOINTMENT (OUTPATIENT)
Dept: CARDIOLOGY | Facility: HOSPITAL | Age: 79
DRG: 327 | End: 2024-07-23
Payer: MEDICARE

## 2024-07-23 ENCOUNTER — APPOINTMENT (OUTPATIENT)
Dept: RADIOLOGY | Facility: HOSPITAL | Age: 79
DRG: 327 | End: 2024-07-23
Payer: MEDICARE

## 2024-07-23 DIAGNOSIS — C15.5 MALIGNANT NEOPLASM OF LOWER THIRD OF ESOPHAGUS (MULTI): ICD-10-CM

## 2024-07-23 DIAGNOSIS — E11.9 TYPE 2 DIABETES MELLITUS WITHOUT COMPLICATION, WITH LONG-TERM CURRENT USE OF INSULIN (MULTI): ICD-10-CM

## 2024-07-23 DIAGNOSIS — C15.9 MALIGNANT NEOPLASM OF ESOPHAGUS, UNSPECIFIED LOCATION (MULTI): Primary | ICD-10-CM

## 2024-07-23 DIAGNOSIS — Z79.4 TYPE 2 DIABETES MELLITUS WITHOUT COMPLICATION, WITH LONG-TERM CURRENT USE OF INSULIN (MULTI): ICD-10-CM

## 2024-07-23 PROBLEM — R13.10 DYSPHAGIA: Status: ACTIVE | Noted: 2024-07-23

## 2024-07-23 PROBLEM — G47.33 OSA (OBSTRUCTIVE SLEEP APNEA): Status: ACTIVE | Noted: 2024-07-23

## 2024-07-23 PROBLEM — K21.9 GASTROESOPHAGEAL REFLUX DISEASE: Status: ACTIVE | Noted: 2024-07-23

## 2024-07-23 PROBLEM — N18.9 CHRONIC RENAL INSUFFICIENCY: Status: ACTIVE | Noted: 2024-07-23

## 2024-07-23 PROBLEM — I10 HTN (HYPERTENSION): Status: ACTIVE | Noted: 2024-07-23

## 2024-07-23 LAB
ABO GROUP (TYPE) IN BLOOD: NORMAL
ALBUMIN SERPL BCP-MCNC: 4 G/DL (ref 3.4–5)
ANION GAP BLDA CALCULATED.4IONS-SCNC: 12 MMO/L (ref 10–25)
ANION GAP BLDA CALCULATED.4IONS-SCNC: 13 MMO/L (ref 10–25)
ANION GAP BLDA CALCULATED.4IONS-SCNC: 14 MMO/L (ref 10–25)
ANION GAP SERPL CALC-SCNC: 16 MMOL/L (ref 10–20)
ANTIBODY SCREEN: NORMAL
APTT PPP: 27 SECONDS (ref 27–38)
BASE EXCESS BLDA CALC-SCNC: -1.6 MMOL/L (ref -2–3)
BASE EXCESS BLDA CALC-SCNC: -2.9 MMOL/L (ref -2–3)
BASE EXCESS BLDA CALC-SCNC: -3 MMOL/L (ref -2–3)
BODY TEMPERATURE: 37 DEGREES CELSIUS
BUN SERPL-MCNC: 9 MG/DL (ref 6–23)
CA-I BLD-SCNC: 1.13 MMOL/L (ref 1.1–1.33)
CA-I BLDA-SCNC: 1.11 MMOL/L (ref 1.1–1.33)
CA-I BLDA-SCNC: 1.19 MMOL/L (ref 1.1–1.33)
CA-I BLDA-SCNC: 1.21 MMOL/L (ref 1.1–1.33)
CALCIUM SERPL-MCNC: 8.9 MG/DL (ref 8.6–10.6)
CHLORIDE BLDA-SCNC: 100 MMOL/L (ref 98–107)
CHLORIDE BLDA-SCNC: 100 MMOL/L (ref 98–107)
CHLORIDE BLDA-SCNC: 102 MMOL/L (ref 98–107)
CHLORIDE SERPL-SCNC: 100 MMOL/L (ref 98–107)
CO2 SERPL-SCNC: 23 MMOL/L (ref 21–32)
CREAT SERPL-MCNC: 0.63 MG/DL (ref 0.5–1.3)
EGFRCR SERPLBLD CKD-EPI 2021: >90 ML/MIN/1.73M*2
ERYTHROCYTE [DISTWIDTH] IN BLOOD BY AUTOMATED COUNT: 16.1 % (ref 11.5–14.5)
GLUCOSE BLD MANUAL STRIP-MCNC: 106 MG/DL (ref 74–99)
GLUCOSE BLD MANUAL STRIP-MCNC: 126 MG/DL (ref 74–99)
GLUCOSE BLD MANUAL STRIP-MCNC: 154 MG/DL (ref 74–99)
GLUCOSE BLD MANUAL STRIP-MCNC: 191 MG/DL (ref 74–99)
GLUCOSE BLD MANUAL STRIP-MCNC: 197 MG/DL (ref 74–99)
GLUCOSE BLDA-MCNC: 106 MG/DL (ref 74–99)
GLUCOSE BLDA-MCNC: 162 MG/DL (ref 74–99)
GLUCOSE BLDA-MCNC: 213 MG/DL (ref 74–99)
GLUCOSE SERPL-MCNC: 202 MG/DL (ref 74–99)
HCO3 BLDA-SCNC: 22 MMOL/L (ref 22–26)
HCO3 BLDA-SCNC: 23.2 MMOL/L (ref 22–26)
HCO3 BLDA-SCNC: 24.3 MMOL/L (ref 22–26)
HCT VFR BLD AUTO: 35.8 % (ref 41–52)
HCT VFR BLD EST: 34 % (ref 41–52)
HCT VFR BLD EST: 37 % (ref 41–52)
HCT VFR BLD EST: 40 % (ref 41–52)
HGB BLD-MCNC: 12 G/DL (ref 13.5–17.5)
HGB BLDA-MCNC: 11.4 G/DL (ref 13.5–17.5)
HGB BLDA-MCNC: 12.2 G/DL (ref 13.5–17.5)
HGB BLDA-MCNC: 13.3 G/DL (ref 13.5–17.5)
INHALED O2 CONCENTRATION: 36 %
INHALED O2 CONCENTRATION: 60 %
INHALED O2 CONCENTRATION: 95 %
INR PPP: 1.1 (ref 0.9–1.1)
LACTATE BLDA-SCNC: 0.9 MMOL/L (ref 0.4–2)
LACTATE BLDA-SCNC: 1.1 MMOL/L (ref 0.4–2)
LACTATE BLDA-SCNC: 1.3 MMOL/L (ref 0.4–2)
MAGNESIUM SERPL-MCNC: 1.37 MG/DL (ref 1.6–2.4)
MCH RBC QN AUTO: 32.7 PG (ref 26–34)
MCHC RBC AUTO-ENTMCNC: 33.5 G/DL (ref 32–36)
MCV RBC AUTO: 98 FL (ref 80–100)
NRBC BLD-RTO: 0 /100 WBCS (ref 0–0)
OXYHGB MFR BLDA: 91.5 % (ref 94–98)
OXYHGB MFR BLDA: 95.3 % (ref 94–98)
OXYHGB MFR BLDA: 97.5 % (ref 94–98)
PCO2 BLDA: 38 MM HG (ref 38–42)
PCO2 BLDA: 45 MM HG (ref 38–42)
PCO2 BLDA: 45 MM HG (ref 38–42)
PH BLDA: 7.32 PH (ref 7.38–7.42)
PH BLDA: 7.34 PH (ref 7.38–7.42)
PH BLDA: 7.37 PH (ref 7.38–7.42)
PHOSPHATE SERPL-MCNC: 3.8 MG/DL (ref 2.5–4.9)
PLATELET # BLD AUTO: 290 X10*3/UL (ref 150–450)
PO2 BLDA: 162 MM HG (ref 85–95)
PO2 BLDA: 69 MM HG (ref 85–95)
PO2 BLDA: 92 MM HG (ref 85–95)
POTASSIUM BLDA-SCNC: 3.8 MMOL/L (ref 3.5–5.3)
POTASSIUM BLDA-SCNC: 4.1 MMOL/L (ref 3.5–5.3)
POTASSIUM BLDA-SCNC: 4.2 MMOL/L (ref 3.5–5.3)
POTASSIUM SERPL-SCNC: 4.1 MMOL/L (ref 3.5–5.3)
PROTHROMBIN TIME: 12.9 SECONDS (ref 9.8–12.8)
RBC # BLD AUTO: 3.67 X10*6/UL (ref 4.5–5.9)
RH FACTOR (ANTIGEN D): NORMAL
SAO2 % BLDA: 100 % (ref 94–100)
SAO2 % BLDA: 94 % (ref 94–100)
SAO2 % BLDA: 98 % (ref 94–100)
SODIUM BLDA-SCNC: 132 MMOL/L (ref 136–145)
SODIUM BLDA-SCNC: 132 MMOL/L (ref 136–145)
SODIUM BLDA-SCNC: 134 MMOL/L (ref 136–145)
SODIUM SERPL-SCNC: 135 MMOL/L (ref 136–145)
WBC # BLD AUTO: 8.8 X10*3/UL (ref 4.4–11.3)

## 2024-07-23 PROCEDURE — 84132 ASSAY OF SERUM POTASSIUM: CPT | Performed by: PHYSICIAN ASSISTANT

## 2024-07-23 PROCEDURE — 83735 ASSAY OF MAGNESIUM: CPT | Performed by: PHYSICIAN ASSISTANT

## 2024-07-23 PROCEDURE — 82330 ASSAY OF CALCIUM: CPT | Performed by: PHYSICIAN ASSISTANT

## 2024-07-23 PROCEDURE — 0DT44ZZ RESECTION OF ESOPHAGOGASTRIC JUNCTION, PERCUTANEOUS ENDOSCOPIC APPROACH: ICD-10-PCS | Performed by: THORACIC SURGERY (CARDIOTHORACIC VASCULAR SURGERY)

## 2024-07-23 PROCEDURE — 96372 THER/PROPH/DIAG INJ SC/IM: CPT | Performed by: ANESTHESIOLOGIST ASSISTANT

## 2024-07-23 PROCEDURE — 0DX60Z5 TRANSFER STOMACH TO ESOPHAGUS, OPEN APPROACH: ICD-10-PCS | Performed by: THORACIC SURGERY (CARDIOTHORACIC VASCULAR SURGERY)

## 2024-07-23 PROCEDURE — 3700000002 HC GENERAL ANESTHESIA TIME - EACH INCREMENTAL 1 MINUTE: Performed by: THORACIC SURGERY (CARDIOTHORACIC VASCULAR SURGERY)

## 2024-07-23 PROCEDURE — 3700000001 HC GENERAL ANESTHESIA TIME - INITIAL BASE CHARGE: Performed by: THORACIC SURGERY (CARDIOTHORACIC VASCULAR SURGERY)

## 2024-07-23 PROCEDURE — 3600000004 HC OR TIME - INITIAL BASE CHARGE - PROCEDURE LEVEL FOUR: Performed by: THORACIC SURGERY (CARDIOTHORACIC VASCULAR SURGERY)

## 2024-07-23 PROCEDURE — 43520 INCISION OF PYLORIC MUSCLE: CPT | Performed by: THORACIC SURGERY (CARDIOTHORACIC VASCULAR SURGERY)

## 2024-07-23 PROCEDURE — 85610 PROTHROMBIN TIME: CPT | Performed by: PHYSICIAN ASSISTANT

## 2024-07-23 PROCEDURE — 93005 ELECTROCARDIOGRAM TRACING: CPT

## 2024-07-23 PROCEDURE — 2500000005 HC RX 250 GENERAL PHARMACY W/O HCPCS: Performed by: ANESTHESIOLOGIST ASSISTANT

## 2024-07-23 PROCEDURE — 2500000002 HC RX 250 W HCPCS SELF ADMINISTERED DRUGS (ALT 637 FOR MEDICARE OP, ALT 636 FOR OP/ED): Performed by: PHYSICIAN ASSISTANT

## 2024-07-23 PROCEDURE — 85027 COMPLETE CBC AUTOMATED: CPT | Performed by: PHYSICIAN ASSISTANT

## 2024-07-23 PROCEDURE — 2500000001 HC RX 250 WO HCPCS SELF ADMINISTERED DRUGS (ALT 637 FOR MEDICARE OP): Performed by: THORACIC SURGERY (CARDIOTHORACIC VASCULAR SURGERY)

## 2024-07-23 PROCEDURE — 0DT34ZZ RESECTION OF LOWER ESOPHAGUS, PERCUTANEOUS ENDOSCOPIC APPROACH: ICD-10-PCS | Performed by: THORACIC SURGERY (CARDIOTHORACIC VASCULAR SURGERY)

## 2024-07-23 PROCEDURE — 07BB0ZZ EXCISION OF MESENTERIC LYMPHATIC, OPEN APPROACH: ICD-10-PCS | Performed by: THORACIC SURGERY (CARDIOTHORACIC VASCULAR SURGERY)

## 2024-07-23 PROCEDURE — 0DHA3UZ INSERTION OF FEEDING DEVICE INTO JEJUNUM, PERCUTANEOUS APPROACH: ICD-10-PCS | Performed by: THORACIC SURGERY (CARDIOTHORACIC VASCULAR SURGERY)

## 2024-07-23 PROCEDURE — 2500000004 HC RX 250 GENERAL PHARMACY W/ HCPCS (ALT 636 FOR OP/ED): Performed by: PHYSICIAN ASSISTANT

## 2024-07-23 PROCEDURE — 2500000004 HC RX 250 GENERAL PHARMACY W/ HCPCS (ALT 636 FOR OP/ED): Performed by: ANESTHESIOLOGIST ASSISTANT

## 2024-07-23 PROCEDURE — 2720000007 HC OR 272 NO HCPCS: Performed by: THORACIC SURGERY (CARDIOTHORACIC VASCULAR SURGERY)

## 2024-07-23 PROCEDURE — 2020000001 HC ICU ROOM DAILY

## 2024-07-23 PROCEDURE — 43288 ESPHG THRSC MOBLJ: CPT | Performed by: THORACIC SURGERY (CARDIOTHORACIC VASCULAR SURGERY)

## 2024-07-23 PROCEDURE — 88307 TISSUE EXAM BY PATHOLOGIST: CPT | Mod: TC,SUR | Performed by: THORACIC SURGERY (CARDIOTHORACIC VASCULAR SURGERY)

## 2024-07-23 PROCEDURE — 71045 X-RAY EXAM CHEST 1 VIEW: CPT | Performed by: RADIOLOGY

## 2024-07-23 PROCEDURE — 0DJ08ZZ INSPECTION OF UPPER INTESTINAL TRACT, VIA NATURAL OR ARTIFICIAL OPENING ENDOSCOPIC: ICD-10-PCS | Performed by: THORACIC SURGERY (CARDIOTHORACIC VASCULAR SURGERY)

## 2024-07-23 PROCEDURE — 84132 ASSAY OF SERUM POTASSIUM: CPT | Performed by: ANESTHESIOLOGIST ASSISTANT

## 2024-07-23 PROCEDURE — 2500000004 HC RX 250 GENERAL PHARMACY W/ HCPCS (ALT 636 FOR OP/ED): Performed by: THORACIC SURGERY (CARDIOTHORACIC VASCULAR SURGERY)

## 2024-07-23 PROCEDURE — P9045 ALBUMIN (HUMAN), 5%, 250 ML: HCPCS | Mod: JZ,JG | Performed by: ANESTHESIOLOGIST ASSISTANT

## 2024-07-23 PROCEDURE — 37799 UNLISTED PX VASCULAR SURGERY: CPT | Performed by: PHYSICIAN ASSISTANT

## 2024-07-23 PROCEDURE — 99291 CRITICAL CARE FIRST HOUR: CPT

## 2024-07-23 PROCEDURE — 82947 ASSAY GLUCOSE BLOOD QUANT: CPT

## 2024-07-23 PROCEDURE — 2500000005 HC RX 250 GENERAL PHARMACY W/O HCPCS: Performed by: THORACIC SURGERY (CARDIOTHORACIC VASCULAR SURGERY)

## 2024-07-23 PROCEDURE — 93010 ELECTROCARDIOGRAM REPORT: CPT | Performed by: INTERNAL MEDICINE

## 2024-07-23 PROCEDURE — 2500000005 HC RX 250 GENERAL PHARMACY W/O HCPCS: Performed by: PHYSICIAN ASSISTANT

## 2024-07-23 PROCEDURE — 71045 X-RAY EXAM CHEST 1 VIEW: CPT

## 2024-07-23 PROCEDURE — 0DB64ZZ EXCISION OF STOMACH, PERCUTANEOUS ENDOSCOPIC APPROACH: ICD-10-PCS | Performed by: THORACIC SURGERY (CARDIOTHORACIC VASCULAR SURGERY)

## 2024-07-23 PROCEDURE — 86901 BLOOD TYPING SEROLOGIC RH(D): CPT | Performed by: ANESTHESIOLOGY

## 2024-07-23 PROCEDURE — 3600000009 HC OR TIME - EACH INCREMENTAL 1 MINUTE - PROCEDURE LEVEL FOUR: Performed by: THORACIC SURGERY (CARDIOTHORACIC VASCULAR SURGERY)

## 2024-07-23 RX ORDER — ROCURONIUM BROMIDE 10 MG/ML
INJECTION, SOLUTION INTRAVENOUS AS NEEDED
Status: DISCONTINUED | OUTPATIENT
Start: 2024-07-23 | End: 2024-07-23

## 2024-07-23 RX ORDER — NORETHINDRONE AND ETHINYL ESTRADIOL 0.5-0.035
KIT ORAL AS NEEDED
Status: DISCONTINUED | OUTPATIENT
Start: 2024-07-23 | End: 2024-07-23

## 2024-07-23 RX ORDER — ESMOLOL HYDROCHLORIDE 10 MG/ML
INJECTION INTRAVENOUS AS NEEDED
Status: DISCONTINUED | OUTPATIENT
Start: 2024-07-23 | End: 2024-07-23

## 2024-07-23 RX ORDER — CALCIUM GLUCONATE 20 MG/ML
1 INJECTION, SOLUTION INTRAVENOUS EVERY 6 HOURS PRN
Status: DISCONTINUED | OUTPATIENT
Start: 2024-07-23 | End: 2024-07-24

## 2024-07-23 RX ORDER — PHENYLEPHRINE HCL IN 0.9% NACL 0.4MG/10ML
SYRINGE (ML) INTRAVENOUS AS NEEDED
Status: DISCONTINUED | OUTPATIENT
Start: 2024-07-23 | End: 2024-07-23

## 2024-07-23 RX ORDER — MAGNESIUM SULFATE HEPTAHYDRATE 40 MG/ML
2 INJECTION, SOLUTION INTRAVENOUS EVERY 6 HOURS PRN
Status: DISCONTINUED | OUTPATIENT
Start: 2024-07-23 | End: 2024-07-24

## 2024-07-23 RX ORDER — BUPIVACAINE HCL/EPINEPHRINE 0.25-.0005
VIAL (ML) INJECTION AS NEEDED
Status: DISCONTINUED | OUTPATIENT
Start: 2024-07-23 | End: 2024-07-23 | Stop reason: HOSPADM

## 2024-07-23 RX ORDER — SODIUM CHLORIDE 0.9 G/100ML
IRRIGANT IRRIGATION AS NEEDED
Status: DISCONTINUED | OUTPATIENT
Start: 2024-07-23 | End: 2024-07-23 | Stop reason: HOSPADM

## 2024-07-23 RX ORDER — HYDROMORPHONE HYDROCHLORIDE 1 MG/ML
0.2 INJECTION, SOLUTION INTRAMUSCULAR; INTRAVENOUS; SUBCUTANEOUS
Status: DISCONTINUED | OUTPATIENT
Start: 2024-07-23 | End: 2024-07-26

## 2024-07-23 RX ORDER — METOPROLOL TARTRATE 1 MG/ML
INJECTION, SOLUTION INTRAVENOUS AS NEEDED
Status: DISCONTINUED | OUTPATIENT
Start: 2024-07-23 | End: 2024-07-23

## 2024-07-23 RX ORDER — LIDOCAINE HYDROCHLORIDE 20 MG/ML
INJECTION, SOLUTION INFILTRATION; PERINEURAL AS NEEDED
Status: DISCONTINUED | OUTPATIENT
Start: 2024-07-23 | End: 2024-07-23

## 2024-07-23 RX ORDER — INSULIN LISPRO 100 [IU]/ML
0-10 INJECTION, SOLUTION INTRAVENOUS; SUBCUTANEOUS EVERY 4 HOURS
Status: DISCONTINUED | OUTPATIENT
Start: 2024-07-23 | End: 2024-07-25

## 2024-07-23 RX ORDER — SODIUM CHLORIDE, SODIUM LACTATE, POTASSIUM CHLORIDE, CALCIUM CHLORIDE 600; 310; 30; 20 MG/100ML; MG/100ML; MG/100ML; MG/100ML
50 INJECTION, SOLUTION INTRAVENOUS CONTINUOUS
Status: DISCONTINUED | OUTPATIENT
Start: 2024-07-23 | End: 2024-07-26

## 2024-07-23 RX ORDER — ALBUMIN HUMAN 50 G/1000ML
SOLUTION INTRAVENOUS AS NEEDED
Status: DISCONTINUED | OUTPATIENT
Start: 2024-07-23 | End: 2024-07-23

## 2024-07-23 RX ORDER — DEXTROSE 50 % IN WATER (D50W) INTRAVENOUS SYRINGE
25
Status: DISCONTINUED | OUTPATIENT
Start: 2024-07-23 | End: 2024-07-30 | Stop reason: HOSPADM

## 2024-07-23 RX ORDER — CEFAZOLIN SODIUM 2 G/100ML
2 INJECTION, SOLUTION INTRAVENOUS EVERY 8 HOURS
Status: COMPLETED | OUTPATIENT
Start: 2024-07-23 | End: 2024-07-24

## 2024-07-23 RX ORDER — METOPROLOL TARTRATE 1 MG/ML
5 INJECTION, SOLUTION INTRAVENOUS EVERY 6 HOURS
Status: DISCONTINUED | OUTPATIENT
Start: 2024-07-23 | End: 2024-07-24

## 2024-07-23 RX ORDER — HEPARIN SODIUM 5000 [USP'U]/ML
INJECTION, SOLUTION INTRAVENOUS; SUBCUTANEOUS AS NEEDED
Status: DISCONTINUED | OUTPATIENT
Start: 2024-07-23 | End: 2024-07-23

## 2024-07-23 RX ORDER — DEXTROSE 50 % IN WATER (D50W) INTRAVENOUS SYRINGE
12.5
Status: DISCONTINUED | OUTPATIENT
Start: 2024-07-23 | End: 2024-07-30 | Stop reason: HOSPADM

## 2024-07-23 RX ORDER — CEFAZOLIN 1 G/1
INJECTION, POWDER, FOR SOLUTION INTRAVENOUS AS NEEDED
Status: DISCONTINUED | OUTPATIENT
Start: 2024-07-23 | End: 2024-07-23

## 2024-07-23 RX ORDER — MAGNESIUM SULFATE HEPTAHYDRATE 40 MG/ML
4 INJECTION, SOLUTION INTRAVENOUS EVERY 6 HOURS PRN
Status: DISCONTINUED | OUTPATIENT
Start: 2024-07-23 | End: 2024-07-24

## 2024-07-23 RX ORDER — PROPOFOL 10 MG/ML
INJECTION, EMULSION INTRAVENOUS AS NEEDED
Status: DISCONTINUED | OUTPATIENT
Start: 2024-07-23 | End: 2024-07-23

## 2024-07-23 RX ORDER — HYDROMORPHONE HYDROCHLORIDE 1 MG/ML
INJECTION, SOLUTION INTRAMUSCULAR; INTRAVENOUS; SUBCUTANEOUS AS NEEDED
Status: DISCONTINUED | OUTPATIENT
Start: 2024-07-23 | End: 2024-07-23

## 2024-07-23 RX ORDER — SODIUM CHLORIDE, SODIUM LACTATE, POTASSIUM CHLORIDE, CALCIUM CHLORIDE 600; 310; 30; 20 MG/100ML; MG/100ML; MG/100ML; MG/100ML
INJECTION, SOLUTION INTRAVENOUS CONTINUOUS PRN
Status: DISCONTINUED | OUTPATIENT
Start: 2024-07-23 | End: 2024-07-23

## 2024-07-23 RX ORDER — HEPARIN SODIUM 5000 [USP'U]/ML
5000 INJECTION, SOLUTION INTRAVENOUS; SUBCUTANEOUS EVERY 8 HOURS
Status: DISCONTINUED | OUTPATIENT
Start: 2024-07-23 | End: 2024-07-30 | Stop reason: HOSPADM

## 2024-07-23 RX ORDER — POTASSIUM CHLORIDE 29.8 MG/ML
40 INJECTION INTRAVENOUS EVERY 6 HOURS PRN
Status: DISCONTINUED | OUTPATIENT
Start: 2024-07-23 | End: 2024-07-24

## 2024-07-23 RX ORDER — ACETAMINOPHEN 10 MG/ML
1000 INJECTION, SOLUTION INTRAVENOUS EVERY 6 HOURS SCHEDULED
Status: DISPENSED | OUTPATIENT
Start: 2024-07-23 | End: 2024-07-24

## 2024-07-23 RX ORDER — GLYCOPYRROLATE 0.2 MG/ML
INJECTION INTRAMUSCULAR; INTRAVENOUS AS NEEDED
Status: DISCONTINUED | OUTPATIENT
Start: 2024-07-23 | End: 2024-07-23

## 2024-07-23 RX ORDER — CALCIUM GLUCONATE 20 MG/ML
2 INJECTION, SOLUTION INTRAVENOUS EVERY 6 HOURS PRN
Status: DISCONTINUED | OUTPATIENT
Start: 2024-07-23 | End: 2024-07-24

## 2024-07-23 RX ORDER — ACETAMINOPHEN 10 MG/ML
INJECTION, SOLUTION INTRAVENOUS AS NEEDED
Status: DISCONTINUED | OUTPATIENT
Start: 2024-07-23 | End: 2024-07-23

## 2024-07-23 RX ORDER — FENTANYL CITRATE 50 UG/ML
INJECTION, SOLUTION INTRAMUSCULAR; INTRAVENOUS AS NEEDED
Status: DISCONTINUED | OUTPATIENT
Start: 2024-07-23 | End: 2024-07-23

## 2024-07-23 RX ORDER — METHADONE IN SOD CHLOR,ISO-OSM 10 MG/ML
SYRINGE (ML) INTRAVENOUS AS NEEDED
Status: DISCONTINUED | OUTPATIENT
Start: 2024-07-23 | End: 2024-07-23

## 2024-07-23 RX ORDER — ONDANSETRON HYDROCHLORIDE 2 MG/ML
INJECTION, SOLUTION INTRAVENOUS AS NEEDED
Status: DISCONTINUED | OUTPATIENT
Start: 2024-07-23 | End: 2024-07-23

## 2024-07-23 SDOH — HEALTH STABILITY: MENTAL HEALTH: CURRENT SMOKER: 0

## 2024-07-23 ASSESSMENT — ENCOUNTER SYMPTOMS
GASTROINTESTINAL NEGATIVE: 1
RESPIRATORY NEGATIVE: 1
CONSTITUTIONAL NEGATIVE: 1
NEUROLOGICAL NEGATIVE: 1

## 2024-07-23 ASSESSMENT — PAIN SCALES - GENERAL
PAINLEVEL_OUTOF10: 3
PAINLEVEL_OUTOF10: 0 - NO PAIN
PAINLEVEL_OUTOF10: 7
PAINLEVEL_OUTOF10: 0 - NO PAIN
PAINLEVEL_OUTOF10: 0 - NO PAIN
PAINLEVEL_OUTOF10: 7
PAINLEVEL_OUTOF10: 7

## 2024-07-23 ASSESSMENT — PAIN - FUNCTIONAL ASSESSMENT
PAIN_FUNCTIONAL_ASSESSMENT: 0-10

## 2024-07-23 ASSESSMENT — PAIN DESCRIPTION - LOCATION
LOCATION: INCISION
LOCATION: INCISION

## 2024-07-23 NOTE — ANESTHESIA POSTPROCEDURE EVALUATION
Patient: Juan M Mcrae    Procedure Summary       Date: 07/23/24 Room / Location: [facility] OR 20 / [facility] OR    Anesthesia Start: 0731 Anesthesia Stop: 1400    Procedure: Three Field Esophagectomy (Right) Diagnosis:       Malignant neoplasm of esophagus, unspecified location (Multi)      (Malignant neoplasm of esophagus, unspecified location (Multi) [C15.9])    Surgeons: Macho Manzo MD Responsible Provider: Demetrius Clemens MD    Anesthesia Type: general ASA Status: 3            Anesthesia Type: No value filed.    Vitals Value Taken Time   /56 07/23/24 1358   Temp 36.5 07/23/24 1400   Pulse 98 07/23/24 1359   Resp 17 07/23/24 1359   SpO2 94% 07/23/24 1359   Vitals shown include unfiled device data.    Anesthesia Post Evaluation    Patient location during evaluation: ICU  Patient participation: complete - patient participated  Level of consciousness: awake and alert  Pain management: satisfactory to patient  Airway patency: patent  Cardiovascular status: acceptable  Respiratory status: acceptable and face mask  Hydration status: acceptable  Postoperative Nausea and Vomiting: none        No notable events documented.

## 2024-07-23 NOTE — ANESTHESIA PROCEDURE NOTES
Airway  Date/Time: 7/23/2024 7:41 AM  Urgency: elective    Airway not difficult    Staffing  Performed: NIKOLE   Authorized by: Naren Samson MD    Performed by: MAGDALENO Guallpa  Patient location during procedure: OR    Indications and Patient Condition  Indications for airway management: anesthesia  Spontaneous Ventilation: absent  Sedation level: deep  Preoxygenated: yes  Patient position: sniffing  MILS not maintained throughout  Mask difficulty assessment: 2 - vent by mask + OA or adjuvant +/- NMBA (Low pressure ventilation)  Planned trial extubation    Final Airway Details  Final airway type: endotracheal airway      Successful airway: ETT - double lumen left  Cuffed: yes   Successful intubation technique: direct laryngoscopy  Facilitating devices/methods: intubating stylet and cricoid pressure  Endotracheal tube insertion site: oral  Blade: Markell  Blade size: #4  ETT DL size (fr): 39  Cormack-Lehane Classification: grade I - full view of glottis  Placement verified by: chest auscultation, bronchoscopy and capnometry   Measured from: lips  ETT to lips (cm): 29  Number of attempts at approach: 1  Ventilation between attempts: none  Number of other approaches attempted: 0    Additional Comments  Modified RSI with cricoid and low pressure bag mask ventilation given, head up position. Lips and teeth in preanesthetic condition. Cloth tie. RUSSELL position reconfirmed after turning lateral with FOB.

## 2024-07-23 NOTE — RESEARCH NOTES
Research Note Follow Up Visit       Juan M Mcrae is here today for 4-8 weeks post chemoradiation but prior to surgery follow up on NRG-.  Follow up procedures completed per protocol. Patient is aware of continued follow up plan.      Education Documentation  Treatment Plan and Schedule, taught by Juancarlos Langley RN at 7/23/2024  9:56 AM.  Learner: Family, Patient  Readiness: Acceptance  Method: Explanation  Response: Verbalizes Understanding    Education Comments  No comments found.     Pt and his wife presented to clinic for post chemoradiation follow up on NRG-. Pt is scheduled for surgery tomorrow with Dr. Manzo.  Pt completed the blood work and the study blood last week when he had his PET scan and visit with Dr. Manzo.  Pt completed the QOL forms today at this visit with MD.  Pt states he feels pretty well.  He is eating food as his tube clogged again and Dr Manzo told him not to worry about it, they will replace it when he has surgery tomorrow.  MD in to complete the assessment for the pt. Pt is aware of the follow up plan.

## 2024-07-23 NOTE — H&P
SICU History & Physical    Subjective   HPI:  Juan M is a 79 y.o. male with past medical history of DM2, HTN, cataracts, CKD, HLD, and STACY presenting to the SICU POD 0 s/p esophagectomy on 7/23/24 with Dr. Manzo. Patient initially underwent EGD on 2/28/2024 for progressive dysphagia and weight loss which revealed esophageal adenocarcinoma. EUS demonstrated a mass and subsequent CT and PET scans showed no evidence of metastatic disease. Patient had port and J-tube placement, then underwent weekly chemoradiation therapy with carboplatin/taxol (last chemo 6/10/2024).    OR Course:   EBL: 150 mL  UOP: 500 mL  Crystalloid: 1.8L  Colloid: 500 mL  Cellsaver: None  Products: None  Antibiotic time: 2g Ancef 0810, 1210  Intubation: Easy, 1 attempt. Grade I view.    Lines/Access: 18G right PIV, 16G left PIV, left radial arterial    Past Medical History:   Diagnosis Date    Cataract     CKD (chronic kidney disease)     Colon polyp     Deviated septum     Disorder of lipoprotein metabolism, unspecified     Elevated serum cholesterol    Dysphagia     ED (erectile dysfunction)     Esophageal cancer (Multi)     GERD (gastroesophageal reflux disease)     Hearing aid worn     HL (hearing loss)     Hypercholesterolemia     Hyperlipidemia     Hypertension     Nephrolithiasis     Obesity     Other specified diabetes mellitus without complications (Multi)     last A1c= 6.8 on 2/5/2024    Sleep apnea     wear CPAP/BiPAP    Vision loss     wears glasses     Past Surgical History:   Procedure Laterality Date    BLADDER SURGERY      COLONOSCOPY  2023    Repeat in one year    COLONOSCOPY  02/05/2018    EYE SURGERY      LITHOTRIPSY      OTHER SURGICAL HISTORY      Vocal cord surgery    TRANSURETHRAL RESECTION OF PROSTATE      UPPER GASTROINTESTINAL ENDOSCOPY  03/02/2024     Medications Prior to Admission   Medication Sig Dispense Refill Last Dose    atenolol (Tenormin) 50 mg tablet 1 tablet (50 mg) by j-tube route once daily.   7/23/2024     "insulin glargine (Lantus Solostar U-100 Insulin) 100 unit/mL (3 mL) pen Inject 10 units once daily under the skin or as directed by your provider.  Max of 20 units per day. 15 mL 2 2024    metFORMIN (Glucophage) 500 mg tablet Take 2 tablets (1,000 mg) by mouth 2 times daily (morning and late afternoon).   2024    traZODone (Desyrel) 100 mg tablet Take 1 tablet (100 mg) by mouth once daily at bedtime. 90 tablet 1 Past Week    acetaminophen (Tylenol) 650 mg/20.3 mL solution oral liquid 20.3 mL (650 mg) by j-tube route every 6 hours if needed for pain. (Patient not taking: Reported on 2024)   Not Taking    lidocaine-prilocaine (Emla) 2.5-2.5 % cream Apply topically once daily as needed for mild pain (1 - 3). (Patient not taking: Reported on 2024) 30 g 2 More than a month    multivitamin with minerals iron-free (Centrum Silver) 1 tablet by j-tube route once daily.   More than a month    naloxone (Narcan) 0.4 mg/mL injection Infuse 0.5 mL (0.2 mg) into a venous catheter.       pen needle, diabetic 32 gauge x 32\" needle Use daily with insulin injection. (Patient not taking: Reported on 2024) 100 each 2 Not Taking     Omeprazole, Shellfish containing products, and Sulfa (sulfonamide antibiotics)  Social History     Tobacco Use    Smoking status: Former     Current packs/day: 0.00     Average packs/day: 2.0 packs/day for 40.0 years (80.0 ttl pk-yrs)     Types: Cigarettes     Start date: 1959     Quit date: 1999     Years since quittin.5    Smokeless tobacco: Never   Vaping Use    Vaping status: Never Used   Substance Use Topics    Alcohol use: Yes     Alcohol/week: 2.0 standard drinks of alcohol     Types: 2 Cans of beer per week     Comment: couple beers a week    Drug use: Not Currently     Comment: gummies-dispensary from [place]     Family History   Problem Relation Name Age of Onset    Stroke Father      Kidney cancer Brother Eliu     Stroke Brother Eliu        Review of " Systems:  Review of Systems   Constitutional: Negative.    HENT: Negative.     Respiratory: Negative.     Cardiovascular:         Pleuritic chest pain   Gastrointestinal: Negative.    Neurological: Negative.        Scheduled Medications:   metoprolol, 5 mg, intravenous, q6h         Continuous Medications:   lactated Ringer's, 100 mL/hr         PRN Medications:       Objective   Vitals:  Most Recent:  Vitals:    07/23/24 0638   BP: 163/88   Resp: 16   Temp: 36.1 °C (97 °F)   SpO2: 97%       24hr Min/Max:  Temp  Min: 36.1 °C (97 °F)  Max: 36.7 °C (98 °F)  Pulse  Min: 60  Max: 60  BP  Min: 137/74  Max: 163/88  Resp  Min: 16  Max: 16  SpO2  Min: 97 %  Max: 97 %    I/O:  No intake/output data recorded.    Hemodynamic parameters for last 24 hours:         Vent settings:       LDA:  Arterial Line 07/23/24 Left Radial (Active)   Placement Date/Time: 07/23/24 (c) 0752   Size: 20 G  Orientation: Left  Location: Radial  Securement Method: Taped  Patient Tolerance: Tolerated well   Number of days: 0       Implantable Port Right Chest Single lumen port (Active)   No placement date or time found.   Orientation: Right  Implantable Port Location: Chest  Port Type: (c) Single lumen port   Number of days:        ETT  39 Fr (Active)   Placement Date/Time: 07/23/24 (c) 0741   Mask Ventilation: (c) Vent by mask + OA or adjuvant +/- NMBA  Technique: Direct laryngoscopy  ETT Type: ETT - double lumen left  Double Lumen Tube Size: 39 Fr  Cuffed: Yes  Laryngoscope: Markell  Blade Size: ...   Number of days: 0       Gastrostomy/Enterostomy Jejunostomy 1 14 Fr. LUQ (Active)   Placement Date/Time: 04/08/24 0851   Type: Jejunostomy  Tube Number: 1  Tube Size (Fr.): (c) 14 Fr.  Location: Q   Number of days: 106         Physical Exam:   Physical Exam  Constitutional:       General: He is not in acute distress.     Appearance: He is not ill-appearing.      Comments: NG tube   HENT:      Head: Normocephalic and atraumatic.   Eyes:       Extraocular Movements: Extraocular movements intact.      Pupils: Pupils are equal, round, and reactive to light.   Cardiovascular:      Rate and Rhythm: Normal rate and regular rhythm.      Heart sounds: Normal heart sounds.   Pulmonary:      Effort: Pulmonary effort is normal. No respiratory distress.      Breath sounds: Normal breath sounds. No wheezing.   Abdominal:      General: There is no distension.      Palpations: Abdomen is soft.      Tenderness: There is no abdominal tenderness.      Comments: Midline vertical incision C/D/I. Right side chest tube C/D/I. EVELIO drain.   Neurological:      General: No focal deficit present.      Mental Status: He is alert and oriented to person, place, and time.         Lab/Radiology/Diagnostic Review:  Results for orders placed or performed during the hospital encounter of 07/23/24 (from the past 24 hour(s))   Type And Screen   Result Value Ref Range    ABO TYPE B     Rh TYPE POS     ANTIBODY SCREEN NEG    POCT GLUCOSE   Result Value Ref Range    POCT Glucose 106 (H) 74 - 99 mg/dL   Blood Gas Arterial Full Panel   Result Value Ref Range    POCT pH, Arterial 7.34 (L) 7.38 - 7.42 pH    POCT pCO2, Arterial 45 (H) 38 - 42 mm Hg    POCT pO2, Arterial 69 (L) 85 - 95 mm Hg    POCT SO2, Arterial 94 94 - 100 %    POCT Oxy Hemoglobin, Arterial 91.5 (L) 94.0 - 98.0 %    POCT Hematocrit Calculated, Arterial 34.0 (L) 41.0 - 52.0 %    POCT Sodium, Arterial 134 (L) 136 - 145 mmol/L    POCT Potassium, Arterial 3.8 3.5 - 5.3 mmol/L    POCT Chloride, Arterial 102 98 - 107 mmol/L    POCT Ionized Calcium, Arterial 1.21 1.10 - 1.33 mmol/L    POCT Glucose, Arterial 106 (H) 74 - 99 mg/dL    POCT Lactate, Arterial 1.3 0.4 - 2.0 mmol/L    POCT Base Excess, Arterial -1.6 -2.0 - 3.0 mmol/L    POCT HCO3 Calculated, Arterial 24.3 22.0 - 26.0 mmol/L    POCT Hemoglobin, Arterial 11.4 (L) 13.5 - 17.5 g/dL    POCT Anion Gap, Arterial 12 10 - 25 mmo/L    Patient Temperature 37.0 degrees Celsius    FiO2 95 %    Blood Gas Arterial Full Panel   Result Value Ref Range    POCT pH, Arterial 7.32 (L) 7.38 - 7.42 pH    POCT pCO2, Arterial 45 (H) 38 - 42 mm Hg    POCT pO2, Arterial 162 (H) 85 - 95 mm Hg    POCT SO2, Arterial 100 94 - 100 %    POCT Oxy Hemoglobin, Arterial 97.5 94.0 - 98.0 %    POCT Hematocrit Calculated, Arterial 37.0 (L) 41.0 - 52.0 %    POCT Sodium, Arterial 132 (L) 136 - 145 mmol/L    POCT Potassium, Arterial 4.2 3.5 - 5.3 mmol/L    POCT Chloride, Arterial 100 98 - 107 mmol/L    POCT Ionized Calcium, Arterial 1.19 1.10 - 1.33 mmol/L    POCT Glucose, Arterial 162 (H) 74 - 99 mg/dL    POCT Lactate, Arterial 1.1 0.4 - 2.0 mmol/L    POCT Base Excess, Arterial -3.0 (L) -2.0 - 3.0 mmol/L    POCT HCO3 Calculated, Arterial 23.2 22.0 - 26.0 mmol/L    POCT Hemoglobin, Arterial 12.2 (L) 13.5 - 17.5 g/dL    POCT Anion Gap, Arterial 13 10 - 25 mmo/L    Patient Temperature 37.0 degrees Celsius    FiO2 60 %   POCT GLUCOSE   Result Value Ref Range    POCT Glucose 191 (H) 74 - 99 mg/dL     NM PET CT FDG wholebody    Result Date: 7/18/2024  Interpreted By:  Cathi Grigsby and Maltbie Grace STUDY: NM PET CT FDG WHOLEBODY;  7/16/2024 10:48 am   INDICATION: Signs/Symptoms:restaging 1 month post chemo/RT- G- research study.   COMPARISON: PET-CT 04/01/2024   ACCESSION NUMBER(S): [id number]   ORDERING CLINICIAN: MANDY HURLEY   TECHNIQUE: TECHNIQUE DIVISION OF NUCLEAR MEDICINE POSITRON EMISSION TOMOGRAPHY (PET-CT)   The patient received an intravenous dose of 11.7 mCi of Fluorine-18 fluorodeoxyglucose (FDG). Positron emission tomographic (PET) images from skull base to the mid-thighs were then acquired after a one hour delay. Also acquired was a contemporaneous low dose non-contrast CT scan performed for attenuation correction of PET images and anatomic localization. The PET and CT images were digitally fused for display. All images were acquired on a combined PET-CT scanner unit. Some areas of FDG accumulation may be  described in standardized uptake value (SUV) units.   CODING: Subsequent Treatment Strategy (PS)   CALIBRATION: Dose Injection-to-Scan Interval (mins): 58 min Mediastinal bloodpool SUV (normal 1.5-2.5): 1.7 Blood glucose: 78 mg/dL   FINDINGS: HEAD AND NECK: * No evidence of focal hypermetabolic lesion in the brain parenchyma, noting that evaluation is limited because of the expected physiologic diffuse FDG uptake in the brain. *No evidence of paranasal sinus disease. *Thyroid gland is unremarkable. *No FDG avid cervical lymphadenopathy is present.   CHEST: *Decreased but persistent FDG uptake in the distal esophagus/gastroesophageal junction (SUV max 3.4, previously 7.0). *No concerning pulmonary nodule. *No enlarged or FDG avid mediastinal, hilar or axillary lymphadenopathy.   ABDOMEN AND PELVIS: *Interval resolution of the previously noted focal FDG uptake in the left hepatic lobe. New focal FDG uptake in the hepatic dome (SUV max 3.6), corresponding to a  hemangioma seen on prior MR. *No FDG avid lymphadenopathy in the abdomen and pelvis. *Both adrenal glands are unremarkable. *Physiologic radiotracer uptake is present in the liver and spleen with excretion into the bowel loops and the genitourinary tract.   MUSCULOSKELETAL/EXTREMITIES: *No concerning FDG avid bone lesion throughout the axial or appendicular skeleton.       1. Decreased but persistent FDG uptake in the distal esophagus/gastroesophageal junction, likely representing residual viable disease. 2. Interval resolution of FDG uptake in the left hepatic lobe. New focal FDG uptake in the hepatic dome, corresponding to a hemangioma seen on prior MR. 3. No evidence of jaime or distant metastasis.   I personally reviewed the images/study and I agree with the findings as stated by Nuclear Medicine fellow Karin Araya MD. This study was interpreted at [facility], [place], [place].   MACRO: None   Signed by: Cathi Grigsby  7/18/2024 8:32 AM Dictation workstation:   [id number]     Assessment/Plan      Juan M is a 79 y.o. male with past medical history of DM2, HTN, cataracts, CKD, HLD, and STACY presenting to the SICU s/p esophagectomy on 7/23/24 with Dr. Manzo.     Plan:  NEURO: No known hx. Acute post-op pain. Drowsy s/p OR procedure.  - ongoing neuro/pain assessments  - Pain control with hydromorphone PCA, tylenol IV scheduled   - PT/OT consult  - Home meds: trazodone 100 mg at bedtime    CV: History of HTN, HLD. Baseline BP 120s/60-80s. No baseline echo.  - continuous EKG/ABP monitoring  - goal map range 65-90  - avoid pressors, use volume for hypotension  - volume resuscitate as clinically indicated  - start Metoprolol 5mg IV q6h for arrhythmia prophylaxis when appropriate   - Home meds: atenolol 50mg    PULM: History of STACY. Arrived to SICU extubated on 4L NC.  - Wean FiO2 as tolerated.    - Q1h incentive spirometry while awake  - F/U post op CXR  - Avoid positive pressure ventilation and NT suctioning as able  - Chest tube to wall suction. Ongoing monitoring of output quantity and character    GI: History of GERD, allergy to omeprazole. Now s/p esophagectomy for esophageal adenocarcinoma.  - Maintain strict NPO  - Holding off PPI 2/2 omeprazole allergy  - Maintain HOB up at least 30 degress at all times secondary to high risk of aspiration  - Send daily amylase levels from EVELIO drain  - NG to LIWS  - Do not replace or reposition NG tube if it becomes dislodged, call thoracic surgery    : CKD. Baseline creatinine 0.75.  - Check renal function panel post op and daily.   - Maintenance IVF, LR @100ml/hr  - Maintain U/O >0.5-1ml/kg/hr  - Replete electrolytes to goal K>4, Mg>2, Phos>2.5, ionized Ca>1.10    HEME: Acute blood loss anemia. OR .  - Check CBC and coags post op and daily  - SC Heparin and SCDs for DVT prophylaxis. OK per thoracic.  - Ongoing monitoring for s/s bleeding    ENDO: History of DM2 on glargine 10U at  home.  - Q4h BG   - SSI Lispro per ICU protocol  - Holding home meds: metformin 1000 BID, insulin glargine     ID: Afebrile, await post op WBC.  - trend temp q4, wbc daily  - cefazolin x24hrs  - ongoing monitoring for s/s infection    Lines:  - left radial arterial line  - PIV x 2    Dispo: Admit to ICU. Patient seen and discussed with ICU attending Dr. Pedro.    Deepak White MD  Anesthesiology PGY-1  SICU phone [phone]

## 2024-07-23 NOTE — ANESTHESIA PROCEDURE NOTES
Peripheral IV  Date/Time: 7/23/2024 7:55 AM      Placement  Needle size: 16 G  Laterality: left  Location: wrist  Local anesthetic: none  Site prep: alcohol  Technique: anatomical landmarks  Attempts: 1

## 2024-07-23 NOTE — CARE PLAN
Problem: Skin  Goal: Decreased wound size/increased tissue granulation at next dressing change  Outcome: Progressing  Goal: Participates in plan/prevention/treatment measures  Outcome: Progressing  Goal: Prevent/manage excess moisture  Outcome: Progressing  Goal: Prevent/minimize sheer/friction injuries  Outcome: Progressing  Goal: Promote/optimize nutrition  Outcome: Progressing  Goal: Promote skin healing  Outcome: Progressing

## 2024-07-23 NOTE — OP NOTE
Three Field Esophagectomy (R) Operative Note     Date: 2024  OR Location: [facility] OR    Name: Juan M Mcrae, : 1945, Age: 79 y.o., MRN: [id number], Sex: male    Diagnosis  Pre-op Diagnosis      * Malignant neoplasm of esophagus, unspecified location (Multi) [C15.9] Post-op Diagnosis     * Malignant neoplasm of esophagus, unspecified location (Multi) [C15.9]     Procedures  VATS Esophagectomy  Pyloromyotomy      Surgeons      * Macho Manzo - Primary    Resident/Fellow/Other Assistant:  Surgeons and Role:     * Jana Schulte MD - Resident - Assisting     * Cait Reed MD - Resident - Assisting    Procedure Summary  Anesthesia: Anesthesia type not filed in the log.  ASA: ASA status not filed in the log.  Anesthesia Staff: Anesthesiologist: Naren Samson MD  CRNA: OBIE Mijares-CRNA  C-AA: MAGDALENO Gaitan; MAGDALENO Guallpa  Estimated Blood Loss: 100mL  Intra-op Medications:   Administrations occurring from 0715 to 1230 on 24:   Medication Name Total Dose   sodium chloride 0.9 % irrigation solution 1,000 mL   surgical lubricant gel 1 Application              Anesthesia Record               Intraprocedure I/O Totals          Intake    lactated Ringer's 1000.00 mL    Total Intake 1000 mL       Output    Urine 400 mL    Total Output 400 mL       Net    Net Volume 600 mL          Specimen:   ID Type Source Tests Collected by Time   1 : mid esophageal node Tissue LYMPH NODE EXCISION SURGICAL PATHOLOGY EXAM Macho Manzo MD 2024 0917   2 : paratracheal node Tissue LYMPH NODE EXCISION SURGICAL PATHOLOGY EXAM Macho Manzo MD 2024 0940   3 : ESOPHAGUS AND CARDIA OF STOMACH Tissue ESOPHAGUS AND STOMACH, GASTROESOPHAGECTOMY SURGICAL PATHOLOGY EXAM Macho Manzo MD 2024 1157   4 : ADDITIONAL GASTRIC MARGIN Tissue ESOPHAGUS AND STOMACH, GASTROESOPHAGECTOMY SURGICAL PATHOLOGY EXAM Macho Manzo MD 2024 1216        Staff:   Scrub Person:  Ninoska Mataub Person: Archana  Circulator: Shivani  Circulator: Laila Larios Circulator: Shivani Larios Scrub: Shivani         Drains and/or Catheters:   Chest Tube 1 Right 28 Fr (Active)   Function -20 cm H2O 07/23/24 0950   Drainage Description Bright red 07/23/24 0950   Dressing Status Clean;Dry 07/23/24 0950   Site Assessment Clean;Dry 07/23/24 0950   Surrounding Skin Intact 07/23/24 0950       Closed/Suction Drain 2 Left Neck Bulb 7 Fr. (Active)       NG/OG/Feeding Tube NG - [place] sump 16 Fr Left nostril (Active)       Gastrostomy/Enterostomy Jejunostomy 1 14 Fr. LUQ (Active)       Urethral Catheter Non-latex 16 Fr. (Active)           Indications: Juan M Mcrae is an 79 y.o. male who is having surgery for esophageal cancer.  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  He underwent neoadjuvant chemoradiation with good response.  He is advised to undergo resection  The patient was seen in the preoperative area. The risks, benefits, complications, treatment options, non-operative alternatives, expected recovery and outcomes were discussed with the patient. The possibilities of reaction to medication, pulmonary aspiration, injury to surrounding structures, bleeding, recurrent infection, the need for additional procedures, failure to diagnose a condition, and creating a complication requiring transfusion or operation were discussed with the patient. The patient concurred with the proposed plan, giving informed consent.  The site of surgery was properly noted/marked if necessary per policy. The patient has been actively warmed in preoperative area. Preoperative antibiotics have been ordered and given within 1 hours of incision. Venous thrombosis prophylaxis have been ordered including bilateral sequential compression devices and chemical prophylaxis    Procedure Details: After satisfactory duction of general trach tube anesthesia esophagoscopy was performed.  The upper and midesophagus were normal  there was some erosions of the lower esophagus but no obvious visible tumor.  The GE junction was at 40 cm the stomach was entered and was visualized and without any visible abnormalities.  The scope was withdrawn the patient was placed in left lateral decubitus position and was prepped and draped in usual sterile fashion.  A 2 cm incision was made approximate 7 interspace and tract saline.  A 2 cm incision was made the ninth interspace in line with tip the scapula.  A 5 mm incision was made to the scapula and a 2 cm incision was made anteriorly thoracoscopy chest revealed normal-appearing pleural surfaces.  The inferior pulm ligament was lysed using cautery.  The esophagus was dissected off the pericardium anteriorly and away from the bronchus intermedius superiorly.  The posterior pleural reflection was lysed and the esophagus was dissected away from the aorta.  Larger bridging vessels were clipped.  The esophagus was encircled with a Penrose.  Dissection proceeded toward the julita.  Julita lymph nodes were harvested off the airway.  The left main bronchus was visualized and was bluntly dissected away from the esophagus.  Next the azygos vein was divided using endovascular stapler.  The right vagus nerve was divided at this area and dissection cranial to this point proceeded within the vagus nerve.  Dissection proceeded up to the thoracic inlet.  Penrose was knotted and placed into the thoracic inlet along the spine.  An additional Penrose was placed and dissection proceeded distally down to the gila.  After final inspection hemostasis a Penrose placed toward the abdomen.  A 23 chest tube in the left side was placed post apically and was secured to skin using a 1 Prolene.  The lung noted above.  The remaining incisions were closed using a deep layer 2-0 Vicryl skin of Monocryl.    The patient was placed in supine position and was prepped and draped in usual sterile fashion.  An abbreviated upper midline  laparotomy was performed.  Exploration of the abdomen revealed no evidence of metastatic disease.  The gastropathic ligament was divided using cautery and MI scalpel.  The anterior peritoneal reflection overlying the GE junction was lysed.  Dissection proceeded between the gila and the esophagus which retrieved the Penrose from the chest.  The greater curvature was inspected the gastrocolic pulse was strong.  The lesser sac was entered inferiorly to this dissection proceeded proximally on the greater curvature using harmonic scalpel short gastrics divided, scalpel.  Up to the level of the GE junction.  Adhesions posterior to the stomach were lysed using cautery.  The left castrated was divided using endovascular stapler.  Final adhesions between the GE junction and the gila were divided using harmonic scalpel and the Penrose was freed and removed.  Distal dissection was performed along the greater curvature of the stomach.  A Kocher maneuver was performed posteriorly at the level of pylorus and distally anteriorly.  The pylorus freely mobilized to the level of the hiatus.  An abbreviated pyloromyotomy was performed using a 15 blade.  In the neck and incision was made from the sternal notch paralleling the border the left cervical mastoid muscle.  Platysma was divided.  Omohyoid was divided.  Dissection proceeded down to the spine mediastinal scope was inserted and free of the Penrose which had been placed during the intrathoracic portion of the operation.  The nasogastric tube was removed and the esophagus was divided at approximately 20 cm from the incisors.  A distal tacking stitch was placed which was brought to the abdomen.  The right gastric artery was divided at the crows foot of veins using endovascular stapler.  Gastric conduit was constructed approximately 5 cm in width using successive applications of a ANJU 80 stapler.  The intersecting areas of these staple lines was oversewed using silk Lembert stitches.   At least 7 to 8 cm of margin was obtained on the cancer.  A small nodule in the cardia stomach was included in the specimen.  As much stomach as possible was removed while still allowing for maximal mobilization of the conduit.  The specimen was sent for perm analysis.  Inspection of the gastric bed was made hemostasis which was good.  Using an endoscopic camera back the conduit was brought out assuring proper orientation to the neck.  The anastomosis in the neck was performed side-to-side functional end-to-end using a posterior application of a 60 mm yellow thickness stapler and closing the intra border and amputating the most proximal tip of the stomach using a TA 60 stapler.  The conduit was reduced to its native location.  In the neck a #7 EVELIO drain was tunneled through separate stab incision and placed posteriorly along the spine posterior to the anastomosis.  It was secured to skin using nylon.  Platysma was run closed using Vicryl.  The wound was irrigated and neck was closed using Monocryl.  In the abdomen the fascia was closed using looped #1 PDS.  It was irrigated and the skin was closed using Monocryl.  Patient was awakened extubated brought to the ICU in stable condition.  Complications:  None; patient tolerated the procedure well.    Disposition: ICU  Condition: stable           Attending Attestation: I was present and scrubbed for the key portions of the procedure.    Macho Manzo  Phone Number: [phone]

## 2024-07-23 NOTE — ANESTHESIA PREPROCEDURE EVALUATION
Patient: Juan M Mcrae    Procedure Information       Date/Time: 07/23/24 0715    Procedure: Three Field Esophagectomy (Right) - VATS Eso    Location: [facility] OR 20 / [facility] OR    Surgeons: Macho Manzo MD        Juan M Mcrae is a 78 y.o.male referred with esophageal cancer.  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 He had a port placement and a J-tube placement. He completed CRT on 6/14/24.     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)      Endocrine   (+) Type 2 diabetes mellitus without complications (Multi)      HEENT   (+) Sensorineural hearing loss (SNHL) of both ears        SURGICAL HISTORY:  Past Surgical History:   Procedure Laterality Date    BLADDER SURGERY      COLONOSCOPY  2023    Repeat in one year    COLONOSCOPY  02/05/2018    EYE SURGERY      LITHOTRIPSY      OTHER SURGICAL HISTORY      Vocal cord surgery    TRANSURETHRAL RESECTION OF PROSTATE      UPPER GASTROINTESTINAL ENDOSCOPY  03/02/2024        MEDICATIONS:  Current Outpatient Medications   Medication Instructions    acetaminophen (TYLENOL) 650 mg, j-tube, Every 6 hours PRN    atenolol (Tenormin) 50 mg tablet 1 tablet, j-tube, Daily    insulin glargine (Lantus Solostar U-100 Insulin) 100 unit/mL (3 mL) pen Inject 10 units once daily under the skin or as  "directed by your provider.  Max of 20 units per day.    lidocaine-prilocaine (Emla) 2.5-2.5 % cream Topical, Daily PRN    metFORMIN (GLUCOPHAGE) 1,000 mg, oral, 2 times daily (morning and late afternoon)    multivitamin with minerals iron-free (Centrum Silver) 1 tablet, j-tube, Daily    naloxone (NARCAN) 0.2 mg, intravenous    pen needle, diabetic 32 gauge x \" needle Use daily with insulin injection.    traZODone (DESYREL) 100 mg, oral, Nightly        VITALS:      2024     4:26 PM 2024     9:51 AM 2024     9:59 AM   Vitals   Systolic 137 124 129   Diastolic 74 63 86   Heart Rate 60 68 79   Temp 36.7 °C (98 °F) 36.2 °C (97.2 °F) 36.4 °C (97.5 °F)   Resp 16 18 16   Height (in)   1.702 m (5' 7\")   Weight (lb) 160.61 160.27 155   BMI 25.15 kg/m2 25.1 kg/m2 24.28 kg/m2   BSA (m2) 1.86 m2 1.85 m2 1.82 m2   Visit Report Report Report      LABS:     Lab Results   Component Value Date    GLUCOSE 134 (H) 2024    CALCIUM 9.7 2024     2024    K 5.1 2024    CO2 29 2024     2024    BUN 19 2024    CREATININE 0.75 2024     Lab Results   Component Value Date    ALT 12 2024    AST 12 2024    ALKPHOS 55 2024    BILITOT 0.4 2024     Lab Results   Component Value Date    WBC 5.9 2024    HGB 12.9 (L) 2024    HCT 39.3 (L) 2024     (H) 2024     2024     Lab Results   Component Value Date/Time    PROTIME 11.9 2024 1051    INR 1.1 2024 1051    APTT 27 2024 1045     SOCIAL:  Social History     Tobacco Use   Smoking Status Former    Current packs/day: 0.00    Average packs/day: 2.0 packs/day for 40.0 years (80.0 ttl pk-yrs)    Types: Cigarettes    Start date: 1959    Quit date: 1999    Years since quittin.5   Smokeless Tobacco Never      Social History     Substance and Sexual Activity   Alcohol Use Yes    Alcohol/week: 2.0 standard drinks of alcohol    Types: 2 Cans " of beer per week    Comment: couple beers a week      Social History     Substance and Sexual Activity   Drug Use Not Currently    Comment: gummies-dispensary from [place]        NPO STATUS:  No data recorded    Clinical Areas Reviewed:    Allergies  Meds               Anesthesia Assessment:    Physical Exam    Airway  Mallampati: II  Neck ROM: full     Cardiovascular   Rhythm: regular  Rate: normal     Dental    Pulmonary   Breath sounds clear to auscultation     Abdominal      Other findings: Poor dentition, multiple broken teeth         Anesthesia Plan    History of general anesthesia?: yes  History of complications of general anesthesia?: no    ASA 3     general   (Plan GA by joanne MCKENNA.)  The patient is not a current smoker.    intravenous induction   Postoperative administration of opioids is intended.  Trial extubation is planned.  Anesthetic plan and risks discussed with patient.  Use of blood products discussed with patient who consented to blood products.    Plan discussed with CAA.

## 2024-07-23 NOTE — ANESTHESIA PROCEDURE NOTES
Arterial Line:    Date/Time: 7/23/2024 7:52 AM    Staffing  Performed: NIKOLE   Authorized by: Naren Samson MD    Performed by: MAGDALENO Guallpa    An arterial line was placed. Procedure performed using surface landmarks.in the OR for the following indication(s): continuous blood pressure monitoring.    A 20 gauge (size) (length), Angiocath (type) catheter was placed into the Left radial artery, secured by tape and and tegaderm,   Seldinger technique not used.  Events:  patient tolerated procedure well with no complications.      Additional notes:  One attempt. Sterile. Good blood return and waveform.

## 2024-07-23 NOTE — PROGRESS NOTES
Pharmacy Admission Order Reconciliation Review    Juan M Mcrae is a 79 y.o. 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 Pharmacy reviewed the patient's unreconciled admission medications.    Prior to admission medications that were reviewed and acted on by the pharmacist include:  Acetaminophen  Atenolol  Lantus  Metformin  MV  narcan  These medications have been reconciled.     Any other unreconcilied medications have been addressed and will be ordered or held by the patient's medical team. Medications addressed by the pharmacist may be added or changed by the patient's medical team at any time.    Glendy Aguilar, PharmD  Transitions of Care Pharmacist  [facility]  Please reach out via Secure Chat for questions

## 2024-07-23 NOTE — ANESTHESIA PROCEDURE NOTES
Peripheral IV  Date/Time: 7/23/2024 7:50 AM      Placement  Needle size: 18 G  Laterality: right  Location: wrist  Local anesthetic: none  Site prep: alcohol  Technique: anatomical landmarks  Attempts: 1

## 2024-07-24 ENCOUNTER — APPOINTMENT (OUTPATIENT)
Dept: RADIOLOGY | Facility: HOSPITAL | Age: 79
DRG: 327 | End: 2024-07-24
Payer: MEDICARE

## 2024-07-24 ENCOUNTER — APPOINTMENT (OUTPATIENT)
Dept: CARDIOLOGY | Facility: HOSPITAL | Age: 79
DRG: 327 | End: 2024-07-24
Payer: MEDICARE

## 2024-07-24 LAB
ALBUMIN SERPL BCP-MCNC: 3.4 G/DL (ref 3.4–5)
AMYLASE FLD-CCNC: 43 U/L
ANION GAP BLDA CALCULATED.4IONS-SCNC: 10 MMO/L (ref 10–25)
ANION GAP SERPL CALC-SCNC: 15 MMOL/L (ref 10–20)
APTT PPP: 27 SECONDS (ref 27–38)
BASE EXCESS BLDA CALC-SCNC: 1 MMOL/L (ref -2–3)
BODY TEMPERATURE: 37 DEGREES CELSIUS
BUN SERPL-MCNC: 10 MG/DL (ref 6–23)
CA-I BLD-SCNC: 1.17 MMOL/L (ref 1.1–1.33)
CA-I BLDA-SCNC: 1.17 MMOL/L (ref 1.1–1.33)
CALCIUM SERPL-MCNC: 8.6 MG/DL (ref 8.6–10.6)
CHLORIDE BLDA-SCNC: 100 MMOL/L (ref 98–107)
CHLORIDE SERPL-SCNC: 101 MMOL/L (ref 98–107)
CO2 SERPL-SCNC: 23 MMOL/L (ref 21–32)
CREAT SERPL-MCNC: 0.54 MG/DL (ref 0.5–1.3)
EGFRCR SERPLBLD CKD-EPI 2021: >90 ML/MIN/1.73M*2
ERYTHROCYTE [DISTWIDTH] IN BLOOD BY AUTOMATED COUNT: 16.3 % (ref 11.5–14.5)
GLUCOSE BLD MANUAL STRIP-MCNC: 107 MG/DL (ref 74–99)
GLUCOSE BLD MANUAL STRIP-MCNC: 112 MG/DL (ref 74–99)
GLUCOSE BLD MANUAL STRIP-MCNC: 114 MG/DL (ref 74–99)
GLUCOSE BLD MANUAL STRIP-MCNC: 119 MG/DL (ref 74–99)
GLUCOSE BLD MANUAL STRIP-MCNC: 119 MG/DL (ref 74–99)
GLUCOSE BLDA-MCNC: 130 MG/DL (ref 74–99)
GLUCOSE SERPL-MCNC: 121 MG/DL (ref 74–99)
HCO3 BLDA-SCNC: 25.2 MMOL/L (ref 22–26)
HCT VFR BLD AUTO: 35.6 % (ref 41–52)
HCT VFR BLD EST: 37 % (ref 41–52)
HGB BLD-MCNC: 11.6 G/DL (ref 13.5–17.5)
HGB BLDA-MCNC: 12.2 G/DL (ref 13.5–17.5)
INHALED O2 CONCENTRATION: 21 %
INR PPP: 1.3 (ref 0.9–1.1)
LACTATE BLDA-SCNC: 0.8 MMOL/L (ref 0.4–2)
MAGNESIUM SERPL-MCNC: 1.39 MG/DL (ref 1.6–2.4)
MAGNESIUM SERPL-MCNC: 2.16 MG/DL (ref 1.6–2.4)
MCH RBC QN AUTO: 32.2 PG (ref 26–34)
MCHC RBC AUTO-ENTMCNC: 32.6 G/DL (ref 32–36)
MCV RBC AUTO: 99 FL (ref 80–100)
NRBC BLD-RTO: 0 /100 WBCS (ref 0–0)
OXYHGB MFR BLDA: 92.3 % (ref 94–98)
PCO2 BLDA: 38 MM HG (ref 38–42)
PH BLDA: 7.43 PH (ref 7.38–7.42)
PHOSPHATE SERPL-MCNC: 4 MG/DL (ref 2.5–4.9)
PLATELET # BLD AUTO: 266 X10*3/UL (ref 150–450)
PO2 BLDA: 64 MM HG (ref 85–95)
POTASSIUM BLDA-SCNC: 4.3 MMOL/L (ref 3.5–5.3)
POTASSIUM SERPL-SCNC: 4.5 MMOL/L (ref 3.5–5.3)
PROTHROMBIN TIME: 14.7 SECONDS (ref 9.8–12.8)
RBC # BLD AUTO: 3.6 X10*6/UL (ref 4.5–5.9)
SAO2 % BLDA: 94 % (ref 94–100)
SODIUM BLDA-SCNC: 131 MMOL/L (ref 136–145)
SODIUM SERPL-SCNC: 134 MMOL/L (ref 136–145)
WBC # BLD AUTO: 10.3 X10*3/UL (ref 4.4–11.3)

## 2024-07-24 PROCEDURE — 97161 PT EVAL LOW COMPLEX 20 MIN: CPT | Mod: GP | Performed by: PHYSICAL THERAPIST

## 2024-07-24 PROCEDURE — 85027 COMPLETE CBC AUTOMATED: CPT | Performed by: PHYSICIAN ASSISTANT

## 2024-07-24 PROCEDURE — 2500000004 HC RX 250 GENERAL PHARMACY W/ HCPCS (ALT 636 FOR OP/ED): Mod: JZ | Performed by: THORACIC SURGERY (CARDIOTHORACIC VASCULAR SURGERY)

## 2024-07-24 PROCEDURE — 71045 X-RAY EXAM CHEST 1 VIEW: CPT | Performed by: RADIOLOGY

## 2024-07-24 PROCEDURE — 82150 ASSAY OF AMYLASE: CPT | Performed by: NURSE PRACTITIONER

## 2024-07-24 PROCEDURE — 1100000001 HC PRIVATE ROOM DAILY

## 2024-07-24 PROCEDURE — 99291 CRITICAL CARE FIRST HOUR: CPT

## 2024-07-24 PROCEDURE — 2500000004 HC RX 250 GENERAL PHARMACY W/ HCPCS (ALT 636 FOR OP/ED): Performed by: NURSE PRACTITIONER

## 2024-07-24 PROCEDURE — 2500000005 HC RX 250 GENERAL PHARMACY W/O HCPCS: Performed by: THORACIC SURGERY (CARDIOTHORACIC VASCULAR SURGERY)

## 2024-07-24 PROCEDURE — 71045 X-RAY EXAM CHEST 1 VIEW: CPT

## 2024-07-24 PROCEDURE — 93005 ELECTROCARDIOGRAM TRACING: CPT

## 2024-07-24 PROCEDURE — 83735 ASSAY OF MAGNESIUM: CPT | Performed by: PHYSICIAN ASSISTANT

## 2024-07-24 PROCEDURE — 2500000004 HC RX 250 GENERAL PHARMACY W/ HCPCS (ALT 636 FOR OP/ED): Mod: JZ | Performed by: PHYSICIAN ASSISTANT

## 2024-07-24 PROCEDURE — 2500000005 HC RX 250 GENERAL PHARMACY W/O HCPCS: Performed by: PHYSICIAN ASSISTANT

## 2024-07-24 PROCEDURE — 84132 ASSAY OF SERUM POTASSIUM: CPT | Performed by: PHYSICIAN ASSISTANT

## 2024-07-24 PROCEDURE — 2500000005 HC RX 250 GENERAL PHARMACY W/O HCPCS

## 2024-07-24 PROCEDURE — 37799 UNLISTED PX VASCULAR SURGERY: CPT | Performed by: PHYSICIAN ASSISTANT

## 2024-07-24 PROCEDURE — 82330 ASSAY OF CALCIUM: CPT | Performed by: PHYSICIAN ASSISTANT

## 2024-07-24 PROCEDURE — 82947 ASSAY GLUCOSE BLOOD QUANT: CPT

## 2024-07-24 PROCEDURE — 85610 PROTHROMBIN TIME: CPT | Performed by: PHYSICIAN ASSISTANT

## 2024-07-24 PROCEDURE — 2500000004 HC RX 250 GENERAL PHARMACY W/ HCPCS (ALT 636 FOR OP/ED)

## 2024-07-24 PROCEDURE — P9045 ALBUMIN (HUMAN), 5%, 250 ML: HCPCS | Mod: JZ | Performed by: THORACIC SURGERY (CARDIOTHORACIC VASCULAR SURGERY)

## 2024-07-24 PROCEDURE — 93010 ELECTROCARDIOGRAM REPORT: CPT | Performed by: INTERNAL MEDICINE

## 2024-07-24 RX ORDER — METOPROLOL TARTRATE 1 MG/ML
5 INJECTION, SOLUTION INTRAVENOUS ONCE
Status: COMPLETED | OUTPATIENT
Start: 2024-07-24 | End: 2024-07-24

## 2024-07-24 RX ORDER — FAMOTIDINE 10 MG/ML
20 INJECTION INTRAVENOUS EVERY 12 HOURS SCHEDULED
Status: DISCONTINUED | OUTPATIENT
Start: 2024-07-24 | End: 2024-07-30 | Stop reason: HOSPADM

## 2024-07-24 RX ORDER — ACETAMINOPHEN 10 MG/ML
1000 INJECTION, SOLUTION INTRAVENOUS EVERY 6 HOURS SCHEDULED
Status: DISCONTINUED | OUTPATIENT
Start: 2024-07-24 | End: 2024-07-26

## 2024-07-24 RX ORDER — LIDOCAINE 560 MG/1
1 PATCH PERCUTANEOUS; TOPICAL; TRANSDERMAL DAILY
Status: DISCONTINUED | OUTPATIENT
Start: 2024-07-24 | End: 2024-07-30 | Stop reason: HOSPADM

## 2024-07-24 RX ORDER — MAGNESIUM SULFATE HEPTAHYDRATE 40 MG/ML
4 INJECTION, SOLUTION INTRAVENOUS ONCE
Status: COMPLETED | OUTPATIENT
Start: 2024-07-24 | End: 2024-07-24

## 2024-07-24 RX ORDER — METOPROLOL TARTRATE 1 MG/ML
10 INJECTION, SOLUTION INTRAVENOUS EVERY 6 HOURS
Status: DISPENSED | OUTPATIENT
Start: 2024-07-24 | End: 2024-07-25

## 2024-07-24 RX ORDER — LIDOCAINE 560 MG/1
1 PATCH PERCUTANEOUS; TOPICAL; TRANSDERMAL DAILY
Status: DISCONTINUED | OUTPATIENT
Start: 2024-07-24 | End: 2024-07-24

## 2024-07-24 RX ORDER — MAGNESIUM SULFATE HEPTAHYDRATE 40 MG/ML
2 INJECTION, SOLUTION INTRAVENOUS ONCE
Status: COMPLETED | OUTPATIENT
Start: 2024-07-24 | End: 2024-07-24

## 2024-07-24 RX ORDER — ALBUMIN HUMAN 50 G/1000ML
12.5 SOLUTION INTRAVENOUS ONCE
Status: DISCONTINUED | OUTPATIENT
Start: 2024-07-24 | End: 2024-07-28

## 2024-07-24 RX ORDER — ALBUMIN HUMAN 50 G/1000ML
12.5 SOLUTION INTRAVENOUS ONCE
Status: COMPLETED | OUTPATIENT
Start: 2024-07-24 | End: 2024-07-24

## 2024-07-24 RX ORDER — METOPROLOL TARTRATE 1 MG/ML
5 INJECTION, SOLUTION INTRAVENOUS ONCE
Status: DISCONTINUED | OUTPATIENT
Start: 2024-07-24 | End: 2024-07-24

## 2024-07-24 SDOH — SOCIAL STABILITY: SOCIAL INSECURITY: HAVE YOU HAD THOUGHTS OF HARMING ANYONE ELSE?: NO

## 2024-07-24 SDOH — SOCIAL STABILITY: SOCIAL INSECURITY: HAS ANYONE EVER THREATENED TO HURT YOUR FAMILY OR YOUR PETS?: NO

## 2024-07-24 SDOH — SOCIAL STABILITY: SOCIAL INSECURITY: ARE THERE ANY APPARENT SIGNS OF INJURIES/BEHAVIORS THAT COULD BE RELATED TO ABUSE/NEGLECT?: NO

## 2024-07-24 SDOH — SOCIAL STABILITY: SOCIAL INSECURITY: DOES ANYONE TRY TO KEEP YOU FROM HAVING/CONTACTING OTHER FRIENDS OR DOING THINGS OUTSIDE YOUR HOME?: NO

## 2024-07-24 SDOH — SOCIAL STABILITY: SOCIAL INSECURITY: ARE YOU OR HAVE YOU BEEN THREATENED OR ABUSED PHYSICALLY, EMOTIONALLY, OR SEXUALLY BY ANYONE?: NO

## 2024-07-24 SDOH — SOCIAL STABILITY: SOCIAL INSECURITY: DO YOU FEEL ANYONE HAS EXPLOITED OR TAKEN ADVANTAGE OF YOU FINANCIALLY OR OF YOUR PERSONAL PROPERTY?: NO

## 2024-07-24 SDOH — SOCIAL STABILITY: SOCIAL INSECURITY: DO YOU FEEL UNSAFE GOING BACK TO THE PLACE WHERE YOU ARE LIVING?: NO

## 2024-07-24 SDOH — SOCIAL STABILITY: SOCIAL INSECURITY: ABUSE: ADULT

## 2024-07-24 SDOH — SOCIAL STABILITY: SOCIAL INSECURITY: WERE YOU ABLE TO COMPLETE ALL THE BEHAVIORAL HEALTH SCREENINGS?: YES

## 2024-07-24 SDOH — SOCIAL STABILITY: SOCIAL INSECURITY: HAVE YOU HAD ANY THOUGHTS OF HARMING ANYONE ELSE?: NO

## 2024-07-24 ASSESSMENT — COGNITIVE AND FUNCTIONAL STATUS - GENERAL
EATING MEALS: A LITTLE
MOVING TO AND FROM BED TO CHAIR: A LITTLE
PATIENT BASELINE BEDBOUND: NO
CLIMB 3 TO 5 STEPS WITH RAILING: A LITTLE
MOBILITY SCORE: 18
HELP NEEDED FOR BATHING: A LITTLE
TURNING FROM BACK TO SIDE WHILE IN FLAT BAD: A LITTLE
WALKING IN HOSPITAL ROOM: A LITTLE
WALKING IN HOSPITAL ROOM: A LITTLE
TOILETING: A LITTLE
MOBILITY SCORE: 21
PERSONAL GROOMING: A LITTLE
DRESSING REGULAR LOWER BODY CLOTHING: A LITTLE
DRESSING REGULAR UPPER BODY CLOTHING: A LITTLE
CLIMB 3 TO 5 STEPS WITH RAILING: A LITTLE
TURNING FROM BACK TO SIDE WHILE IN FLAT BAD: A LITTLE
MOVING FROM LYING ON BACK TO SITTING ON SIDE OF FLAT BED WITH BEDRAILS: A LITTLE
DAILY ACTIVITIY SCORE: 18
STANDING UP FROM CHAIR USING ARMS: A LITTLE

## 2024-07-24 ASSESSMENT — ACTIVITIES OF DAILY LIVING (ADL)
DRESSING YOURSELF: INDEPENDENT
TOILETING: INDEPENDENT
BATHING: INDEPENDENT
PATIENT'S MEMORY ADEQUATE TO SAFELY COMPLETE DAILY ACTIVITIES?: YES
JUDGMENT_ADEQUATE_SAFELY_COMPLETE_DAILY_ACTIVITIES: YES
GROOMING: INDEPENDENT
HEARING - RIGHT EAR: FUNCTIONAL
ADL_ASSISTANCE: INDEPENDENT
LACK_OF_TRANSPORTATION: NO
HEARING - LEFT EAR: FUNCTIONAL
WALKS IN HOME: INDEPENDENT
ADEQUATE_TO_COMPLETE_ADL: YES
FEEDING YOURSELF: INDEPENDENT

## 2024-07-24 ASSESSMENT — LIFESTYLE VARIABLES
HOW MANY STANDARD DRINKS CONTAINING ALCOHOL DO YOU HAVE ON A TYPICAL DAY: 1 OR 2
AUDIT-C TOTAL SCORE: 2
HOW OFTEN DO YOU HAVE 6 OR MORE DRINKS ON ONE OCCASION: NEVER
SKIP TO QUESTIONS 9-10: 1
AUDIT-C TOTAL SCORE: 2
HOW OFTEN DO YOU HAVE A DRINK CONTAINING ALCOHOL: 2-4 TIMES A MONTH

## 2024-07-24 ASSESSMENT — PAIN - FUNCTIONAL ASSESSMENT
PAIN_FUNCTIONAL_ASSESSMENT: 0-10

## 2024-07-24 ASSESSMENT — PAIN DESCRIPTION - LOCATION: LOCATION: INCISION

## 2024-07-24 ASSESSMENT — PAIN SCALES - GENERAL
PAINLEVEL_OUTOF10: 0 - NO PAIN
PAINLEVEL_OUTOF10: 3
PAINLEVEL_OUTOF10: 3
PAINLEVEL_OUTOF10: 4
PAINLEVEL_OUTOF10: 0 - NO PAIN
PAINLEVEL_OUTOF10: 4
PAINLEVEL_OUTOF10: 8
PAINLEVEL_OUTOF10: 2
PAINLEVEL_OUTOF10: 7

## 2024-07-24 NOTE — PROGRESS NOTES
"Juan M Mcrae is a 79 y.o. 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    Subjective   No acute events overnight    This AM: pt found laying in bed comfortably. Pt rates pain as 4-5/10 but feels that it is well controlled considering surgery yesterday.       Objective     Physical Exam  Constitutional:       Appearance: Normal appearance.   HENT:      Head: Normocephalic.      Nose:      Comments: NGT in place     Mouth/Throat:      Mouth: Mucous membranes are moist.   Eyes:      Extraocular Movements: Extraocular movements intact.      Conjunctiva/sclera: Conjunctivae normal.   Cardiovascular:      Rate and Rhythm: Normal rate and regular rhythm.   Pulmonary:      Effort: Pulmonary effort is normal. No respiratory distress.      Breath sounds: Normal breath sounds.   Abdominal:      General: There is no distension.      Palpations: Abdomen is soft.   Musculoskeletal:      Cervical back: Normal range of motion.   Skin:     General: Skin is warm and dry.   Neurological:      General: No focal deficit present.      Mental Status: He is alert and oriented to person, place, and time.         Last Recorded Vitals  Blood pressure 141/74, pulse 85, temperature 36.4 °C (97.5 °F), temperature source Temporal, resp. rate 18, height 1.702 m (5' 7\"), weight 72.3 kg (159 lb 6.3 oz), SpO2 93%.  Intake/Output last 3 Shifts:  I/O last 3 completed shifts:  In: 4416.7 (61.1 mL/kg) [I.V.:3316.7 (45.9 mL/kg); IV Piggyback:1100]  Out: 2400 (33.2 mL/kg) [Urine:1880 (0.7 mL/kg/hr); Emesis/NG output:100; Drains:80; Blood:150; Chest Tube:190]  Weight: 72.3 kg     Relevant Results  Scheduled medications  acetaminophen, 1,000 mg, intravenous, q6h GUSTAVO  heparin (porcine), 5,000 Units, subcutaneous, q8h  insulin lispro, 0-10 Units, subcutaneous, q4h  lidocaine, 1 patch, transdermal, Daily  metoprolol, 5 mg, intravenous, q6h      Continuous medications  lactated Ringer's, 100 mL/hr, Last " Rate: 100 mL/hr (07/23/24 1912)      PRN medications  PRN medications: calcium gluconate, calcium gluconate, dextrose, dextrose, glucagon, glucagon, HYDROmorphone, magnesium sulfate, magnesium sulfate  Results for orders placed or performed during the hospital encounter of 07/23/24 (from the past 24 hour(s))   Blood Gas Arterial Full Panel   Result Value Ref Range    POCT pH, Arterial 7.32 (L) 7.38 - 7.42 pH    POCT pCO2, Arterial 45 (H) 38 - 42 mm Hg    POCT pO2, Arterial 162 (H) 85 - 95 mm Hg    POCT SO2, Arterial 100 94 - 100 %    POCT Oxy Hemoglobin, Arterial 97.5 94.0 - 98.0 %    POCT Hematocrit Calculated, Arterial 37.0 (L) 41.0 - 52.0 %    POCT Sodium, Arterial 132 (L) 136 - 145 mmol/L    POCT Potassium, Arterial 4.2 3.5 - 5.3 mmol/L    POCT Chloride, Arterial 100 98 - 107 mmol/L    POCT Ionized Calcium, Arterial 1.19 1.10 - 1.33 mmol/L    POCT Glucose, Arterial 162 (H) 74 - 99 mg/dL    POCT Lactate, Arterial 1.1 0.4 - 2.0 mmol/L    POCT Base Excess, Arterial -3.0 (L) -2.0 - 3.0 mmol/L    POCT HCO3 Calculated, Arterial 23.2 22.0 - 26.0 mmol/L    POCT Hemoglobin, Arterial 12.2 (L) 13.5 - 17.5 g/dL    POCT Anion Gap, Arterial 13 10 - 25 mmo/L    Patient Temperature 37.0 degrees Celsius    FiO2 60 %   POCT GLUCOSE   Result Value Ref Range    POCT Glucose 191 (H) 74 - 99 mg/dL   Calcium, Ionized   Result Value Ref Range    POCT Calcium, Ionized 1.13 1.1 - 1.33 mmol/L   CBC   Result Value Ref Range    WBC 8.8 4.4 - 11.3 x10*3/uL    nRBC 0.0 0.0 - 0.0 /100 WBCs    RBC 3.67 (L) 4.50 - 5.90 x10*6/uL    Hemoglobin 12.0 (L) 13.5 - 17.5 g/dL    Hematocrit 35.8 (L) 41.0 - 52.0 %    MCV 98 80 - 100 fL    MCH 32.7 26.0 - 34.0 pg    MCHC 33.5 32.0 - 36.0 g/dL    RDW 16.1 (H) 11.5 - 14.5 %    Platelets 290 150 - 450 x10*3/uL   Coagulation Screen   Result Value Ref Range    Protime 12.9 (H) 9.8 - 12.8 seconds    INR 1.1 0.9 - 1.1    aPTT 27 27 - 38 seconds   Magnesium   Result Value Ref Range    Magnesium 1.37 (L) 1.60 - 2.40  mg/dL   Renal Function Panel   Result Value Ref Range    Glucose 202 (H) 74 - 99 mg/dL    Sodium 135 (L) 136 - 145 mmol/L    Potassium 4.1 3.5 - 5.3 mmol/L    Chloride 100 98 - 107 mmol/L    Bicarbonate 23 21 - 32 mmol/L    Anion Gap 16 10 - 20 mmol/L    Urea Nitrogen 9 6 - 23 mg/dL    Creatinine 0.63 0.50 - 1.30 mg/dL    eGFR >90 >60 mL/min/1.73m*2    Calcium 8.9 8.6 - 10.6 mg/dL    Phosphorus 3.8 2.5 - 4.9 mg/dL    Albumin 4.0 3.4 - 5.0 g/dL   Blood Gas Arterial Full Panel   Result Value Ref Range    POCT pH, Arterial 7.37 (L) 7.38 - 7.42 pH    POCT pCO2, Arterial 38 38 - 42 mm Hg    POCT pO2, Arterial 92 85 - 95 mm Hg    POCT SO2, Arterial 98 94 - 100 %    POCT Oxy Hemoglobin, Arterial 95.3 94.0 - 98.0 %    POCT Hematocrit Calculated, Arterial 40.0 (L) 41.0 - 52.0 %    POCT Sodium, Arterial 132 (L) 136 - 145 mmol/L    POCT Potassium, Arterial 4.1 3.5 - 5.3 mmol/L    POCT Chloride, Arterial 100 98 - 107 mmol/L    POCT Ionized Calcium, Arterial 1.11 1.10 - 1.33 mmol/L    POCT Glucose, Arterial 213 (H) 74 - 99 mg/dL    POCT Lactate, Arterial 0.9 0.4 - 2.0 mmol/L    POCT Base Excess, Arterial -2.9 (L) -2.0 - 3.0 mmol/L    POCT HCO3 Calculated, Arterial 22.0 22.0 - 26.0 mmol/L    POCT Hemoglobin, Arterial 13.3 (L) 13.5 - 17.5 g/dL    POCT Anion Gap, Arterial 14 10 - 25 mmo/L    Patient Temperature 37.0 degrees Celsius    FiO2 36 %   Electrocardiogram, 12-lead PRN ACS symptoms   Result Value Ref Range    Ventricular Rate 92 BPM    Atrial Rate 92 BPM    ID Interval 142 ms    QRS Duration 96 ms    QT Interval 382 ms    QTC Calculation(Bazett) 472 ms    P Axis 36 degrees    R Axis 42 degrees    T Axis 18 degrees    QRS Count 15 beats    Q Onset 223 ms    P Onset 152 ms    P Offset 208 ms    T Offset 414 ms    QTC Fredericia 440 ms   POCT GLUCOSE   Result Value Ref Range    POCT Glucose 197 (H) 74 - 99 mg/dL   POCT GLUCOSE   Result Value Ref Range    POCT Glucose 154 (H) 74 - 99 mg/dL   POCT GLUCOSE   Result Value Ref  Range    POCT Glucose 126 (H) 74 - 99 mg/dL   Blood Gas Arterial Full Panel   Result Value Ref Range    POCT pH, Arterial 7.43 (H) 7.38 - 7.42 pH    POCT pCO2, Arterial 38 38 - 42 mm Hg    POCT pO2, Arterial 64 (L) 85 - 95 mm Hg    POCT SO2, Arterial 94 94 - 100 %    POCT Oxy Hemoglobin, Arterial 92.3 (L) 94.0 - 98.0 %    POCT Hematocrit Calculated, Arterial 37.0 (L) 41.0 - 52.0 %    POCT Sodium, Arterial 131 (L) 136 - 145 mmol/L    POCT Potassium, Arterial 4.3 3.5 - 5.3 mmol/L    POCT Chloride, Arterial 100 98 - 107 mmol/L    POCT Ionized Calcium, Arterial 1.17 1.10 - 1.33 mmol/L    POCT Glucose, Arterial 130 (H) 74 - 99 mg/dL    POCT Lactate, Arterial 0.8 0.4 - 2.0 mmol/L    POCT Base Excess, Arterial 1.0 -2.0 - 3.0 mmol/L    POCT HCO3 Calculated, Arterial 25.2 22.0 - 26.0 mmol/L    POCT Hemoglobin, Arterial 12.2 (L) 13.5 - 17.5 g/dL    POCT Anion Gap, Arterial 10 10 - 25 mmo/L    Patient Temperature 37.0 degrees Celsius    FiO2 21 %   Calcium, Ionized   Result Value Ref Range    POCT Calcium, Ionized 1.17 1.1 - 1.33 mmol/L   CBC   Result Value Ref Range    WBC 10.3 4.4 - 11.3 x10*3/uL    nRBC 0.0 0.0 - 0.0 /100 WBCs    RBC 3.60 (L) 4.50 - 5.90 x10*6/uL    Hemoglobin 11.6 (L) 13.5 - 17.5 g/dL    Hematocrit 35.6 (L) 41.0 - 52.0 %    MCV 99 80 - 100 fL    MCH 32.2 26.0 - 34.0 pg    MCHC 32.6 32.0 - 36.0 g/dL    RDW 16.3 (H) 11.5 - 14.5 %    Platelets 266 150 - 450 x10*3/uL   Coagulation Screen   Result Value Ref Range    Protime 14.7 (H) 9.8 - 12.8 seconds    INR 1.3 (H) 0.9 - 1.1    aPTT 27 27 - 38 seconds   Magnesium   Result Value Ref Range    Magnesium 1.39 (L) 1.60 - 2.40 mg/dL   Renal Function Panel   Result Value Ref Range    Glucose 121 (H) 74 - 99 mg/dL    Sodium 134 (L) 136 - 145 mmol/L    Potassium 4.5 3.5 - 5.3 mmol/L    Chloride 101 98 - 107 mmol/L    Bicarbonate 23 21 - 32 mmol/L    Anion Gap 15 10 - 20 mmol/L    Urea Nitrogen 10 6 - 23 mg/dL    Creatinine 0.54 0.50 - 1.30 mg/dL    eGFR >90 >60  mL/min/1.73m*2    Calcium 8.6 8.6 - 10.6 mg/dL    Phosphorus 4.0 2.5 - 4.9 mg/dL    Albumin 3.4 3.4 - 5.0 g/dL   POCT GLUCOSE   Result Value Ref Range    POCT Glucose 119 (H) 74 - 99 mg/dL   Amylase, Fluid   Result Value Ref Range    Amylase, Fluid 43 Not established. 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 male with past medical history of DM2, HTN, cataracts, CKD, HLD, and STACY presenting to the SICU s/p esophagectomy on 7/23/24 with Dr. Manzo.      Plan:  NEURO: No known hx. Acute post-op pain. Drowsy s/p OR procedure.  - ongoing neuro/pain assessments  - Pain control with hydromorphone PCA, tylenol IV scheduled   - PT/OT consult  - Holding home meds: trazodone 100 mg at bedtime     CV: History of HTN, HLD. Baseline BP 120s/60-80s. No baseline echo.  - continuous EKG/ABP monitoring  - goal map range 65-90  - avoid pressors, use volume for hypotension  - volume resuscitate as clinically indicated  - Metoprolol 5mg IV q6h for arrhythmia prophylaxis   - Home meds: atenolol 50mg     PULM: History of STACY. Arrived to SICU extubated on 4L NC.  - Wean FiO2 as tolerated.    - Q1h incentive spirometry while awake  - F/U post op CXR  - Avoid positive pressure ventilation and NT suctioning as able  - Chest tube to water seal per surgical team. Ongoing monitoring of output quantity and character  - Neck drain with serosanguinous output of 80 ml over last 24 hr     GI: History of GERD, allergy to omeprazole. Now s/p esophagectomy for esophageal adenocarcinoma.  - Maintain strict NPO  - Holding off PPI  2/2 omeprazole allergy, assessing alternative option per surgery possibly pepcid  - Maintain HOB up at least 30 degress at all times secondary to high risk of aspiration  - Daily amylase levels from EVELIO drain  - NG to LIWS  - Do not replace or reposition NG tube if it becomes dislodged, call thoracic surgery     : CKD. Baseline creatinine 0.75.  - Check renal function panel post op and daily.   - Maintenance IVF, LR @100ml/hr  - Maintain U/O >0.5-1ml/kg/hr  - Replete electrolytes to goal K>4, Mg>2, Phos>2.5, ionized Ca>1.10     HEME: Acute blood loss anemia. OR .  - Check CBC and coags post op and daily  - SC Heparin and SCDs for DVT prophylaxis. OK per thoracic.  - Ongoing monitoring for s/s bleeding     ENDO: History of DM2 on glargine 10U at home.  - Q4h BG   - SSI Lispro per ICU protocol  - Holding home meds: metformin 1000 BID, insulin glargine      ID: Afebrile, await post op WBC.  - trend temp q4, wbc daily  - cefazolin x24hrs to finish today  - ongoing monitoring for s/s infection     Lines:  - left radial arterial line to be removed today  - PIV x 2     Dispo: plan for floor transfer today. Patient seen and discussed with ICU attending Dr. Pedro.         Deepak White MD

## 2024-07-24 NOTE — PROGRESS NOTES
Physical Therapy    Physical Therapy Evaluation    Patient Name: Juan M Mcrae  MRN: [id number]  Today's Date: 7/24/2024   Time Calculation  Start Time: 1056  Stop Time: 1120  Time Calculation (min): 24 min    Assessment/Plan   PT Assessment  PT Assessment Results: Decreased endurance, Impaired balance, Decreased mobility  Rehab Prognosis: Excellent  Barriers to Discharge: None anticipated  Evaluation/Treatment Tolerance: Patient tolerated treatment well  Medical Staff Made Aware: Yes  End of Session Communication: Bedside nurse  Assessment Comment: Pt is an 78 yo male admitted s/p esophagectomy, upon PT eval, pt presents with mild post op impairments with balance and mobility compared to IND baseline. Skilled PT indicated to progress.  End of Session Patient Position: Up in chair, Alarm off, not on at start of session  IP OR SWING BED PT PLAN  Inpatient or Swing Bed: Inpatient  PT Plan  Treatment/Interventions: Bed mobility, Transfer training, Gait training, Stair training, Balance training, Neuromuscular re-education, Strengthening, Endurance training, Range of motion, Therapeutic exercise, Therapeutic activity, Home exercise program, Postural re-education  PT Plan: Ongoing PT  PT Frequency: 3 times per week  PT Discharge Recommendations: No PT needed after discharge  Equipment Recommended upon Discharge: Wheeled walker  PT Recommended Transfer Status: Stand by assist  PT - OK to Discharge: Yes    Subjective   General Visit Information:  General  Reason for Referral: s/p esphophagectomy  Past Medical History Relevant to Rehab: DM2, HTN, cataracts, CKD, HLD, and STACY, GERD, dysphagia  Prior to Session Communication: Bedside nurse  Patient Position Received: Bed, 3 rail up  General Comment: Pt alert and agreeable, on RA, tele, IV, CT, vinod, EVELIO  Home Living:  Home Living  Type of Home: House  Lives With: Alone (GF will be present full time to assist post DC)  Home Adaptive Equipment: None  Home Layout: Able to live on  main level with bedroom/bathroom  Home Access: Stairs to enter with rails  Entrance Stairs-Number of Steps: 5  Bathroom Shower/Tub:  (Has walk-in shower on first floor and tub shower with grab bar on 2nd floor)  Home Living Comments: Has adjustable bed  Prior Level of Function:  Prior Function Per Pt/Caregiver Report  Level of [place]: Independent with ADLs and functional transfers, Independent with homemaking with ambulation  ADL Assistance: Independent  Homemaking Assistance: Independent  Ambulatory Assistance: Independent  Vocational: Retired  Precautions:  Precautions  Medical Precautions: Fall precautions, Abdominal precautions  Precautions Comment: HOB >/= 30 deg at all times  Vital Signs:  Vital Signs  Heart Rate: 91 (post tx: 72)  Resp: 17 (post tx: 22)  SpO2: 93 % (post tx: 99%)  BP: 129/71 (post tx: 125/78)    Objective   Pain:  Pain Assessment  Pain Assessment: 0-10  0-10 (Numeric) Pain Score: 0 - No pain  Cognition:  Cognition  Overall Cognitive Status: Within Functional Limits  Orientation Level: Oriented X4    General Assessments:  Activity Tolerance  Endurance: Endurance does not limit participation in activity  Early Mobility/Exercise Safety Screen: Proceed with mobilization - No exclusion criteria met    Sensation  Light Touch: No apparent deficits    Coordination  Movements are Fluid and Coordinated: Yes    Postural Control  Postural Control: Within Functional Limits  Head Control: WFL    Static Sitting Balance  Static Sitting-Comment/Number of Minutes: SBA  Dynamic Sitting Balance  Dynamic Sitting-Comments: SBA    Static Standing Balance  Static Standing-Comment/Number of Minutes: SBA with FWW  Dynamic Standing Balance  Dynamic Standing-Comments: CGA/SBA with FWW  Functional Assessments:  Bed Mobility  Bed Mobility: Yes  Bed Mobility 1  Bed Mobility 1: Supine to sitting  Level of Assistance 1: Minimum assistance  Bed Mobility Comments 1: HOB elevated    Transfers  Transfer: Yes  Transfer  1  Technique 1: Sit to stand, Stand to sit  Transfer Device 1: Walker  Transfer Level of Assistance 1: Contact guard  Trials/Comments 1: appropraite safety    Ambulation/Gait Training  Ambulation/Gait Training Performed: Yes  Ambulation/Gait Training 1  Surface 1: Level tile  Device 1: Rolling walker  Assistance 1:  (Progressed from CGA to SBA)  Comments/Distance (ft) 1: x150 ft    Extremity/Trunk Assessments:  RUE   RUE : Within Functional Limits  LUE   LUE: Within Functional Limits  RLE   RLE : Within Functional Limits  LLE   LLE : Within Functional Limits  Outcome Measures:  [facility] Basic Mobility  Turning from your back to your side while in a flat bed without using bedrails: A little  Moving from lying on your back to sitting on the side of a flat bed without using bedrails: A little  Moving to and from bed to chair (including a wheelchair): A little  Standing up from a chair using your arms (e.g. wheelchair or bedside chair): A little  To walk in hospital room: A little  Climbing 3-5 steps with railing: A little  Basic Mobility - Total Score: 18    FSS-ICU  Ambulation: Walks >/ or equal to 50 feet with any assistance x1  Rolling: Minimal assistance (performs 75% or more of task)  Sitting: Minimal assistance (performs 75% or more of task)  Transfer Sit-to-Stand: Minimal assistance (performs 75% or more of task)  Transfer Supine-to-Sit: Minimal assistance (performs 75% or more of task)  Total Score: 18      Early Mobility/Exercise Safety Screen: Proceed with mobilization - No exclusion criteria met  ICU Mobility Scale: Walking with assistance of 1 person [8]    Encounter Problems       Encounter Problems (Active)       Balance       STG - Maintains dynamic standing balance with upper extremity support on LRAD with Mod I        Start:  07/24/24    Expected End:  08/07/24               Mobility       STG - Patient will ambulate >/= 500 ft with Mod I using LRAD       Start:  07/24/24    Expected End:  08/07/24             STG - Patient will ascend and descend 5 stairs with rail vs LRAD and Mod I        Start:  07/24/24    Expected End:  08/07/24               PT Transfers       STG - Patient will perform bed mobility with Mod I       Start:  07/24/24    Expected End:  08/07/24            STG - Patient will transfer sit to and from stand with Mod I using LRAD       Start:  07/24/24    Expected End:  08/07/24               Pain - Adult              Education Documentation  Precautions, taught by Josey Cervantes PT at 7/24/2024  1:42 PM.  Learner: Patient  Readiness: Acceptance  Method: Explanation  Response: Verbalizes Understanding    Mobility Training, taught by Josey Cervantes PT at 7/24/2024  1:42 PM.  Learner: Patient  Readiness: Acceptance  Method: Explanation  Response: Verbalizes Understanding    Education Comments  No comments found.    Josey Cervantes PT, DPT

## 2024-07-24 NOTE — CARE PLAN
Problem: Skin  Goal: Decreased wound size/increased tissue granulation at next dressing change  Outcome: Progressing  Goal: Participates in plan/prevention/treatment measures  Outcome: Progressing  Goal: Prevent/manage excess moisture  Outcome: Progressing  Goal: Prevent/minimize sheer/friction injuries  Outcome: Progressing  Goal: Promote/optimize nutrition  Outcome: Progressing  Goal: Promote skin healing  Outcome: Progressing     Problem: Pain - Adult  Goal: Verbalizes/displays adequate comfort level or baseline comfort level  Outcome: Progressing     Problem: Safety - Adult  Goal: Free from fall injury  Outcome: Progressing     Problem: Discharge Planning  Goal: Discharge to home or other facility with appropriate resources  Outcome: Progressing     Problem: Chronic Conditions and Co-morbidities  Goal: Patient's chronic conditions and co-morbidity symptoms are monitored and maintained or improved  Outcome: Progressing     Problem: Diabetes  Goal: Achieve decreasing blood glucose levels by end of shift  Outcome: Progressing  Goal: Increase stability of blood glucose readings by end of shift  Outcome: Progressing  Goal: Decrease in ketones present in urine by end of shift  Outcome: Progressing  Goal: Maintain electrolyte levels within acceptable range throughout shift  Outcome: Progressing  Goal: Maintain glucose levels >70mg/dl to <250mg/dl throughout shift  Outcome: Progressing  Goal: No changes in neurological exam by end of shift  Outcome: Progressing  Goal: Learn about and adhere to nutrition recommendations by end of shift  Outcome: Progressing  Goal: Vital signs within normal range for age by end of shift  Outcome: Progressing  Goal: Increase self care and/or family involovement by end of shift  Outcome: Progressing  Goal: Receive DSME education by end of shift  Outcome: Progressing     Problem: Pain  Goal: Takes deep breaths with improved pain control throughout the shift  Outcome: Progressing  Goal:  Turns in bed with improved pain control throughout the shift  Outcome: Progressing  Goal: Walks with improved pain control throughout the shift  Outcome: Progressing  Goal: Performs ADL's with improved pain control throughout shift  Outcome: Progressing  Goal: Participates in PT with improved pain control throughout the shift  Outcome: Progressing  Goal: Free from opioid side effects throughout the shift  Outcome: Progressing  Goal: Free from acute confusion related to pain meds throughout the shift  Outcome: Progressing

## 2024-07-24 NOTE — PROGRESS NOTES
"Juan M Mcrae is a 79 y.o. 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    Subjective   No acute events overnight. NGT with about 100cc of output. Dwayne drain with 60 ml output. CT with 190 ml serosanguineous output, placed to waterseal this AM. Complains of pain around his chest tube site, and his neck incision, otherwise pain is well controlled. Denies f/c/n/v, chest pain or SOB.        Objective   Constitutional: NAD  HEENT: NGT in place to low intermittent wall suction with some bloody output; neck incision with island dressing on, c/d/I  Respiratory: unlabored breathing on room air, right VATS incisions with dressing in place c/d/I, CT to waterseal, serosanguineous output, no appreciable air leak   Cardiovascular: nontachycardic   Abdomen: soft, appropriately tender to palpation along incision site, midline laparotomy incision with dressing in place, minimal strikethrough on inferior aspect of dressing  : brock in place draining clear yellow urine  MSK: normal ROM   Neuro: conversant; no focal defecits     Last Recorded Vitals  Blood pressure 113/65, pulse 94, temperature 36.6 °C (97.9 °F), temperature source Temporal, resp. rate 25, height 1.702 m (5' 7\"), weight 72.3 kg (159 lb 6.3 oz), SpO2 93%.  Intake/Output last 3 Shifts:  I/O last 3 completed shifts:  In: 4416.7 (61.1 mL/kg) [I.V.:3316.7 (45.9 mL/kg); IV Piggyback:1100]  Out: 2400 (33.2 mL/kg) [Urine:1880 (0.7 mL/kg/hr); Emesis/NG output:100; Drains:80; Blood:150; Chest Tube:190]  Weight: 72.3 kg     Relevant Results  Results for orders placed or performed during the hospital encounter of 07/23/24 (from the past 24 hour(s))   POCT GLUCOSE   Result Value Ref Range    POCT Glucose 191 (H) 74 - 99 mg/dL   Calcium, Ionized   Result Value Ref Range    POCT Calcium, Ionized 1.13 1.1 - 1.33 mmol/L   CBC   Result Value Ref Range    WBC 8.8 4.4 - 11.3 x10*3/uL    nRBC 0.0 0.0 - 0.0 /100 WBCs    RBC 3.67 (L) " 4.50 - 5.90 x10*6/uL    Hemoglobin 12.0 (L) 13.5 - 17.5 g/dL    Hematocrit 35.8 (L) 41.0 - 52.0 %    MCV 98 80 - 100 fL    MCH 32.7 26.0 - 34.0 pg    MCHC 33.5 32.0 - 36.0 g/dL    RDW 16.1 (H) 11.5 - 14.5 %    Platelets 290 150 - 450 x10*3/uL   Coagulation Screen   Result Value Ref Range    Protime 12.9 (H) 9.8 - 12.8 seconds    INR 1.1 0.9 - 1.1    aPTT 27 27 - 38 seconds   Magnesium   Result Value Ref Range    Magnesium 1.37 (L) 1.60 - 2.40 mg/dL   Renal Function Panel   Result Value Ref Range    Glucose 202 (H) 74 - 99 mg/dL    Sodium 135 (L) 136 - 145 mmol/L    Potassium 4.1 3.5 - 5.3 mmol/L    Chloride 100 98 - 107 mmol/L    Bicarbonate 23 21 - 32 mmol/L    Anion Gap 16 10 - 20 mmol/L    Urea Nitrogen 9 6 - 23 mg/dL    Creatinine 0.63 0.50 - 1.30 mg/dL    eGFR >90 >60 mL/min/1.73m*2    Calcium 8.9 8.6 - 10.6 mg/dL    Phosphorus 3.8 2.5 - 4.9 mg/dL    Albumin 4.0 3.4 - 5.0 g/dL   Blood Gas Arterial Full Panel   Result Value Ref Range    POCT pH, Arterial 7.37 (L) 7.38 - 7.42 pH    POCT pCO2, Arterial 38 38 - 42 mm Hg    POCT pO2, Arterial 92 85 - 95 mm Hg    POCT SO2, Arterial 98 94 - 100 %    POCT Oxy Hemoglobin, Arterial 95.3 94.0 - 98.0 %    POCT Hematocrit Calculated, Arterial 40.0 (L) 41.0 - 52.0 %    POCT Sodium, Arterial 132 (L) 136 - 145 mmol/L    POCT Potassium, Arterial 4.1 3.5 - 5.3 mmol/L    POCT Chloride, Arterial 100 98 - 107 mmol/L    POCT Ionized Calcium, Arterial 1.11 1.10 - 1.33 mmol/L    POCT Glucose, Arterial 213 (H) 74 - 99 mg/dL    POCT Lactate, Arterial 0.9 0.4 - 2.0 mmol/L    POCT Base Excess, Arterial -2.9 (L) -2.0 - 3.0 mmol/L    POCT HCO3 Calculated, Arterial 22.0 22.0 - 26.0 mmol/L    POCT Hemoglobin, Arterial 13.3 (L) 13.5 - 17.5 g/dL    POCT Anion Gap, Arterial 14 10 - 25 mmo/L    Patient Temperature 37.0 degrees Celsius    FiO2 36 %   Electrocardiogram, 12-lead PRN ACS symptoms   Result Value Ref Range    Ventricular Rate 92 BPM    Atrial Rate 92 BPM    NM Interval 142 ms    QRS  Duration 96 ms    QT Interval 382 ms    QTC Calculation(Bazett) 472 ms    P Axis 36 degrees    R Axis 42 degrees    T Axis 18 degrees    QRS Count 15 beats    Q Onset 223 ms    P Onset 152 ms    P Offset 208 ms    T Offset 414 ms    QTC Fredericia 440 ms   POCT GLUCOSE   Result Value Ref Range    POCT Glucose 197 (H) 74 - 99 mg/dL   POCT GLUCOSE   Result Value Ref Range    POCT Glucose 154 (H) 74 - 99 mg/dL   POCT GLUCOSE   Result Value Ref Range    POCT Glucose 126 (H) 74 - 99 mg/dL   Blood Gas Arterial Full Panel   Result Value Ref Range    POCT pH, Arterial 7.43 (H) 7.38 - 7.42 pH    POCT pCO2, Arterial 38 38 - 42 mm Hg    POCT pO2, Arterial 64 (L) 85 - 95 mm Hg    POCT SO2, Arterial 94 94 - 100 %    POCT Oxy Hemoglobin, Arterial 92.3 (L) 94.0 - 98.0 %    POCT Hematocrit Calculated, Arterial 37.0 (L) 41.0 - 52.0 %    POCT Sodium, Arterial 131 (L) 136 - 145 mmol/L    POCT Potassium, Arterial 4.3 3.5 - 5.3 mmol/L    POCT Chloride, Arterial 100 98 - 107 mmol/L    POCT Ionized Calcium, Arterial 1.17 1.10 - 1.33 mmol/L    POCT Glucose, Arterial 130 (H) 74 - 99 mg/dL    POCT Lactate, Arterial 0.8 0.4 - 2.0 mmol/L    POCT Base Excess, Arterial 1.0 -2.0 - 3.0 mmol/L    POCT HCO3 Calculated, Arterial 25.2 22.0 - 26.0 mmol/L    POCT Hemoglobin, Arterial 12.2 (L) 13.5 - 17.5 g/dL    POCT Anion Gap, Arterial 10 10 - 25 mmo/L    Patient Temperature 37.0 degrees Celsius    FiO2 21 %   Calcium, Ionized   Result Value Ref Range    POCT Calcium, Ionized 1.17 1.1 - 1.33 mmol/L   CBC   Result Value Ref Range    WBC 10.3 4.4 - 11.3 x10*3/uL    nRBC 0.0 0.0 - 0.0 /100 WBCs    RBC 3.60 (L) 4.50 - 5.90 x10*6/uL    Hemoglobin 11.6 (L) 13.5 - 17.5 g/dL    Hematocrit 35.6 (L) 41.0 - 52.0 %    MCV 99 80 - 100 fL    MCH 32.2 26.0 - 34.0 pg    MCHC 32.6 32.0 - 36.0 g/dL    RDW 16.3 (H) 11.5 - 14.5 %    Platelets 266 150 - 450 x10*3/uL   Coagulation Screen   Result Value Ref Range    Protime 14.7 (H) 9.8 - 12.8 seconds    INR 1.3 (H) 0.9 -  1.1    aPTT 27 27 - 38 seconds   Magnesium   Result Value Ref Range    Magnesium 1.39 (L) 1.60 - 2.40 mg/dL   Renal Function Panel   Result Value Ref Range    Glucose 121 (H) 74 - 99 mg/dL    Sodium 134 (L) 136 - 145 mmol/L    Potassium 4.5 3.5 - 5.3 mmol/L    Chloride 101 98 - 107 mmol/L    Bicarbonate 23 21 - 32 mmol/L    Anion Gap 15 10 - 20 mmol/L    Urea Nitrogen 10 6 - 23 mg/dL    Creatinine 0.54 0.50 - 1.30 mg/dL    eGFR >90 >60 mL/min/1.73m*2    Calcium 8.6 8.6 - 10.6 mg/dL    Phosphorus 4.0 2.5 - 4.9 mg/dL    Albumin 3.4 3.4 - 5.0 g/dL   POCT GLUCOSE   Result Value Ref Range    POCT Glucose 119 (H) 74 - 99 mg/dL   Amylase, Fluid   Result Value Ref Range    Amylase, Fluid 43 Not established. 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  He underwent endoscopic ultrasound on March 21, 2024 that showed T2 N1 extending from 41 cm to 43 cm with minimal extension on the cardia. He underwent jtube placement and laparoscopy on 4/8/24 .  He completed CRT on 6/14/24.     Patient is now POD1 s/p [place] Esophagectomy. Procedure was uncomplicated. Patient is recovering appropriately.     Plan:   Neuro:  - iv tylenol  - dilaudid prn  - holding home trazadone     Respiratory:   -  wean o2 as tolerated  - aggressive bronchopulmonary hygiene  - CT to waterseal  - avoid positive pressure ventilation   - EVELIO neck drain to bulb suction, continue to monitor output    Cardiovascular:   - map > 65  - continuous tele  - avoid pressors, use volume for resuscitation  - metop 5 IV q6  - holding home atenolol    GI:  - pepcid for PPI (allergic to omeprazole, patient NPO will require stress ulcer ppx)  - HOB at 30 degrees at all times   - daily amylase level, 43 today  - ok to dc NGT  - NPO  - no feeds through J tube    :   - UO > 30cc/hr   - MIVF @100  - replete electrolytes PRN  - dc brock    HEME:  - transfuse as clinically indicated    Endo:  - SSI lispro  - was with blood sugars as high as 400 overnight  - holding home metformin 1000 BID, home lantus    ID:  - finished periop dose of abx    Dispo: Okay for transfer to LT3 today.     D/w attending surgeon Dr. Manzo.     Cait Reed PGY2  Thoracic Surgery     Cait Reed MD

## 2024-07-24 NOTE — CARE PLAN
The patient's goals for the shift include      The clinical goals for the shift include        Problem: Skin  Goal: Decreased wound size/increased tissue granulation at next dressing change  Outcome: Progressing  Flowsheets (Taken 7/23/2024 2247)  Decreased wound size/increased tissue granulation at next dressing change:   Promote sleep for wound healing   Protective dressings over bony prominences   Utilize specialty bed per algorithm  Goal: Participates in plan/prevention/treatment measures  Outcome: Progressing  Goal: Prevent/manage excess moisture  Outcome: Progressing  Flowsheets (Taken 7/23/2024 2247)  Prevent/manage excess moisture:   Cleanse incontinence/protect with barrier cream   Follow provider orders for dressing changes  Goal: Prevent/minimize sheer/friction injuries  Outcome: Progressing  Flowsheets (Taken 7/23/2024 2247)  Prevent/minimize sheer/friction injuries:   HOB 30 degrees or less   Increase activity/out of bed for meals   Turn/reposition every 2 hours/use positioning/transfer devices   Use pull sheet   Utilize specialty bed per algorithm  Goal: Promote/optimize nutrition  Outcome: Progressing  Flowsheets (Taken 7/23/2024 2247)  Promote/optimize nutrition:   Monitor/record intake including meals   Discuss with provider if NPO > 2 days  Goal: Promote skin healing  Outcome: Progressing  Flowsheets (Taken 7/23/2024 2247)  Promote skin healing:   Assess skin/pad under line(s)/device(s)   Ensure correct size (line/device) and apply per  instructions   Rotate device position/do not position patient on device   Turn/reposition every 2 hours/use positioning/transfer devices   Protective dressings over bony prominences     Problem: Pain - Adult  Goal: Verbalizes/displays adequate comfort level or baseline comfort level  Outcome: Progressing     Problem: Safety - Adult  Goal: Free from fall injury  Outcome: Progressing     Problem: Discharge Planning  Goal: Discharge to home or other  facility with appropriate resources  Outcome: Progressing     Problem: Chronic Conditions and Co-morbidities  Goal: Patient's chronic conditions and co-morbidity symptoms are monitored and maintained or improved  Outcome: Progressing     Problem: Diabetes  Goal: Achieve decreasing blood glucose levels by end of shift  Outcome: Progressing  Goal: Increase stability of blood glucose readings by end of shift  Outcome: Progressing  Goal: Decrease in ketones present in urine by end of shift  Outcome: Progressing  Goal: Maintain electrolyte levels within acceptable range throughout shift  Outcome: Progressing  Goal: Maintain glucose levels >70mg/dl to <250mg/dl throughout shift  Outcome: Progressing  Goal: No changes in neurological exam by end of shift  Outcome: Progressing  Goal: Learn about and adhere to nutrition recommendations by end of shift  Outcome: Progressing  Goal: Vital signs within normal range for age by end of shift  Outcome: Progressing  Goal: Increase self care and/or family involovement by end of shift  Outcome: Progressing  Goal: Receive DSME education by end of shift  Outcome: Progressing     Problem: Pain  Goal: Takes deep breaths with improved pain control throughout the shift  Outcome: Progressing  Goal: Turns in bed with improved pain control throughout the shift  Outcome: Progressing  Goal: Walks with improved pain control throughout the shift  Outcome: Progressing  Goal: Performs ADL's with improved pain control throughout shift  Outcome: Progressing  Goal: Participates in PT with improved pain control throughout the shift  Outcome: Progressing  Goal: Free from opioid side effects throughout the shift  Outcome: Progressing  Goal: Free from acute confusion related to pain meds throughout the shift  Outcome: Progressing

## 2024-07-25 ENCOUNTER — APPOINTMENT (OUTPATIENT)
Dept: RADIOLOGY | Facility: HOSPITAL | Age: 79
DRG: 327 | End: 2024-07-25
Payer: MEDICARE

## 2024-07-25 LAB
AMYLASE FLD-CCNC: 55 U/L
ANION GAP SERPL CALC-SCNC: 15 MMOL/L (ref 10–20)
ATRIAL RATE: 92 BPM
BUN SERPL-MCNC: 9 MG/DL (ref 6–23)
CALCIUM SERPL-MCNC: 8.5 MG/DL (ref 8.6–10.6)
CHLORIDE SERPL-SCNC: 102 MMOL/L (ref 98–107)
CO2 SERPL-SCNC: 25 MMOL/L (ref 21–32)
CREAT SERPL-MCNC: 0.53 MG/DL (ref 0.5–1.3)
EGFRCR SERPLBLD CKD-EPI 2021: >90 ML/MIN/1.73M*2
ERYTHROCYTE [DISTWIDTH] IN BLOOD BY AUTOMATED COUNT: 16.3 % (ref 11.5–14.5)
GLUCOSE BLD MANUAL STRIP-MCNC: 101 MG/DL (ref 74–99)
GLUCOSE BLD MANUAL STRIP-MCNC: 105 MG/DL (ref 74–99)
GLUCOSE BLD MANUAL STRIP-MCNC: 112 MG/DL (ref 74–99)
GLUCOSE BLD MANUAL STRIP-MCNC: 120 MG/DL (ref 74–99)
GLUCOSE BLD MANUAL STRIP-MCNC: 149 MG/DL (ref 74–99)
GLUCOSE SERPL-MCNC: 95 MG/DL (ref 74–99)
HCT VFR BLD AUTO: 35.1 % (ref 41–52)
HGB BLD-MCNC: 11.6 G/DL (ref 13.5–17.5)
MAGNESIUM SERPL-MCNC: 2 MG/DL (ref 1.6–2.4)
MCH RBC QN AUTO: 32.3 PG (ref 26–34)
MCHC RBC AUTO-ENTMCNC: 33 G/DL (ref 32–36)
MCV RBC AUTO: 98 FL (ref 80–100)
NRBC BLD-RTO: 0 /100 WBCS (ref 0–0)
P AXIS: 36 DEGREES
P OFFSET: 208 MS
P ONSET: 152 MS
PLATELET # BLD AUTO: 242 X10*3/UL (ref 150–450)
POTASSIUM SERPL-SCNC: 3.8 MMOL/L (ref 3.5–5.3)
PR INTERVAL: 142 MS
Q ONSET: 223 MS
QRS COUNT: 15 BEATS
QRS DURATION: 96 MS
QT INTERVAL: 382 MS
QTC CALCULATION(BAZETT): 472 MS
QTC FREDERICIA: 440 MS
R AXIS: 42 DEGREES
RBC # BLD AUTO: 3.59 X10*6/UL (ref 4.5–5.9)
SODIUM SERPL-SCNC: 138 MMOL/L (ref 136–145)
T AXIS: 18 DEGREES
T OFFSET: 414 MS
VENTRICULAR RATE: 92 BPM
WBC # BLD AUTO: 7.8 X10*3/UL (ref 4.4–11.3)

## 2024-07-25 PROCEDURE — 2500000005 HC RX 250 GENERAL PHARMACY W/O HCPCS: Performed by: NURSE PRACTITIONER

## 2024-07-25 PROCEDURE — 1200000002 HC GENERAL ROOM WITH TELEMETRY DAILY

## 2024-07-25 PROCEDURE — 2500000001 HC RX 250 WO HCPCS SELF ADMINISTERED DRUGS (ALT 637 FOR MEDICARE OP): Performed by: NURSE PRACTITIONER

## 2024-07-25 PROCEDURE — 2500000004 HC RX 250 GENERAL PHARMACY W/ HCPCS (ALT 636 FOR OP/ED): Mod: JZ | Performed by: NURSE PRACTITIONER

## 2024-07-25 PROCEDURE — 71045 X-RAY EXAM CHEST 1 VIEW: CPT | Performed by: RADIOLOGY

## 2024-07-25 PROCEDURE — 2500000004 HC RX 250 GENERAL PHARMACY W/ HCPCS (ALT 636 FOR OP/ED): Performed by: NURSE PRACTITIONER

## 2024-07-25 PROCEDURE — 83735 ASSAY OF MAGNESIUM: CPT | Performed by: NURSE PRACTITIONER

## 2024-07-25 PROCEDURE — 71045 X-RAY EXAM CHEST 1 VIEW: CPT

## 2024-07-25 PROCEDURE — 82150 ASSAY OF AMYLASE: CPT | Performed by: NURSE PRACTITIONER

## 2024-07-25 PROCEDURE — 80048 BASIC METABOLIC PNL TOTAL CA: CPT | Performed by: NURSE PRACTITIONER

## 2024-07-25 PROCEDURE — 99232 SBSQ HOSP IP/OBS MODERATE 35: CPT | Performed by: NURSE PRACTITIONER

## 2024-07-25 PROCEDURE — 82947 ASSAY GLUCOSE BLOOD QUANT: CPT

## 2024-07-25 PROCEDURE — 3E0H76Z INTRODUCTION OF NUTRITIONAL SUBSTANCE INTO LOWER GI, VIA NATURAL OR ARTIFICIAL OPENING: ICD-10-PCS | Performed by: NURSE PRACTITIONER

## 2024-07-25 PROCEDURE — 85027 COMPLETE CBC AUTOMATED: CPT | Performed by: NURSE PRACTITIONER

## 2024-07-25 RX ORDER — METOPROLOL TARTRATE 25 MG/1
25 TABLET, FILM COATED ORAL EVERY 8 HOURS
Status: DISCONTINUED | OUTPATIENT
Start: 2024-07-25 | End: 2024-07-26

## 2024-07-25 RX ORDER — POTASSIUM CHLORIDE 1.5 G/1.58G
20 POWDER, FOR SOLUTION ORAL ONCE
Status: COMPLETED | OUTPATIENT
Start: 2024-07-25 | End: 2024-07-25

## 2024-07-25 RX ORDER — INSULIN LISPRO 100 [IU]/ML
0-10 INJECTION, SOLUTION INTRAVENOUS; SUBCUTANEOUS EVERY 6 HOURS
Status: DISCONTINUED | OUTPATIENT
Start: 2024-07-25 | End: 2024-07-30 | Stop reason: HOSPADM

## 2024-07-25 RX ORDER — MAGNESIUM SULFATE HEPTAHYDRATE 40 MG/ML
2 INJECTION, SOLUTION INTRAVENOUS ONCE
Status: COMPLETED | OUTPATIENT
Start: 2024-07-25 | End: 2024-07-25

## 2024-07-25 ASSESSMENT — PAIN SCALES - GENERAL
PAINLEVEL_OUTOF10: 2
PAINLEVEL_OUTOF10: 0 - NO PAIN
PAINLEVEL_OUTOF10: 0 - NO PAIN

## 2024-07-25 ASSESSMENT — COGNITIVE AND FUNCTIONAL STATUS - GENERAL
TURNING FROM BACK TO SIDE WHILE IN FLAT BAD: A LITTLE
PERSONAL GROOMING: A LITTLE
DAILY ACTIVITIY SCORE: 19
STANDING UP FROM CHAIR USING ARMS: A LITTLE
MOVING TO AND FROM BED TO CHAIR: A LITTLE
MOVING FROM LYING ON BACK TO SITTING ON SIDE OF FLAT BED WITH BEDRAILS: A LITTLE
MOBILITY SCORE: 19
CLIMB 3 TO 5 STEPS WITH RAILING: A LITTLE
HELP NEEDED FOR BATHING: A LITTLE
TOILETING: A LITTLE
DRESSING REGULAR LOWER BODY CLOTHING: A LITTLE
DRESSING REGULAR UPPER BODY CLOTHING: A LITTLE

## 2024-07-25 ASSESSMENT — PAIN DESCRIPTION - LOCATION: LOCATION: CHEST

## 2024-07-25 ASSESSMENT — PAIN DESCRIPTION - ORIENTATION: ORIENTATION: LEFT

## 2024-07-25 ASSESSMENT — PAIN - FUNCTIONAL ASSESSMENT: PAIN_FUNCTIONAL_ASSESSMENT: 0-10

## 2024-07-25 NOTE — CARE PLAN
The patient's goals for the shift include rest    The clinical goals for the shift include control pain    Over the shift, the patient did not make progress toward the following goals. Barriers to progression include no food source . Recommendations to address these barriers include question progressing diet .    Problem: Skin  Goal: Promote/optimize nutrition  Outcome: Not Progressing

## 2024-07-25 NOTE — PROGRESS NOTES
"Thoracic Surgery Progress Note  7/25/2024    Juan M Mcrae is a 79 y.o. male with a history of a DM2, HTN, cataracts, CKD, HLD, and STACY who presents to Dr. 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 He underwent neoadjuvant chemoradiation with good response and is now 2 Days Post-Op status post VATS esophagectomy and pyloromyotomy.     Overnight issues: SVT prior to transfer to T3 that responded to increase in IV metoprolol + Magnesium + albumin. Remains in NSR. On RA. + flatus. Awaiting void after brock removal.  CT removed on AM rounds. Cervical EVELIO amylase 55 this am.     Physical Exam:  General: He is a pleasant male currently in no distress seen sitting up in bed.  Visit Vitals  /78 (BP Location: Right arm, Patient Position: Lying)   Pulse 91   Temp 37 °C (98.6 °F) (Temporal)   Resp 18   Ht 1.702 m (5' 7\")   Wt 74.8 kg (165 lb)   SpO2 92%   BMI 25.84 kg/m²   Smoking Status Former   BSA 1.88 m²     Body mass index is 25.84 kg/m².   HEENT: Normocephalic and atraumatic.   NECK: Supple. Trach midline. No JVD.  Cervical incision JUDY well approx/no erythema. Cervical EVELIO holding bulb sx with serosang drg.   CHEST: Breathing comfortably on RA.  R Chest tube to WS, minimal drg, no AL. Removed on AM rounds.   HEART: Regular rate and rhythm. NSR per tele review.   ABDOMEN: Soft, appro tender around abd ML, ss judy. J tube currently capped, secured with mesenteric tape.   : awaiting void after brock remova  NEUROLOGIC: Alert and oriented. Grossly intact.   EXTREMITIES: Moves all extremities equally.  Pedal pulses are palpable. No lower extremity edema. No calf tenderness.     Diagnostics:     Intake/Output Summary (Last 24 hours) at 7/25/2024 1016  Last data filed at 7/25/2024 0922  Gross per 24 hour   Intake 700 ml   Output 1535 ml   Net -835 ml     Results from last 7 days   Lab Units 07/25/24  0710 07/24/24  0221 07/23/24  1409   WBC AUTO x10*3/uL 7.8 10.3 8.8   HEMOGLOBIN g/dL 11.6* 11.6* " 12.0*   HEMATOCRIT % 35.1* 35.6* 35.8*   PLATELETS AUTO x10*3/uL 242 266 290     Results from last 7 days   Lab Units 07/25/24  0710 07/24/24  0221 07/23/24  1409   SODIUM mmol/L 138 134* 135*   POTASSIUM mmol/L 3.8 4.5 4.1   CHLORIDE mmol/L 102 101 100   CO2 mmol/L 25 23 23   BUN mg/dL 9 10 9   CREATININE mg/dL 0.53 0.54 0.63   GLUCOSE mg/dL 95 121* 202*   CALCIUM mg/dL 8.5* 8.6 8.9     Results from last 7 days   Lab Units 07/25/24  0710 07/24/24  0221 07/23/24  1409   SODIUM mmol/L 138 134* 135*   POTASSIUM mmol/L 3.8 4.5 4.1   CHLORIDE mmol/L 102 101 100   CO2 mmol/L 25 23 23   BUN mg/dL 9 10 9   CREATININE mg/dL 0.53 0.54 0.63   GLUCOSE mg/dL 95 121* 202*   CALCIUM mg/dL 8.5* 8.6 8.9     Scheduled medications  acetaminophen, 1,000 mg, intravenous, q6h GUSTAVO  albumin human, 12.5 g, intravenous, Once  famotidine, 20 mg, intravenous, q12h GUSTAVO  heparin (porcine), 5,000 Units, subcutaneous, q8h  insulin lispro, 0-10 Units, subcutaneous, q4h  lidocaine, 1 patch, transdermal, Daily  metoprolol, 10 mg, intravenous, q6h  metoprolol tartrate, 25 mg, j-tube, q8h      Continuous medications  lactated Ringer's, 50 mL/hr, Last Rate: 50 mL/hr (07/25/24 0934)      PRN medications  PRN medications: dextrose, dextrose, glucagon, glucagon, HYDROmorphone    Imaging:   AM CXR reviewed. Expected post op changes and chest tube placement. No clinically significant pneumothorax. No formal report at this time.     Assessment:  Juan M Mcrae is a 79 y.o. male with a history of a DM2, HTN, cataracts, CKD, HLD, and STACY who presents to Dr. 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 He underwent neoadjuvant chemoradiation with good response and is now 2 Days Post-Op status post VATS esophagectomy and pyloromyotomy.     7/24/24:  NGT removed. SVT, increase in beta blocker.  7/25/24:  NAEON. Meza removed. CT removed.        Plan:  NEURO: No known hx. Acute post-op pain.   - ongoing neuro/pain assessments  - Pain control  with tylenol IV scheduled, lido patch, Dilaudid IVP Q2 h PRN  - PT/OT consult  - Holding home meds: trazodone 100 mg at bedtime     CV: History of HTN, HLD. Baseline BP 120s/60-80s. No baseline echo.  - continuous EKG/ABP monitoring  - goal map range 65-90  - volume resuscitate as clinically indicated  - Metoprolol 5mg IV q6h for arrhythmia prophylaxis-->10mg Q6-->Metoprolol 25mg TID via J tube, increase as BP/HR tolerate.   - Home meds: atenolol 50mg     PULM: History of STACY.   - currently on RA  - Q1h incentive spirometry while awake  - Chest tube discontinue 7/25  - Cervical EVELIO amylase Q am      GI: History of GERD, allergy to omeprazole. Now s/p esophagectomy for esophageal adenocarcinoma.  - Maintain strict NPO  - Holding off PPI 2/2 omeprazole allergy, using alternative option of pepcid  - Maintain HOB up at least 30 degress at all times secondary to high risk of aspiration  - Daily amylase levels from EVELIO drain  - NG discontinue 7/24  - start tf today at 20 ml/hr, advance by 10 ml Q 12 hours.    - dietician to re-assess for TF regiment/needs     : CKD. Baseline creatinine 0.75.  - Check renal function panel post op and daily.   - Maintenance IVF, LR @50ml/hr  - follow up void after brock removal   - Replete electrolytes to goal K>4, Mg>2     HEME: Acute blood loss anemia. OR .  - Check CBC and coags post op and daily  - Ongoing monitoring for s/s bleeding     ENDO: History of DM2 on glargine 10U at home. Sugars 105-120  - Q4h BG   - SSI Lispro per ICU protocol  - Holding home meds: metformin 1000 BID, insulin glargine      ID: Afebrile, await post op WBC.  - trend temp q4, wbc daily  - cefazolin x24hrs completed  - ongoing monitoring for s/s infection    PPX:  - IS, OOB, ambulation   - SC Heparin and SCDs for DVT prophylaxis.     Disposition:  -Plan for discharge to home once stable from a surgical standpoint.  -Continue to assess for home-going needs, likely resume nocturnal TF upon discharge      Patient seen and examined by this provider. Plan of care discussed with attending Dr. Manzo.    Karen Delgado, APRN-CNP  Thoracic Surgery Pager [phone]

## 2024-07-25 NOTE — CONSULTS
"Nutrition Initial Assessment:   Nutrition Assessment    Reason for Assessment: Provider consult order (s/p esophagectomy, pt has J-tube)    Patient is a 79 y.o. male presenting with malignant neoplasm of lower third esophagus now s/p esophagectomy on 7/23/24 with Dr. Manzo. Prior J-tube placement 4/08/24.     PMHx DM2, HTN, cataracts, CKD, HLD, STACY, GERD    Nutrition History:  Food and Nutrient History: Being followed by [facility] outpatient RDN -- recently able to tolerate PO diet & TF regimen held (last date 7/14). While dependent on feeds, his regimen was ~5 cartons of Glucerna 1.5 for  ~1800kcals & ~95gm protein as pt would run nocturnally @ ~93-100mls/hr. Recently encouraged to purchase Boost VHC from [facility] to help supplement home PO intake as well (530 kcals, 22gm pro & 52gm CHO per carton).  Food Allergies/Intolerances:   Shellfish containing products  GI Symptoms: None  Oral Problems: None       Anthropometrics:  Height: 170.2 cm (5' 7\")   Weight: 74.8 kg (165 lb)   BMI (Calculated): 25.84  IBW/kg (Dietitian Calculated): 67.3 kg  Percent of IBW: 111 %     Date/Time Weight   07/25/24 0554 74.8 kg (165 lb)   07/24/24 0600 72.3 kg (159 lb 6.3 oz)   07/23/24 0638 72.2 kg (159 lb 2.8 oz)       Weight History:   Wt Readings from Last 10 Encounters:   07/25/24 74.8 kg (165 lb)   07/22/24 72.8 kg (160 lb 9.7 oz)   07/17/24 72.7 kg (160 lb 4.4 oz)   06/22/24 70.3 kg (155 lb)   06/19/24 73 kg (160 lb 15 oz)   06/19/24 72.6 kg (160 lb)   06/11/24 73.2 kg (161 lb 6.4 oz)   06/10/24 71.6 kg (157 lb 15.4 oz)   06/04/24 73.1 kg (161 lb 1.6 oz)   06/03/24 72 kg (158 lb 11.7 oz)     5-6-2024 was 77.9 kg (171 lb 10.1 oz) - start date     Weight Change %:  Weight History / % Weight Change: slight wt increase from post-op fluid gains > still triggering for significant weight loss since start of treatmen ~3months ago but weight has been stable for ~2 months  Significant Weight Loss: Yes  Interpretation of Weight Loss: " 7.5% in 3 months    Nutrition Focused Physical Exam Findings:  Visual, suspect age-related component  Subcutaneous Fat Loss:   Orbital Fat Pads: Mild-Moderate (slight dark circles and slight hollowing)  Buccal Fat Pads: Mild-Moderate (flat cheeks, minimal bounce)  Muscle Wasting:  Temporalis: Mild-Moderate (slight depression)  Edema:  Edema: +2 mild  Edema Location: LLE  Physical Findings:  Skin: Positive (surg sites)    Nutrition Significant Labs:  CBC Trend:   Results from last 7 days   Lab Units 07/25/24  0710 07/24/24  0221 07/23/24  1409   WBC AUTO x10*3/uL 7.8 10.3 8.8   RBC AUTO x10*6/uL 3.59* 3.60* 3.67*   HEMOGLOBIN g/dL 11.6* 11.6* 12.0*   HEMATOCRIT % 35.1* 35.6* 35.8*   MCV fL 98 99 98   PLATELETS AUTO x10*3/uL 242 266 290    , A1C:  Lab Results   Component Value Date    HGBA1C 8.8 (H) 07/17/2024   , BG POCT trend:   Results from last 7 days   Lab Units 07/25/24  0846 07/25/24  0450 07/25/24  0055 07/24/24  2048 07/24/24  1537   POCT GLUCOSE mg/dL 101* 105* 120* 114* 119*    , Renal Lab Trend:   Results from last 7 days   Lab Units 07/25/24  0710 07/24/24  1024 07/24/24  0221 07/23/24  1409   POTASSIUM mmol/L 3.8  --  4.5 4.1   PHOSPHORUS mg/dL  --   --  4.0 3.8   SODIUM mmol/L 138  --  134* 135*   MAGNESIUM mg/dL 2.00   < > 1.39* 1.37*   EGFR mL/min/1.73m*2 >90  --  >90 >90   BUN mg/dL 9  --  10 9   CREATININE mg/dL 0.53  --  0.54 0.63    < > = values in this interval not displayed.     Nutrition Specific Medications:  famotidine, 20 mg, intravenous, q12h GUSTAVO  insulin lispro, 0-10 Units, subcutaneous, q4h    Continuous medications  lactated Ringer's, 50 mL/hr, Last Rate: 50 mL/hr (07/25/24 0934)        I/O:   Last BM Date:  (APOBM);      Dietary Orders (From admission, onward)       Start     Ordered    07/25/24 0857  Enteral feeding with NPO Glucerna 1.5; JT (jejunostomy tube); 20; 60; Water; Tap water; Every 4 hours  Diet effective now        Comments: Advance by 10 ml every 12 hours to goal rate (to  be determined by dietician)   Question Answer Comment   Tube feeding formula: Glucerna 1.5    Feeding route: JT (jejunostomy tube)    Tube feeding continuous rate (mL/hr): 20    Tube feeding flush (mL): 60    Flush type: Water    Water type: Tap water    Flush frequency: Every 4 hours        07/25/24 0824                     Estimated Needs:   Total Energy Estimated Needs (kCal):  (6013-7359)  Method for Estimating Needs: 25-30 kcals/kg  Total Protein Estimated Needs (g):  (100)  Method for Estimating Needs: ~1.3 gm/kg  Total Fluid Estimated Needs (mL):  (1mL/kcal or per team)           Nutrition Diagnosis   Malnutrition Diagnosis  Patient has Malnutrition Diagnosis: No (previously moderately malnourished > improving)    Nutrition Diagnosis  Patient has Nutrition Diagnosis: Yes  Diagnosis Status (1): New  Nutrition Diagnosis 1: Altered GI function  Related to (1): esophageal cancer  As Evidenced by (1): s/p esophagectomy, J-tube       Nutrition Interventions/Recommendations         Nutrition Prescription:  Individualized Nutrition Prescription Provided for : tube feeds while NPO > diet per thoracic surgery following esophagectomy        Nutrition Interventions:   Interventions: Enteral intake, Meals and snacks    1) Diet advancement per thoracic surgery following esophogram  2) Glucerna 1.5 goal @ 60 mls/hr continuous while NPO  = 1440mls, 2160 kcals, 119gm protein, 192gm CHO and ~1100mls free water/day  3) Add FWF per team - consider 200 mls flush four times per day   4) Initiate/adjust bowel regimen PRN to promote post-op stool regularity           Nutrition Monitoring and Evaluation   Food/Nutrient Related History Monitoring  Monitoring and Evaluation Plan: Enteral and parenteral nutrition intake  Enteral and Parenteral Nutrition Intake: Enteral nutrition intake, Enteral nutrition formula/solution  Criteria: initiate & titrate TF to goal rate without GI upset ; goal > 75% est needs while NPO         Biochemical  Data, Medical Tests and Procedures  Monitoring and Evaluation Plan: Electrolyte/renal panel, Glucose/endocrine profile  Criteria: lytes WNL  Criteria: BG controlled WNL              Time Spent (min): 60 minutes

## 2024-07-25 NOTE — CARE PLAN
The patient's goals for the shift include rest    The clinical goals for the shift include patient will remain hemodynmically stable throughout shift      Problem: Skin  Goal: Decreased wound size/increased tissue granulation at next dressing change  Outcome: Progressing  Goal: Participates in plan/prevention/treatment measures  Outcome: Progressing  Goal: Promote/optimize nutrition  Outcome: Progressing  Flowsheets (Taken 7/25/2024 1946)  Promote/optimize nutrition: Monitor/record intake including meals  Goal: Promote skin healing  Outcome: Progressing     Problem: Discharge Planning  Goal: Discharge to home or other facility with appropriate resources  Outcome: Progressing     Problem: Diabetes  Goal: Increase self care and/or family involovement by end of shift  Outcome: Progressing  Goal: Receive DSME education by end of shift  Outcome: Progressing     Problem: Pain  Goal: Takes deep breaths with improved pain control throughout the shift  Outcome: Progressing  Goal: Walks with improved pain control throughout the shift  Outcome: Progressing  Goal: Performs ADL's with improved pain control throughout shift  Outcome: Progressing  Goal: Participates in PT with improved pain control throughout the shift  Outcome: Progressing

## 2024-07-26 ENCOUNTER — APPOINTMENT (OUTPATIENT)
Dept: RADIOLOGY | Facility: HOSPITAL | Age: 79
DRG: 327 | End: 2024-07-26
Payer: MEDICARE

## 2024-07-26 LAB
AMYLASE FLD-CCNC: 41 U/L
ANION GAP SERPL CALC-SCNC: 14 MMOL/L (ref 10–20)
BUN SERPL-MCNC: 10 MG/DL (ref 6–23)
CALCIUM SERPL-MCNC: 8.4 MG/DL (ref 8.6–10.6)
CHLORIDE SERPL-SCNC: 101 MMOL/L (ref 98–107)
CO2 SERPL-SCNC: 27 MMOL/L (ref 21–32)
CREAT SERPL-MCNC: 0.55 MG/DL (ref 0.5–1.3)
EGFRCR SERPLBLD CKD-EPI 2021: >90 ML/MIN/1.73M*2
ERYTHROCYTE [DISTWIDTH] IN BLOOD BY AUTOMATED COUNT: 16.4 % (ref 11.5–14.5)
GLUCOSE BLD MANUAL STRIP-MCNC: 121 MG/DL (ref 74–99)
GLUCOSE BLD MANUAL STRIP-MCNC: 126 MG/DL (ref 74–99)
GLUCOSE BLD MANUAL STRIP-MCNC: 140 MG/DL (ref 74–99)
GLUCOSE BLD MANUAL STRIP-MCNC: 152 MG/DL (ref 74–99)
GLUCOSE SERPL-MCNC: 151 MG/DL (ref 74–99)
HCT VFR BLD AUTO: 35.4 % (ref 41–52)
HGB BLD-MCNC: 11.6 G/DL (ref 13.5–17.5)
MCH RBC QN AUTO: 32.9 PG (ref 26–34)
MCHC RBC AUTO-ENTMCNC: 32.8 G/DL (ref 32–36)
MCV RBC AUTO: 100 FL (ref 80–100)
NRBC BLD-RTO: 0 /100 WBCS (ref 0–0)
PLATELET # BLD AUTO: 251 X10*3/UL (ref 150–450)
POTASSIUM SERPL-SCNC: 4.2 MMOL/L (ref 3.5–5.3)
RBC # BLD AUTO: 3.53 X10*6/UL (ref 4.5–5.9)
SODIUM SERPL-SCNC: 138 MMOL/L (ref 136–145)
WBC # BLD AUTO: 6.4 X10*3/UL (ref 4.4–11.3)

## 2024-07-26 PROCEDURE — 2500000004 HC RX 250 GENERAL PHARMACY W/ HCPCS (ALT 636 FOR OP/ED): Performed by: NURSE PRACTITIONER

## 2024-07-26 PROCEDURE — 36415 COLL VENOUS BLD VENIPUNCTURE: CPT | Performed by: NURSE PRACTITIONER

## 2024-07-26 PROCEDURE — 71045 X-RAY EXAM CHEST 1 VIEW: CPT | Performed by: RADIOLOGY

## 2024-07-26 PROCEDURE — 97110 THERAPEUTIC EXERCISES: CPT | Mod: GP,CQ

## 2024-07-26 PROCEDURE — 99232 SBSQ HOSP IP/OBS MODERATE 35: CPT | Performed by: NURSE PRACTITIONER

## 2024-07-26 PROCEDURE — 85027 COMPLETE CBC AUTOMATED: CPT | Performed by: NURSE PRACTITIONER

## 2024-07-26 PROCEDURE — 2500000001 HC RX 250 WO HCPCS SELF ADMINISTERED DRUGS (ALT 637 FOR MEDICARE OP): Performed by: NURSE PRACTITIONER

## 2024-07-26 PROCEDURE — 2500000002 HC RX 250 W HCPCS SELF ADMINISTERED DRUGS (ALT 637 FOR MEDICARE OP, ALT 636 FOR OP/ED): Performed by: NURSE PRACTITIONER

## 2024-07-26 PROCEDURE — 1200000002 HC GENERAL ROOM WITH TELEMETRY DAILY

## 2024-07-26 PROCEDURE — 80048 BASIC METABOLIC PNL TOTAL CA: CPT | Performed by: NURSE PRACTITIONER

## 2024-07-26 PROCEDURE — 97116 GAIT TRAINING THERAPY: CPT | Mod: GP,CQ

## 2024-07-26 PROCEDURE — 71045 X-RAY EXAM CHEST 1 VIEW: CPT

## 2024-07-26 PROCEDURE — 82150 ASSAY OF AMYLASE: CPT | Performed by: NURSE PRACTITIONER

## 2024-07-26 PROCEDURE — 82947 ASSAY GLUCOSE BLOOD QUANT: CPT

## 2024-07-26 RX ORDER — OXYCODONE HCL 5 MG/5 ML
5 SOLUTION, ORAL ORAL EVERY 4 HOURS PRN
Status: DISCONTINUED | OUTPATIENT
Start: 2024-07-26 | End: 2024-07-30 | Stop reason: HOSPADM

## 2024-07-26 RX ORDER — OXYCODONE HCL 5 MG/5 ML
10 SOLUTION, ORAL ORAL EVERY 4 HOURS PRN
Status: DISCONTINUED | OUTPATIENT
Start: 2024-07-26 | End: 2024-07-30 | Stop reason: HOSPADM

## 2024-07-26 RX ORDER — ACETAMINOPHEN 160 MG/5ML
650 SOLUTION ORAL EVERY 6 HOURS
Status: DISCONTINUED | OUTPATIENT
Start: 2024-07-26 | End: 2024-07-30 | Stop reason: HOSPADM

## 2024-07-26 RX ORDER — METOPROLOL TARTRATE 25 MG/1
37.5 TABLET, FILM COATED ORAL EVERY 8 HOURS
Status: DISCONTINUED | OUTPATIENT
Start: 2024-07-26 | End: 2024-07-30 | Stop reason: HOSPADM

## 2024-07-26 ASSESSMENT — PAIN SCALES - GENERAL
PAINLEVEL_OUTOF10: 3
PAINLEVEL_OUTOF10: 2
PAINLEVEL_OUTOF10: 3
PAINLEVEL_OUTOF10: 3

## 2024-07-26 ASSESSMENT — COGNITIVE AND FUNCTIONAL STATUS - GENERAL
CLIMB 3 TO 5 STEPS WITH RAILING: A LITTLE
MOVING FROM LYING ON BACK TO SITTING ON SIDE OF FLAT BED WITH BEDRAILS: A LITTLE
STANDING UP FROM CHAIR USING ARMS: A LITTLE
WALKING IN HOSPITAL ROOM: A LITTLE
TURNING FROM BACK TO SIDE WHILE IN FLAT BAD: A LITTLE
MOBILITY SCORE: 18
MOVING TO AND FROM BED TO CHAIR: A LITTLE

## 2024-07-26 ASSESSMENT — PAIN DESCRIPTION - DESCRIPTORS: DESCRIPTORS: SORE

## 2024-07-26 ASSESSMENT — PAIN DESCRIPTION - ORIENTATION
ORIENTATION: MID
ORIENTATION: MID

## 2024-07-26 ASSESSMENT — PAIN DESCRIPTION - LOCATION
LOCATION: NECK
LOCATION: NECK

## 2024-07-26 ASSESSMENT — PAIN - FUNCTIONAL ASSESSMENT
PAIN_FUNCTIONAL_ASSESSMENT: 0-10
PAIN_FUNCTIONAL_ASSESSMENT: 0-10

## 2024-07-26 NOTE — CARE PLAN
Problem: Skin  Goal: Decreased wound size/increased tissue granulation at next dressing change  Outcome: Progressing  Goal: Participates in plan/prevention/treatment measures  Outcome: Progressing  Goal: Promote/optimize nutrition  Outcome: Progressing  Goal: Promote skin healing  Outcome: Progressing     Problem: Discharge Planning  Goal: Discharge to home or other facility with appropriate resources  Outcome: Progressing     Problem: Diabetes  Goal: Increase self care and/or family involovement by end of shift  Outcome: Progressing  Goal: Receive DSME education by end of shift  Outcome: Progressing     Problem: Pain  Goal: Takes deep breaths with improved pain control throughout the shift  Outcome: Progressing  Goal: Walks with improved pain control throughout the shift  Outcome: Progressing  Goal: Performs ADL's with improved pain control throughout shift  Outcome: Progressing  Goal: Participates in PT with improved pain control throughout the shift  Outcome: Progressing   The patient's goals for the shift include rest    The clinical goals for the shift include Patient will tolerate increase in tube feed rate throughout shift.

## 2024-07-26 NOTE — PROGRESS NOTES
"Thoracic Surgery Progress Note  7/26/2024    Juan M Mcrae is a 79 y.o. male with a history of a DM2, HTN, cataracts, CKD, HLD, and STACY who presents to Dr. 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 He underwent neoadjuvant chemoradiation with good response and is now 3 Days Post-Op status post VATS esophagectomy and pyloromyotomy.     Overnight issues: NAEON. Tolerating titration of TF, currently at 30cc/hr. Cervical EVELIO amylase 41 this am.     Physical Exam:  General: He is a pleasant male currently in no distress seen sitting up in bed.  Visit Vitals  /87 (BP Location: Right arm, Patient Position: Lying)   Pulse 69   Temp 36.4 °C (97.5 °F) (Temporal)   Resp 17   Ht 1.702 m (5' 7\")   Wt 73.4 kg (161 lb 13.1 oz)   SpO2 92%   BMI 25.34 kg/m²   Smoking Status Former   BSA 1.86 m²     Body mass index is 25.34 kg/m².   HEENT: Normocephalic and atraumatic.   NECK: Supple. Trach midline. No JVD.  Cervical incision JUDY well approx/no erythema. Cervical EVELIO holding bulb sx with serosang drg.   CHEST: Breathing comfortably on RA.  R previous Chest tube with occlusive dressing in place.  HEART: Regular rate and rhythm. NSR per tele review.   ABDOMEN: Soft, appro tender around abd ML, ss judy. J tube currently capped, secured with mesenteric tape.   : voiding  NEUROLOGIC: Alert and oriented. Grossly intact.   EXTREMITIES: Moves all extremities equally.  Pedal pulses are palpable. No lower extremity edema. No calf tenderness.     Diagnostics:     Intake/Output Summary (Last 24 hours) at 7/26/2024 0750  Last data filed at 7/26/2024 0647  Gross per 24 hour   Intake 3283.33 ml   Output 1370 ml   Net 1913.33 ml     Results from last 7 days   Lab Units 07/25/24  0710 07/24/24  0221 07/23/24  1409   WBC AUTO x10*3/uL 7.8 10.3 8.8   HEMOGLOBIN g/dL 11.6* 11.6* 12.0*   HEMATOCRIT % 35.1* 35.6* 35.8*   PLATELETS AUTO x10*3/uL 242 266 290     Results from last 7 days   Lab Units 07/25/24  0710 07/24/24  0221 " 07/23/24  1409   SODIUM mmol/L 138 134* 135*   POTASSIUM mmol/L 3.8 4.5 4.1   CHLORIDE mmol/L 102 101 100   CO2 mmol/L 25 23 23   BUN mg/dL 9 10 9   CREATININE mg/dL 0.53 0.54 0.63   GLUCOSE mg/dL 95 121* 202*   CALCIUM mg/dL 8.5* 8.6 8.9     Results from last 7 days   Lab Units 07/25/24  0710 07/24/24  0221 07/23/24  1409   SODIUM mmol/L 138 134* 135*   POTASSIUM mmol/L 3.8 4.5 4.1   CHLORIDE mmol/L 102 101 100   CO2 mmol/L 25 23 23   BUN mg/dL 9 10 9   CREATININE mg/dL 0.53 0.54 0.63   GLUCOSE mg/dL 95 121* 202*   CALCIUM mg/dL 8.5* 8.6 8.9     Scheduled medications  acetaminophen, 1,000 mg, intravenous, q6h GUSTAVO  albumin human, 12.5 g, intravenous, Once  famotidine, 20 mg, intravenous, q12h GUSTAVO  heparin (porcine), 5,000 Units, subcutaneous, q8h  insulin lispro, 0-10 Units, subcutaneous, q6h  lidocaine, 1 patch, transdermal, Daily  metoprolol tartrate, 25 mg, j-tube, q8h      Continuous medications  lactated Ringer's, 50 mL/hr, Last Rate: 50 mL/hr (07/26/24 0647)      PRN medications  PRN medications: dextrose, dextrose, glucagon, glucagon, HYDROmorphone    Imaging:   AM CXR reviewed. Expected post op changes and chest tube placement. No clinically significant pneumothorax. No formal report at this time.     Assessment:  Juan M Mcrae is a 79 y.o. male with a history of a DM2, HTN, cataracts, CKD, HLD, and STACY who presents to Dr. 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 He underwent neoadjuvant chemoradiation with good response and is now 2 Days Post-Op status post VATS esophagectomy and pyloromyotomy.     7/24/24:  NGT removed. SVT, increase in beta blocker.  7/25/24:  NAEON. Meza removed. CT removed.    7/26/24: NAEON. On RA. Voiding. Tolerating titration of TF, currently at 30cc/hr. Cervical EVELIO amylase 41 this am. IVF heplocked.      Plan:  NEURO: No known hx. Acute post-op pain.   - ongoing neuro/pain assessments  - Pain control with tylenol J tube scheduled, lido patch, PRN oxycodone  via J tube  - PT/OT consult  - Holding home meds: trazodone 100 mg at bedtime     CV: History of HTN, HLD. Baseline BP 120s/60-80s. No baseline echo.  - continuous EKG/ABP monitoring  - goal map range 65-90  - volume resuscitate as clinically indicated  - Metoprolol 5mg IV q6h for arrhythmia prophylaxis-->10mg Q6-->Metoprolol 25mg TID via J tube, increase as BP/HR tolerate. Increased to 37.5mg TID today.   - Home meds: atenolol 50mg     PULM: History of STACY.   - currently on RA  - Q1h incentive spirometry while awake  - Chest tube discontinue 7/25  - Cervical EVELIO amylase Q am      GI: History of GERD, allergy to omeprazole. Now s/p esophagectomy for esophageal adenocarcinoma.  - Maintain strict NPO  - Holding off PPI 2/2 omeprazole allergy, using alternative option of pepcid  - Maintain HOB up at least 30 degress at all times secondary to high risk of aspiration  - Daily amylase levels from EVELIO drain  - NG discontinue 7/24  - TF via J tube, advance by 10 ml Q 12 hours to goal of 60cc/hr.  100cc FW Q6h.     - dietician input appreciated     : CKD. Baseline creatinine 0.75.  - Check renal function panel post op and daily.   - HL IVF  - voiding after brock removal   - Replete electrolytes to goal K>4, Mg>2     HEME: Acute blood loss anemia. OR .  - Check CBC daily  - COAGS prn  - Ongoing monitoring for s/s bleeding     ENDO: History of DM2 on glargine 10U at home. Sugars 105-120  - Q4h BG   - SSI Lispro per ICU protocol  - Holding home meds: metformin 1000 BID, insulin glargine      ID: Afebrile, await post op WBC.  - trend temp q4, wbc daily  - cefazolin x24hrs completed  - ongoing monitoring for s/s infection    PPX:  - IS, OOB, ambulation   - SC Heparin and SCDs for DVT prophylaxis.     Disposition:  -Plan for discharge to home once stable from a surgical standpoint. If he remains clinically stable, the tentative plan will be for PO trial Sunday, cervical amylase Monday AM. If satisfactory likely home Monday  without the need for TF.     Patient seen and examined by this provider. Plan of care discussed with attending Dr. Manzo.    Karen Delgado, APRN-CNP  Thoracic Surgery Pager [phone]

## 2024-07-26 NOTE — PROGRESS NOTES
Physical Therapy    Physical Therapy Treatment    Patient Name: Juan M Mcrae  MRN: [id number]  Today's Date: 7/26/2024  Time Calculation  Start Time: 1003  Stop Time: 1029  Time Calculation (min): 26 min    Assessment/Plan   PT Assessment  Rehab Prognosis: Excellent  Evaluation/Treatment Tolerance: Patient tolerated treatment well  End of Session Communication: Bedside nurse  Assessment Comment: Pt. is pleasant and motivated. Pt. does report feeling a little bored and looking fwd to getting home. Pt. is on track to discharge home without further therapy needs.  End of Session Patient Position: Up in chair, Alarm off, not on at start of session  PT Plan  Inpatient/Swing Bed or Outpatient: Inpatient  PT Plan  Treatment/Interventions: Bed mobility, Transfer training, Gait training, Stair training, Balance training, Neuromuscular re-education, Strengthening, Endurance training, Range of motion, Therapeutic exercise, Therapeutic activity, Home exercise program, Postural re-education  PT Plan: Ongoing PT  PT Frequency: 3 times per week  PT Discharge Recommendations: No PT needed after discharge  Equipment Recommended upon Discharge: Wheeled walker  PT Recommended Transfer Status: Stand by assist  PT - OK to Discharge: Yes      General Visit Information:   PT  Visit  PT Received On: 07/26/24  General  Reason for Referral: s/p esphophagectomy  Past Medical History Relevant to Rehab: DM2, HTN, cataracts, CKD, HLD, and STACY, GERD, dysphagia  Prior to Session Communication: Bedside nurse  Patient Position Received: Bed, 3 rail up  General Comment: Pt. is pleasant and willing to participate. Pt. has kinsey drain/tele/PIV    Subjective   Precautions:  Precautions  Medical Precautions: Fall precautions, Abdominal precautions  Vital Signs:  Vital Signs  Heart Rate: 78  SpO2: 97 %    Objective   Pain:  Pain Assessment  Pain Assessment: 0-10  0-10 (Numeric) Pain Score: 3  Pain Location: Abdomen  Pain Descriptors: Sore  Pain  Interventions: Medication (See MAR)  Cognition:     Coordination:     Postural Control:  Static Sitting Balance  Static Sitting-Comment/Number of Minutes: SBA  Dynamic Standing Balance  Dynamic Standing-Balance Support: Bilateral upper extremity supported  Dynamic Standing-Comments: Close sba/light cga  Extremity/Trunk Assessments:                      Activity Tolerance:  Activity Tolerance  Endurance: Endurance does not limit participation in activity  Treatments:  Therapeutic Exercise  Therapeutic Exercise Performed:  (Pt. performed standing bilat le ex MARCHES, HEEL RAISES X 15 and ABD X 15 REPS.)    Therapeutic Activity  Therapeutic Activity Performed: Yes  Therapeutic Activity 1: Pt. donned slipper/shoes while seated sba         Ambulation/Gait Training  Ambulation/Gait Training Performed: Yes  Ambulation/Gait Training 1  Surface 1:  (Pt. amb. 100' x 2 using a wh. walker and close sba - cues to keep walker close. Pt. took 1 seated rest break as this was pt.'s first walk out of room.)  Transfers  Transfer: Yes  Transfer 1  Transfer From 1: Chair with arms to  Transfer to 1: Stand  Transfer Device 1: Walker  Transfer Level of Assistance 1: Contact guard    Outcome Measures:  [facility] Basic Mobility  Turning from your back to your side while in a flat bed without using bedrails: A little  Moving from lying on your back to sitting on the side of a flat bed without using bedrails: A little  Moving to and from bed to chair (including a wheelchair): A little  Standing up from a chair using your arms (e.g. wheelchair or bedside chair): A little  To walk in hospital room: A little  Climbing 3-5 steps with railing: A little  Basic Mobility - Total Score: 18    Education Documentation  Precautions, taught by Joana Verma PTA at 7/26/2024 10:53 AM.  Learner: Patient  Readiness: Acceptance  Method: Explanation, Demonstration  Response: Needs Reinforcement    Home Exercise Program, taught by Joana Verma PTA at 7/26/2024  10:53 AM.  Learner: Patient  Readiness: Acceptance  Method: Explanation, Demonstration  Response: Needs Reinforcement    Mobility Training, taught by Joana Verma PTA at 7/26/2024 10:53 AM.  Learner: Patient  Readiness: Acceptance  Method: Explanation, Demonstration  Response: Needs Reinforcement    Education Comments  No comments found.        OP EDUCATION:       Encounter Problems       Encounter Problems (Active)       Balance       STG - Maintains dynamic standing balance with upper extremity support on LRAD with Mod I  (Progressing)       Start:  07/24/24    Expected End:  08/07/24               Mobility       STG - Patient will ambulate >/= 500 ft with Mod I using LRAD (Progressing)       Start:  07/24/24    Expected End:  08/07/24            STG - Patient will ascend and descend 5 stairs with rail vs LRAD and Mod I        Start:  07/24/24    Expected End:  08/07/24               PT Transfers       STG - Patient will perform bed mobility with Mod I (Progressing)       Start:  07/24/24    Expected End:  08/07/24            STG - Patient will transfer sit to and from stand with Mod I using LRAD (Progressing)       Start:  07/24/24    Expected End:  08/07/24               Pain - Adult

## 2024-07-27 ENCOUNTER — APPOINTMENT (OUTPATIENT)
Dept: RADIOLOGY | Facility: HOSPITAL | Age: 79
DRG: 327 | End: 2024-07-27
Payer: MEDICARE

## 2024-07-27 LAB
AMYLASE FLD-CCNC: 34 U/L
ANION GAP SERPL CALC-SCNC: 14 MMOL/L (ref 10–20)
BUN SERPL-MCNC: 11 MG/DL (ref 6–23)
CALCIUM SERPL-MCNC: 8.1 MG/DL (ref 8.6–10.6)
CHLORIDE SERPL-SCNC: 101 MMOL/L (ref 98–107)
CO2 SERPL-SCNC: 26 MMOL/L (ref 21–32)
CREAT SERPL-MCNC: 0.55 MG/DL (ref 0.5–1.3)
EGFRCR SERPLBLD CKD-EPI 2021: >90 ML/MIN/1.73M*2
ERYTHROCYTE [DISTWIDTH] IN BLOOD BY AUTOMATED COUNT: 16 % (ref 11.5–14.5)
GLUCOSE BLD MANUAL STRIP-MCNC: 141 MG/DL (ref 74–99)
GLUCOSE BLD MANUAL STRIP-MCNC: 162 MG/DL (ref 74–99)
GLUCOSE BLD MANUAL STRIP-MCNC: 175 MG/DL (ref 74–99)
GLUCOSE BLD MANUAL STRIP-MCNC: 179 MG/DL (ref 74–99)
GLUCOSE SERPL-MCNC: 164 MG/DL (ref 74–99)
HCT VFR BLD AUTO: 34.8 % (ref 41–52)
HGB BLD-MCNC: 11.7 G/DL (ref 13.5–17.5)
MAGNESIUM SERPL-MCNC: 1.86 MG/DL (ref 1.6–2.4)
MCH RBC QN AUTO: 32.6 PG (ref 26–34)
MCHC RBC AUTO-ENTMCNC: 33.6 G/DL (ref 32–36)
MCV RBC AUTO: 97 FL (ref 80–100)
NRBC BLD-RTO: 0 /100 WBCS (ref 0–0)
PLATELET # BLD AUTO: 246 X10*3/UL (ref 150–450)
POTASSIUM SERPL-SCNC: 3.7 MMOL/L (ref 3.5–5.3)
RBC # BLD AUTO: 3.59 X10*6/UL (ref 4.5–5.9)
SODIUM SERPL-SCNC: 137 MMOL/L (ref 136–145)
WBC # BLD AUTO: 7 X10*3/UL (ref 4.4–11.3)

## 2024-07-27 PROCEDURE — 2500000004 HC RX 250 GENERAL PHARMACY W/ HCPCS (ALT 636 FOR OP/ED): Performed by: NURSE PRACTITIONER

## 2024-07-27 PROCEDURE — 1200000002 HC GENERAL ROOM WITH TELEMETRY DAILY

## 2024-07-27 PROCEDURE — 85027 COMPLETE CBC AUTOMATED: CPT | Performed by: NURSE PRACTITIONER

## 2024-07-27 PROCEDURE — 2500000001 HC RX 250 WO HCPCS SELF ADMINISTERED DRUGS (ALT 637 FOR MEDICARE OP): Performed by: STUDENT IN AN ORGANIZED HEALTH CARE EDUCATION/TRAINING PROGRAM

## 2024-07-27 PROCEDURE — 2500000002 HC RX 250 W HCPCS SELF ADMINISTERED DRUGS (ALT 637 FOR MEDICARE OP, ALT 636 FOR OP/ED): Performed by: NURSE PRACTITIONER

## 2024-07-27 PROCEDURE — 71045 X-RAY EXAM CHEST 1 VIEW: CPT | Performed by: RADIOLOGY

## 2024-07-27 PROCEDURE — 2500000004 HC RX 250 GENERAL PHARMACY W/ HCPCS (ALT 636 FOR OP/ED): Performed by: STUDENT IN AN ORGANIZED HEALTH CARE EDUCATION/TRAINING PROGRAM

## 2024-07-27 PROCEDURE — 82150 ASSAY OF AMYLASE: CPT | Performed by: NURSE PRACTITIONER

## 2024-07-27 PROCEDURE — 2500000001 HC RX 250 WO HCPCS SELF ADMINISTERED DRUGS (ALT 637 FOR MEDICARE OP): Performed by: NURSE PRACTITIONER

## 2024-07-27 PROCEDURE — 71045 X-RAY EXAM CHEST 1 VIEW: CPT

## 2024-07-27 PROCEDURE — 83735 ASSAY OF MAGNESIUM: CPT | Performed by: STUDENT IN AN ORGANIZED HEALTH CARE EDUCATION/TRAINING PROGRAM

## 2024-07-27 PROCEDURE — 82947 ASSAY GLUCOSE BLOOD QUANT: CPT

## 2024-07-27 PROCEDURE — 80048 BASIC METABOLIC PNL TOTAL CA: CPT | Performed by: NURSE PRACTITIONER

## 2024-07-27 PROCEDURE — 36415 COLL VENOUS BLD VENIPUNCTURE: CPT | Performed by: NURSE PRACTITIONER

## 2024-07-27 RX ORDER — MAGNESIUM SULFATE HEPTAHYDRATE 40 MG/ML
2 INJECTION, SOLUTION INTRAVENOUS ONCE
Status: COMPLETED | OUTPATIENT
Start: 2024-07-27 | End: 2024-07-27

## 2024-07-27 RX ORDER — POTASSIUM CHLORIDE 1.5 G/1.58G
40 POWDER, FOR SOLUTION ORAL ONCE
Status: COMPLETED | OUTPATIENT
Start: 2024-07-27 | End: 2024-07-27

## 2024-07-27 RX ORDER — BISACODYL 10 MG/1
10 SUPPOSITORY RECTAL DAILY
Status: DISCONTINUED | OUTPATIENT
Start: 2024-07-27 | End: 2024-07-30 | Stop reason: HOSPADM

## 2024-07-27 ASSESSMENT — PAIN - FUNCTIONAL ASSESSMENT: PAIN_FUNCTIONAL_ASSESSMENT: 0-10

## 2024-07-27 ASSESSMENT — PAIN DESCRIPTION - ORIENTATION
ORIENTATION: MID
ORIENTATION: MID

## 2024-07-27 ASSESSMENT — PAIN SCALES - GENERAL
PAINLEVEL_OUTOF10: 2
PAINLEVEL_OUTOF10: 3
PAINLEVEL_OUTOF10: 3

## 2024-07-27 ASSESSMENT — PAIN DESCRIPTION - LOCATION
LOCATION: NECK

## 2024-07-27 NOTE — PROGRESS NOTES
TCC admission assessment completed. Explained role of TCC - verbalized understanding.     Demographics/Insurance: Confirmed in chart  Living Environment: Lives alone.  Primary Support Person: Significant Other, Arlene, [phone]  PCP: Dr. Candy Grossman. Last seen within the past 2 months.  Recent Falls: denies  Assistive Devices/DME: denies  Home Oxygen: denies  Dialysis: denies  Home Care Agency: denies  Transportation at Discharge: Significant other will transport home at discharge  Pharmacy: [facility]  Concerns about discharge: None at this time. If home care is needed, patient is agreeable to [facility].     Maureen Raines RN, TCC

## 2024-07-27 NOTE — PROGRESS NOTES
"Thoracic Surgery Progress Note  7/27/2024    Juan M Mcrae is a 79 y.o. male with a history of a DM2, HTN, cataracts, CKD, HLD, and STACY who presents to Dr. 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 He underwent neoadjuvant chemoradiation with good response and is now 4 Days Post-Op status post VATS esophagectomy and pyloromyotomy.     Overnight issues: NAEON. Tolerating titration of TF, currently at 50cc/hr. Cervical EVELIO amylase 34 this am with SS output.     Physical Exam:  General: He is a pleasant male currently in no distress seen sitting up in bed.  Visit Vitals  /87 (BP Location: Right arm, Patient Position: Lying)   Pulse 101   Temp 36.8 °C (98.2 °F) (Temporal)   Resp 18   Ht 1.702 m (5' 7\")   Wt 72.6 kg (160 lb)   SpO2 93%   BMI 25.06 kg/m²   Smoking Status Former   BSA 1.85 m²     Body mass index is 25.06 kg/m².   HEENT: Normocephalic and atraumatic.   NECK: Supple. Trach midline. No JVD.  Cervical incision JUDY well approx/no erythema. Cervical EVELIO holding bulb sx with serosang drg.   CHEST: Breathing comfortably on RA.  R previous Chest tube with occlusive dressing in place.  HEART: Regular rate and rhythm. NSR per tele review.   ABDOMEN: Soft, appro tender around abd ML, ss judy. J tube currently capped, secured with mesenteric tape.   : voiding  NEUROLOGIC: Alert and oriented. Grossly intact.   EXTREMITIES: Moves all extremities equally.  Pedal pulses are palpable. No lower extremity edema. No calf tenderness.     Diagnostics:     Intake/Output Summary (Last 24 hours) at 7/27/2024 1253  Last data filed at 7/27/2024 0849  Gross per 24 hour   Intake 540 ml   Output 1483 ml   Net -943 ml     Results from last 7 days   Lab Units 07/27/24  0857 07/26/24  0644 07/25/24  0710   WBC AUTO x10*3/uL 7.0 6.4 7.8   HEMOGLOBIN g/dL 11.7* 11.6* 11.6*   HEMATOCRIT % 34.8* 35.4* 35.1*   PLATELETS AUTO x10*3/uL 246 251 242     Results from last 7 days   Lab Units 07/27/24  0857 07/26/24  1124 " 07/25/24  0710   SODIUM mmol/L 137 138 138   POTASSIUM mmol/L 3.7 4.2 3.8   CHLORIDE mmol/L 101 101 102   CO2 mmol/L 26 27 25   BUN mg/dL 11 10 9   CREATININE mg/dL 0.55 0.55 0.53   GLUCOSE mg/dL 164* 151* 95   CALCIUM mg/dL 8.1* 8.4* 8.5*     Results from last 7 days   Lab Units 07/27/24  0857 07/26/24  1124 07/25/24  0710   SODIUM mmol/L 137 138 138   POTASSIUM mmol/L 3.7 4.2 3.8   CHLORIDE mmol/L 101 101 102   CO2 mmol/L 26 27 25   BUN mg/dL 11 10 9   CREATININE mg/dL 0.55 0.55 0.53   GLUCOSE mg/dL 164* 151* 95   CALCIUM mg/dL 8.1* 8.4* 8.5*     Scheduled medications  acetaminophen, 650 mg, j-tube, q6h  albumin human, 12.5 g, intravenous, Once  bisacodyl, 10 mg, rectal, Daily  famotidine, 20 mg, intravenous, q12h GUSTAVO  heparin (porcine), 5,000 Units, subcutaneous, q8h  insulin lispro, 0-10 Units, subcutaneous, q6h  lidocaine, 1 patch, transdermal, Daily  magnesium sulfate, 2 g, intravenous, Once  metoprolol tartrate, 37.5 mg, j-tube, q8h      Continuous medications       PRN medications  PRN medications: dextrose, dextrose, glucagon, glucagon, oxyCODONE, oxyCODONE    Imaging:   AM CXR reviewed. Expected post op changes and chest tube placement. No clinically significant pneumothorax. No formal report at this time.     Assessment:  Juan M Mcrae is a 79 y.o. male with a history of a DM2, HTN, cataracts, CKD, HLD, and STACY who presents to Dr. 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 He underwent neoadjuvant chemoradiation with good response and is now 2 Days Post-Op status post VATS esophagectomy and pyloromyotomy.     7/24/24:  NGT removed. SVT, increase in beta blocker.  7/25/24:  NAEON. Meza removed. CT removed.    7/26/24: NAEON. On RA. Voiding. Tolerating titration of TF, at 30cc/hr. Cervical EVELIO amylase 41 this am. IVF heplocked.  7/27/24: MARTINEZ on RA. Voiding, cervical EVELIO amylase 34. Tolerating TF at 50cc/hr; will advance to goal/60/hr today. Awaiting BM. Suppository  today.    Plan:  NEURO: No known hx. Acute post-op pain.   - ongoing neuro/pain assessments  - Pain control with tylenol J tube scheduled, lido patch, PRN oxycodone via J tube  - PT/OT consult  - Holding home meds: trazodone 100 mg at bedtime     CV: History of HTN, HLD. Baseline BP 120s/60-80s. No baseline echo.  - continuous EKG/ABP monitoring  - goal map range 65-90  - volume resuscitate as clinically indicated  - Metoprolol 5mg IV q6h for arrhythmia prophylaxis-->10mg Q6-->Metoprolol 25mg TID via J tube, increase as BP/HR tolerate. Increased to 37.5mg TID today.   - Home meds: atenolol 50mg     PULM: History of STACY.   - currently on RA  - Q1h incentive spirometry while awake  - Chest tube discontinue 7/25  - Cervical EVELIO amylase Q am      GI: History of GERD, allergy to omeprazole. Now s/p esophagectomy for esophageal adenocarcinoma.  - Maintain strict NPO  - Holding off PPI 2/2 omeprazole allergy, using alternative option of pepcid  - Maintain HOB up at least 30 degress at all times secondary to high risk of aspiration  - Daily amylase levels from EVELIO drain  - NG discontinue 7/24  - TF via J tube, advance by 10 ml Q 12 hours to goal of 60cc/hr.  100cc FW Q6h.     - dietician input appreciated  - plan for sips tomorrow if clinically stable and swallow study Monday 7/29; if swallow is negative, can advance to FLD on Monday  - awaiting BM; give suppository today     : CKD. Baseline creatinine 0.75.  - Check renal function panel post op and daily.   - HL IVF  - voiding after brock removal   - Replete electrolytes to goal K>4, Mg>2     HEME: Acute blood loss anemia. OR .  - Check CBC daily  - COAGS prn  - Ongoing monitoring for s/s bleeding     ENDO: History of DM2 on glargine 10U at home. Sugars 105-120  - Q4h BG   - SSI Lispro per ICU protocol  - Holding home meds: metformin 1000 BID, insulin glargine      ID: Afebrile, await post op WBC.  - trend temp q4, wbc daily  - cefazolin x24hrs completed  - ongoing  monitoring for s/s infection    PPX:  - IS, OOB, ambulation   - SC Heparin and SCDs for DVT prophylaxis.     Disposition:  -Plan for discharge to home once stable from a surgical standpoint. If he remains clinically stable, the tentative plan will be for PO trial Sunday, cervical amylase Monday AM. If satisfactory likely home Monday without the need for TF.     Patient seen and examined, and plan of care discussed with attending Dr. Manzo.    Jenelle Norton MD  General surgery resident  Thoracic Surgery [id number]

## 2024-07-28 ENCOUNTER — APPOINTMENT (OUTPATIENT)
Dept: RADIOLOGY | Facility: HOSPITAL | Age: 79
DRG: 327 | End: 2024-07-28
Payer: MEDICARE

## 2024-07-28 VITALS
TEMPERATURE: 96.3 F | HEIGHT: 67 IN | WEIGHT: 160.7 LBS | DIASTOLIC BLOOD PRESSURE: 81 MMHG | OXYGEN SATURATION: 95 % | RESPIRATION RATE: 17 BRPM | HEART RATE: 85 BPM | BODY MASS INDEX: 25.22 KG/M2 | SYSTOLIC BLOOD PRESSURE: 154 MMHG

## 2024-07-28 LAB
AMYLASE FLD-CCNC: <10 U/L
ANION GAP SERPL CALC-SCNC: 16 MMOL/L (ref 10–20)
ATRIAL RATE: 97 BPM
BUN SERPL-MCNC: 13 MG/DL (ref 6–23)
CALCIUM SERPL-MCNC: 9 MG/DL (ref 8.6–10.6)
CHLORIDE SERPL-SCNC: 101 MMOL/L (ref 98–107)
CO2 SERPL-SCNC: 24 MMOL/L (ref 21–32)
CREAT SERPL-MCNC: 0.59 MG/DL (ref 0.5–1.3)
EGFRCR SERPLBLD CKD-EPI 2021: >90 ML/MIN/1.73M*2
ERYTHROCYTE [DISTWIDTH] IN BLOOD BY AUTOMATED COUNT: 16.7 % (ref 11.5–14.5)
GLUCOSE BLD MANUAL STRIP-MCNC: 146 MG/DL (ref 74–99)
GLUCOSE BLD MANUAL STRIP-MCNC: 169 MG/DL (ref 74–99)
GLUCOSE BLD MANUAL STRIP-MCNC: 175 MG/DL (ref 74–99)
GLUCOSE BLD MANUAL STRIP-MCNC: 221 MG/DL (ref 74–99)
GLUCOSE SERPL-MCNC: 161 MG/DL (ref 74–99)
HCT VFR BLD AUTO: 38.3 % (ref 41–52)
HGB BLD-MCNC: 12.2 G/DL (ref 13.5–17.5)
MAGNESIUM SERPL-MCNC: 2 MG/DL (ref 1.6–2.4)
MCH RBC QN AUTO: 32.2 PG (ref 26–34)
MCHC RBC AUTO-ENTMCNC: 31.9 G/DL (ref 32–36)
MCV RBC AUTO: 101 FL (ref 80–100)
NRBC BLD-RTO: 0 /100 WBCS (ref 0–0)
P AXIS: 54 DEGREES
P OFFSET: 196 MS
P ONSET: 142 MS
PLATELET # BLD AUTO: 258 X10*3/UL (ref 150–450)
POTASSIUM SERPL-SCNC: 4 MMOL/L (ref 3.5–5.3)
PR INTERVAL: 138 MS
Q ONSET: 211 MS
QRS COUNT: 16 BEATS
QRS DURATION: 86 MS
QT INTERVAL: 348 MS
QTC CALCULATION(BAZETT): 441 MS
QTC FREDERICIA: 408 MS
R AXIS: 68 DEGREES
RBC # BLD AUTO: 3.79 X10*6/UL (ref 4.5–5.9)
SODIUM SERPL-SCNC: 137 MMOL/L (ref 136–145)
T AXIS: 15 DEGREES
T OFFSET: 385 MS
VENTRICULAR RATE: 97 BPM
WBC # BLD AUTO: 8.3 X10*3/UL (ref 4.4–11.3)

## 2024-07-28 PROCEDURE — 36415 COLL VENOUS BLD VENIPUNCTURE: CPT | Performed by: NURSE PRACTITIONER

## 2024-07-28 PROCEDURE — 80051 ELECTROLYTE PANEL: CPT | Performed by: NURSE PRACTITIONER

## 2024-07-28 PROCEDURE — 82947 ASSAY GLUCOSE BLOOD QUANT: CPT

## 2024-07-28 PROCEDURE — 71045 X-RAY EXAM CHEST 1 VIEW: CPT | Performed by: RADIOLOGY

## 2024-07-28 PROCEDURE — 2500000004 HC RX 250 GENERAL PHARMACY W/ HCPCS (ALT 636 FOR OP/ED): Performed by: NURSE PRACTITIONER

## 2024-07-28 PROCEDURE — 85027 COMPLETE CBC AUTOMATED: CPT | Performed by: NURSE PRACTITIONER

## 2024-07-28 PROCEDURE — 83735 ASSAY OF MAGNESIUM: CPT | Performed by: STUDENT IN AN ORGANIZED HEALTH CARE EDUCATION/TRAINING PROGRAM

## 2024-07-28 PROCEDURE — 82150 ASSAY OF AMYLASE: CPT | Performed by: NURSE PRACTITIONER

## 2024-07-28 PROCEDURE — 2500000004 HC RX 250 GENERAL PHARMACY W/ HCPCS (ALT 636 FOR OP/ED)

## 2024-07-28 PROCEDURE — 2500000002 HC RX 250 W HCPCS SELF ADMINISTERED DRUGS (ALT 637 FOR MEDICARE OP, ALT 636 FOR OP/ED): Performed by: NURSE PRACTITIONER

## 2024-07-28 PROCEDURE — 71045 X-RAY EXAM CHEST 1 VIEW: CPT

## 2024-07-28 PROCEDURE — 2500000001 HC RX 250 WO HCPCS SELF ADMINISTERED DRUGS (ALT 637 FOR MEDICARE OP): Performed by: NURSE PRACTITIONER

## 2024-07-28 PROCEDURE — 1200000002 HC GENERAL ROOM WITH TELEMETRY DAILY

## 2024-07-28 ASSESSMENT — PAIN DESCRIPTION - LOCATION
LOCATION: ABDOMEN
LOCATION: NECK
LOCATION: NECK

## 2024-07-28 ASSESSMENT — PAIN SCALES - GENERAL
PAINLEVEL_OUTOF10: 2
PAINLEVEL_OUTOF10: 3
PAINLEVEL_OUTOF10: 2

## 2024-07-28 ASSESSMENT — PAIN DESCRIPTION - ORIENTATION: ORIENTATION: MID

## 2024-07-28 NOTE — PROGRESS NOTES
"Thoracic Surgery Progress Note  7/28/2024    Juan M Mcrae is a 79 y.o. male with a history of a DM2, HTN, cataracts, CKD, HLD, and STACY who presents to Dr. 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 He underwent neoadjuvant chemoradiation with good response and is now 5 Days Post-Op status post VATS esophagectomy and pyloromyotomy.     Overnight issues: NAEON. HR elevated overnight to low 100's but has since normalized. Patient feels well, denies chest pain. Tolerating sips.    Physical Exam:  General: He is a pleasant male currently in no distress seen sitting up in a chair.    Visit Vitals  /79 (BP Location: Right arm, Patient Position: Sitting)   Pulse 86   Temp 36.9 °C (98.4 °F) (Temporal)   Resp 16   Ht 1.702 m (5' 7\")   Wt 72.9 kg (160 lb 11.2 oz)   SpO2 95%   BMI 25.17 kg/m²   Smoking Status Former   BSA 1.86 m²     Body mass index is 25.17 kg/m².   HEENT: Normocephalic and atraumatic.   NECK: Supple. Trach midline. No JVD.  Cervical incision JUDY well approx/no erythema. Cervical EVELIO holding bulb sx with serosang drg.   CHEST: Breathing comfortably on RA.  R previous Chest tube with occlusive dressing in place.  HEART: Regular rate and rhythm. NSR per tele review.   ABDOMEN: Soft, appro tender around abd ML, ss judy. Tube feeds being administered via J tube.  : voiding  NEUROLOGIC: Alert and oriented. Grossly intact.   EXTREMITIES: Moves all extremities equally.  Pedal pulses are palpable. No lower extremity edema. No calf tenderness.     Diagnostics:     Intake/Output Summary (Last 24 hours) at 7/28/2024 1100  Last data filed at 7/28/2024 0616  Gross per 24 hour   Intake --   Output 1060 ml   Net -1060 ml     Results from last 7 days   Lab Units 07/28/24  0854 07/27/24  0857 07/26/24  0644   WBC AUTO x10*3/uL 8.3 7.0 6.4   HEMOGLOBIN g/dL 12.2* 11.7* 11.6*   HEMATOCRIT % 38.3* 34.8* 35.4*   PLATELETS AUTO x10*3/uL 258 246 251     Results from last 7 days   Lab Units " 07/28/24  0854 07/27/24  0857 07/26/24  1124   SODIUM mmol/L 137 137 138   POTASSIUM mmol/L 4.0 3.7 4.2   CHLORIDE mmol/L 101 101 101   CO2 mmol/L 24 26 27   BUN mg/dL 13 11 10   CREATININE mg/dL 0.59 0.55 0.55   GLUCOSE mg/dL 161* 164* 151*   CALCIUM mg/dL 9.0 8.1* 8.4*     Results from last 7 days   Lab Units 07/28/24  0854 07/27/24  0857 07/26/24  1124   SODIUM mmol/L 137 137 138   POTASSIUM mmol/L 4.0 3.7 4.2   CHLORIDE mmol/L 101 101 101   CO2 mmol/L 24 26 27   BUN mg/dL 13 11 10   CREATININE mg/dL 0.59 0.55 0.55   GLUCOSE mg/dL 161* 164* 151*   CALCIUM mg/dL 9.0 8.1* 8.4*     Scheduled medications  acetaminophen, 650 mg, j-tube, q6h  albumin human, 12.5 g, intravenous, Once  bisacodyl, 10 mg, rectal, Daily  famotidine, 20 mg, intravenous, q12h GUSTAVO  heparin (porcine), 5,000 Units, subcutaneous, q8h  insulin lispro, 0-10 Units, subcutaneous, q6h  lidocaine, 1 patch, transdermal, Daily  metoprolol tartrate, 37.5 mg, j-tube, q8h      Continuous medications       PRN medications  PRN medications: dextrose, dextrose, glucagon, glucagon, oxyCODONE, oxyCODONE    Imaging:   AM CXR reviewed. Expected post op changes and chest tube placement. No clinically significant pneumothorax. No formal report at this time.     Assessment:  Juan M Mcrae is a 79 y.o. male with a history of a DM2, HTN, cataracts, CKD, HLD, and STACY who presents to Dr. 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 He underwent neoadjuvant chemoradiation with good response and is now 2 Days Post-Op status post VATS esophagectomy and pyloromyotomy.     7/24/24:  NGT removed. SVT, increase in beta blocker.  7/25/24:  NAEON. Meza removed. CT removed.    7/26/24: NAEON. On RA. Voiding. Tolerating titration of TF, at 30cc/hr. Cervical EVELIO amylase 41 this am. IVF heplocked.  7/27/24: NAEON on RA. Voiding, cervical EVELIO amylase 34. Tolerating TF at 50cc/hr; will advance to goal/60/hr today. Awaiting BM. Suppository today.    Plan:  NEURO: No  known hx. Acute post-op pain.   - ongoing neuro/pain assessments  - Pain control with tylenol J tube scheduled, lido patch, PRN oxycodone via J tube  - PT/OT consult  - Holding home meds: trazodone 100 mg at bedtime     CV: History of HTN, HLD. Baseline BP 120s/60-80s. No baseline echo.  - continuous EKG/ABP monitoring  - goal map range 65-90  - volume resuscitate as clinically indicated  - Metoprolol 37.5mg TID   - Home meds: atenolol 50mg     PULM: History of STACY.   - currently on RA  - Q1h incentive spirometry while awake  - Chest tube discontinue 7/25     GI: History of GERD, allergy to omeprazole. Now s/p esophagectomy for esophageal adenocarcinoma.  - Sips of clear liquids  - Holding off PPI 2/2 omeprazole allergy, using alternative option of pepcid  - Maintain HOB up at least 30 degress at all times secondary to high risk of aspiration  - Cervical EVELIO amylase Q am, pending 7/28 levels  - TF via J tube, advance by 10 ml Q 12 hours to goal of 60cc/hr.  100cc FW Q6h.     - Swallow study Monday 7/29; if swallow is negative, can advance to FLD on Monday     : CKD. Baseline creatinine 0.75.  - Check renal function panel post op and daily.   - HL IVF  - voiding after brock removal   - Replete electrolytes to goal K>4, Mg>2     HEME: Acute blood loss anemia. OR .  - Check CBC daily  - Ongoing monitoring for s/s bleeding     ENDO: History of DM2 on glargine 10U at home. Sugars 105-120  - Q4h BG   - SSI Lispro per ICU protocol  - Holding home meds: metformin 1000 BID, insulin glargine      ID: Afebrile, await post op WBC.  - trend temp q4, wbc daily  - cefazolin x24hrs completed  - ongoing monitoring for s/s infection    PPX:  - IS, OOB, ambulation   - SC Heparin and SCDs for DVT prophylaxis.     Disposition:  -Plan for discharge to home once stable from a surgical standpoint. If he remains clinically stable, the tentative plan will be for PO trial Sunday, cervical amylase Monday AM. If satisfactory likely home  Monday without the need for TF.     Patient seen and discussed with attending, Dr. Lay.    Ashlee Bonilla MD  PGY-2 General Surgery  Thoracic Surgery [id number]

## 2024-07-29 ENCOUNTER — APPOINTMENT (OUTPATIENT)
Dept: RADIOLOGY | Facility: HOSPITAL | Age: 79
DRG: 327 | End: 2024-07-29
Payer: MEDICARE

## 2024-07-29 LAB
AMYLASE FLD-CCNC: <10 U/L
AMYLASE FLD-CCNC: <10 U/L
ANION GAP SERPL CALC-SCNC: 13 MMOL/L (ref 10–20)
BUN SERPL-MCNC: 15 MG/DL (ref 6–23)
CALCIUM SERPL-MCNC: 8.9 MG/DL (ref 8.6–10.6)
CHLORIDE SERPL-SCNC: 101 MMOL/L (ref 98–107)
CO2 SERPL-SCNC: 28 MMOL/L (ref 21–32)
CREAT SERPL-MCNC: 0.59 MG/DL (ref 0.5–1.3)
EGFRCR SERPLBLD CKD-EPI 2021: >90 ML/MIN/1.73M*2
ERYTHROCYTE [DISTWIDTH] IN BLOOD BY AUTOMATED COUNT: 16.7 % (ref 11.5–14.5)
GLUCOSE BLD MANUAL STRIP-MCNC: 152 MG/DL (ref 74–99)
GLUCOSE BLD MANUAL STRIP-MCNC: 153 MG/DL (ref 74–99)
GLUCOSE BLD MANUAL STRIP-MCNC: 160 MG/DL (ref 74–99)
GLUCOSE BLD MANUAL STRIP-MCNC: 179 MG/DL (ref 74–99)
GLUCOSE BLD MANUAL STRIP-MCNC: 184 MG/DL (ref 74–99)
GLUCOSE SERPL-MCNC: 166 MG/DL (ref 74–99)
HCT VFR BLD AUTO: 35 % (ref 41–52)
HGB BLD-MCNC: 11.4 G/DL (ref 13.5–17.5)
LAB AP BLOCK FOR ADDITIONAL STUDIES: NORMAL
LABORATORY COMMENT REPORT: NORMAL
MAGNESIUM SERPL-MCNC: 1.97 MG/DL (ref 1.6–2.4)
MCH RBC QN AUTO: 32 PG (ref 26–34)
MCHC RBC AUTO-ENTMCNC: 32.6 G/DL (ref 32–36)
MCV RBC AUTO: 98 FL (ref 80–100)
NRBC BLD-RTO: 0 /100 WBCS (ref 0–0)
PATH REPORT.FINAL DX SPEC: NORMAL
PATH REPORT.GROSS SPEC: NORMAL
PATH REPORT.RELEVANT HX SPEC: NORMAL
PATH REPORT.TOTAL CANCER: NORMAL
PATHOLOGY SYNOPTIC REPORT: NORMAL
PLATELET # BLD AUTO: 251 X10*3/UL (ref 150–450)
POTASSIUM SERPL-SCNC: 4 MMOL/L (ref 3.5–5.3)
RBC # BLD AUTO: 3.56 X10*6/UL (ref 4.5–5.9)
SODIUM SERPL-SCNC: 138 MMOL/L (ref 136–145)
WBC # BLD AUTO: 7 X10*3/UL (ref 4.4–11.3)

## 2024-07-29 PROCEDURE — 74220 X-RAY XM ESOPHAGUS 1CNTRST: CPT | Performed by: STUDENT IN AN ORGANIZED HEALTH CARE EDUCATION/TRAINING PROGRAM

## 2024-07-29 PROCEDURE — 2550000001 HC RX 255 CONTRASTS: Performed by: THORACIC SURGERY (CARDIOTHORACIC VASCULAR SURGERY)

## 2024-07-29 PROCEDURE — 71045 X-RAY EXAM CHEST 1 VIEW: CPT | Performed by: RADIOLOGY

## 2024-07-29 PROCEDURE — 83735 ASSAY OF MAGNESIUM: CPT | Performed by: STUDENT IN AN ORGANIZED HEALTH CARE EDUCATION/TRAINING PROGRAM

## 2024-07-29 PROCEDURE — 82947 ASSAY GLUCOSE BLOOD QUANT: CPT

## 2024-07-29 PROCEDURE — 85027 COMPLETE CBC AUTOMATED: CPT | Performed by: NURSE PRACTITIONER

## 2024-07-29 PROCEDURE — 2550000001 HC RX 255 CONTRASTS

## 2024-07-29 PROCEDURE — 2500000004 HC RX 250 GENERAL PHARMACY W/ HCPCS (ALT 636 FOR OP/ED): Performed by: NURSE PRACTITIONER

## 2024-07-29 PROCEDURE — 74018 RADEX ABDOMEN 1 VIEW: CPT | Performed by: RADIOLOGY

## 2024-07-29 PROCEDURE — 36415 COLL VENOUS BLD VENIPUNCTURE: CPT | Performed by: NURSE PRACTITIONER

## 2024-07-29 PROCEDURE — 80048 BASIC METABOLIC PNL TOTAL CA: CPT | Performed by: STUDENT IN AN ORGANIZED HEALTH CARE EDUCATION/TRAINING PROGRAM

## 2024-07-29 PROCEDURE — 2500000002 HC RX 250 W HCPCS SELF ADMINISTERED DRUGS (ALT 637 FOR MEDICARE OP, ALT 636 FOR OP/ED): Performed by: NURSE PRACTITIONER

## 2024-07-29 PROCEDURE — 99232 SBSQ HOSP IP/OBS MODERATE 35: CPT | Performed by: NURSE PRACTITIONER

## 2024-07-29 PROCEDURE — 71045 X-RAY EXAM CHEST 1 VIEW: CPT

## 2024-07-29 PROCEDURE — 1200000002 HC GENERAL ROOM WITH TELEMETRY DAILY

## 2024-07-29 PROCEDURE — 82150 ASSAY OF AMYLASE: CPT | Performed by: NURSE PRACTITIONER

## 2024-07-29 PROCEDURE — 74018 RADEX ABDOMEN 1 VIEW: CPT

## 2024-07-29 PROCEDURE — 2500000001 HC RX 250 WO HCPCS SELF ADMINISTERED DRUGS (ALT 637 FOR MEDICARE OP): Performed by: NURSE PRACTITIONER

## 2024-07-29 PROCEDURE — 74220 X-RAY XM ESOPHAGUS 1CNTRST: CPT

## 2024-07-29 RX ORDER — INSULIN GLARGINE 100 [IU]/ML
4 INJECTION, SOLUTION SUBCUTANEOUS EVERY 24 HOURS
Status: DISCONTINUED | OUTPATIENT
Start: 2024-07-29 | End: 2024-07-30 | Stop reason: HOSPADM

## 2024-07-29 NOTE — PROGRESS NOTES
"Thoracic Surgery Progress Note  7/29/2024    Juan M Mcrae is a 79 y.o. male with a history of a DM2, HTN, cataracts, CKD, HLD, and STACY who presents to Dr. 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 He underwent neoadjuvant chemoradiation with good response and is now 6 Days Post-Op status post VATS esophagectomy and pyloromyotomy.     Overnight issues:   No significant events overnight  Murky, foul smelling drainage from neck EVELIO this am   Slight tachycardia yesterday     Physical Exam:  General: He is a pleasant male currently in no distress seen sitting up in a chair.    Visit Vitals  /80 (BP Location: Right arm, Patient Position: Sitting)   Pulse 84   Temp 36.7 °C (98.1 °F)   Resp 17   Ht 1.702 m (5' 7\")   Wt 72.8 kg (160 lb 9.6 oz)   SpO2 94%   BMI 25.15 kg/m²   Smoking Status Former   BSA 1.86 m²     Body mass index is 25.15 kg/m².   HEENT: Normocephalic and atraumatic.   NECK: Supple. Trach midline. No JVD.  Cervical incision JUDY well approx/no erythema. Cervical EVELIO holding bulb sx with murky, foul smelling fluid , 10 ml in 24 hrs   CHEST: Breathing comfortably on RA.  R previous Chest tube JUDY, well approximated   HEART: Regular rate and rhythm. NSR per tele review.   ABDOMEN: Soft, appro tender around abd ML, ss judy. + flatus and BM  : voiding  NEUROLOGIC: Alert and oriented. Grossly intact.   EXTREMITIES: Moves all extremities equally.  Pedal pulses are palpable. No lower extremity edema. No calf tenderness.     Diagnostics:   Scheduled medications  acetaminophen, 650 mg, j-tube, q6h  bisacodyl, 10 mg, rectal, Daily  famotidine, 20 mg, intravenous, q12h GUSTAVO  heparin (porcine), 5,000 Units, subcutaneous, q8h  insulin glargine, 4 Units, subcutaneous, q24h  insulin lispro, 0-10 Units, subcutaneous, q6h  lidocaine, 1 patch, transdermal, Daily  metoprolol tartrate, 37.5 mg, j-tube, q8h      Continuous medications     PRN medications  PRN medications: dextrose, dextrose, glucagon, " glucagon, oxyCODONE, oxyCODONE    Results for orders placed or performed during the hospital encounter of 07/23/24 (from the past 24 hour(s))   POCT GLUCOSE   Result Value Ref Range    POCT Glucose 221 (H) 74 - 99 mg/dL   POCT GLUCOSE   Result Value Ref Range    POCT Glucose 146 (H) 74 - 99 mg/dL   POCT GLUCOSE   Result Value Ref Range    POCT Glucose 179 (H) 74 - 99 mg/dL   POCT GLUCOSE   Result Value Ref Range    POCT Glucose 184 (H) 74 - 99 mg/dL   Amylase, Fluid   Result Value Ref Range    Amylase, Fluid <10 Not established. U/L   CBC   Result Value Ref Range    WBC 7.0 4.4 - 11.3 x10*3/uL    nRBC 0.0 0.0 - 0.0 /100 WBCs    RBC 3.56 (L) 4.50 - 5.90 x10*6/uL    Hemoglobin 11.4 (L) 13.5 - 17.5 g/dL    Hematocrit 35.0 (L) 41.0 - 52.0 %    MCV 98 80 - 100 fL    MCH 32.0 26.0 - 34.0 pg    MCHC 32.6 32.0 - 36.0 g/dL    RDW 16.7 (H) 11.5 - 14.5 %    Platelets 251 150 - 450 x10*3/uL   Magnesium   Result Value Ref Range    Magnesium 1.97 1.60 - 2.40 mg/dL   Basic metabolic panel   Result Value Ref Range    Glucose 166 (H) 74 - 99 mg/dL    Sodium 138 136 - 145 mmol/L    Potassium 4.0 3.5 - 5.3 mmol/L    Chloride 101 98 - 107 mmol/L    Bicarbonate 28 21 - 32 mmol/L    Anion Gap 13 10 - 20 mmol/L    Urea Nitrogen 15 6 - 23 mg/dL    Creatinine 0.59 0.50 - 1.30 mg/dL    eGFR >90 >60 mL/min/1.73m*2    Calcium 8.9 8.6 - 10.6 mg/dL       XR chest 1 view 07/28/2024    Narrative  Interpreted By:  Chay Guzman,  STUDY:  XR CHEST 1 VIEW;  7/28/2024 4:06 am    INDICATION:  Signs/Symptoms:effusion.    COMPARISON:  Exam dated 07/27/2024    ACCESSION NUMBER(S):  [id number]    ORDERING CLINICIAN:  NEAL LAZO    FINDINGS:  AP radiograph of the chest was provided.    Right chest wall medication port with catheter tip projecting over  the lower SVC. Surgical drain projects over the superior mediastinum.    CARDIOMEDIASTINAL SILHOUETTE:  Cardiomediastinal silhouette is stable in size and configuration.    LUNGS:  Stable small left  pleural effusion and left basilar opacities. Slight  interval increase in right basilar opacities are most consistent with  atelectasis. No evidence of pneumothorax.    ABDOMEN:  No remarkable upper abdominal findings.    BONES:  No acute osseous changes.    Impression  1.  Stable small left pleural effusion with left basilar  atelectasis/consolidation. Interval increase in right basilar  opacities are most consistent with atelectasis.  2. Medical devices as above.        MACRO:  None    Signed by: Chay Guzman 7/28/2024 8:58 AM  Dictation workstation:   [id number]    Assessment:  Juan M Mcrae is a 79 y.o. male with a history of a DM2, HTN, cataracts, CKD, HLD, and SATCY who presents to Dr. 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 He underwent neoadjuvant chemoradiation with good response and is now post VATS esophagectomy and pyloromyotomy.     7/24/24:  NGT removed. SVT, increase in beta blocker.  7/25/24:  NAEON. Meza removed. CT removed.    7/26/24: NAEON. On RA. Voiding. Tolerating titration of TF, at 30cc/hr. Cervical EVELIO amylase 41 this am. IVF heplocked.  7/27/24: NAEON on RA. Voiding, cervical EVELIO amylase 34. Tolerating TF at 50cc/hr; will advance to goal/60/hr today. Awaiting BM. Suppository today.  7/28/24: BM, tachycardia , murky EVELIO drainage     Plan:  NEURO: No known hx. Acute post-op pain.   - Ongoing neuro/pain assessments  - Good pain control with tylenol J tube scheduled, lido patch, PRN oxycodone via J tube  - Bowel regimen while on narcotics   - Lidocaine patch to incisions   - Encourage OOB/ambulation   - PT/OT following   - Holding home meds: trazodone 100 mg at bedtime     CV: History of HTN, HLD. Baseline BP 120s/60-80s. No baseline echo.  Normotensive, NSR this am.   - Continuous telemetry and VS every 4 hrs   - Metoprolol 37.5mg TID   - Replace electrolytes as needed   - Home meds: atenolol 50mg     PULM: History of STACY. Atelectasis, chest tube management   -  Oxygenating well on RA  - Q1h incentive spirometry while awake  - Chest tube discontinue 7/25  - Daily CXR's      GI: History of GERD, allergy to omeprazole. Now s/p esophagectomy for esophageal adenocarcinoma.  - Sips of clear liquids  - Holding off PPI 2/2 omeprazole allergy, using alternative option of pepcid  - Maintain HOB up at least 30 degress at all times secondary to high risk of aspiration  - Cervical EVELIO amylase: < 10 this am however murky and foul smelling fluid this am   - Awaiting esophagram today   - TF via J tube to goal of 60 ml/hr   - Await official pathology results      : CKD. Baseline creatinine 0.75.  - Check renal function panel post op and daily.   - Voiding after brock removal   - Replete electrolytes to goal K>4, Mg>2     HEME: Acute blood loss anemia. OR .  - Check CBC daily  - Ongoing monitoring for s/s bleeding     ENDO: History of DM2 on glargine 10U and Metformin 1000 mg BID at home  - BG with  SSI Lispro per protocol  - Holding home meds: metformin 1000 BID, resume insulin glargine at 4 U, adjust as needed, enteral feeds at goal (glucerna)     ID: Afebrile, no leukocytosis   - Trend temp q4, wbc daily  - Completed cefazolin x24hrs   - Ongoing monitoring for s/s infection    PPX:  - IS, OOB, ambulation   - SC Heparin and SCDs for DVT prophylaxis.     Disposition:  -Plan for discharge to home once stable from a surgical standpoint. Patient has home tube feeds supply, will arrange for home nursing care.      Patient seen by this provider and discussed with attending, Dr. Manzo.     Chantal Guillen, APRN-CNP  Thoracic Surgery [id number]

## 2024-07-30 ENCOUNTER — APPOINTMENT (OUTPATIENT)
Dept: OTOLARYNGOLOGY | Facility: CLINIC | Age: 79
End: 2024-07-30
Payer: MEDICARE

## 2024-07-30 ENCOUNTER — APPOINTMENT (OUTPATIENT)
Dept: RADIOLOGY | Facility: HOSPITAL | Age: 79
DRG: 327 | End: 2024-07-30
Payer: MEDICARE

## 2024-07-30 ENCOUNTER — DOCUMENTATION (OUTPATIENT)
Dept: HOME HEALTH SERVICES | Facility: HOME HEALTH | Age: 79
End: 2024-07-30

## 2024-07-30 ENCOUNTER — HOME HEALTH ADMISSION (OUTPATIENT)
Dept: HOME HEALTH SERVICES | Facility: HOME HEALTH | Age: 79
End: 2024-07-30
Payer: MEDICARE

## 2024-07-30 VITALS
DIASTOLIC BLOOD PRESSURE: 79 MMHG | RESPIRATION RATE: 18 BRPM | HEIGHT: 67 IN | WEIGHT: 158.4 LBS | OXYGEN SATURATION: 96 % | TEMPERATURE: 97.5 F | BODY MASS INDEX: 24.86 KG/M2 | SYSTOLIC BLOOD PRESSURE: 129 MMHG | HEART RATE: 96 BPM

## 2024-07-30 LAB
AMYLASE FLD-CCNC: 18 U/L
ANION GAP SERPL CALC-SCNC: 12 MMOL/L (ref 10–20)
BUN SERPL-MCNC: 16 MG/DL (ref 6–23)
CALCIUM SERPL-MCNC: 9.3 MG/DL (ref 8.6–10.6)
CHLORIDE SERPL-SCNC: 101 MMOL/L (ref 98–107)
CO2 SERPL-SCNC: 29 MMOL/L (ref 21–32)
CREAT SERPL-MCNC: 0.56 MG/DL (ref 0.5–1.3)
EGFRCR SERPLBLD CKD-EPI 2021: >90 ML/MIN/1.73M*2
ERYTHROCYTE [DISTWIDTH] IN BLOOD BY AUTOMATED COUNT: 16.7 % (ref 11.5–14.5)
GLUCOSE BLD MANUAL STRIP-MCNC: 170 MG/DL (ref 74–99)
GLUCOSE SERPL-MCNC: 183 MG/DL (ref 74–99)
HCT VFR BLD AUTO: 35.3 % (ref 41–52)
HGB BLD-MCNC: 11.5 G/DL (ref 13.5–17.5)
MAGNESIUM SERPL-MCNC: 2 MG/DL (ref 1.6–2.4)
MCH RBC QN AUTO: 32.9 PG (ref 26–34)
MCHC RBC AUTO-ENTMCNC: 32.6 G/DL (ref 32–36)
MCV RBC AUTO: 101 FL (ref 80–100)
NRBC BLD-RTO: 0 /100 WBCS (ref 0–0)
PLATELET # BLD AUTO: 279 X10*3/UL (ref 150–450)
POTASSIUM SERPL-SCNC: 4.1 MMOL/L (ref 3.5–5.3)
RBC # BLD AUTO: 3.5 X10*6/UL (ref 4.5–5.9)
SODIUM SERPL-SCNC: 138 MMOL/L (ref 136–145)
WBC # BLD AUTO: 6.1 X10*3/UL (ref 4.4–11.3)

## 2024-07-30 PROCEDURE — 99238 HOSP IP/OBS DSCHRG MGMT 30/<: CPT | Performed by: NURSE PRACTITIONER

## 2024-07-30 PROCEDURE — 71045 X-RAY EXAM CHEST 1 VIEW: CPT

## 2024-07-30 PROCEDURE — 82150 ASSAY OF AMYLASE: CPT | Performed by: NURSE PRACTITIONER

## 2024-07-30 PROCEDURE — 85027 COMPLETE CBC AUTOMATED: CPT | Performed by: NURSE PRACTITIONER

## 2024-07-30 PROCEDURE — 71045 X-RAY EXAM CHEST 1 VIEW: CPT | Performed by: RADIOLOGY

## 2024-07-30 PROCEDURE — 83735 ASSAY OF MAGNESIUM: CPT | Performed by: NURSE PRACTITIONER

## 2024-07-30 PROCEDURE — 36415 COLL VENOUS BLD VENIPUNCTURE: CPT | Performed by: NURSE PRACTITIONER

## 2024-07-30 PROCEDURE — 82947 ASSAY GLUCOSE BLOOD QUANT: CPT

## 2024-07-30 PROCEDURE — 2500000001 HC RX 250 WO HCPCS SELF ADMINISTERED DRUGS (ALT 637 FOR MEDICARE OP): Performed by: NURSE PRACTITIONER

## 2024-07-30 PROCEDURE — 2500000004 HC RX 250 GENERAL PHARMACY W/ HCPCS (ALT 636 FOR OP/ED): Performed by: NURSE PRACTITIONER

## 2024-07-30 PROCEDURE — 80048 BASIC METABOLIC PNL TOTAL CA: CPT | Performed by: NURSE PRACTITIONER

## 2024-07-30 PROCEDURE — 2500000002 HC RX 250 W HCPCS SELF ADMINISTERED DRUGS (ALT 637 FOR MEDICARE OP, ALT 636 FOR OP/ED): Performed by: NURSE PRACTITIONER

## 2024-07-30 RX ORDER — OXYCODONE HCL 5 MG/5 ML
5 SOLUTION, ORAL ORAL EVERY 6 HOURS PRN
Qty: 110 ML | Refills: 0 | Status: SHIPPED | OUTPATIENT
Start: 2024-07-30 | End: 2024-08-06

## 2024-07-30 RX ORDER — FAMOTIDINE 20 MG/1
20 TABLET, FILM COATED ORAL DAILY
Qty: 30 TABLET | Refills: 0 | Status: SHIPPED | OUTPATIENT
Start: 2024-07-30

## 2024-07-30 RX ORDER — INSULIN GLARGINE 100 [IU]/ML
5 INJECTION, SOLUTION SUBCUTANEOUS EVERY 24 HOURS
Start: 2024-07-30

## 2024-07-30 ASSESSMENT — PAIN SCALES - PAIN ASSESSMENT IN ADVANCED DEMENTIA (PAINAD): TOTALSCORE: MEDICATION (SEE MAR)

## 2024-07-30 ASSESSMENT — PAIN DESCRIPTION - LOCATION: LOCATION: ABDOMEN

## 2024-07-30 ASSESSMENT — PAIN - FUNCTIONAL ASSESSMENT: PAIN_FUNCTIONAL_ASSESSMENT: 0-10

## 2024-07-30 ASSESSMENT — PAIN SCALES - GENERAL
PAINLEVEL_OUTOF10: 2
PAINLEVEL_OUTOF10: 0 - NO PAIN

## 2024-07-30 NOTE — HH CARE COORDINATION
Home Care received a Referral for Nursing. We have processed the referral for a Start of Care on 07/31.     If you have any questions or concerns, please feel free to contact us at [phone]. Follow the prompts, enter your five digit zip code, and you will be directed to your care team on [facility] 1.

## 2024-07-30 NOTE — PROGRESS NOTES
"Thoracic Surgery Progress Note  7/30/2024    Juan M Mcrae is a 79 y.o. male with a history of a DM2, HTN, cataracts, CKD, HLD, and STACY who presents to Dr. 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 He underwent neoadjuvant chemoradiation with good response and is now 7 Days Post-Op status post VATS esophagectomy and pyloromyotomy.     Overnight issues:   J-tube accidentally dislodged last night - replaced and tube study confirmed placement, TF running   Murky, foul smelling drainage from neck EVELIO , 5 ml in 24 hrs, amylase 18      Physical Exam:  General: He is a pleasant male currently in no distress seen sitting up in a chair.    Visit Vitals  /79 (BP Location: Right arm, Patient Position: Sitting)   Pulse 96   Temp 36.4 °C (97.5 °F) (Temporal)   Resp 18   Ht 1.702 m (5' 7\")   Wt 71.8 kg (158 lb 6.4 oz)   SpO2 96%   BMI 24.81 kg/m²   Smoking Status Former   BSA 1.84 m²     Body mass index is 24.81 kg/m².   HEENT: Normocephalic and atraumatic.   NECK: Supple. Trach midline. No JVD.  Cervical incision JUDY well approx/no erythema. Cervical EVELIO holding bulb sx with murky, foul smelling fluid , 5 ml in 24 hrs   CHEST: Breathing comfortably on RA.  R previous Chest tube JUDY, well approximated   HEART: Regular rate and rhythm. NSR per tele review.   ABDOMEN: Soft, appro tender around abd ML, ss judy. + flatus and BM  : voiding  NEUROLOGIC: Alert and oriented. Grossly intact.   EXTREMITIES: Moves all extremities equally.  Pedal pulses are palpable. No lower extremity edema. No calf tenderness.     Diagnostics:   Scheduled medications  acetaminophen, 650 mg, j-tube, q6h  bisacodyl, 10 mg, rectal, Daily  famotidine, 20 mg, intravenous, q12h GUSTAVO  heparin (porcine), 5,000 Units, subcutaneous, q8h  insulin glargine, 4 Units, subcutaneous, q24h  insulin lispro, 0-10 Units, subcutaneous, q6h  lidocaine, 1 patch, transdermal, Daily  metoprolol tartrate, 37.5 mg, j-tube, q8h      Continuous " medications     PRN medications  PRN medications: dextrose, dextrose, glucagon, glucagon, oxyCODONE, oxyCODONE    Results for orders placed or performed during the hospital encounter of 07/23/24 (from the past 24 hour(s))   Amylase, Fluid   Result Value Ref Range    Amylase, Fluid <10 Not established. U/L   POCT GLUCOSE   Result Value Ref Range    POCT Glucose 160 (H) 74 - 99 mg/dL   POCT GLUCOSE   Result Value Ref Range    POCT Glucose 152 (H) 74 - 99 mg/dL   POCT GLUCOSE   Result Value Ref Range    POCT Glucose 153 (H) 74 - 99 mg/dL   POCT GLUCOSE   Result Value Ref Range    POCT Glucose 170 (H) 74 - 99 mg/dL   Amylase, Fluid   Result Value Ref Range    Amylase, Fluid 18 Not established. U/L   Magnesium   Result Value Ref Range    Magnesium 2.00 1.60 - 2.40 mg/dL   CBC   Result Value Ref Range    WBC 6.1 4.4 - 11.3 x10*3/uL    nRBC 0.0 0.0 - 0.0 /100 WBCs    RBC 3.50 (L) 4.50 - 5.90 x10*6/uL    Hemoglobin 11.5 (L) 13.5 - 17.5 g/dL    Hematocrit 35.3 (L) 41.0 - 52.0 %     (H) 80 - 100 fL    MCH 32.9 26.0 - 34.0 pg    MCHC 32.6 32.0 - 36.0 g/dL    RDW 16.7 (H) 11.5 - 14.5 %    Platelets 279 150 - 450 x10*3/uL   Basic Metabolic Panel   Result Value Ref Range    Glucose 183 (H) 74 - 99 mg/dL    Sodium 138 136 - 145 mmol/L    Potassium 4.1 3.5 - 5.3 mmol/L    Chloride 101 98 - 107 mmol/L    Bicarbonate 29 21 - 32 mmol/L    Anion Gap 12 10 - 20 mmol/L    Urea Nitrogen 16 6 - 23 mg/dL    Creatinine 0.56 0.50 - 1.30 mg/dL    eGFR >90 >60 mL/min/1.73m*2    Calcium 9.3 8.6 - 10.6 mg/dL       XR chest 1 view 07/30/2024    Narrative  Interpreted By:  Eliu Khalil,  STUDY:  XR CHEST 1 VIEW; 7/30/2024 4:09 am    INDICATION:  Signs/Symptoms:effusion.    COMPARISON:  08/29/2024.    ACCESSION NUMBER(S):  [id number]    ORDERING CLINICIAN:  NEAL LAZO    FINDINGS:  Port-A-Cath catheter overlies the SVC.    CARDIOMEDIASTINAL SILHOUETTE:  Cardiomediastinal silhouette is normal in size and configuration.    LUNGS:  There  is bibasilar atelectasis. Slight improvement in left lung  aeration. No pneumothorax.    ABDOMEN:  No remarkable upper abdominal findings.    BONES:  No acute osseous changes.    Impression  1.  Interval improvement in basilar aeration/atelectasis status post  gastroesophageal pull-through. No pneumothorax.      Signed by: Eliu Khalil 7/30/2024 7:23 AM  Dictation workstation:   [id number]    Assessment:  Juan M Mcrae is a 79 y.o. male with a history of a DM2, HTN, cataracts, CKD, HLD, and STACY who presents to Dr. 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 He underwent neoadjuvant chemoradiation with good response and is now post VATS esophagectomy and pyloromyotomy.     7/24/24:  NGT removed. SVT, increase in beta blocker.  7/25/24:  NAEON. Meza removed. CT removed.    7/26/24: NAEON. On RA. Voiding. Tolerating titration of TF, at 30cc/hr. Cervical EVELIO amylase 41 this am. IVF heplocked.  7/27/24: NAEON on RA. Voiding, cervical EVELIO amylase 34. Tolerating TF at 50cc/hr; will advance to goal/60/hr today. Awaiting BM. Suppository today.  7/28/24: BM, tachycardia , murky EVELIO drainage   [id number]/[id number] negative esophagram , ice chips     Plan:  NEURO: No known hx. Acute post-op pain.   - Ongoing neuro/pain assessments  - Good pain control with tylenol J tube scheduled, lido patch, PRN oxycodone via J tube  - Bowel regimen while on narcotics   - Lidocaine patch to incisions   - Encourage OOB/ambulation   - PT/OT following   - Holding home meds: trazodone 100 mg at bedtime     CV: History of HTN, HLD. Baseline BP 120s/60-80s. No baseline echo.  Normotensive, NSR this am.   - Continuous telemetry and VS every 4 hrs   - Metoprolol 37.5mg TID   - Replace electrolytes as needed   - Home meds: atenolol 50mg     PULM: History of STACY. Atelectasis, chest tube management   - Oxygenating well on RA  - Q1h incentive spirometry while awake  - Chest tube discontinue 7/25  - Daily CXR's      GI: History of GERD,  allergy to omeprazole. Now s/p esophagectomy for esophageal adenocarcinoma.  - Holding off PPI 2/2 omeprazole allergy, using alternative option of pepcid  - Maintain HOB up at least 30 degress at all times secondary to high risk of aspiration  - Cervical EVELIO amylase: 18 this am however murky and foul smelling fluid this am ,keep EVELIO in place until next week follow up   - Esophagram negative for leak but concern for clinical leak - OK for ice chips and sips of water with crushed medicine , Cycled nocturnal feeds and keep EVELIO drain in place   - TF via J tube to goal of 60 ml/hr - transition into cycled nocturnal on discharge/today   - Await official pathology results      : CKD. Baseline creatinine 0.75.  - Check renal function panel post op and daily.   - Voiding after brock removal   - Replete electrolytes to goal K>4, Mg>2     HEME: Acute blood loss anemia. OR .  - Check CBC daily  - Ongoing monitoring for s/s bleeding     ENDO: History of DM2 on glargine 10U and Metformin 1000 mg BID at home  - BG with  SSI Lispro per protocol  - Holding home meds: metformin 1000 BID, resumed insulin glargine at 4 U, adjust as needed, enteral feeds at goal (glucerna)     ID: Afebrile, no leukocytosis   - Trend temp q4, wbc daily  - Completed cefazolin x24hrs   - Ongoing monitoring for s/s infection    PPX:  - IS, OOB, ambulation   - SC Heparin and SCDs for DVT prophylaxis.     Disposition:  -Plan for discharge to home today with 1 week follow up.  Patient has home tube feeds supply, will arrange for home nursing care.      Patient seen by this provider and discussed with attending, Dr. Manzo.     Chantal Guillen, APRN-CNP  Thoracic Surgery [id number]

## 2024-07-30 NOTE — PROGRESS NOTES
[facility] was notified of patient's planned discharge home today. Per NP he has enteral feeding supplies at home.  Nancy Weber, RN  Add: [facility] confirmed SOC 7/31.  Nancy Weber RN

## 2024-07-30 NOTE — DISCHARGE SUMMARY
Discharge Diagnosis  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)    Issues Requiring Follow-Up  Post op follow up in 1 week  Pathology results     Test Results Pending At Discharge  Pending Labs       Order Current Status    Amylase, Fluid Collected (07/29/24 1228)    Amylase, Fluid Collected (07/29/24 1228)            Hospital Course  Juan M Mcrae is a 79 y.o. male with a history of a DM2, HTN, cataracts, CKD, HLD, and STACY who presents to Dr. 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 He underwent neoadjuvant chemoradiation with good response and is now post VATS esophagectomy and pyloromyotomy.      7/24/24:  NGT removed. SVT, increase in beta blocker.  7/25/24:  NAEON. Meza removed. CT removed.    7/26/24: NAEON. On RA. Voiding. Tolerating titration of TF, at 30cc/hr. Cervical EVELIO amylase 41 this am. IVF heplocked.  7/27/24: NAEON on RA. Voiding, cervical EVELIO amylase 34. Tolerating TF at 50cc/hr; will advance to goal/60/hr today. Awaiting BM. Suppository today.  7/28/24: BM, tachycardia , murky EVELIO drainage   7/29/24 negative esophagram , ice chips , concern for clinical leak    7/30/24 serial amylase continued to be low but EVELIO drainage was murky and foul smelling: patient will remain NPO with only ice chips and sips of water with crushed medications and cyclic nocturnal feeds at 100 ml/hr for 14 hours. EVELIO drain will remain in place and will be removed in the clinic during his 1 week follow up.    Patient was deemed stable for discharge on 7/30/24 and was provided with the usual thoracic discharge instructions. He was ambulating hallway independently, pain was well managed and had bowel movements.   He will follow up with Dr. Manzo in 1 week.  Patient stated he already has enteral feeds supply at home and we will arrange for home nursing care.     Pertinent Physical Exam At Time of Discharge  General: He is a pleasant male currently in no distress seen sitting up in a chair.    "  Visit Vitals  /79 (BP Location: Right arm, Patient Position: Sitting)   Pulse 96   Temp 36.4 °C (97.5 °F) (Temporal)   Resp 18   Ht 1.702 m (5' 7\")   Wt 71.8 kg (158 lb 6.4 oz)   SpO2 96%   BMI 24.81 kg/m²   Smoking Status Former   BSA 1.84 m²      Body mass index is 24.81 kg/m².   HEENT: Normocephalic and atraumatic.   NECK: Supple. Trach midline. No JVD.  Cervical incision JUDY well approx/no erythema. Cervical EVELIO holding bulb sx with murky, foul smelling fluid , 5 ml in 24 hrs   CHEST: Breathing comfortably on RA.  R previous Chest tube JUDY, well approximated   HEART: Regular rate and rhythm. NSR per tele review.   ABDOMEN: Soft, appro tender around abd ML, ss judy. + flatus and BM  : voiding  NEUROLOGIC: Alert and oriented. Grossly intact.   EXTREMITIES: Moves all extremities equally.  Pedal pulses are palpable. No lower extremity edema. No calf tenderness.     Home Medications     Medication List      START taking these medications     acetaminophen 650 mg/20.3 mL solution oral liquid; Commonly known as:   Tylenol; 20.3 mL (650 mg) by j-tube route every 6 hours if needed for   pain.   famotidine 20 mg tablet; Commonly known as: Pepcid; Take 1 tablet (20   mg) by mouth once daily.   insulin glargine 100 unit/mL injection; Commonly known as: Lantus;   Inject 5 Units under the skin once every 24 hours. Take as directed per   insulin instructions.; Replaces: Lantus Solostar U-100 Insulin 100 unit/mL   (3 mL) pen   oxyCODONE 5 mg/5 mL solution; Commonly known as: Roxicodone; 5 mL (5 mg)   by j-tube route every 6 hours if needed for moderate pain (4 - 6) for up   to 7 days.     CONTINUE taking these medications     atenolol 50 mg tablet; Commonly known as: Tenormin   pen needle, diabetic 32 gauge x 5/32\" needle; Use daily with insulin   injection.     STOP taking these medications     Lantus Solostar U-100 Insulin 100 unit/mL (3 mL) pen; Generic drug:   insulin glargine; Replaced by: insulin glargine 100 " unit/mL injection   lidocaine-prilocaine 2.5-2.5 % cream; Commonly known as: Emla   metFORMIN 500 mg tablet; Commonly known as: Glucophage   multivitamin with minerals iron-free; Commonly known as: Centrum Silver   naloxone 0.4 mg/mL injection; Commonly known as: Narcan   traZODone 100 mg tablet; Commonly known as: Desyrel       Outpatient Follow-Up  Future Appointments   Date Time Provider Department Center   8/7/2024  1:30 PM Macho Manzo MD [id number] [facility]   8/26/2024  3:20 PM Catarino Fry MD [id number] [facility]   9/13/2024  4:30 PM Candy Grossman MD [id number] [facility]   10/24/2024  2:15 PM Dario Smith MD [id number] [facility] / Rodolfo Guillen, APRN-CNP  Thoracic Surgery [id number]

## 2024-07-31 ENCOUNTER — HOME CARE VISIT (OUTPATIENT)
Dept: HOME HEALTH SERVICES | Facility: HOME HEALTH | Age: 79
End: 2024-07-31
Payer: MEDICARE

## 2024-07-31 VITALS
TEMPERATURE: 98.8 F | RESPIRATION RATE: 20 BRPM | HEIGHT: 66 IN | BODY MASS INDEX: 25.55 KG/M2 | HEART RATE: 70 BPM | DIASTOLIC BLOOD PRESSURE: 60 MMHG | SYSTOLIC BLOOD PRESSURE: 108 MMHG | OXYGEN SATURATION: 94 % | WEIGHT: 159 LBS

## 2024-07-31 PROCEDURE — G0299 HHS/HOSPICE OF RN EA 15 MIN: HCPCS | Mod: HHH

## 2024-07-31 PROCEDURE — 169592 NO-PAY CLAIM PROCEDURE

## 2024-07-31 ASSESSMENT — ENCOUNTER SYMPTOMS
HOARSE VOICE: 1
PAIN LOCATION - PAIN FREQUENCY: WITH ACTIVITY
PAIN LOCATION - PAIN SEVERITY: 4/10
PERSON REPORTING PAIN: PATIENT
FORGETFULNESS: 1
LAST BOWEL MOVEMENT: 67052
PAIN LOCATION - RELIEVING FACTORS: REST MEDS
DIARRHEA: 1
PAIN LOCATION: THROAT
PAIN LOCATION - PAIN DURATION: CONTINUOUS
APPETITE LEVEL: GOOD
SORE THROAT: 1
MUSCLE WEAKNESS: 1
LOSS OF SENSATION IN FEET: 0
PAIN LOCATION - PAIN FREQUENCY: WITH ACTIVITY
STOOL FREQUENCY: DAILY
CHANGE IN APPETITE: UNCHANGED
HIGHEST PAIN SEVERITY IN PAST 24 HOURS: 6/10
PAIN LOCATION - PAIN SEVERITY: 3/10
SUBJECTIVE PAIN PROGRESSION: UNCHANGED
PAIN: 1
PAIN LOCATION - PAIN DURATION: VARIES
OCCASIONAL FEELINGS OF UNSTEADINESS: 0
LOWEST PAIN SEVERITY IN PAST 24 HOURS: 3/10
DEPRESSION: 0
PAIN LOCATION - PAIN QUALITY: SHARP, STABBING
PAIN LOCATION - RELIEVING FACTORS: PAIN MEDS
FATIGUES EASILY: 1
PAIN LOCATION - PAIN QUALITY: DULL ACHE
PAIN LOCATION: ABDOMEN
FATIGUE: 1
TROUBLE SWALLOWING: 1
PAIN SEVERITY GOAL: 0/10

## 2024-07-31 ASSESSMENT — ACTIVITIES OF DAILY LIVING (ADL)
ENTERING_EXITING_HOME: SUPERVISION
OASIS_M1830: 05

## 2024-07-31 ASSESSMENT — LIFESTYLE VARIABLES: SMOKING_STATUS: 0

## 2024-08-02 ENCOUNTER — HOME CARE VISIT (OUTPATIENT)
Dept: HOME HEALTH SERVICES | Facility: HOME HEALTH | Age: 79
End: 2024-08-02
Payer: MEDICARE

## 2024-08-02 PROCEDURE — G0151 HHCP-SERV OF PT,EA 15 MIN: HCPCS | Mod: HHH

## 2024-08-02 ASSESSMENT — ENCOUNTER SYMPTOMS
PAIN: 1
PAIN LOCATION - PAIN SEVERITY: 2/10
LOWEST PAIN SEVERITY IN PAST 24 HOURS: 0/10
PAIN LOCATION: ABDOMEN
PAIN LOCATION - RELIEVING FACTORS: REST
SUBJECTIVE PAIN PROGRESSION: GRADUALLY IMPROVING
PAIN SEVERITY GOAL: 0/10
PAIN LOCATION - EXACERBATING FACTORS: FEEDING TUBE
HIGHEST PAIN SEVERITY IN PAST 24 HOURS: 4/10
PERSON REPORTING PAIN: PATIENT

## 2024-08-02 NOTE — DOCUMENTATION CLARIFICATION NOTE
"    PATIENT:               ELDA LEDBETTER  ACCT #:                  [id number]  MRN:                       [id number]  :                       1945  ADMIT DATE:       2024 5:51 AM  DISCH DATE:        2024 11:29 AM  RESPONDING PROVIDER #:        [id number]          PROVIDER RESPONSE TEXT:    Pathology findings esophageal cancer metastatic to regional nodes    CDI QUERY TEXT:    Clarification    Instruction:    Based on your assessment of the patient and the clinical information, please provide the requested documentation by clicking on the appropriate radio button and enter any additional information if prompted.    Question: Based on your assessment of the patient and the pathology findings, can the pathology findings be confirmed by providing the requested documentation to include if specimen is benign or malignant, primary or secondary and any metastatic sites.  Please include diagnosis of disease    When answering this query, please exercise your independent professional judgment. The fact that a question is being asked, does not imply that any particular answer is desired or expected.    The patient's clinical indicators include:  Clinical Information: Patient is an 79 year old male who presented with a lower third esophageal cancer.    Clinical Indicators: Patient is s/p VATS esophagectomy and pyloromyotomy on 24.    Surgical Pathology from 24 resulted as - c. esophagus and stomach, gastroesophagectomy: residual poorly differentiated adenocarcinoma, see note and synoptic report.  metastatic adenocarcinoma identified in 2 of 10 submitted lymph nodes.\"    Treatment: resection    Risk Factors: malignancy  Options provided:  -- Pathology findings, Please specify additional information below  -- Other - I will add my own diagnosis  -- Refer to Clinical Documentation Reviewer    Query created by: Ruthie Berrios on 2024 10:54 AM      Electronically signed by:  VALENTÍN BURR MD 2024 " 12:34 PM

## 2024-08-02 NOTE — HOME HEALTH
Hospital Course   Juan M Mcrae is a 79 y.o. male with a history of a DM2, HTN, cataracts, CKD, HLD, and STACY who presents to Dr. 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 He underwent neoadjuvant chemoradiation with good response and is now post VATS esophagectomy and pyloromyotomy.   7/24/24: NGT removed. SVT, increase in beta blocker.   7/25/24: NAEON. Meza removed. CT removed.   7/26/24: NAEON. On RA. Voiding. Tolerating titration of TF, at 30cc/hr. Cervical EVELIO amylase 41 this am. IVF heplocked.   7/27/24: NAEON on RA. Voiding, cervical EVELIO amylase 34. Tolerating TF at 50cc/hr; will advance to goal/60/hr today. Awaiting BM. Suppository today.   7/28/24: BM, tachycardia , murky EVELIO drainage   7/29/24 negative esophagram , ice chips , concern for clinical leak   7/30/24 serial amylase continued to be low but EVELIO drainage was murky and foul smelling: patient will remain NPO with only ice chips and sips of water with crushed medications and cyclic nocturnal feeds at 100 ml/hr for 14 hours. EVELIO drain will remain in place and will be removed in the clinic during his 1 week follow up.   Patient was deemed stable for discharge on 7/30/24 and was provided with the usual thoracic discharge instructions. He was ambulating hallway independently, pain was well managed and had bowel movements.   He will follow up with Dr. Manzo in 1 week.   Patient stated he already has enteral feeds supply at home and we will arrange for home nursing care.    Weakness to L ankle DF.  L ankle DF at 3+ out of 5.  R ankle DF 5/5..  Foot slap observed during walking but patient is NOT stubbing his toes on the left side during swing phase of walking.  Discussed theraband strengthening vs. everlast leg extension apparatus.  Patient also encouraged to perform AROM ankle pumps, Theraband resisted DF but also calf strethes (in the form of runner's stretch) as well as walking for fitness with an eye to improve endurance, balance  and L ankle function.    After a discussion it was agreed that no additional Home visits are warranted and that patient can implement recommendations made.  This will be a single visit encounter.

## 2024-08-06 ENCOUNTER — HOME CARE VISIT (OUTPATIENT)
Dept: HOME HEALTH SERVICES | Facility: HOME HEALTH | Age: 79
End: 2024-08-06
Payer: MEDICARE

## 2024-08-06 VITALS
OXYGEN SATURATION: 100 % | TEMPERATURE: 97.1 F | HEART RATE: 62 BPM | RESPIRATION RATE: 20 BRPM | DIASTOLIC BLOOD PRESSURE: 81 MMHG | SYSTOLIC BLOOD PRESSURE: 130 MMHG

## 2024-08-06 PROCEDURE — G0300 HHS/HOSPICE OF LPN EA 15 MIN: HCPCS | Mod: HHH

## 2024-08-06 ASSESSMENT — ENCOUNTER SYMPTOMS
PAIN LOCATION - PAIN QUALITY: ACHES
PAIN LOCATION: ABDOMEN
PAIN LOCATION - PAIN FREQUENCY: INTERMITTENT
BOWEL PATTERN NORMAL: 1
FATIGUES EASILY: 1
PAIN: 1
PAIN LOCATION - PAIN FREQUENCY: WITH ACTIVITY
PAIN LOCATION - PAIN QUALITY: SHARP
PAIN LOCATION - PAIN SEVERITY: 3/10
PAIN LOCATION - EXACERBATING FACTORS: MOVEMENT
HOARSE VOICE: 1
TROUBLE SWALLOWING: 1
PAIN LOCATION - PAIN SEVERITY: 4/10
STOOL FREQUENCY: DAILY
HIGHEST PAIN SEVERITY IN PAST 24 HOURS: 6/10
PAIN SEVERITY GOAL: 0/10
PAIN LOCATION: THROAT
LOWEST PAIN SEVERITY IN PAST 24 HOURS: 3/10
LAST BOWEL MOVEMENT: 67058
SORE THROAT: 1

## 2024-08-07 ENCOUNTER — NUTRITION (OUTPATIENT)
Dept: HEMATOLOGY/ONCOLOGY | Facility: HOSPITAL | Age: 79
End: 2024-08-07

## 2024-08-07 ENCOUNTER — OFFICE VISIT (OUTPATIENT)
Dept: SURGERY | Facility: HOSPITAL | Age: 79
End: 2024-08-07
Payer: MEDICARE

## 2024-08-07 ENCOUNTER — HOSPITAL ENCOUNTER (OUTPATIENT)
Dept: RADIOLOGY | Facility: HOSPITAL | Age: 79
Discharge: HOME | End: 2024-08-07
Payer: MEDICARE

## 2024-08-07 ENCOUNTER — APPOINTMENT (OUTPATIENT)
Dept: SURGERY | Facility: HOSPITAL | Age: 79
End: 2024-08-07
Payer: MEDICARE

## 2024-08-07 ENCOUNTER — TELEPHONE (OUTPATIENT)
Dept: PRIMARY CARE | Facility: CLINIC | Age: 79
End: 2024-08-07

## 2024-08-07 VITALS
DIASTOLIC BLOOD PRESSURE: 63 MMHG | TEMPERATURE: 97 F | RESPIRATION RATE: 18 BRPM | SYSTOLIC BLOOD PRESSURE: 118 MMHG | BODY MASS INDEX: 24.95 KG/M2 | HEART RATE: 66 BPM | OXYGEN SATURATION: 98 % | WEIGHT: 154.6 LBS

## 2024-08-07 DIAGNOSIS — C15.5 MALIGNANT NEOPLASM OF LOWER THIRD OF ESOPHAGUS (MULTI): ICD-10-CM

## 2024-08-07 DIAGNOSIS — C15.5 MALIGNANT NEOPLASM OF LOWER THIRD OF ESOPHAGUS (MULTI): Primary | ICD-10-CM

## 2024-08-07 DIAGNOSIS — I10 HYPERTENSION, UNSPECIFIED TYPE: ICD-10-CM

## 2024-08-07 PROCEDURE — 99211 OFF/OP EST MAY X REQ PHY/QHP: CPT | Performed by: THORACIC SURGERY (CARDIOTHORACIC VASCULAR SURGERY)

## 2024-08-07 PROCEDURE — 71046 X-RAY EXAM CHEST 2 VIEWS: CPT

## 2024-08-07 RX ORDER — ATENOLOL 50 MG/1
50 TABLET ORAL DAILY
Qty: 90 TABLET | Refills: 0 | Status: SHIPPED | OUTPATIENT
Start: 2024-08-07

## 2024-08-07 ASSESSMENT — PAIN SCALES - GENERAL: PAINLEVEL: 0-NO PAIN

## 2024-08-07 NOTE — PROGRESS NOTES
Mr. 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  He completed neoadjuvant CRT on 6/14/24.  He has done well at home.  He had amylase tests and a swallow study that were negative for leak.  He has been on full tube feeds.  His neck drain has had minimal drainage. He denies fevers.     We resutured his feeding tube today and removed his neck drain.    His CXR is acceptable.     Surgical Pathology Exam: [id number]  Order: [id number]   Collected 7/23/2024 09:17       Status: Final result       Visible to patient: Yes (not seen)       Dx: Malignant neoplasm of esophagus, unsp...    0 Result Notes      Component    FINAL DIAGNOSIS   A. LYMPH NODE EXCISION: ONE LARGE LYMPH NODE WITH NO SIGNIFICANT PATHOLOGIC FINDINGS.     B.  SPECIMEN LABELED AS 'LYMPH NODE EXCISION': PORTION OF FIBROADIPOSE TISSUE WITH NO SIGNIFICANT PATHOLOGIC FINDINGS, SEE NOTE.     Note: No lymphoid tissue is present in the specimen.     C. ESOPHAGUS AND STOMACH, GASTROESOPHAGECTOMY: RESIDUAL POORLY DIFFERENTIATED ADENOCARCINOMA, SEE NOTE AND SYNOPTIC REPORT.     METASTATIC ADENOCARCINOMA IDENTIFIED IN 2 OF 10 SUBMITTED LYMPH NODES.     Note: Immunohistochemistry for mismatch repair proteins was performed on a previous specimen of this neoplasm.  The neoplasm was found to be mismatch repair proficient.     D.  STOMACH, SEGMENTAL RESECTION: SEGMENT OF STOMACH WITH NO SIGNIFICANT PATHOLOGIC FINDINGS.   Electronically signed by Malachi Colón MD on 7/29/2024 at 1735        By the signature on this report, the individual or group listed as making the Final Interpretation/Diagnosis certifies that they have reviewed this case.    Case Summary Report   ESOPHAGUS   8th Edition - Protocol posted: 6/22/2022ESOPHAGUS - All Specimens  SPECIMEN   Procedure  Esophagogastrectomy   TUMOR   Tumor Site  Esophagogastric junction (EGJ)   Relationship of Tumor to Esophagogastric Junction  Tumor  midpoint is located at the esophagogastric junction   Histologic Type  Adenocarcinoma   Histologic Grade  G3, poorly differentiated, undifferentiated   Tumor Size  Cannot be determined: Specimen is post chemoradiation.  Residual tumor is composed of small foci scattered throughout the specimen.   Tumor Extent  Invades muscularis propria   Treatment Effect  Present, with residual cancer showing evident tumor regression, but more than single cells or rare small groups of cancer cells (partial response, score 2)   Lymphovascular Invasion  Not identified   Perineural Invasion  Present   MARGINS   Margin Status for Invasive Carcinoma  All margins negative for invasive carcinoma   Closest Margin(s) to Invasive Carcinoma  Radial   Distance from Invasive Carcinoma to Closest Margin  Greater than 1 cm   Margin Status for Dysplasia and Intestinal Metaplasia  All margins negative for dysplasia   REGIONAL LYMPH NODES   Regional Lymph Node Status  Tumor present in regional lymph node(s)   Number of Lymph Nodes with Tumor  2   Number of Lymph Nodes Examined  11   PATHOLOGIC STAGE CLASSIFICATION (pTNM, AJCC 8th Edition)   Reporting of pT, pN, and (when applicable) pM categories is based on information available to the pathologist at the time the report is issued. As per the AJCC (Chapter 1, 8th Ed.) it is the managing physician’s responsibility to establish the final pathologic stage based upon all pertinent information, including but potentially not limited to this pathology report.   TNM Descriptors  y (post-treatment)   pT Category  pT3   pN Category  pN1             Mr. Mcrae is doing well.  We will be obtaining a modified barium swallow and if that is okay he will begin clear liquids.  In the meantime he will continue tube feeds and I will see him back in one week.

## 2024-08-07 NOTE — PROGRESS NOTES
"NUTRITION Follow-up NOTE    Nutrition Assessment     Reason for Visit:  Juan M Mcrae is a 79 y.o. male who presents for esophageal cancer (lower esophageal/ GE junction)He received chemotherapy at [facility] (carbo / paclitaxel) began 5-6-2024 to 6-)  Proton at [facility] ( began 5-6-2024 and completed on 6-)    I am seeing pt  and s/o after appointment with thoracic surgery.  His surgery  7/23/2024  Discharged 7/30/2024 7/29 esophagram - concern for clinical leak   NPO at discharge - ice chips   Cyclic TF at 100 for 14 hours Glucerna 1.5- usually done around 13 hours  1400 ml   2100 calories  1063 ml free water  116 g protein    Drain was removed today- j-tube was re-stitched- pt remains NPO- pending MBSS    ____________________________________    Jtube to be placed on 4/8 and replaced on 6- by thoracic in ED  TF had been Glucerna 1.5- 5 cartons at night over 12 hours - rate 91ml per hour   Last feeding was 7/14/2024 due to issues with clogging   With this situation he resorted to intake of Boost VHC prior to surgery    DME [facility]     Started on insulin- 7/6/204  Taking Lantus 10 units ( max 20 units)  Has CGM   Continues with Metformin as well               Lab Results   Component Value Date/Time    GLUCOSE 183 (H) 07/30/2024 0654     07/30/2024 0654    K 4.1 07/30/2024 0654     07/30/2024 0654    CO2 29 07/30/2024 0654    ANIONGAP 12 07/30/2024 0654    BUN 16 07/30/2024 0654    CREATININE 0.56 07/30/2024 0654    EGFR >90 07/30/2024 0654    CALCIUM 9.3 07/30/2024 0654    ALBUMIN 3.4 07/24/2024 0221    ALKPHOS 55 07/17/2024 1051    PROT 7.3 07/17/2024 1051    AST 12 07/17/2024 1051    BILITOT 0.4 07/17/2024 1051    ALT 12 07/17/2024 1051     No results found for: \"VITD25\"        Anthropometrics:     HT:  167.5 cm   IBW: 64.5 kg  108.7 % IBW  BMI: 24.9        Wt Readings from Last 10 Encounters:   08/07/24 70.1 kg (154 lb 9.6 oz)   07/31/24 72.1 kg (159 lb)   07/30/24 71.8 kg (158 " lb 6.4 oz)   07/22/24 72.8 kg (160 lb 9.7 oz)   07/17/24 72.7 kg (160 lb 4.4 oz)   06/22/24 70.3 kg (155 lb)   06/19/24 73 kg (160 lb 15 oz)   06/19/24 72.6 kg (160 lb)   06/11/24 73.2 kg (161 lb 6.4 oz)   06/10/24 71.6 kg (157 lb 15.4 oz)      5-6-2024  was 77.9 kg (171 lb 10.1 oz- start date     Food And Nutrient Intake:      Hydration   Flushes  2 in am - 120- increase 180 ml  Noon 120 increase to 180 ml  4p -120ml will increase to 180 ml  7-8p-  120ml will increase to 180 ml  Add another flush of 180 ml this will now provide   900 ml of free water + TF (1063ml) in total provides 1963 ml- better approvimating needs       TF had been providing    Glucerna 1.5-   ~6 cartons per day `  1400 ml   2100 calories  1063 ml free water  116 g protein                                                        Nutrition Focused Physical Exam Findings:  Pt with minimal weight loss  Ambulated into clinic                         Energy Needs     2280 calories  91.2 g protein   2280 ml of free water       Nutrition Diagnosis               Nutrition Interventions/Recommendations   Nutrition Prescription   TF- Glucerna 1.5- is tolerated and meeting needs- no changes needed  Did increase flushes as noted above to meet fluid needs  Continues NPO  pending MBSS      Food and Nutrition Delivery       Nutrition Education       Coordination of Care   Medical oncology- meets Dr. Lisandra Stovall  Radiation oncology   Clinical trials RN  Thoracic surgeon -  Dr. Manzo-     There are no Patient Instructions on file for this visit.    Nutrition Monitoring and Evaluation        BS  Weight   Tolerance to TF  Hydration status         Time Spent  Prep time on day of patient encounter: 15 minutes  Time spent directly with patient, family or caregiver: 15 minutes  Additional Time Spent on Patient Care Activities: 5 minutes  Documentation Time: 10 minutes  Other Time Spent: 0 minutes  Total: 45 minutes

## 2024-08-08 ENCOUNTER — HOME CARE VISIT (OUTPATIENT)
Dept: HOME HEALTH SERVICES | Facility: HOME HEALTH | Age: 79
End: 2024-08-08
Payer: MEDICARE

## 2024-08-08 VITALS
TEMPERATURE: 97.8 F | DIASTOLIC BLOOD PRESSURE: 77 MMHG | RESPIRATION RATE: 18 BRPM | SYSTOLIC BLOOD PRESSURE: 135 MMHG | HEART RATE: 76 BPM | OXYGEN SATURATION: 97 %

## 2024-08-08 ASSESSMENT — ENCOUNTER SYMPTOMS
STOOL FREQUENCY: DAILY
PAIN LOCATION: ABDOMEN
PAIN LOCATION - PAIN DURATION: DAILY
LOWEST PAIN SEVERITY IN PAST 24 HOURS: 0/10
LAST BOWEL MOVEMENT: 67060
TROUBLE SWALLOWING: 1
PAIN LOCATION - RELIEVING FACTORS: MEDICATION
PAIN SEVERITY GOAL: 0/10
PAIN LOCATION - PAIN FREQUENCY: INTERMITTENT
SUBJECTIVE PAIN PROGRESSION: WAXING AND WANING
PAIN: 1
HIGHEST PAIN SEVERITY IN PAST 24 HOURS: 5/10

## 2024-08-09 ENCOUNTER — HOSPITAL ENCOUNTER (OUTPATIENT)
Dept: RADIOLOGY | Facility: HOSPITAL | Age: 79
Discharge: HOME | End: 2024-08-09
Payer: MEDICARE

## 2024-08-09 DIAGNOSIS — C15.5 MALIGNANT NEOPLASM OF LOWER THIRD OF ESOPHAGUS (MULTI): ICD-10-CM

## 2024-08-09 PROCEDURE — 74230 X-RAY XM SWLNG FUNCJ C+: CPT

## 2024-08-09 PROCEDURE — 2500000005 HC RX 250 GENERAL PHARMACY W/O HCPCS: Performed by: THORACIC SURGERY (CARDIOTHORACIC VASCULAR SURGERY)

## 2024-08-09 PROCEDURE — 92611 MOTION FLUOROSCOPY/SWALLOW: CPT | Mod: GN | Performed by: SPEECH-LANGUAGE PATHOLOGIST

## 2024-08-09 NOTE — PROCEDURES
Speech-Language Pathology    Speech-Language Pathology    Inpatient/Outpatient Modified Barium Swallow Study    Patient Name: Juan M Mcrae  MRN: [id number]  : 1945  Today's Date: 24   Time in: 1305    Time out:1335      Modified Barium Swallow Study completed. Informed verbal consent obtained prior to completion of exam. Trials of thin, nectar/mildly thick liquid, honey/moderately thick liquid, puree, regular solids and barium tablet with thin liquid were given.     SLP: KAMRYN Sandoval   Contact info: [facility]; phone: [phone]      Reason for Referral: Pt has PEG s/p esophagectomy 24 r/o aspiration,                 Patient Hx: per MD progress note 24: Mr. Mcrae underwent esophagectomy on 24 for what was originally diagnosed as a T2N1 distal esophageal adenocarcinoma.  He completed neoadjuvant CRT on 24.  He has done well at home.  He had amylase tests and a swallow study that were negative for leak.  He has been on full tube feeds. We resutured his feeding tube today and removed his neck drain.                                                                                                                           Respiratory Status: Room air  Current diet: tube feedings    Pain:  Pain Scale: 0-10  Ratin    FINAL SPEECH RECOMMENDATIONS    DIET:   - Regular (IDDSI Level 7)  - Thin liquids (IDDSI Level 0)  Above diet per clearance from MD, pt reports he was told he can start a soft diet  Plan:  SLP Plan: No treatment needs identified at this time   Patient Agreeable: Yes    Education Provided: Results and recommendations per MBSS, with video review;   Mechanics of the Swallow Summary:  ORAL PHASE: WFL  PHARYNGEAL PHASE: WFL  Pharyngeal residue - Trace residue within or on the pharyngeal structures     ESOPHAGEAL PHASE:  Esophageal clearance - Complete clearance       SLP Impressions with Severity Rating:   Pt presents with no oropharyngeal dysphagia upon  completion of modified barium swallow study this date. Prompt initiation of pharyngeal swallow, adequate airway closure, no laryngeal penetration or aspiration was visualized during study. Patient demonstrated trace pharyngeal residue post swallow.        OUTCOME MEASURES:  Functional Oral Intake Scale  Functional Oral Intake Scale: Level 7        total oral diet with no restrictions       Rosenbek's Penetration Aspiration Scale  Thin Liquids: 1. NO ASPIRATION & NO PENETRATION - no aspiration, contrast does not enter airway  [place] Thick Liquids: 1. NO ASPIRATION & NO PENETRATION - no aspiration, contrast does not enter airway  Puree: 1. NO ASPIRATION & NO PENETRATION - no aspiration, contrast does not enter airway  Solids: 1. NO ASPIRATION & NO PENETRATION - no aspiration, contrast does not enter airway

## 2024-08-12 ENCOUNTER — HOME CARE VISIT (OUTPATIENT)
Dept: HOME HEALTH SERVICES | Facility: HOME HEALTH | Age: 79
End: 2024-08-12
Payer: MEDICARE

## 2024-08-12 ENCOUNTER — APPOINTMENT (OUTPATIENT)
Dept: PRIMARY CARE | Facility: CLINIC | Age: 79
End: 2024-08-12
Payer: MEDICARE

## 2024-08-12 VITALS
RESPIRATION RATE: 20 BRPM | TEMPERATURE: 98.5 F | OXYGEN SATURATION: 95 % | DIASTOLIC BLOOD PRESSURE: 60 MMHG | HEART RATE: 64 BPM | SYSTOLIC BLOOD PRESSURE: 112 MMHG

## 2024-08-12 PROCEDURE — G0299 HHS/HOSPICE OF RN EA 15 MIN: HCPCS | Mod: HHH

## 2024-08-12 ASSESSMENT — ENCOUNTER SYMPTOMS
CHANGE IN APPETITE: INCREASED
LOWEST PAIN SEVERITY IN PAST 24 HOURS: 0/10
HIGHEST PAIN SEVERITY IN PAST 24 HOURS: 4/10
SUBJECTIVE PAIN PROGRESSION: GRADUALLY IMPROVING
PAIN LOCATION - PAIN SEVERITY: 1/10
PAIN LOCATION - PAIN FREQUENCY: INFREQUENT
PAIN LOCATION: ABDOMEN
PAIN LOCATION - RELIEVING FACTORS: TYLENOL
PAIN: 1
PERSON REPORTING PAIN: PATIENT
PAIN LOCATION - PAIN QUALITY: ACHY
PAIN SEVERITY GOAL: 0/10
HOARSE VOICE: 1
APPETITE LEVEL: GOOD
TROUBLE SWALLOWING: 1
PAIN LOCATION - PAIN DURATION: VARIES

## 2024-08-14 ENCOUNTER — OFFICE VISIT (OUTPATIENT)
Dept: SURGERY | Facility: HOSPITAL | Age: 79
End: 2024-08-14
Payer: MEDICARE

## 2024-08-14 VITALS
BODY MASS INDEX: 25.37 KG/M2 | HEART RATE: 51 BPM | RESPIRATION RATE: 18 BRPM | TEMPERATURE: 96.8 F | OXYGEN SATURATION: 96 % | DIASTOLIC BLOOD PRESSURE: 71 MMHG | WEIGHT: 157.19 LBS | SYSTOLIC BLOOD PRESSURE: 131 MMHG

## 2024-08-14 DIAGNOSIS — C15.5 MALIGNANT NEOPLASM OF LOWER THIRD OF ESOPHAGUS (MULTI): Primary | ICD-10-CM

## 2024-08-14 PROCEDURE — 99211 OFF/OP EST MAY X REQ PHY/QHP: CPT | Performed by: THORACIC SURGERY (CARDIOTHORACIC VASCULAR SURGERY)

## 2024-08-14 ASSESSMENT — PAIN SCALES - GENERAL: PAINLEVEL: 3

## 2024-08-14 NOTE — PROGRESS NOTES
Mr. 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 He completed neoadjuvant CRT on 6/14/24.  He had a modified barium swallow last week that showed good swallowing coordination.    On exam today his neck is pristine.  He denies any fevers or chills.    Mr. Mcrae was started on of liquid diet today and advance to soft solid foods on Friday.  He will stop his tube feeds today.  I will see him back in 2 weeks for J-tube removal.

## 2024-08-19 ENCOUNTER — HOME CARE VISIT (OUTPATIENT)
Dept: HOME HEALTH SERVICES | Facility: HOME HEALTH | Age: 79
End: 2024-08-19
Payer: MEDICARE

## 2024-08-19 VITALS
DIASTOLIC BLOOD PRESSURE: 68 MMHG | TEMPERATURE: 98.4 F | SYSTOLIC BLOOD PRESSURE: 118 MMHG | HEART RATE: 61 BPM | OXYGEN SATURATION: 95 %

## 2024-08-19 PROCEDURE — G0299 HHS/HOSPICE OF RN EA 15 MIN: HCPCS | Mod: HHH

## 2024-08-19 ASSESSMENT — ENCOUNTER SYMPTOMS
PAIN LOCATION: ABDOMEN
PAIN SEVERITY GOAL: 0/10
PAIN: 1
HIGHEST PAIN SEVERITY IN PAST 24 HOURS: 5/10
PAIN LOCATION - EXACERBATING FACTORS: BENDING
PERSON REPORTING PAIN: PATIENT
PAIN LOCATION - PAIN FREQUENCY: FREQUENT
SUBJECTIVE PAIN PROGRESSION: WAXING AND WANING
PAIN LOCATION - PAIN SEVERITY: 3/10
LOWEST PAIN SEVERITY IN PAST 24 HOURS: 3/10
PAIN LOCATION - PAIN QUALITY: DULL

## 2024-08-19 ASSESSMENT — ACTIVITIES OF DAILY LIVING (ADL): OASIS_M1830: 00

## 2024-08-26 ENCOUNTER — OFFICE VISIT (OUTPATIENT)
Dept: HEMATOLOGY/ONCOLOGY | Facility: CLINIC | Age: 79
End: 2024-08-26
Payer: MEDICARE

## 2024-08-26 ENCOUNTER — INFUSION (OUTPATIENT)
Dept: HEMATOLOGY/ONCOLOGY | Facility: CLINIC | Age: 79
End: 2024-08-26
Payer: MEDICARE

## 2024-08-26 VITALS
RESPIRATION RATE: 16 BRPM | WEIGHT: 155.09 LBS | SYSTOLIC BLOOD PRESSURE: 118 MMHG | HEART RATE: 54 BPM | OXYGEN SATURATION: 96 % | DIASTOLIC BLOOD PRESSURE: 71 MMHG | BODY MASS INDEX: 25.03 KG/M2 | TEMPERATURE: 98 F

## 2024-08-26 DIAGNOSIS — C15.5 MALIGNANT NEOPLASM OF LOWER THIRD OF ESOPHAGUS (MULTI): ICD-10-CM

## 2024-08-26 DIAGNOSIS — C15.5 MALIGNANT NEOPLASM OF LOWER THIRD OF ESOPHAGUS (MULTI): Primary | ICD-10-CM

## 2024-08-26 PROCEDURE — 96523 IRRIG DRUG DELIVERY DEVICE: CPT

## 2024-08-26 PROCEDURE — 2500000004 HC RX 250 GENERAL PHARMACY W/ HCPCS (ALT 636 FOR OP/ED): Performed by: INTERNAL MEDICINE

## 2024-08-26 PROCEDURE — 99215 OFFICE O/P EST HI 40 MIN: CPT | Performed by: INTERNAL MEDICINE

## 2024-08-26 RX ORDER — PROCHLORPERAZINE MALEATE 10 MG
10 TABLET ORAL EVERY 6 HOURS PRN
Qty: 30 TABLET | Refills: 5 | Status: SHIPPED | OUTPATIENT
Start: 2024-08-26

## 2024-08-26 RX ORDER — HEPARIN 100 UNIT/ML
500 SYRINGE INTRAVENOUS AS NEEDED
OUTPATIENT
Start: 2024-08-26

## 2024-08-26 RX ORDER — HEPARIN SODIUM,PORCINE/PF 10 UNIT/ML
50 SYRINGE (ML) INTRAVENOUS AS NEEDED
OUTPATIENT
Start: 2024-08-26

## 2024-08-26 RX ORDER — HEPARIN 100 UNIT/ML
500 SYRINGE INTRAVENOUS AS NEEDED
Status: DISCONTINUED | OUTPATIENT
Start: 2024-08-26 | End: 2024-08-26 | Stop reason: HOSPADM

## 2024-08-26 ASSESSMENT — PAIN SCALES - GENERAL: PAINLEVEL: 0-NO PAIN

## 2024-08-26 ASSESSMENT — ACTIVITIES OF DAILY LIVING (ADL)
GROOMING_CURRENT_FUNCTION: INDEPENDENT
BATHING ASSESSED: 1
USING THE TELPHONE: INDEPENDENT
PREPARING MEALS: INDEPENDENT
GROOMING ASSESSED: 1
BATHING_CURRENT_FUNCTION: INDEPENDENT
SHOPPING: INDEPENDENT
DRESSING_LB_CURRENT_FUNCTION: INDEPENDENT
DRESSING_UB_CURRENT_FUNCTION: INDEPENDENT
AMBULATION ASSISTANCE: INDEPENDENT
AMBULATION ASSISTANCE: 1
LAUNDRY ASSESSED: 1
HOUSEKEEPING ASSESSED: 1
SHOPPING ASSESSED: 1
HOME_HEALTH_OASIS: 00
LIGHT HOUSEKEEPING: INDEPENDENT
TRANSPORTATION ASSESSED: 1
ORAL_CARE_ASSESSED: 1
ORAL_CARE_CURRENT_FUNCTION: INDEPENDENT
CURRENT_FUNCTION: INDEPENDENT
TOILETING: 1
TRANSPORTATION: INDEPENDENT
PHYSICAL TRANSFERS ASSESSED: 1
LAUNDRY: INDEPENDENT
FEEDING ASSESSED: 1
FEEDING: INDEPENDENT
TELEPHONE USE ASSESSED: 1
TOILETING: INDEPENDENT

## 2024-08-26 NOTE — PROGRESS NOTES
Patient ID: Juan M Mcrae is a 79 y.o. male.    Diagnoses:   1.GE junction adenocarcinoma (signet ring cell), proficient MMR.  Localized (clinical stage II/III).  2. Type 2 diabetes mellitus on oral hypoglycemics, hypertension controlled with medications.    Genomic profile:  Proficient MMR status on the biopsy by IHC.    Assessment and Plan:  This is a pleasant 78-year-old man with a diagnosis of GE junction adenocarcinoma.  His staging PET/CT and subsequent imaging studies confirmed localized disease.  He had a port placement and a J-tube placement.  He has seen radiation oncology and he has been enrolled in the NRG- (photon versus proton) study.    He completed concurrent chemoradiation in early June 2024 (last chemo was on Bettie 10, 2024).  His surgery took place on July 23, 2024.  He tolerated the surgery well.  The final pathology was ypT3 N1.      Today he was here for follow-up.  He is eating well.  Maintaining his body weight.  Denies any significant pain.    Since he has residual tumor in the resected specimen, I discussed adjuvant therapy with nivolumab.  Discussed the data of keynote-577 study which showed survival advantage with adjuvant nivolumab.  Discussed various side effects of nivolumab in detail.  He has been given printed on nivolumab by one of her clinic nurses.  After discussion, he signed the consent form and I ordered nivolumab for him to be started in early October 2024.    Plan: In early October 2024 to start adjuvant nivolumab.      I have placed all orders as outlined above. I advised the patient to schedule the tests and follow-up appointment as discussed by contacting the  on the way out or calling by phone.    Providers:  Surgeon: Dr. Jovany Bucknerc:  EdgardoOnjavier: Donna.    Chief complaint: GE junction adenocarcinoma.    HPI:  ONCOLOGIC HISTORY-    February 28, 2024: Underwent an EGD for progressive dysphagia and weight loss.  EGD revealed an obstructing mass in the distal  esophagus.  Biopsy confirmed adenocarcinoma with signet ring cell features.    March 21, 2024: EUS revealed T2 N1 mass extending from 41 cm to 43 cm and involving less than 1 cm of the gastric cardia.    March 22, 2024: CT scan of chest did not show any metastatic disease.    April 1, 2024 24: PET/CT scan done:   1. Hypermetabolic linear focus at gastroesophageal junction which is  extending to the gastric cardia/proximal lesser curvature in  correlation with distal esophageal /gastroesophageal junction wall  thickening. These findings are compatible with biopsy-proven  adenocarcinoma.  2. No evidence of hypermetabolic lymphadenopathy.  3. Abnormal focal increase in FDG uptake in the left hepatic lobe in  correlation with hypoattenuating lesion, concerning for metastatic  disease. Further correlation with dedicated CT abdomen is recommended.  4. Large fat containing left inguinal hernia.  Interval history: He has significant dysphagia although he can get by with soup.  He has lost about 60 pounds in the last 6 months or so.  Feels fatigued.  Denies any pain.  Denies fever or chill or any other sign of ongoing infection.    April 3, 2024: CT scan of abdomen and MRI liver ruled out metastatic disease (liver lesions were hemangiomas).    May 6, 2024: Started concurrent radiation and chemotherapy with weekly carboplatin and Taxol.  Will complete concurrent chemoradiation on June 14, 2024.    6/10/24: RECEIVED THE LAST DOSE OF CHEMO. LAST DOSE OF RADIATION WAS ON 6/14/2024.    July 23, 2024: Underwent surgery.  Pathology ypT3 N1.    Interval history:  Patient presents today for an evaluation after the surgery.  He is swallowing well.  He has recovered from the surgery quite well.  Denies any significant pain. He has no nausea or vomiting or diarrhea. He has mild (grade 1) fatigue.  He complains of some hair loss.  He denies any fevers, chills or night sweats. No cough, chest pain or shortness of breath.      Allergies  Omeprazole, Sulfa, shellfish    Medications:  Acetaminophen, atenolol, dexamethasone, Emla cream, metformin 1000 mg twice daily, multivitamin, naloxone, nystatin, olanzapine, Actos, prochlorperazine, trazodone     Past Medical History:   Type 2 diabetes mellitus on oral hypoglycemics, hypertension controlled with medications.  Cataract, CKD (chronic kidney disease), Colon polyp, Deviated septum, ED (erectile dysfunction), Esophageal cancer, GERD (gastroesophageal reflux disease), Hyperlipidemia, Nephrolithiasis, Sleep apnea (wear CPAP/BiPAP)    Surgical History:    Past Surgical History:   Procedure Laterality Date    BLADDER SURGERY      COLONOSCOPY      Repeat in one year    EYE SURGERY      LITHOTRIPSY      OTHER SURGICAL HISTORY      Vocal cord surgery    TRANSURETHRAL RESECTION OF PROSTATE      UPPER GASTROINTESTINAL ENDOSCOPY  2024      Family History:    Family History   Problem Relation Name Age of Onset    Stroke Father      Kidney cancer Brother Eliu     Stroke Brother Eliu      Family Oncology History:    Cancer-related family history includes Kidney cancer in his brother.    Social History:      Tobacco Use    Smoking status: Former     Current packs/day: 0.00     Average packs/day: 2.0 packs/day for 40.0 years (80.0 ttl pk-yrs)     Types: Cigarettes     Start date: 1959     Quit date: 1999     Years since quittin.4    Smokeless tobacco: Never   Vaping Use    Vaping status: Never Used   Substance Use Topics    Alcohol use: Yes     Alcohol/week: 2.0 standard drinks of alcohol     Types: 2 Cans of beer per week     Comment: couple beers a week    Drug use: Not Currently     Comment: gummies-dispensary from [place]        Vital Signs:  /71 (BP Location: Right arm, Patient Position: Sitting, BP Cuff Size: Adult)   Pulse 54   Temp 36.7 °C (98 °F) (Temporal)   Resp 16   Wt 70.4 kg (155 lb 1.5 oz)   SpO2 96%   BMI 25.03 kg/m²     Physical Exam:  ECO  Pain:  0  Constitutional: Well developed, awake/alert/oriented x3, no distress, alert and cooperative  Eyes: PER. sclera anicteric  ENMT: Oral mucosa moist  Respiratory/Thorax: Breathing is non-labored. Lungs are clear to auscultation bilaterally. No adventitious breath sounds  Cardiovascular: S1-S2. Regular rate and rhythm. No murmurs, rubs, or gallops appreciated  Gastrointestinal: Abdomen soft nontender, nondistended, normal active bowel sounds.  Musculoskeletal: ROM intact, no joint swelling, normal strength  Extremities: normal extremities, no cyanosis, no edema, no clubbing  Neurologic: alert and oriented x3. Nonfocal exam. No myoclonus  Psychological: Pleasant, appropriate and easily engaged     Lab Results:  Labs from today reviewed.           Catarino rFy MD

## 2024-08-26 NOTE — PROGRESS NOTES
Patient ambulated into clinic for follow up with Dr. Fry accompanied by his significant other.     Stated he is eating 5 small meals a day. Able to eat normal solid food. PEG tube being removed on 8/28.     Immunotherapy (Nivolumab) discussed. Print out given for Juan M to review. Patient would like to review the information and think about it.     Education printed and reviewed with patient.     Follow up in October.

## 2024-08-28 ENCOUNTER — NUTRITION (OUTPATIENT)
Dept: HEMATOLOGY/ONCOLOGY | Facility: HOSPITAL | Age: 79
End: 2024-08-28

## 2024-08-28 ENCOUNTER — OFFICE VISIT (OUTPATIENT)
Dept: SURGERY | Facility: HOSPITAL | Age: 79
End: 2024-08-28
Payer: MEDICARE

## 2024-08-28 VITALS
HEART RATE: 48 BPM | DIASTOLIC BLOOD PRESSURE: 54 MMHG | RESPIRATION RATE: 20 BRPM | SYSTOLIC BLOOD PRESSURE: 121 MMHG | BODY MASS INDEX: 25.19 KG/M2 | TEMPERATURE: 97.5 F | WEIGHT: 156.09 LBS | OXYGEN SATURATION: 97 %

## 2024-08-28 DIAGNOSIS — C15.5 MALIGNANT NEOPLASM OF LOWER THIRD OF ESOPHAGUS (MULTI): Primary | ICD-10-CM

## 2024-08-28 PROCEDURE — 99211 OFF/OP EST MAY X REQ PHY/QHP: CPT | Performed by: THORACIC SURGERY (CARDIOTHORACIC VASCULAR SURGERY)

## 2024-08-28 ASSESSMENT — PAIN SCALES - GENERAL: PAINLEVEL: 2

## 2024-08-28 NOTE — PROGRESS NOTES
Mr. 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 He completed neoadjuvant CRT on 6/14/24. Final pathology was ypT3N1 G3 with all margins negative. He is tolerating a soft solid diet and has no complaints.  He denies any lightheadedness.      Surgical Pathology Exam: [id number]  Order: [id number]   Collected 7/23/2024 09:17       Status: Final result       Visible to patient: Yes (not seen)       Dx: Malignant neoplasm of esophagus, unsp...    0 Result Notes      Component    FINAL DIAGNOSIS   A. LYMPH NODE EXCISION: ONE LARGE LYMPH NODE WITH NO SIGNIFICANT PATHOLOGIC FINDINGS.     B.  SPECIMEN LABELED AS 'LYMPH NODE EXCISION': PORTION OF FIBROADIPOSE TISSUE WITH NO SIGNIFICANT PATHOLOGIC FINDINGS, SEE NOTE.     Note: No lymphoid tissue is present in the specimen.     C. ESOPHAGUS AND STOMACH, GASTROESOPHAGECTOMY: RESIDUAL POORLY DIFFERENTIATED ADENOCARCINOMA, SEE NOTE AND SYNOPTIC REPORT.     METASTATIC ADENOCARCINOMA IDENTIFIED IN 2 OF 10 SUBMITTED LYMPH NODES.     Note: Immunohistochemistry for mismatch repair proteins was performed on a previous specimen of this neoplasm.  The neoplasm was found to be mismatch repair proficient.     D.  STOMACH, SEGMENTAL RESECTION: SEGMENT OF STOMACH WITH NO SIGNIFICANT PATHOLOGIC FINDINGS.   Electronically signed by Malachi Colón MD on 7/29/2024 at 1735        By the signature on this report, the individual or group listed as making the Final Interpretation/Diagnosis certifies that they have reviewed this case.    Case Summary Report   ESOPHAGUS   8th Edition - Protocol posted: 6/22/2022ESOPHAGUS - All Specimens  SPECIMEN   Procedure  Esophagogastrectomy   TUMOR   Tumor Site  Esophagogastric junction (EGJ)   Relationship of Tumor to Esophagogastric Junction  Tumor midpoint is located at the esophagogastric junction   Histologic Type  Adenocarcinoma   Histologic Grade  G3, poorly differentiated,  undifferentiated   Tumor Size  Cannot be determined: Specimen is post chemoradiation.  Residual tumor is composed of small foci scattered throughout the specimen.   Tumor Extent  Invades muscularis propria   Treatment Effect  Present, with residual cancer showing evident tumor regression, but more than single cells or rare small groups of cancer cells (partial response, score 2)   Lymphovascular Invasion  Not identified   Perineural Invasion  Present   MARGINS   Margin Status for Invasive Carcinoma  All margins negative for invasive carcinoma   Closest Margin(s) to Invasive Carcinoma  Radial   Distance from Invasive Carcinoma to Closest Margin  Greater than 1 cm   Margin Status for Dysplasia and Intestinal Metaplasia  All margins negative for dysplasia   REGIONAL LYMPH NODES   Regional Lymph Node Status  Tumor present in regional lymph node(s)   Number of Lymph Nodes with Tumor  2   Number of Lymph Nodes Examined  11   PATHOLOGIC STAGE CLASSIFICATION (pTNM, AJCC 8th Edition)   Reporting of pT, pN, and (when applicable) pM categories is based on information available to the pathologist at the time the report is issued. As per the AJCC (Chapter 1, 8th Ed.) it is the managing physician’s responsibility to establish the final pathologic stage based upon all pertinent information, including but potentially not limited to this pathology report.   TNM Descriptors  y (post-treatment)   pT Category  pT3   pN Category  pN1             Mr. Mcrae is doing well.  We removed his feeding tube today.  He is planning on one year of immunotherapy.  I will see him back in five months time.

## 2024-08-28 NOTE — PROGRESS NOTES
NUTRITION Follow-up NOTE    Nutrition Assessment     Reason for Visit:  Juan M Mcrae is a 79 y.o. male who presents for esophageal cancer (lower esophageal/ GE junction)He received chemotherapy at [place] (carbo / paclitaxel) began 5-6-2024 to 6-)  Proton at [facility] ( began 5-6-2024 and completed on 6-)    I am seeing pt  and s/o after appointment with thoracic surgery.  His surgery  7/23/2024  Discharged 7/30/2024 7/29 esophagram - concern for clinical leak   NPO at discharge - ice chips   Cyclic TF at 100 for 14 hours Glucerna 1.5- usually done around 13 hours  1400 ml   2100 calories  1063 ml free water  116 g protein    Drain was removed today- j-tube was re-stitched- pt remains NPO- pending MBSS done on 8/9/2024  Results were   FINAL SPEECH RECOMMENDATIONS      DIET:  - Regular (IDDSI Level 7)  - Thin liquids (IDDSI Level 0)  Above diet per clearance from MD, pt reports he was told he can start  a soft diet Plan:  SLP Plan: No treatment needs identified at this time  Patient Agreeable: Yes    8- saw Dr. Manzo-  TF stopped  Was started on liquid diet 8-16 could advance to soft solid foods    8- saw Dr. Fry in [place]   He is to begin Nivo in October 8/  Diet advanced to regular   No regurgitation  Had some constipation for a bit but this is reloved  No diarrhea  Eating 5 times a day   J-tube removed  Weight is essentially stable without TF usage  [employer] called - will schedule  of pump and pole     Lab Results   Component Value Date/Time    GLUCOSE 183 (H) 07/30/2024 0654     07/30/2024 0654    K 4.1 07/30/2024 0654     07/30/2024 0654    CO2 29 07/30/2024 0654    ANIONGAP 12 07/30/2024 0654    BUN 16 07/30/2024 0654    CREATININE 0.56 07/30/2024 0654    EGFR >90 07/30/2024 0654    CALCIUM 9.3 07/30/2024 0654    ALBUMIN 3.4 07/24/2024 0221    ALKPHOS 55 07/17/2024 1051    PROT 7.3 07/17/2024 1051    AST 12 07/17/2024 1051    BILITOT 0.4  "07/17/2024 1051    ALT 12 07/17/2024 1051     No results found for: \"VITD25\"        Anthropometrics:     HT:  167.5 cm   IBW: 64.5 kg  108.7 % IBW  BMI: 24.9        Wt Readings from Last 10 Encounters:   08/28/24 70.8 kg (156 lb 1.4 oz)   08/26/24 70.4 kg (155 lb 1.5 oz)   08/14/24 71.3 kg (157 lb 3 oz)   08/07/24 70.1 kg (154 lb 9.6 oz)   07/31/24 72.1 kg (159 lb)   07/30/24 71.8 kg (158 lb 6.4 oz)   07/22/24 72.8 kg (160 lb 9.7 oz)   07/17/24 72.7 kg (160 lb 4.4 oz)   06/22/24 70.3 kg (155 lb)   06/19/24 73 kg (160 lb 15 oz)      5-6-2024  was 77.9 kg (171 lb 10.1 oz- start date     Food And Nutrient Intake:        Jtube to be placed on 4/8 and replaced on 6- by thoracic in ED  TF had been Glucerna 1.5- 6 cartons at night over 12 hours   DME [employer] - will need to return supplies- will call [place] to have pump returned  Pt used feeding tube after esophagectomy as sole source of nutrition  This was stopped on 8/14/2024  Tube removed today    Pt reports he is eating 5 times per day  He is tolerating all foods  He feels with diet advanced to regular he will gain weight      Started on insulin- 7/6/204  Taking Lantus 5 units   Has CGM                                                           Nutrition Focused Physical Exam Findings:  Phone                         Energy Needs     Dosing weight: 70.4 kg  Calories per day: 2110  determined by 30 kcal/kg  Protein (g) per day: 85 determined by 1.2 g/kg  Estimated fluid needs: 2110 determined by 1 kcal/mL     Nutrition Diagnosis   Malnutrition Diagnosis  Patient has Malnutrition Diagnosis: No (previously moderately malnourished > improving)    Nutrition Diagnosis  Patient has Nutrition Diagnosis: Yes  Diagnosis Status (1): Ongoing  Nutrition Diagnosis 1: Altered GI function  Related to (1): esophageal cancer  As Evidenced by (1): need for diet alterations post-esophagectomy with goal of continued BS control      Nutrition Interventions/Recommendations   Nutrition " Prescription   Continue with post-gastrectomy diet       Food and Nutrition Delivery       Nutrition Education       Coordination of Care   Medical oncology- meets Dr. Fry in [place]- made him aware of RD in [place]- did not ask her to cover pt at this time  [facility]- called today to  equipment- pt knows I will call and he may need to call as well if he has not heard from them by the second week in September   Thoracic surgeon -  Dr. Manzo-     There are no Patient Instructions on file for this visit.    Nutrition Monitoring and Evaluation        BS  Weight   Tolerance to diet   Hydration status         Time Spent  Prep time on day of patient encounter: 10 minutes  Time spent directly with patient, family or caregiver: 10 minutes  Additional Time Spent on Patient Care Activities: 5 minutes  Documentation Time: 15 minutes  Other Time Spent: 0 minutes  Total: 40 minutes

## 2024-09-06 ENCOUNTER — TELEPHONE (OUTPATIENT)
Dept: PRIMARY CARE | Facility: CLINIC | Age: 79
End: 2024-09-06
Payer: MEDICARE

## 2024-09-06 DIAGNOSIS — F51.01 PRIMARY INSOMNIA: ICD-10-CM

## 2024-09-06 NOTE — TELEPHONE ENCOUNTER
Patient returned call, he is taking it regularly. He states they wouldn't allow him to take it in the hospital. Advised I would have [facility] send in prescription.

## 2024-09-06 NOTE — TELEPHONE ENCOUNTER
Patient wants a refill of Trazodone 100 mg., prescription in [facility].  Pharmacy is [facility] in [place].    Patient's number:  [phone]   109

## 2024-09-06 NOTE — TELEPHONE ENCOUNTER
Looks like it may have been discontinued while was hospitalized but cannot tell why. Has he been taking regularly?

## 2024-09-08 RX ORDER — TRAZODONE HYDROCHLORIDE 100 MG/1
100 TABLET ORAL NIGHTLY PRN
Qty: 90 TABLET | Refills: 0 | Status: SHIPPED | OUTPATIENT
Start: 2024-09-08 | End: 2024-12-07

## 2024-09-13 ENCOUNTER — APPOINTMENT (OUTPATIENT)
Dept: PRIMARY CARE | Facility: CLINIC | Age: 79
End: 2024-09-13
Payer: MEDICARE

## 2024-09-24 ENCOUNTER — APPOINTMENT (OUTPATIENT)
Dept: ENDOCRINOLOGY | Facility: CLINIC | Age: 79
End: 2024-09-24
Payer: MEDICARE

## 2024-10-07 ENCOUNTER — OFFICE VISIT (OUTPATIENT)
Dept: HEMATOLOGY/ONCOLOGY | Facility: CLINIC | Age: 79
End: 2024-10-07
Payer: MEDICARE

## 2024-10-07 ENCOUNTER — INFUSION (OUTPATIENT)
Dept: HEMATOLOGY/ONCOLOGY | Facility: CLINIC | Age: 79
End: 2024-10-07
Payer: MEDICARE

## 2024-10-07 VITALS
HEART RATE: 51 BPM | DIASTOLIC BLOOD PRESSURE: 80 MMHG | BODY MASS INDEX: 26.35 KG/M2 | OXYGEN SATURATION: 97 % | SYSTOLIC BLOOD PRESSURE: 149 MMHG | TEMPERATURE: 97.1 F | WEIGHT: 163.25 LBS | RESPIRATION RATE: 17 BRPM

## 2024-10-07 DIAGNOSIS — C15.5 MALIGNANT NEOPLASM OF LOWER THIRD OF ESOPHAGUS (MULTI): Primary | ICD-10-CM

## 2024-10-07 DIAGNOSIS — C15.5 MALIGNANT NEOPLASM OF LOWER THIRD OF ESOPHAGUS (MULTI): ICD-10-CM

## 2024-10-07 LAB
ALBUMIN SERPL BCP-MCNC: 4 G/DL (ref 3.4–5)
ALP SERPL-CCNC: 68 U/L (ref 33–136)
ALT SERPL W P-5'-P-CCNC: 10 U/L (ref 10–52)
ANION GAP SERPL CALC-SCNC: 12 MMOL/L (ref 10–20)
AST SERPL W P-5'-P-CCNC: 15 U/L (ref 9–39)
BASOPHILS # BLD AUTO: 0.06 X10*3/UL (ref 0–0.1)
BASOPHILS NFR BLD AUTO: 1.2 %
BILIRUB SERPL-MCNC: 0.3 MG/DL (ref 0–1.2)
BUN SERPL-MCNC: 13 MG/DL (ref 6–23)
CALCIUM SERPL-MCNC: 9.1 MG/DL (ref 8.6–10.3)
CHLORIDE SERPL-SCNC: 107 MMOL/L (ref 98–107)
CO2 SERPL-SCNC: 26 MMOL/L (ref 21–32)
CORTIS AM PEAK SERPL-MSCNC: 17.6 UG/DL (ref 5–20)
CREAT SERPL-MCNC: 0.71 MG/DL (ref 0.5–1.3)
EGFRCR SERPLBLD CKD-EPI 2021: >90 ML/MIN/1.73M*2
EOSINOPHIL # BLD AUTO: 0.22 X10*3/UL (ref 0–0.4)
EOSINOPHIL NFR BLD AUTO: 4.6 %
ERYTHROCYTE [DISTWIDTH] IN BLOOD BY AUTOMATED COUNT: 14.6 % (ref 11.5–14.5)
GLUCOSE SERPL-MCNC: 143 MG/DL (ref 74–99)
HCT VFR BLD AUTO: 40.7 % (ref 41–52)
HGB BLD-MCNC: 13.2 G/DL (ref 13.5–17.5)
IMM GRANULOCYTES # BLD AUTO: 0.01 X10*3/UL (ref 0–0.5)
IMM GRANULOCYTES NFR BLD AUTO: 0.2 % (ref 0–0.9)
LYMPHOCYTES # BLD AUTO: 0.71 X10*3/UL (ref 0.8–3)
LYMPHOCYTES NFR BLD AUTO: 14.7 %
MCH RBC QN AUTO: 31.9 PG (ref 26–34)
MCHC RBC AUTO-ENTMCNC: 32.4 G/DL (ref 32–36)
MCV RBC AUTO: 98 FL (ref 80–100)
MONOCYTES # BLD AUTO: 0.64 X10*3/UL (ref 0.05–0.8)
MONOCYTES NFR BLD AUTO: 13.3 %
NEUTROPHILS # BLD AUTO: 3.18 X10*3/UL (ref 1.6–5.5)
NEUTROPHILS NFR BLD AUTO: 66 %
NRBC BLD-RTO: 0 /100 WBCS (ref 0–0)
PLATELET # BLD AUTO: 230 X10*3/UL (ref 150–450)
POTASSIUM SERPL-SCNC: 4.2 MMOL/L (ref 3.5–5.3)
PROT SERPL-MCNC: 6.7 G/DL (ref 6.4–8.2)
RBC # BLD AUTO: 4.14 X10*6/UL (ref 4.5–5.9)
SODIUM SERPL-SCNC: 141 MMOL/L (ref 136–145)
TSH SERPL-ACNC: 1.55 MIU/L (ref 0.44–3.98)
WBC # BLD AUTO: 4.8 X10*3/UL (ref 4.4–11.3)

## 2024-10-07 PROCEDURE — 3077F SYST BP >= 140 MM HG: CPT | Performed by: INTERNAL MEDICINE

## 2024-10-07 PROCEDURE — 96413 CHEMO IV INFUSION 1 HR: CPT

## 2024-10-07 PROCEDURE — 1157F ADVNC CARE PLAN IN RCRD: CPT | Performed by: INTERNAL MEDICINE

## 2024-10-07 PROCEDURE — 1123F ACP DISCUSS/DSCN MKR DOCD: CPT | Performed by: INTERNAL MEDICINE

## 2024-10-07 PROCEDURE — 99214 OFFICE O/P EST MOD 30 MIN: CPT | Performed by: INTERNAL MEDICINE

## 2024-10-07 PROCEDURE — 85025 COMPLETE CBC W/AUTO DIFF WBC: CPT

## 2024-10-07 PROCEDURE — 82533 TOTAL CORTISOL: CPT | Mod: WESLAB

## 2024-10-07 PROCEDURE — 1159F MED LIST DOCD IN RCRD: CPT | Performed by: INTERNAL MEDICINE

## 2024-10-07 PROCEDURE — 2500000004 HC RX 250 GENERAL PHARMACY W/ HCPCS (ALT 636 FOR OP/ED): Mod: JZ,JG | Performed by: INTERNAL MEDICINE

## 2024-10-07 PROCEDURE — 1126F AMNT PAIN NOTED NONE PRSNT: CPT | Performed by: INTERNAL MEDICINE

## 2024-10-07 PROCEDURE — 84075 ASSAY ALKALINE PHOSPHATASE: CPT

## 2024-10-07 PROCEDURE — 84443 ASSAY THYROID STIM HORMONE: CPT | Mod: WESLAB

## 2024-10-07 PROCEDURE — 3079F DIAST BP 80-89 MM HG: CPT | Performed by: INTERNAL MEDICINE

## 2024-10-07 PROCEDURE — 82024 ASSAY OF ACTH: CPT

## 2024-10-07 RX ORDER — PROCHLORPERAZINE MALEATE 10 MG
10 TABLET ORAL EVERY 6 HOURS PRN
Status: DISCONTINUED | OUTPATIENT
Start: 2024-10-07 | End: 2024-10-07 | Stop reason: HOSPADM

## 2024-10-07 RX ORDER — PROCHLORPERAZINE MALEATE 10 MG
10 TABLET ORAL EVERY 6 HOURS PRN
Status: CANCELLED | OUTPATIENT
Start: 2024-10-07

## 2024-10-07 RX ORDER — ALBUTEROL SULFATE 0.83 MG/ML
3 SOLUTION RESPIRATORY (INHALATION) AS NEEDED
Status: DISCONTINUED | OUTPATIENT
Start: 2024-10-07 | End: 2024-10-07 | Stop reason: HOSPADM

## 2024-10-07 RX ORDER — EPINEPHRINE 0.3 MG/.3ML
0.3 INJECTION SUBCUTANEOUS EVERY 5 MIN PRN
Status: DISCONTINUED | OUTPATIENT
Start: 2024-10-07 | End: 2024-10-07 | Stop reason: HOSPADM

## 2024-10-07 RX ORDER — EPINEPHRINE 0.3 MG/.3ML
0.3 INJECTION SUBCUTANEOUS EVERY 5 MIN PRN
Status: CANCELLED | OUTPATIENT
Start: 2024-10-07

## 2024-10-07 RX ORDER — FAMOTIDINE 10 MG/ML
20 INJECTION INTRAVENOUS ONCE AS NEEDED
Status: CANCELLED | OUTPATIENT
Start: 2024-10-07

## 2024-10-07 RX ORDER — DIPHENHYDRAMINE HYDROCHLORIDE 50 MG/ML
50 INJECTION INTRAMUSCULAR; INTRAVENOUS AS NEEDED
Status: CANCELLED | OUTPATIENT
Start: 2024-10-07

## 2024-10-07 RX ORDER — ALBUTEROL SULFATE 0.83 MG/ML
3 SOLUTION RESPIRATORY (INHALATION) AS NEEDED
Status: CANCELLED | OUTPATIENT
Start: 2024-10-07

## 2024-10-07 RX ORDER — FAMOTIDINE 10 MG/ML
20 INJECTION INTRAVENOUS ONCE AS NEEDED
Status: DISCONTINUED | OUTPATIENT
Start: 2024-10-07 | End: 2024-10-07 | Stop reason: HOSPADM

## 2024-10-07 RX ORDER — PROCHLORPERAZINE EDISYLATE 5 MG/ML
10 INJECTION INTRAMUSCULAR; INTRAVENOUS EVERY 6 HOURS PRN
Status: CANCELLED | OUTPATIENT
Start: 2024-10-07

## 2024-10-07 RX ORDER — PROCHLORPERAZINE EDISYLATE 5 MG/ML
10 INJECTION INTRAMUSCULAR; INTRAVENOUS EVERY 6 HOURS PRN
Status: DISCONTINUED | OUTPATIENT
Start: 2024-10-07 | End: 2024-10-07 | Stop reason: HOSPADM

## 2024-10-07 RX ORDER — DIPHENHYDRAMINE HYDROCHLORIDE 50 MG/ML
50 INJECTION INTRAMUSCULAR; INTRAVENOUS AS NEEDED
Status: DISCONTINUED | OUTPATIENT
Start: 2024-10-07 | End: 2024-10-07 | Stop reason: HOSPADM

## 2024-10-07 ASSESSMENT — PAIN SCALES - GENERAL: PAINLEVEL: 0-NO PAIN

## 2024-10-07 NOTE — PROGRESS NOTES
Patient ambulated into clinic for follow up with Dr. Fry accompanied by his significant other Helga. Medications and allergies reviewed.     Reports no issues swallowing at this time and able to eat anything he wants now.     LUQ site covered with 2x2 and tape- J tube site, still open. MD aware.     To start nivolumab today. Information previously given to patient, reviewed side effects.   PI sheet on immunotherapy given.

## 2024-10-07 NOTE — PROGRESS NOTES
Patient ID: Juan M Mcrae is a 79 y.o. male.    Diagnoses:   1.GE junction adenocarcinoma (signet ring cell), proficient MMR.  Localized (clinical stage II/III).  Status post esophagectomy on July 23, 2024 following neoadjuvant CRT->ypT3 N1.  Adjuvant nivolumab started on October 7, 2024.  2. Type 2 diabetes mellitus on oral hypoglycemics, hypertension controlled with medications.    Genomic profile:  Proficient MMR status on the biopsy by IHC.    Assessment and Plan:  This is a pleasant 78-year-old man with a diagnosis of GE junction adenocarcinoma.  His staging PET/CT and subsequent imaging studies confirmed localized disease.  He had a port placement and a J-tube placement.  He has seen radiation oncology and he has been enrolled in the NRG- (photon versus proton) study.    He completed concurrent chemoradiation in early June 2024 (last chemo was on Bettie 10, 2024).  His surgery took place on July 23, 2024.  He tolerated the surgery well.  The final pathology was ypT3 N1.    Since he has residual tumor in the resected specimen, I discussed adjuvant therapy with nivolumab.  Discussed the data of CheckMate-577 study which showed survival advantage with adjuvant nivolumab.  Discussed various side effects of nivolumab in detail.  He has been given printed on nivolumab by one of her clinic nurses.  After discussion, he signed the consent form and I ordered nivolumab for him.    Plan: adjuvant nivolumab for 1 year (started on 10-).      I have placed all orders as outlined above. I advised the patient to schedule the tests and follow-up appointment as discussed by contacting the  on the way out or calling by phone.    Providers:  Surgeon: Dr. Jovany Bucknerc:  Jennie: Donna.    Chief complaint: GE junction adenocarcinoma.    HPI:  ONCOLOGIC HISTORY-    February 28, 2024: Underwent an EGD for progressive dysphagia and weight loss.  EGD revealed an obstructing mass in the distal esophagus.  Biopsy  confirmed adenocarcinoma with signet ring cell features.    March 21, 2024: EUS revealed T2 N1 mass extending from 41 cm to 43 cm and involving less than 1 cm of the gastric cardia.    March 22, 2024: CT scan of chest did not show any metastatic disease.    April 1, 2024 24: PET/CT scan done:   1. Hypermetabolic linear focus at gastroesophageal junction which is  extending to the gastric cardia/proximal lesser curvature in  correlation with distal esophageal /gastroesophageal junction wall  thickening. These findings are compatible with biopsy-proven  adenocarcinoma.  2. No evidence of hypermetabolic lymphadenopathy.  3. Abnormal focal increase in FDG uptake in the left hepatic lobe in  correlation with hypoattenuating lesion, concerning for metastatic  disease. Further correlation with dedicated CT abdomen is recommended.  4. Large fat containing left inguinal hernia.  Interval history: He has significant dysphagia although he can get by with soup.  He has lost about 60 pounds in the last 6 months or so.  Feels fatigued.  Denies any pain.  Denies fever or chill or any other sign of ongoing infection.    April 3, 2024: CT scan of abdomen and MRI liver ruled out metastatic disease (liver lesions were hemangiomas).    May 6, 2024: Started concurrent radiation and chemotherapy with weekly carboplatin and Taxol.  Will complete concurrent chemoradiation on June 14, 2024.    6/10/24: RECEIVED THE LAST DOSE OF CHEMO. LAST DOSE OF RADIATION WAS ON 6/14/2024.    July 23, 2024: Underwent surgery.  Pathology ypT3 N1.    October 7, 2024: Started adjuvant nivolumab.    Interval history:  Patient presents today to start adjuvant nivolumab.  He is swallowing well.  He has recovered from the surgery quite well.  Denies any significant pain. He has no nausea or vomiting or diarrhea. He has mild (grade 1) fatigue.  He complains of some hair loss.  He denies any fevers, chills or night sweats. No cough, chest pain or shortness of  breath.     Allergies  Omeprazole, Sulfa, shellfish    Medications:  Acetaminophen, atenolol, dexamethasone, Emla cream, metformin 1000 mg twice daily, multivitamin, naloxone, nystatin, olanzapine, Actos, prochlorperazine, trazodone     Past Medical History:   Type 2 diabetes mellitus on oral hypoglycemics, hypertension controlled with medications.  Cataract, CKD (chronic kidney disease), Colon polyp, Deviated septum, ED (erectile dysfunction), Esophageal cancer, GERD (gastroesophageal reflux disease), Hyperlipidemia, Nephrolithiasis, Sleep apnea (wear CPAP/BiPAP)    Surgical History:    Past Surgical History:   Procedure Laterality Date    BLADDER SURGERY      COLONOSCOPY      Repeat in one year    EYE SURGERY      LITHOTRIPSY      OTHER SURGICAL HISTORY      Vocal cord surgery    TRANSURETHRAL RESECTION OF PROSTATE      UPPER GASTROINTESTINAL ENDOSCOPY  2024      Family History:    Family History   Problem Relation Name Age of Onset    Stroke Father      Kidney cancer Brother Eliu     Stroke Brother Eliu      Family Oncology History:    Cancer-related family history includes Kidney cancer in his brother.    Social History:      Tobacco Use    Smoking status: Former     Current packs/day: 0.00     Average packs/day: 2.0 packs/day for 40.0 years (80.0 ttl pk-yrs)     Types: Cigarettes     Start date: 1959     Quit date: 1999     Years since quittin.4    Smokeless tobacco: Never   Vaping Use    Vaping status: Never Used   Substance Use Topics    Alcohol use: Yes     Alcohol/week: 2.0 standard drinks of alcohol     Types: 2 Cans of beer per week     Comment: couple beers a week    Drug use: Not Currently     Comment: gummies-dispensary from [place]        Vital Signs:  There were no vitals taken for this visit.    Physical Exam:  ECO  Pain: 0  Constitutional: Well developed, awake/alert/oriented x3, no distress, alert and cooperative  Eyes: PER. sclera anicteric  ENMT: Oral mucosa  moist  Respiratory/Thorax: Breathing is non-labored. Lungs are clear to auscultation bilaterally. No adventitious breath sounds  Cardiovascular: S1-S2. Regular rate and rhythm. No murmurs, rubs, or gallops appreciated  Gastrointestinal: Abdomen soft nontender, nondistended, normal active bowel sounds.  Musculoskeletal: ROM intact, no joint swelling, normal strength  Extremities: normal extremities, no cyanosis, no edema, no clubbing  Neurologic: alert and oriented x3. Nonfocal exam. No myoclonus  Psychological: Pleasant, appropriate and easily engaged     Lab Results:  Labs from today reviewed.           Catarino Fry MD

## 2024-10-08 ENCOUNTER — TELEPHONE (OUTPATIENT)
Dept: PRIMARY CARE | Facility: CLINIC | Age: 79
End: 2024-10-08
Payer: MEDICARE

## 2024-10-08 DIAGNOSIS — I10 HYPERTENSION, UNSPECIFIED TYPE: ICD-10-CM

## 2024-10-08 DIAGNOSIS — Z79.4 TYPE 2 DIABETES MELLITUS WITHOUT COMPLICATION, WITH LONG-TERM CURRENT USE OF INSULIN (MULTI): Primary | ICD-10-CM

## 2024-10-08 DIAGNOSIS — I10 PRIMARY HYPERTENSION: ICD-10-CM

## 2024-10-08 DIAGNOSIS — E78.2 MIXED HYPERLIPIDEMIA: ICD-10-CM

## 2024-10-08 DIAGNOSIS — E11.9 TYPE 2 DIABETES MELLITUS WITHOUT COMPLICATION, WITH LONG-TERM CURRENT USE OF INSULIN (MULTI): Primary | ICD-10-CM

## 2024-10-08 DIAGNOSIS — F51.01 PRIMARY INSOMNIA: ICD-10-CM

## 2024-10-08 LAB — ACTH PLAS-MCNC: 19.7 PG/ML (ref 7.2–63.3)

## 2024-10-09 ENCOUNTER — APPOINTMENT (OUTPATIENT)
Dept: ENDOCRINOLOGY | Facility: HOSPITAL | Age: 79
End: 2024-10-09
Payer: MEDICARE

## 2024-10-10 NOTE — PROGRESS NOTES
Juan M Mcrae is an 79 year old male referred by Dr. Bibiana Zheng for evaluation of rectal mass.    History of of GE junction adenocarcinoma. He completed concurrent chemoradiation in early June 2024 (last chemo was on Bettie 10, 2024). S/P Esophagectomy July 23, 2024. He tolerated the surgery well. The final pathology was ypT3 N1.   Oncology recommended adjuvant nivolumab for 1 year (started on 10-).        9/23/2024 Colonoscopy to cecum  Rectum:  A 30 mm sessile polyp with full thickness attachment at 5-6 from the dentate line.  Biopsy taken.  Sigmoid colon:  Diverticulosis of large intestine without complication, moderate  Mid: diminutive polyp,  Polypectomy done using cold biopsy forceps  Descending colon:  Normal  Transverse colon:  Normal  Ascending colon: Proximal: polyp of size 6 mm.  Polypectomy done by cold snare.    Pathology:   Proximal Ascending polyp:  Tubular adenoma.  No high grade dysplasia or malignancy  Mid Sigmoid polyp:  Unremarkable colonic mucosa.  No adenomatous or hyperplastic changes.  Rectal Mass: tubular adenoma.  No high grade dysplasia or malignancy      Past Medical History  GE junction adenocarcinoma (signet ring cell)  DM  HTN    Surgical History  Esophagectomy on July 23, 2024 following neoadjuvant CRT      Social History  Smoking: Former smoker, quit 1999   ETOH: 2 beer/week    Family History  Brother:  Kidney cancer    Review of Systems  Constitutional: Negative for fever, chills, anorexia, weight loss, malaise     ENMT: Negative for nasal discharge, congestion, ear pain, mouth pain, throat pain     Respiratory: Negative for cough, hemoptysis, wheezing, shortness of breath     Cardiac: Negative for chest pain, dyspnea on exertion, orthopnea, palpitations, syncope, (+)HTN     Gastrointestinal: Negative for nausea, vomiting, diarrhea, constipation, abdominal pain, (+)GE junction adenocarcinoma    Genitourinary: Negative for discharge, dysuria, flank pain, frequency,  hematuria     Musculoskeletal: Negative for decreased ROM, pain, swelling, weakness     Neurological: Negative for dizziness, confusion, headache, seizures, syncope     Psychiatric: Negative for mood changes, anxiety, hallucinations, sleep changes, suicidal ideas     Skin: Negative for mass, pain, itching, rash, ulcer     Endocrine: Negative for heat intolerance, cold intolerance, excessive sweating, polyuria, excess thirst, (+)DM     Hematologic/Lymph: Negative for anemia, bruising, easy bleeding, night sweats, petechiae, history of DVT/PE or cancer     Allergic/Immunologic: Negative for anaphylaxis, itchy/ teary eyes, itching, sneezing, swelling    Physical Exam  Constitutional: Well developed, awake/alert/oriented x3, no distress, alert and cooperative             Eyes: Sclera anicteric, no conjunctival inflammation, conjugate gaze    ENMT: mucous membranes moist, no apparent injury,            Head/Neck: Neck supple, no apparent injury, No JVD, trachea midline, no bruits              Respiratory/Thorax: Patent airways, CTAB, normal breath sounds with good chest expansion, thorax symmetric         Cardiovascular: Regular, rate and rhythm, no murmurs, normal S1 and S2         Gastrointestinal: Nondistended, soft, non-tender, no rebound tenderness or guarding, no masses palpable, no organomegaly, +BS, no bruits               Extremities: normal extremities, no cyanosis edema, contusions or wounds, 2+ femoral pulses B/L              Neurological: alert and oriented x3, normal strength, Normal gait          Lymphatic: No palpable inguinal lymphadenopathy   Psychological: Appropriate mood and behavior         Skin: Warm and dry, no lesions, no rashes                Anorectal:  Flexible Sigmoidoscopy In Clinic    Date/Time: 10/22/2024 8:25 AM    Performed by: Cruz Sahni MD  Authorized by: Cruz Sahni MD  Indications: rectal mass  External exam performed: yes  Digital exam performed: yes  Comments: Endoscope  inserted transanally and advanced to 15 cm.  Prep was excellent.  On withdrawal there was a 2 cm polyp overlying the first rectal valve in the left lateral position.            Impression:  Recurrent rectal polyp at approximately 5 cm in the left lateral position on the first rectal valve    Plan:    TAMIS excision  Will coordinate with oncology for timing with current adjuvant chemotherapy of esophageal cancer    Risks and benefits of surgery were discussed with the patient including, but not limited to: conversion to an open procedure, infection, bleeding, wound healing issues, anastomotic leak or stricture, damage to surrounding structures, need for additional intervention

## 2024-10-17 ENCOUNTER — APPOINTMENT (OUTPATIENT)
Dept: PRIMARY CARE | Facility: CLINIC | Age: 79
End: 2024-10-17
Payer: MEDICARE

## 2024-10-17 ENCOUNTER — TELEPHONE (OUTPATIENT)
Dept: PRIMARY CARE | Facility: CLINIC | Age: 79
End: 2024-10-17

## 2024-10-17 VITALS
WEIGHT: 162 LBS | SYSTOLIC BLOOD PRESSURE: 124 MMHG | HEIGHT: 66 IN | BODY MASS INDEX: 26.03 KG/M2 | DIASTOLIC BLOOD PRESSURE: 64 MMHG | HEART RATE: 66 BPM | OXYGEN SATURATION: 98 %

## 2024-10-17 DIAGNOSIS — E11.9 TYPE 2 DIABETES MELLITUS WITHOUT COMPLICATION, WITHOUT LONG-TERM CURRENT USE OF INSULIN (MULTI): ICD-10-CM

## 2024-10-17 DIAGNOSIS — Z00.00 ROUTINE GENERAL MEDICAL EXAMINATION AT HEALTH CARE FACILITY: Primary | ICD-10-CM

## 2024-10-17 DIAGNOSIS — E11.65 TYPE 2 DIABETES MELLITUS WITH HYPERGLYCEMIA, WITHOUT LONG-TERM CURRENT USE OF INSULIN: ICD-10-CM

## 2024-10-17 DIAGNOSIS — I10 HYPERTENSION, UNSPECIFIED TYPE: ICD-10-CM

## 2024-10-17 PROBLEM — R05.9 COUGH: Status: RESOLVED | Noted: 2020-11-23 | Resolved: 2024-10-17

## 2024-10-17 PROBLEM — H66.001 NON-RECURRENT ACUTE SUPPURATIVE OTITIS MEDIA OF RIGHT EAR WITHOUT SPONTANEOUS RUPTURE OF TYMPANIC MEMBRANE: Status: RESOLVED | Noted: 2024-10-17 | Resolved: 2024-10-17

## 2024-10-17 PROBLEM — R19.5 GUAIAC POSITIVE STOOLS: Status: RESOLVED | Noted: 2024-10-17 | Resolved: 2024-10-17

## 2024-10-17 PROBLEM — C20 MALIGNANT NEOPLASM OF RECTUM (MULTI): Status: RESOLVED | Noted: 2024-10-17 | Resolved: 2024-10-17

## 2024-10-17 PROBLEM — H66.91 OTITIS MEDIA OF RIGHT EAR: Status: RESOLVED | Noted: 2022-10-18 | Resolved: 2024-10-17

## 2024-10-17 PROBLEM — E78.00 PURE HYPERCHOLESTEROLEMIA, UNSPECIFIED: Status: ACTIVE | Noted: 2018-12-18

## 2024-10-17 PROBLEM — N52.9 PRIMARY ERECTILE DYSFUNCTION: Status: RESOLVED | Noted: 2021-02-18 | Resolved: 2024-10-17

## 2024-10-17 PROBLEM — R21 RASH: Status: ACTIVE | Noted: 2021-10-02

## 2024-10-17 PROBLEM — M25.562 LEFT KNEE PAIN: Status: RESOLVED | Noted: 2022-04-21 | Resolved: 2024-10-17

## 2024-10-17 PROBLEM — R43.0 ANOSMIA: Status: RESOLVED | Noted: 2020-11-23 | Resolved: 2024-10-17

## 2024-10-17 PROBLEM — E66.9 OBESITY WITH BODY MASS INDEX 30 OR GREATER: Status: ACTIVE | Noted: 2020-08-18

## 2024-10-17 PROBLEM — J34.2 DEVIATED NASAL SEPTUM: Status: RESOLVED | Noted: 2024-10-17 | Resolved: 2024-10-17

## 2024-10-17 PROBLEM — R09.81 NASAL CONGESTION: Status: RESOLVED | Noted: 2024-10-17 | Resolved: 2024-10-17

## 2024-10-17 PROBLEM — I11.9 BENIGN HYPERTENSIVE HEART DISEASE: Status: ACTIVE | Noted: 2024-10-17

## 2024-10-17 PROBLEM — R21 RASH: Status: RESOLVED | Noted: 2024-10-17 | Resolved: 2024-10-17

## 2024-10-17 PROBLEM — E78.00 ELEVATED SERUM CHOLESTEROL: Status: RESOLVED | Noted: 2024-10-17 | Resolved: 2024-10-17

## 2024-10-17 PROBLEM — H93.13 TINNITUS OF BOTH EARS: Status: ACTIVE | Noted: 2024-10-17

## 2024-10-17 PROBLEM — J38.3 VOCAL CORD DYSPLASIA: Status: RESOLVED | Noted: 2018-07-19 | Resolved: 2024-10-17

## 2024-10-17 PROBLEM — R49.0 HOARSENESS: Status: RESOLVED | Noted: 2024-10-17 | Resolved: 2024-10-17

## 2024-10-17 PROBLEM — J34.3 HYPERTROPHY OF BOTH INFERIOR NASAL TURBINATES: Status: ACTIVE | Noted: 2024-10-17

## 2024-10-17 PROBLEM — H90.0 CONDUCTIVE HEARING LOSS OF BOTH MIDDLE EARS: Status: RESOLVED | Noted: 2024-10-17 | Resolved: 2024-10-17

## 2024-10-17 PROBLEM — H90.11 CONDUCTIVE HEARING LOSS OF RIGHT EAR: Status: ACTIVE | Noted: 2024-10-17

## 2024-10-17 PROBLEM — H61.23 BILATERAL IMPACTED CERUMEN: Status: ACTIVE | Noted: 2024-10-17

## 2024-10-17 PROBLEM — H33.319 HORSESHOE TEAR OF RETINA WITHOUT DETACHMENT: Status: RESOLVED | Noted: 2021-06-18 | Resolved: 2024-10-17

## 2024-10-17 PROBLEM — Z86.79 HISTORY OF HYPERTENSION: Status: ACTIVE | Noted: 2024-10-17

## 2024-10-17 PROBLEM — C61 MALIGNANT NEOPLASM OF PROSTATE (MULTI): Status: RESOLVED | Noted: 2024-10-17 | Resolved: 2024-10-17

## 2024-10-17 PROBLEM — H69.91 DYSFUNCTION OF RIGHT EUSTACHIAN TUBE: Status: RESOLVED | Noted: 2024-10-17 | Resolved: 2024-10-17

## 2024-10-17 LAB — POC HEMOGLOBIN A1C: 7 % (ref 4.2–6.5)

## 2024-10-17 ASSESSMENT — PATIENT HEALTH QUESTIONNAIRE - PHQ9
1. LITTLE INTEREST OR PLEASURE IN DOING THINGS: NOT AT ALL
SUM OF ALL RESPONSES TO PHQ9 QUESTIONS 1 AND 2: 0
SUM OF ALL RESPONSES TO PHQ9 QUESTIONS 1 AND 2: 0
2. FEELING DOWN, DEPRESSED OR HOPELESS: NOT AT ALL
2. FEELING DOWN, DEPRESSED OR HOPELESS: NOT AT ALL
1. LITTLE INTEREST OR PLEASURE IN DOING THINGS: NOT AT ALL

## 2024-10-17 ASSESSMENT — ACTIVITIES OF DAILY LIVING (ADL)
DRESSING: INDEPENDENT
DOING_HOUSEWORK: INDEPENDENT
MANAGING_FINANCES: INDEPENDENT
GROCERY_SHOPPING: INDEPENDENT
BATHING: INDEPENDENT
TAKING_MEDICATION: INDEPENDENT

## 2024-10-17 ASSESSMENT — COLUMBIA-SUICIDE SEVERITY RATING SCALE - C-SSRS: 1. IN THE PAST MONTH, HAVE YOU WISHED YOU WERE DEAD OR WISHED YOU COULD GO TO SLEEP AND NOT WAKE UP?: NO

## 2024-10-17 ASSESSMENT — PAIN SCALES - GENERAL: PAINLEVEL_OUTOF10: 0-NO PAIN

## 2024-10-17 NOTE — PROGRESS NOTES
Subjective   Reason for Visit: Juan M Mcrae is an 79 y.o. male here for a Medicare Wellness visit.     Past Medical, Surgical, and Family History reviewed and updated in chart.    Reviewed all medications by prescribing practitioner or clinical pharmacist (such as prescriptions, OTCs, herbal therapies and supplements) and documented in the medical record.    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  Adjuvant nivolumab started on October 7, 2024.     #DM review  Last A1c - 7.0 (POC today 10/17/24), 6.8, 6.1, 6.6, 7.0, 6.8, 6.6, 6.7   Current treatment:  -Metformin, recently restarted   -No longer on insulin    Last eGFR -  >90  Last Creatinine -  0.71  Last microalbumin - 7/23  Last eye exam - 10/23  Pneumovax done? - Complete    AM sugars low 100's  Basalglar 10 units - not eating great as trying to increase weight trying to get to 170  A1c 8.8 7/2024   CGM review   58% In range  0%  low  11% very high  31%  high  7.6 % GMI    Health Maintenance  Immunizations:     Tdap    Pneumococcal complete    Shingles discussed    COVID complete    Influenza complete  Colonoscopy: UTD    Male Specific   PSA aged out   AAA screen: NA      Patient Care Team:  Candy Grossman MD as PCP - General (Family Medicine)  Maximiliano Ayala MD as Primary Care Provider  Shivani Huang DO as Surgeon (Gastroenterology)  Catarino Fry MD as Consulting Physician (Hematology and Oncology)  Carine Adan RN as Registered Nurse (Hematology and Oncology)  Melina Leroy [facility] as Pharmacist (Pharmacy)     Review of Systems   Constitutional:  Negative for  "chills and fever.   HENT:  Negative for trouble swallowing.    Eyes:  Negative for visual disturbance.   Respiratory:  Negative for cough, shortness of breath and wheezing.    Cardiovascular:  Negative for chest pain and palpitations.   Gastrointestinal:  Negative for abdominal pain, blood in stool, constipation and diarrhea.   Genitourinary:  Negative for difficulty urinating, penile pain and testicular pain.   Musculoskeletal:  Negative for arthralgias, back pain and joint swelling.   Skin:  Negative for rash.   Neurological:  Negative for dizziness, light-headedness and headaches.   Psychiatric/Behavioral:  Negative for dysphoric mood. The patient is not nervous/anxious.        Objective   Vitals:  /64   Pulse 66   Ht 1.676 m (5' 6\")   Wt 73.5 kg (162 lb)   SpO2 98%   BMI 26.15 kg/m²       Physical Exam  Vitals and nursing note reviewed.   Constitutional:       Appearance: Normal appearance.   HENT:      Head: Normocephalic and atraumatic.      Right Ear: Tympanic membrane, ear canal and external ear normal.      Left Ear: Tympanic membrane, ear canal and external ear normal.      Mouth/Throat:      Mouth: Mucous membranes are moist.      Pharynx: Oropharynx is clear.   Eyes:      Extraocular Movements: Extraocular movements intact.      Conjunctiva/sclera: Conjunctivae normal.      Pupils: Pupils are equal, round, and reactive to light.   Cardiovascular:      Rate and Rhythm: Normal rate and regular rhythm.      Heart sounds: Normal heart sounds. No murmur heard.  Pulmonary:      Effort: Pulmonary effort is normal.      Breath sounds: Normal breath sounds. No wheezing, rhonchi or rales.   Abdominal:      General: Abdomen is flat.      Palpations: Abdomen is soft.      Tenderness: There is no abdominal tenderness.   Musculoskeletal:         General: Normal range of motion.      Cervical back: Normal range of motion and neck supple.   Skin:     General: Skin is warm and dry.   Neurological:      Mental " Status: He is alert.   Psychiatric:         Mood and Affect: Mood normal.         Behavior: Behavior normal.         Assessment & Plan  Type 2 diabetes mellitus with hyperglycemia, without long-term current use of insulin  Controlled  Continue metformin, discussed titration schedule  Will follow up with Melina Leroy in pharmacy and plan to add farxiga at that visit if need better glucose control   Eye exam UTD  Reviewed dietary efforts and physical activity    Orders:    POCT glycosylated hemoglobin (Hb A1C) manually resulted    Routine general medical examination at health care facility  Well adult exam.  1. Age appropriate preventative measures reviewed.   2. Encouraged healthy diet and exercise.  3. Immunizations- Reviewed and discussed  4. Labs- Reviewed and discussed with patient  5. Medications- Reviewed         Mixed hyperlipidemia

## 2024-10-21 ENCOUNTER — INFUSION (OUTPATIENT)
Dept: HEMATOLOGY/ONCOLOGY | Facility: CLINIC | Age: 79
End: 2024-10-21
Payer: COMMERCIAL

## 2024-10-21 ENCOUNTER — OFFICE VISIT (OUTPATIENT)
Dept: HEMATOLOGY/ONCOLOGY | Facility: CLINIC | Age: 79
End: 2024-10-21
Payer: COMMERCIAL

## 2024-10-21 VITALS
WEIGHT: 164.35 LBS | BODY MASS INDEX: 26.53 KG/M2 | RESPIRATION RATE: 16 BRPM | OXYGEN SATURATION: 97 % | DIASTOLIC BLOOD PRESSURE: 76 MMHG | HEART RATE: 56 BPM | TEMPERATURE: 97.1 F | SYSTOLIC BLOOD PRESSURE: 143 MMHG

## 2024-10-21 DIAGNOSIS — C15.5 MALIGNANT NEOPLASM OF LOWER THIRD OF ESOPHAGUS (MULTI): Primary | ICD-10-CM

## 2024-10-21 DIAGNOSIS — C15.5 MALIGNANT NEOPLASM OF LOWER THIRD OF ESOPHAGUS (MULTI): ICD-10-CM

## 2024-10-21 LAB
ALBUMIN SERPL BCP-MCNC: 4.2 G/DL (ref 3.4–5)
ALP SERPL-CCNC: 67 U/L (ref 33–136)
ALT SERPL W P-5'-P-CCNC: 12 U/L (ref 10–52)
ANION GAP SERPL CALC-SCNC: 12 MMOL/L (ref 10–20)
AST SERPL W P-5'-P-CCNC: 14 U/L (ref 9–39)
BASOPHILS # BLD AUTO: 0.08 X10*3/UL (ref 0–0.1)
BASOPHILS NFR BLD AUTO: 1.4 %
BILIRUB SERPL-MCNC: 0.3 MG/DL (ref 0–1.2)
BUN SERPL-MCNC: 15 MG/DL (ref 6–23)
CALCIUM SERPL-MCNC: 9.4 MG/DL (ref 8.6–10.3)
CHLORIDE SERPL-SCNC: 106 MMOL/L (ref 98–107)
CO2 SERPL-SCNC: 27 MMOL/L (ref 21–32)
CREAT SERPL-MCNC: 0.67 MG/DL (ref 0.5–1.3)
EGFRCR SERPLBLD CKD-EPI 2021: >90 ML/MIN/1.73M*2
EOSINOPHIL # BLD AUTO: 0.17 X10*3/UL (ref 0–0.4)
EOSINOPHIL NFR BLD AUTO: 2.9 %
ERYTHROCYTE [DISTWIDTH] IN BLOOD BY AUTOMATED COUNT: 14.7 % (ref 11.5–14.5)
GLUCOSE SERPL-MCNC: 138 MG/DL (ref 74–99)
HCT VFR BLD AUTO: 40.1 % (ref 41–52)
HGB BLD-MCNC: 13.3 G/DL (ref 13.5–17.5)
IMM GRANULOCYTES # BLD AUTO: 0.01 X10*3/UL (ref 0–0.5)
IMM GRANULOCYTES NFR BLD AUTO: 0.2 % (ref 0–0.9)
LYMPHOCYTES # BLD AUTO: 1.02 X10*3/UL (ref 0.8–3)
LYMPHOCYTES NFR BLD AUTO: 17.5 %
MCH RBC QN AUTO: 31.7 PG (ref 26–34)
MCHC RBC AUTO-ENTMCNC: 33.2 G/DL (ref 32–36)
MCV RBC AUTO: 96 FL (ref 80–100)
MONOCYTES # BLD AUTO: 0.68 X10*3/UL (ref 0.05–0.8)
MONOCYTES NFR BLD AUTO: 11.6 %
NEUTROPHILS # BLD AUTO: 3.88 X10*3/UL (ref 1.6–5.5)
NEUTROPHILS NFR BLD AUTO: 66.4 %
NRBC BLD-RTO: 0 /100 WBCS (ref 0–0)
PLATELET # BLD AUTO: 216 X10*3/UL (ref 150–450)
POTASSIUM SERPL-SCNC: 4 MMOL/L (ref 3.5–5.3)
PROT SERPL-MCNC: 6.8 G/DL (ref 6.4–8.2)
RBC # BLD AUTO: 4.19 X10*6/UL (ref 4.5–5.9)
SODIUM SERPL-SCNC: 141 MMOL/L (ref 136–145)
WBC # BLD AUTO: 5.8 X10*3/UL (ref 4.4–11.3)

## 2024-10-21 PROCEDURE — 96413 CHEMO IV INFUSION 1 HR: CPT

## 2024-10-21 PROCEDURE — 1126F AMNT PAIN NOTED NONE PRSNT: CPT

## 2024-10-21 PROCEDURE — 3078F DIAST BP <80 MM HG: CPT

## 2024-10-21 PROCEDURE — 2500000004 HC RX 250 GENERAL PHARMACY W/ HCPCS (ALT 636 FOR OP/ED): Performed by: INTERNAL MEDICINE

## 2024-10-21 PROCEDURE — 84075 ASSAY ALKALINE PHOSPHATASE: CPT

## 2024-10-21 PROCEDURE — 3077F SYST BP >= 140 MM HG: CPT

## 2024-10-21 PROCEDURE — 99215 OFFICE O/P EST HI 40 MIN: CPT

## 2024-10-21 PROCEDURE — 1160F RVW MEDS BY RX/DR IN RCRD: CPT

## 2024-10-21 PROCEDURE — 1159F MED LIST DOCD IN RCRD: CPT

## 2024-10-21 PROCEDURE — 1157F ADVNC CARE PLAN IN RCRD: CPT

## 2024-10-21 PROCEDURE — 99215 OFFICE O/P EST HI 40 MIN: CPT | Mod: 25

## 2024-10-21 PROCEDURE — 2500000004 HC RX 250 GENERAL PHARMACY W/ HCPCS (ALT 636 FOR OP/ED): Mod: JZ,JG

## 2024-10-21 PROCEDURE — 1123F ACP DISCUSS/DSCN MKR DOCD: CPT

## 2024-10-21 PROCEDURE — 85025 COMPLETE CBC W/AUTO DIFF WBC: CPT

## 2024-10-21 RX ORDER — FAMOTIDINE 10 MG/ML
20 INJECTION INTRAVENOUS ONCE AS NEEDED
Status: CANCELLED | OUTPATIENT
Start: 2024-10-21

## 2024-10-21 RX ORDER — ALBUTEROL SULFATE 0.83 MG/ML
3 SOLUTION RESPIRATORY (INHALATION) AS NEEDED
Status: DISCONTINUED | OUTPATIENT
Start: 2024-10-21 | End: 2024-10-21 | Stop reason: HOSPADM

## 2024-10-21 RX ORDER — EPINEPHRINE 0.3 MG/.3ML
0.3 INJECTION SUBCUTANEOUS EVERY 5 MIN PRN
Status: DISCONTINUED | OUTPATIENT
Start: 2024-10-21 | End: 2024-10-21 | Stop reason: HOSPADM

## 2024-10-21 RX ORDER — ALBUTEROL SULFATE 0.83 MG/ML
3 SOLUTION RESPIRATORY (INHALATION) AS NEEDED
Status: CANCELLED | OUTPATIENT
Start: 2024-10-21

## 2024-10-21 RX ORDER — HEPARIN 100 UNIT/ML
500 SYRINGE INTRAVENOUS AS NEEDED
OUTPATIENT
Start: 2024-10-21

## 2024-10-21 RX ORDER — HEPARIN SODIUM,PORCINE/PF 10 UNIT/ML
50 SYRINGE (ML) INTRAVENOUS AS NEEDED
OUTPATIENT
Start: 2024-10-21

## 2024-10-21 RX ORDER — DIPHENHYDRAMINE HYDROCHLORIDE 50 MG/ML
50 INJECTION INTRAMUSCULAR; INTRAVENOUS AS NEEDED
Status: DISCONTINUED | OUTPATIENT
Start: 2024-10-21 | End: 2024-10-21 | Stop reason: HOSPADM

## 2024-10-21 RX ORDER — DIPHENHYDRAMINE HYDROCHLORIDE 50 MG/ML
50 INJECTION INTRAMUSCULAR; INTRAVENOUS AS NEEDED
Status: CANCELLED | OUTPATIENT
Start: 2024-10-21

## 2024-10-21 RX ORDER — PROCHLORPERAZINE MALEATE 10 MG
10 TABLET ORAL EVERY 6 HOURS PRN
Status: CANCELLED | OUTPATIENT
Start: 2024-10-21

## 2024-10-21 RX ORDER — FAMOTIDINE 10 MG/ML
20 INJECTION INTRAVENOUS ONCE AS NEEDED
Status: DISCONTINUED | OUTPATIENT
Start: 2024-10-21 | End: 2024-10-21 | Stop reason: HOSPADM

## 2024-10-21 RX ORDER — HEPARIN 100 UNIT/ML
500 SYRINGE INTRAVENOUS AS NEEDED
Status: DISCONTINUED | OUTPATIENT
Start: 2024-10-21 | End: 2024-10-21 | Stop reason: HOSPADM

## 2024-10-21 RX ORDER — PROCHLORPERAZINE EDISYLATE 5 MG/ML
10 INJECTION INTRAMUSCULAR; INTRAVENOUS EVERY 6 HOURS PRN
Status: CANCELLED | OUTPATIENT
Start: 2024-10-21

## 2024-10-21 RX ORDER — EPINEPHRINE 0.3 MG/.3ML
0.3 INJECTION SUBCUTANEOUS EVERY 5 MIN PRN
Status: CANCELLED | OUTPATIENT
Start: 2024-10-21

## 2024-10-21 RX ORDER — PROCHLORPERAZINE EDISYLATE 5 MG/ML
10 INJECTION INTRAMUSCULAR; INTRAVENOUS EVERY 6 HOURS PRN
Status: DISCONTINUED | OUTPATIENT
Start: 2024-10-21 | End: 2024-10-21 | Stop reason: HOSPADM

## 2024-10-21 RX ORDER — PROCHLORPERAZINE MALEATE 10 MG
10 TABLET ORAL EVERY 6 HOURS PRN
Status: DISCONTINUED | OUTPATIENT
Start: 2024-10-21 | End: 2024-10-21 | Stop reason: HOSPADM

## 2024-10-21 RX ORDER — METFORMIN HYDROCHLORIDE 500 MG/1
500 TABLET ORAL
COMMUNITY

## 2024-10-21 RX ORDER — HEPARIN SODIUM,PORCINE/PF 10 UNIT/ML
50 SYRINGE (ML) INTRAVENOUS AS NEEDED
Status: DISCONTINUED | OUTPATIENT
Start: 2024-10-21 | End: 2024-10-21 | Stop reason: HOSPADM

## 2024-10-21 ASSESSMENT — PAIN SCALES - GENERAL: PAINLEVEL_OUTOF10: 0-NO PAIN

## 2024-10-21 NOTE — PROGRESS NOTES
Patient ID: Juan M Mcrae is a 79 y.o. male.    Diagnoses:   1.GE junction adenocarcinoma (signet ring cell), proficient MMR.  Localized (clinical stage II/III).  Status post esophagectomy on July 23, 2024 following neoadjuvant CRT->ypT3 N1.  Adjuvant nivolumab started on October 7, 2024.  2. Type 2 diabetes mellitus on oral hypoglycemics, hypertension controlled with medications.    Genomic profile:  Proficient MMR status on the biopsy by IHC.    Assessment and Plan:  This is a pleasant 79-year-old man with a diagnosis of GE junction adenocarcinoma.  His staging PET/CT and subsequent imaging studies confirmed localized disease.  He had a port placement and a J-tube placement.  He has seen radiation oncology and he has been enrolled in the NRG- (photon versus proton) study.    He completed concurrent chemoradiation in early June 2024 (last chemo was on Bettie 10, 2024).  His surgery took place on July 23, 2024.  He tolerated the surgery well.  The final pathology was ypT3 N1.      Since he has residual tumor in the resected specimen, We discussed adjuvant therapy with nivolumab.  Discussed the data of CheckMate-[id number] study which showed survival advantage with adjuvant nivolumab.  After discussion, he signed the consent form and we ordered nivolumab for him.    Plan: adjuvant nivolumab for 1 year (started on 10-).      Will continue with treatment as planned today. Patient will return in 2 weeks prior to next treatment. He is aware of plan and knows to call with any issues or concerns.     I have placed all orders as outlined above. I advised the patient to schedule the tests and follow-up appointment as discussed by contacting the  on the way out or calling by phone.    Providers:  Surgeon: Dr. Jovany Solares:  Jennie: Donna.    Chief complaint: GE junction adenocarcinoma.    HPI:  ONCOLOGIC HISTORY-    February 28, 2024: Underwent an EGD for progressive dysphagia and weight loss.  EGD revealed an  obstructing mass in the distal esophagus.  Biopsy confirmed adenocarcinoma with signet ring cell features.    March 21, 2024: EUS revealed T2 N1 mass extending from 41 cm to 43 cm and involving less than 1 cm of the gastric cardia.    March 22, 2024: CT scan of chest did not show any metastatic disease.    April 1, 2024 24: PET/CT scan done:   1. Hypermetabolic linear focus at gastroesophageal junction which is  extending to the gastric cardia/proximal lesser curvature in  correlation with distal esophageal /gastroesophageal junction wall  thickening. These findings are compatible with biopsy-proven  adenocarcinoma.  2. No evidence of hypermetabolic lymphadenopathy.  3. Abnormal focal increase in FDG uptake in the left hepatic lobe in  correlation with hypoattenuating lesion, concerning for metastatic  disease. Further correlation with dedicated CT abdomen is recommended.  4. Large fat containing left inguinal hernia.  Interval history: He has significant dysphagia although he can get by with soup.  He has lost about 60 pounds in the last 6 months or so.  Feels fatigued.  Denies any pain.  Denies fever or chill or any other sign of ongoing infection.    April 3, 2024: CT scan of abdomen and MRI liver ruled out metastatic disease (liver lesions were hemangiomas).    May 6, 2024: Started concurrent radiation and chemotherapy with weekly carboplatin and Taxol.  Will complete concurrent chemoradiation on June 14, 2024.    6/10/24: RECEIVED THE LAST DOSE OF CHEMO. LAST DOSE OF RADIATION WAS ON 6/14/2024.    July 23, 2024: Underwent surgery.  Pathology ypT3 N1.    October 7, 2024: Started adjuvant nivolumab.    Interval history:  Mr. Mcrae  presents today to continue nivolumab. He reports tolerating his first treatment fairly well. Denies any significant pain. He has no nausea or vomiting or diarrhea. He has mild (grade 1) fatigue.  He denies any fevers, chills or night sweats. No cough, chest pain or shortness of  breath.     Review of systems: Negative unless otherwise stated in HPI     Allergies  Omeprazole, Sulfa, shellfish    Medications:  Acetaminophen, atenolol, dexamethasone, Emla cream, metformin 1000 mg twice daily, multivitamin, naloxone, nystatin, olanzapine, Actos, prochlorperazine, trazodone     Past Medical History:   Type 2 diabetes mellitus on oral hypoglycemics, hypertension controlled with medications.  Cataract, CKD (chronic kidney disease), Colon polyp, Deviated septum, ED (erectile dysfunction), Esophageal cancer, GERD (gastroesophageal reflux disease), Hyperlipidemia, Nephrolithiasis, Sleep apnea (wear CPAP/BiPAP)    Surgical History:    Past Surgical History:   Procedure Laterality Date    BLADDER SURGERY      COLONOSCOPY      Repeat in one year    EYE SURGERY      LITHOTRIPSY      OTHER SURGICAL HISTORY      Vocal cord surgery    TRANSURETHRAL RESECTION OF PROSTATE      UPPER GASTROINTESTINAL ENDOSCOPY  2024      Family History:    Family History   Problem Relation Name Age of Onset    Stroke Father      Kidney cancer Brother Eliu     Stroke Brother Eliu      Family Oncology History:    Cancer-related family history includes Kidney cancer in his brother.    Social History:      Tobacco Use    Smoking status: Former     Current packs/day: 0.00     Average packs/day: 2.0 packs/day for 40.0 years (80.0 ttl pk-yrs)     Types: Cigarettes     Start date: 1959     Quit date: 1999     Years since quittin.4    Smokeless tobacco: Never   Vaping Use    Vaping status: Never Used   Substance Use Topics    Alcohol use: Yes     Alcohol/week: 2.0 standard drinks of alcohol     Types: 2 Cans of beer per week     Comment: couple beers a week    Drug use: Not Currently     Comment: gummies-dispensary from [place]        Vital Signs:  /76 (BP Location: Right arm, Patient Position: Sitting, BP Cuff Size: Adult long)   Pulse 56   Temp 36.2 °C (97.1 °F) (Temporal)   Resp 16   Wt 74.6 kg (164  lb 5.7 oz)   SpO2 97%   BMI 26.53 kg/m²     Physical Exam:    ECO  Pain: 0    Constitutional: Well developed, awake/alert/oriented x3, no distress, alert and cooperative  Eyes: PER. sclera anicteric  ENMT: Oral mucosa moist  Respiratory/Thorax: Breathing is non-labored. Lungs are clear to auscultation bilaterally. No adventitious breath sounds  Cardiovascular: S1-S2. Regular rate and rhythm. No murmurs, rubs, or gallops appreciated  Gastrointestinal: Abdomen soft nontender, nondistended, normal active bowel sounds.  Musculoskeletal: ROM intact, no joint swelling, normal strength  Extremities: normal extremities, no cyanosis, no edema, no clubbing  Neurologic: alert and oriented x3. Nonfocal exam. No myoclonus  Psychological: Pleasant, appropriate and easily engaged     Lab Results:  Labs from today reviewed.           Kristi Rodriguez, APRN-CNP

## 2024-10-21 NOTE — PROGRESS NOTES
Pt arrived ambulatory to infusion. Pt denies new or worsening symptoms. Pt saw provider today. Port site c/d/I. Easily accessed with brisk blood return. Labs drawn and sent per order.   Pt tolerated treatment well. Brisk blood return at completion. Port flushed per order, deaccessed and bandaged. Pt given AVS. Pt left ambulatory.

## 2024-10-21 NOTE — PROGRESS NOTES
Patient here alone for follow up and treatment with YAMILKA Rodriguez NP; seen for esophageal cancer.    Receives Nivolumab every 28 days; per NP, patient to receive today.     Reconciled and reviewed medication and allergy list with patient.    Patient has no pain at this time.    Patient is eating well and without difficulty; taking in normal foods at this time.  PEG removed 6 weeks ago.      Patient to return as scheduled for treatment and follow up.    Reminded patient to check his My Chart for any updates to scheduling and to review medication list against what  has at home.  Also reviewed patient can obtain lab results on My Chart which will be reviewed at follow up visits.    No barriers to education noted, pt. Agreed to plan and verbalized understanding using teach back method

## 2024-10-22 ENCOUNTER — OFFICE VISIT (OUTPATIENT)
Dept: SURGERY | Facility: CLINIC | Age: 79
End: 2024-10-22
Payer: MEDICARE

## 2024-10-22 VITALS
BODY MASS INDEX: 25.27 KG/M2 | HEIGHT: 67 IN | SYSTOLIC BLOOD PRESSURE: 127 MMHG | TEMPERATURE: 96.6 F | RESPIRATION RATE: 18 BRPM | WEIGHT: 161 LBS | DIASTOLIC BLOOD PRESSURE: 67 MMHG | HEART RATE: 53 BPM

## 2024-10-22 DIAGNOSIS — D12.5 BENIGN NEOPLASM OF SIGMOID COLON: ICD-10-CM

## 2024-10-22 DIAGNOSIS — K62.1 RECTAL POLYP: Primary | ICD-10-CM

## 2024-10-22 DIAGNOSIS — K62.1 RECTAL POLYP: ICD-10-CM

## 2024-10-22 DIAGNOSIS — K57.30 DIVERTICULOSIS OF LARGE INTESTINE WITHOUT DIVERTICULITIS: ICD-10-CM

## 2024-10-22 DIAGNOSIS — D12.2 BENIGN NEOPLASM OF ASCENDING COLON: ICD-10-CM

## 2024-10-22 PROCEDURE — 45330 DIAGNOSTIC SIGMOIDOSCOPY: CPT | Performed by: SURGERY

## 2024-10-22 PROCEDURE — 99214 OFFICE O/P EST MOD 30 MIN: CPT | Performed by: SURGERY

## 2024-10-22 RX ORDER — CHLORHEXIDINE GLUCONATE ORAL RINSE 1.2 MG/ML
SOLUTION DENTAL
Qty: 120 ML | Refills: 0 | Status: SHIPPED | OUTPATIENT
Start: 2024-10-22

## 2024-10-22 RX ORDER — NEOMYCIN SULFATE 500 MG/1
TABLET ORAL
Qty: 6 TABLET | Refills: 0 | Status: SHIPPED | OUTPATIENT
Start: 2024-10-22

## 2024-10-22 RX ORDER — METRONIDAZOLE 250 MG/1
TABLET ORAL
Qty: 3 TABLET | Refills: 0 | Status: SHIPPED | OUTPATIENT
Start: 2024-10-22

## 2024-10-22 ASSESSMENT — PAIN SCALES - GENERAL: PAINLEVEL_OUTOF10: 0-NO PAIN

## 2024-10-22 NOTE — LETTER
October 22, 2024     Catarino Fry MD  [address]  [facility] [address]    Patient: Juan M Mcrae   YOB: 1945   Date of Visit: 10/22/2024       Dear Dr. Catarino Fry MD:    Plan for transanal minimally invasive surgery of recurrent rectal polyp.  I want to confirm that no changes need to be made to his adjuvant therapy related to his esophageal cancer.  Sincerely,     Cruz Sahni MD      CC: No Recipients  ______________________________________________________________________________________    Juan M Mcrae is an 79 year old male referred by Dr. Bibiana Zheng for evaluation of rectal mass.    History of of GE junction adenocarcinoma. He completed concurrent chemoradiation in early June 2024 (last chemo was on Bettie 10, 2024). S/P Esophagectomy July 23, 2024. He tolerated the surgery well. The final pathology was ypT3 N1.   Oncology recommended adjuvant nivolumab for 1 year (started on 10-).        9/23/2024 Colonoscopy to cecum  Rectum:  A 30 mm sessile polyp with full thickness attachment at 5-6 from the dentate line.  Biopsy taken.  Sigmoid colon:  Diverticulosis of large intestine without complication, moderate  Mid: diminutive polyp,  Polypectomy done using cold biopsy forceps  Descending colon:  Normal  Transverse colon:  Normal  Ascending colon: Proximal: polyp of size 6 mm.  Polypectomy done by cold snare.    Pathology:   Proximal Ascending polyp:  Tubular adenoma.  No high grade dysplasia or malignancy  Mid Sigmoid polyp:  Unremarkable colonic mucosa.  No adenomatous or hyperplastic changes.  Rectal Mass: tubular adenoma.  No high grade dysplasia or malignancy      Past Medical History  GE junction adenocarcinoma (signet ring cell)  DM  HTN    Surgical History  Esophagectomy on July 23, 2024 following neoadjuvant CRT      Social History  Smoking: Former smoker, quit 1999   ETOH: 2 beer/week    Family History  Brother:  Kidney cancer    Review of  Systems  Constitutional: Negative for fever, chills, anorexia, weight loss, malaise     ENMT: Negative for nasal discharge, congestion, ear pain, mouth pain, throat pain     Respiratory: Negative for cough, hemoptysis, wheezing, shortness of breath     Cardiac: Negative for chest pain, dyspnea on exertion, orthopnea, palpitations, syncope, (+)HTN     Gastrointestinal: Negative for nausea, vomiting, diarrhea, constipation, abdominal pain, (+)GE junction adenocarcinoma    Genitourinary: Negative for discharge, dysuria, flank pain, frequency, hematuria     Musculoskeletal: Negative for decreased ROM, pain, swelling, weakness     Neurological: Negative for dizziness, confusion, headache, seizures, syncope     Psychiatric: Negative for mood changes, anxiety, hallucinations, sleep changes, suicidal ideas     Skin: Negative for mass, pain, itching, rash, ulcer     Endocrine: Negative for heat intolerance, cold intolerance, excessive sweating, polyuria, excess thirst, (+)DM     Hematologic/Lymph: Negative for anemia, bruising, easy bleeding, night sweats, petechiae, history of DVT/PE or cancer     Allergic/Immunologic: Negative for anaphylaxis, itchy/ teary eyes, itching, sneezing, swelling    Physical Exam  Constitutional: Well developed, awake/alert/oriented x3, no distress, alert and cooperative             Eyes: Sclera anicteric, no conjunctival inflammation, conjugate gaze    ENMT: mucous membranes moist, no apparent injury,            Head/Neck: Neck supple, no apparent injury, No JVD, trachea midline, no bruits              Respiratory/Thorax: Patent airways, CTAB, normal breath sounds with good chest expansion, thorax symmetric         Cardiovascular: Regular, rate and rhythm, no murmurs, normal S1 and S2         Gastrointestinal: Nondistended, soft, non-tender, no rebound tenderness or guarding, no masses palpable, no organomegaly, +BS, no bruits               Extremities: normal extremities, no cyanosis edema,  contusions or wounds, 2+ femoral pulses B/L              Neurological: alert and oriented x3, normal strength, Normal gait          Lymphatic: No palpable inguinal lymphadenopathy   Psychological: Appropriate mood and behavior         Skin: Warm and dry, no lesions, no rashes                Anorectal:  Flexible Sigmoidoscopy In Clinic    Date/Time: 10/22/2024 8:25 AM    Performed by: Cruz Sahni MD  Authorized by: Cruz Sahni MD  Indications: rectal mass  External exam performed: yes  Digital exam performed: yes  Comments: Endoscope inserted transanally and advanced to 15 cm.  Prep was excellent.  On withdrawal there was a 2 cm polyp overlying the first rectal valve in the left lateral position.            Impression:  Recurrent rectal polyp at approximately 5 cm in the left lateral position on the first rectal valve    Plan:    TAMIS excision  Will coordinate with oncology for timing with current adjuvant chemotherapy of esophageal cancer    Risks and benefits of surgery were discussed with the patient including, but not limited to: conversion to an open procedure, infection, bleeding, wound healing issues, anastomotic leak or stricture, damage to surrounding structures, need for additional intervention

## 2024-10-22 NOTE — LETTER
October 22, 2024     Bibiana Zheng MD  [address]  [address]  [place] [address]    Patient: Juan M Mcrae   YOB: 1945   Date of Visit: 10/22/2024       Dear Dr. Bibiana Zheng MD:    Thank you for referring Juan M Mcrae to me for evaluation. Below are my notes for this consultation.  If you have questions, please do not hesitate to call me. I look forward to following your patient along with you.       Sincerely,     Cruz Sahni MD      CC: No Recipients  ______________________________________________________________________________________    Juan M Mcrae is an 79 year old male referred by Dr. Bibiana Zheng for evaluation of rectal mass.    History of of GE junction adenocarcinoma. He completed concurrent chemoradiation in early June 2024 (last chemo was on Bettie 10, 2024). S/P Esophagectomy July 23, 2024. He tolerated the surgery well. The final pathology was ypT3 N1.   Oncology recommended adjuvant nivolumab for 1 year (started on 10-).        9/23/2024 Colonoscopy to cecum  Rectum:  A 30 mm sessile polyp with full thickness attachment at 5-6 from the dentate line.  Biopsy taken.  Sigmoid colon:  Diverticulosis of large intestine without complication, moderate  Mid: diminutive polyp,  Polypectomy done using cold biopsy forceps  Descending colon:  Normal  Transverse colon:  Normal  Ascending colon: Proximal: polyp of size 6 mm.  Polypectomy done by cold snare.    Pathology:   Proximal Ascending polyp:  Tubular adenoma.  No high grade dysplasia or malignancy  Mid Sigmoid polyp:  Unremarkable colonic mucosa.  No adenomatous or hyperplastic changes.  Rectal Mass: tubular adenoma.  No high grade dysplasia or malignancy      Past Medical History  GE junction adenocarcinoma (signet ring cell)  DM  HTN    Surgical History  Esophagectomy on July 23, 2024 following neoadjuvant CRT      Social History  Smoking: Former smoker, quit 1999   ETOH: 2 beer/week    Family History  Brother:  Kidney  cancer    Review of Systems  Constitutional: Negative for fever, chills, anorexia, weight loss, malaise     ENMT: Negative for nasal discharge, congestion, ear pain, mouth pain, throat pain     Respiratory: Negative for cough, hemoptysis, wheezing, shortness of breath     Cardiac: Negative for chest pain, dyspnea on exertion, orthopnea, palpitations, syncope, (+)HTN     Gastrointestinal: Negative for nausea, vomiting, diarrhea, constipation, abdominal pain, (+)GE junction adenocarcinoma    Genitourinary: Negative for discharge, dysuria, flank pain, frequency, hematuria     Musculoskeletal: Negative for decreased ROM, pain, swelling, weakness     Neurological: Negative for dizziness, confusion, headache, seizures, syncope     Psychiatric: Negative for mood changes, anxiety, hallucinations, sleep changes, suicidal ideas     Skin: Negative for mass, pain, itching, rash, ulcer     Endocrine: Negative for heat intolerance, cold intolerance, excessive sweating, polyuria, excess thirst, (+)DM     Hematologic/Lymph: Negative for anemia, bruising, easy bleeding, night sweats, petechiae, history of DVT/PE or cancer     Allergic/Immunologic: Negative for anaphylaxis, itchy/ teary eyes, itching, sneezing, swelling    Physical Exam  Constitutional: Well developed, awake/alert/oriented x3, no distress, alert and cooperative             Eyes: Sclera anicteric, no conjunctival inflammation, conjugate gaze    ENMT: mucous membranes moist, no apparent injury,            Head/Neck: Neck supple, no apparent injury, No JVD, trachea midline, no bruits              Respiratory/Thorax: Patent airways, CTAB, normal breath sounds with good chest expansion, thorax symmetric         Cardiovascular: Regular, rate and rhythm, no murmurs, normal S1 and S2         Gastrointestinal: Nondistended, soft, non-tender, no rebound tenderness or guarding, no masses palpable, no organomegaly, +BS, no bruits               Extremities: normal extremities, no  cyanosis edema, contusions or wounds, 2+ femoral pulses B/L              Neurological: alert and oriented x3, normal strength, Normal gait          Lymphatic: No palpable inguinal lymphadenopathy   Psychological: Appropriate mood and behavior         Skin: Warm and dry, no lesions, no rashes                Anorectal:  Flexible Sigmoidoscopy In Clinic    Date/Time: 10/22/2024 8:25 AM    Performed by: Cruz Sahni MD  Authorized by: Cruz Sahni MD  Indications: rectal mass  External exam performed: yes  Digital exam performed: yes  Comments: Endoscope inserted transanally and advanced to 15 cm.  Prep was excellent.  On withdrawal there was a 2 cm polyp overlying the first rectal valve in the left lateral position.            Impression:  Recurrent rectal polyp at approximately 5 cm in the left lateral position on the first rectal valve    Plan:    TAMIS excision  Will coordinate with oncology for timing with current adjuvant chemotherapy of esophageal cancer    Risks and benefits of surgery were discussed with the patient including, but not limited to: conversion to an open procedure, infection, bleeding, wound healing issues, anastomotic leak or stricture, damage to surrounding structures, need for additional intervention

## 2024-10-24 ENCOUNTER — APPOINTMENT (OUTPATIENT)
Dept: SURGERY | Facility: CLINIC | Age: 79
End: 2024-10-24
Payer: MEDICARE

## 2024-10-24 ENCOUNTER — APPOINTMENT (OUTPATIENT)
Dept: OTOLARYNGOLOGY | Facility: CLINIC | Age: 79
End: 2024-10-24
Payer: MEDICARE

## 2024-10-24 VITALS — HEIGHT: 66 IN | BODY MASS INDEX: 26.2 KG/M2 | WEIGHT: 163 LBS

## 2024-10-24 DIAGNOSIS — J34.2 DEVIATED NASAL SEPTUM: ICD-10-CM

## 2024-10-24 DIAGNOSIS — R49.0 HOARSENESS: Primary | ICD-10-CM

## 2024-10-24 DIAGNOSIS — J38.3 VOCAL CORD DYSPLASIA: ICD-10-CM

## 2024-10-24 DIAGNOSIS — H90.3 ASNHL (ASYMMETRICAL SENSORINEURAL HEARING LOSS): ICD-10-CM

## 2024-10-24 DIAGNOSIS — G47.33 OBSTRUCTIVE SLEEP APNEA: ICD-10-CM

## 2024-10-24 NOTE — PROGRESS NOTES
Chief Complaint   Patient presents with    Hoarseness     LOV: 3/2024 HOARSENESS & VOCAL CORD CHECK      HPI:  Juan M Mcrae is a 79 y.o. male who presents in follow-up for recheck of his hearing loss as well as his history of obstructive sleep apnea and to check his larynx.  Since I last saw him in March 2024 he has been diagnosed with and treated for GE junction adenocarcinoma-T3N1.  He had neoadjuvant chemotherapy and radiation therapy prior to esophagectomy on July 23, 2024.  Now doing adjuvant nivolumab.  Feeding tube is out any states he is swallowing normally without any dysphagia.  Denies any sore throat or hoarseness.  Occasionally his voice gets raspy with use-chronic w/o change.  He has been using his CPAP every night for at least 6 to 7 hours with great success.  Audiogram shows no change from prior with continued mildly asymmetric sloping moderate to severe sensorineural hearing loss which is a little bit worse on the right.  96% discrimination on the left and 84% on the right.  H/O left vocal cord atypia and dysplasia. 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   He did have some visible recurrence and was referred to Dr. West. He had to KTP laser ablations, the last was in November 2018.  He is an ex-smoker.    PMH:  Past Medical History:   Diagnosis Date    Cataract     CKD (chronic kidney disease)     Colon polyp     Deviated nasal septum 10/17/2024    Deviated septum     Disorder of lipoprotein metabolism, unspecified     Elevated serum cholesterol    Dysfunction of right eustachian tube 10/17/2024    Dysphagia     ED (erectile dysfunction)     Elevated serum cholesterol 10/17/2024    Esophageal cancer (Multi)     GERD (gastroesophageal reflux disease)     Hearing aid worn     HL (hearing loss)     Hoarseness 10/17/2024    Horseshoe tear of retina without detachment  06/18/2021    Hypercholesterolemia     Hyperlipidemia     Hypertension     Left knee pain 04/21/2022    Malignant neoplasm of prostate (Multi) 10/17/2024    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    Nephrolithiasis     Obesity     Other specified diabetes mellitus without complications     last A1c= 6.8 on 2/5/2024    Otitis media of right ear 10/18/2022    Rash 10/17/2024    Sleep apnea     wear CPAP/BiPAP    Vision loss     wears glasses     Past Surgical History:   Procedure Laterality Date    BLADDER SURGERY      COLONOSCOPY  2023    Repeat in one year    COLONOSCOPY  02/05/2018    EYE SURGERY      LITHOTRIPSY      OTHER SURGICAL HISTORY      Vocal cord surgery    TRANSURETHRAL RESECTION OF PROSTATE      UPPER GASTROINTESTINAL ENDOSCOPY  03/02/2024         Medications:     Current Outpatient Medications:     atenolol (Tenormin) 50 mg tablet, 1 tablet (50 mg) by j-tube route once daily., Disp: 90 tablet, Rfl: 0    metFORMIN (Glucophage) 500 mg tablet, Take 1 tablet (500 mg) by mouth 2 times daily (morning and late afternoon). 1 tab bid for 1 week then 2 tab bid after, Disp: , Rfl:     traZODone (Desyrel) 100 mg tablet, Take 1 tablet (100 mg) by mouth as needed at bedtime for sleep., Disp: 90 tablet, Rfl: 0    chlorhexidine (Peridex) 0.12 % solution, Rinse mouth with 15 ml after toothbrushing the night before surgery and on the morning of surgery.  Expectorate after rinsing.  Do not swallow., Disp: 120 mL, Rfl: 0    famotidine (Pepcid) 20 mg tablet, Take 1 tablet (20 mg) by mouth once daily., Disp: 30 tablet, Rfl: 0    metroNIDAZOLE (Flagyl) 250 mg tablet, Take one tablet at 6p, 7p, and 11p the night before surgery., Disp: 3 tablet, Rfl: 0    neomycin (Mycifradin) 500 mg tablet, Take two tabs by mouth at 6p, 7pm, and 11p the night before surgery., Disp: 6 tablet, Rfl: 0  No current facility-administered medications for this visit.    Facility-Administered Medications Ordered in Other Visits:     heparin  "flush 100 unit/mL syringe 500 Units, 500 Units, intra-catheter, PRN, Catarino Fry MD, 500 Units at 07/15/24 2387     Allergies:  Allergies   Allergen Reactions    Omeprazole Itching and Unknown    Shellfish Containing Products Hives    Sulfa (Sulfonamide Antibiotics) Rash        ROS:  Review of systems normal unless stated otherwise in the HPI and/or PMH.    Physical Exam:  Height 1.676 m (5' 6\"), weight 73.9 kg (163 lb). Body mass index is 26.31 kg/m².     GENERAL APPEARANCE: Well developed and well nourished.  Alert and oriented in no acute distress.  Normal vocal quality.      HEAD/FACE: No erythema or edema or facial tenderness.  Normal facial nerve function bilaterally.    EAR:       EXTERNAL: Normal pinnas and external auditory canals without lesion or obstructing wax.       MIDDLE EAR: Tympanic membranes intact and mobile with normal landmarks.  Middle ear space appears well aerated.  Wax removed bilaterally with alligators.       TUBE STATUS: N/A       MASTOID CAVITY: N/A       HEARING: Gross hearing assessment is within normal limits.      NOSE:       VISUALIZED USING: Anterior rhinoscopy with headlight and nasal speculum.       DORSUM: Midline, nontraumatic appearance.       MUCOSA: Normal-appearing.       SECRETIONS: Normal.       SEPTUM: Right deviation       INFERIOR TURBINATES: Normal.       MIDDLE TURBINATES/MEATUS: N/A       BLEEDING: N/A         ORAL CAVITY/PHARYNX:       TEETH: Adequate dentition.       TONGUE: No mass or lesion.  Normal mobility.       FLOOR OF MOUTH: No mass or lesion.       PALATE: Normal hard palate, soft palate, and uvula.       OROPHARYNX: Normal without mass or lesion.       BUCCAL MUCOSA/GBS: Normal without mass or lesion.       LIPS: Normal.    LARYNX/HYPOPHARYNX/NASOPHARYNX: Flexible laryngoscopy was performed after consent secondary to an inadequate mirror examination.  The flexible laryngoscope was placed through the nasal cavity revealing normal nasopharynx, " normal oropharynx, normal hypopharynx, and normal larynx.  There is normal bilateral vocal cord mobility without any mucosal masses or lesions.    NECK: No palpable masses or abnormal adenopathy.  Trachea is midline.    THYROID: No thyromegaly or palpable nodule.    SALIVARY GLANDS: Normal bilateral parotid and submandibular glands by inspection and palpation.    TMJ's: Normal.    NEURO: Cranial nerve exam grossly normal bilaterally.       Assessment/Plan   Juan M was seen today for hoarseness.  Diagnoses and all orders for this visit:  Hoarseness (Primary)  Vocal cord dysplasia  Obstructive sleep apnea  Deviated nasal septum  ASNHL (asymmetrical sensorineural hearing loss)    No evidence of disease in the larynx.  Continue to recheck that yearly.  Doing great with CPAP compliance and clinical effect.  Continue CPAP.  Glad he is doing well after treatment for his esophageal cancer.  Continue to follow-up with his physicians for this.  Follow up in about 1 year (around 10/24/2025) for sooner with changes or worsening symptoms.     Dario Smith MD

## 2024-10-26 ASSESSMENT — ENCOUNTER SYMPTOMS
DIZZINESS: 0
CHILLS: 0
DIFFICULTY URINATING: 0
FEVER: 0
PALPITATIONS: 0
LIGHT-HEADEDNESS: 0
BLOOD IN STOOL: 0
DIARRHEA: 0
HEADACHES: 0
ARTHRALGIAS: 0
ABDOMINAL PAIN: 0
SHORTNESS OF BREATH: 0
BACK PAIN: 0
COUGH: 0
DYSPHORIC MOOD: 0
CONSTIPATION: 0
WHEEZING: 0
TROUBLE SWALLOWING: 0
JOINT SWELLING: 0
NERVOUS/ANXIOUS: 0

## 2024-11-04 ENCOUNTER — OFFICE VISIT (OUTPATIENT)
Dept: HEMATOLOGY/ONCOLOGY | Facility: CLINIC | Age: 79
End: 2024-11-04
Payer: MEDICARE

## 2024-11-04 ENCOUNTER — INFUSION (OUTPATIENT)
Dept: HEMATOLOGY/ONCOLOGY | Facility: CLINIC | Age: 79
End: 2024-11-04
Payer: MEDICARE

## 2024-11-04 VITALS
WEIGHT: 164.02 LBS | RESPIRATION RATE: 16 BRPM | BODY MASS INDEX: 26.47 KG/M2 | HEART RATE: 60 BPM | SYSTOLIC BLOOD PRESSURE: 143 MMHG | TEMPERATURE: 98.5 F | DIASTOLIC BLOOD PRESSURE: 76 MMHG | OXYGEN SATURATION: 96 %

## 2024-11-04 DIAGNOSIS — C15.5 MALIGNANT NEOPLASM OF LOWER THIRD OF ESOPHAGUS (MULTI): ICD-10-CM

## 2024-11-04 DIAGNOSIS — C15.9 MALIGNANT NEOPLASM OF ESOPHAGUS, UNSPECIFIED LOCATION (MULTI): ICD-10-CM

## 2024-11-04 LAB
ALBUMIN SERPL BCP-MCNC: 4.2 G/DL (ref 3.4–5)
ALP SERPL-CCNC: 69 U/L (ref 33–136)
ALT SERPL W P-5'-P-CCNC: 9 U/L (ref 10–52)
ANION GAP SERPL CALC-SCNC: 13 MMOL/L (ref 10–20)
AST SERPL W P-5'-P-CCNC: 13 U/L (ref 9–39)
BASOPHILS # BLD AUTO: 0.04 X10*3/UL (ref 0–0.1)
BASOPHILS NFR BLD AUTO: 0.6 %
BILIRUB SERPL-MCNC: 0.4 MG/DL (ref 0–1.2)
BUN SERPL-MCNC: 13 MG/DL (ref 6–23)
CALCIUM SERPL-MCNC: 9.5 MG/DL (ref 8.6–10.3)
CHLORIDE SERPL-SCNC: 104 MMOL/L (ref 98–107)
CO2 SERPL-SCNC: 27 MMOL/L (ref 21–32)
CORTIS AM PEAK SERPL-MSCNC: 11 UG/DL (ref 5–20)
CREAT SERPL-MCNC: 0.78 MG/DL (ref 0.5–1.3)
EGFRCR SERPLBLD CKD-EPI 2021: 90 ML/MIN/1.73M*2
EOSINOPHIL # BLD AUTO: 0.41 X10*3/UL (ref 0–0.4)
EOSINOPHIL NFR BLD AUTO: 5.7 %
ERYTHROCYTE [DISTWIDTH] IN BLOOD BY AUTOMATED COUNT: 14.9 % (ref 11.5–14.5)
GLUCOSE SERPL-MCNC: 136 MG/DL (ref 74–99)
HCT VFR BLD AUTO: 39.8 % (ref 41–52)
HGB BLD-MCNC: 12.8 G/DL (ref 13.5–17.5)
IMM GRANULOCYTES # BLD AUTO: 0.02 X10*3/UL (ref 0–0.5)
IMM GRANULOCYTES NFR BLD AUTO: 0.3 % (ref 0–0.9)
LYMPHOCYTES # BLD AUTO: 0.77 X10*3/UL (ref 0.8–3)
LYMPHOCYTES NFR BLD AUTO: 10.6 %
MCH RBC QN AUTO: 31.2 PG (ref 26–34)
MCHC RBC AUTO-ENTMCNC: 32.2 G/DL (ref 32–36)
MCV RBC AUTO: 97 FL (ref 80–100)
MONOCYTES # BLD AUTO: 0.71 X10*3/UL (ref 0.05–0.8)
MONOCYTES NFR BLD AUTO: 9.8 %
NEUTROPHILS # BLD AUTO: 5.29 X10*3/UL (ref 1.6–5.5)
NEUTROPHILS NFR BLD AUTO: 73 %
NRBC BLD-RTO: 0 /100 WBCS (ref 0–0)
PLATELET # BLD AUTO: 206 X10*3/UL (ref 150–450)
POTASSIUM SERPL-SCNC: 3.9 MMOL/L (ref 3.5–5.3)
PROT SERPL-MCNC: 7 G/DL (ref 6.4–8.2)
RBC # BLD AUTO: 4.1 X10*6/UL (ref 4.5–5.9)
SODIUM SERPL-SCNC: 140 MMOL/L (ref 136–145)
TSH SERPL-ACNC: 1.27 MIU/L (ref 0.44–3.98)
WBC # BLD AUTO: 7.2 X10*3/UL (ref 4.4–11.3)

## 2024-11-04 PROCEDURE — 82533 TOTAL CORTISOL: CPT

## 2024-11-04 PROCEDURE — 99214 OFFICE O/P EST MOD 30 MIN: CPT | Performed by: INTERNAL MEDICINE

## 2024-11-04 PROCEDURE — 1123F ACP DISCUSS/DSCN MKR DOCD: CPT | Performed by: INTERNAL MEDICINE

## 2024-11-04 PROCEDURE — 80053 COMPREHEN METABOLIC PANEL: CPT

## 2024-11-04 PROCEDURE — 3077F SYST BP >= 140 MM HG: CPT | Performed by: INTERNAL MEDICINE

## 2024-11-04 PROCEDURE — 1159F MED LIST DOCD IN RCRD: CPT | Performed by: INTERNAL MEDICINE

## 2024-11-04 PROCEDURE — 84443 ASSAY THYROID STIM HORMONE: CPT

## 2024-11-04 PROCEDURE — 99214 OFFICE O/P EST MOD 30 MIN: CPT | Mod: 25 | Performed by: INTERNAL MEDICINE

## 2024-11-04 PROCEDURE — 82024 ASSAY OF ACTH: CPT

## 2024-11-04 PROCEDURE — 2500000004 HC RX 250 GENERAL PHARMACY W/ HCPCS (ALT 636 FOR OP/ED): Mod: JZ,JG | Performed by: INTERNAL MEDICINE

## 2024-11-04 PROCEDURE — 3078F DIAST BP <80 MM HG: CPT | Performed by: INTERNAL MEDICINE

## 2024-11-04 PROCEDURE — 85025 COMPLETE CBC W/AUTO DIFF WBC: CPT

## 2024-11-04 PROCEDURE — 1157F ADVNC CARE PLAN IN RCRD: CPT | Performed by: INTERNAL MEDICINE

## 2024-11-04 PROCEDURE — 1126F AMNT PAIN NOTED NONE PRSNT: CPT | Performed by: INTERNAL MEDICINE

## 2024-11-04 PROCEDURE — 96413 CHEMO IV INFUSION 1 HR: CPT

## 2024-11-04 RX ORDER — DIPHENHYDRAMINE HYDROCHLORIDE 50 MG/ML
50 INJECTION INTRAMUSCULAR; INTRAVENOUS AS NEEDED
Status: CANCELLED | OUTPATIENT
Start: 2024-11-04

## 2024-11-04 RX ORDER — HEPARIN 100 UNIT/ML
500 SYRINGE INTRAVENOUS AS NEEDED
Status: DISCONTINUED | OUTPATIENT
Start: 2024-11-04 | End: 2024-11-04 | Stop reason: HOSPADM

## 2024-11-04 RX ORDER — FAMOTIDINE 10 MG/ML
20 INJECTION INTRAVENOUS ONCE AS NEEDED
Status: DISCONTINUED | OUTPATIENT
Start: 2024-11-04 | End: 2024-11-04 | Stop reason: HOSPADM

## 2024-11-04 RX ORDER — EPINEPHRINE 0.3 MG/.3ML
0.3 INJECTION SUBCUTANEOUS EVERY 5 MIN PRN
Status: CANCELLED | OUTPATIENT
Start: 2024-11-04

## 2024-11-04 RX ORDER — ALBUTEROL SULFATE 0.83 MG/ML
3 SOLUTION RESPIRATORY (INHALATION) AS NEEDED
Status: DISCONTINUED | OUTPATIENT
Start: 2024-11-04 | End: 2024-11-04 | Stop reason: HOSPADM

## 2024-11-04 RX ORDER — FAMOTIDINE 10 MG/ML
20 INJECTION INTRAVENOUS ONCE AS NEEDED
Status: CANCELLED | OUTPATIENT
Start: 2024-11-04

## 2024-11-04 RX ORDER — EPINEPHRINE 0.3 MG/.3ML
0.3 INJECTION SUBCUTANEOUS EVERY 5 MIN PRN
Status: DISCONTINUED | OUTPATIENT
Start: 2024-11-04 | End: 2024-11-04 | Stop reason: HOSPADM

## 2024-11-04 RX ORDER — PROCHLORPERAZINE MALEATE 10 MG
10 TABLET ORAL EVERY 6 HOURS PRN
Status: DISCONTINUED | OUTPATIENT
Start: 2024-11-04 | End: 2024-11-04 | Stop reason: HOSPADM

## 2024-11-04 RX ORDER — HEPARIN SODIUM,PORCINE/PF 10 UNIT/ML
50 SYRINGE (ML) INTRAVENOUS AS NEEDED
OUTPATIENT
Start: 2024-11-04

## 2024-11-04 RX ORDER — PROCHLORPERAZINE EDISYLATE 5 MG/ML
10 INJECTION INTRAMUSCULAR; INTRAVENOUS EVERY 6 HOURS PRN
Status: DISCONTINUED | OUTPATIENT
Start: 2024-11-04 | End: 2024-11-04 | Stop reason: HOSPADM

## 2024-11-04 RX ORDER — PROCHLORPERAZINE MALEATE 10 MG
10 TABLET ORAL EVERY 6 HOURS PRN
Status: CANCELLED | OUTPATIENT
Start: 2024-11-04

## 2024-11-04 RX ORDER — HEPARIN 100 UNIT/ML
500 SYRINGE INTRAVENOUS AS NEEDED
OUTPATIENT
Start: 2024-11-04

## 2024-11-04 RX ORDER — PROCHLORPERAZINE MALEATE 10 MG
10 TABLET ORAL EVERY 6 HOURS PRN
COMMUNITY

## 2024-11-04 RX ORDER — ALBUTEROL SULFATE 0.83 MG/ML
3 SOLUTION RESPIRATORY (INHALATION) AS NEEDED
Status: CANCELLED | OUTPATIENT
Start: 2024-11-04

## 2024-11-04 RX ORDER — PROCHLORPERAZINE EDISYLATE 5 MG/ML
10 INJECTION INTRAMUSCULAR; INTRAVENOUS EVERY 6 HOURS PRN
Status: CANCELLED | OUTPATIENT
Start: 2024-11-04

## 2024-11-04 RX ORDER — ONDANSETRON HYDROCHLORIDE 8 MG/1
8 TABLET, FILM COATED ORAL EVERY 8 HOURS PRN
COMMUNITY

## 2024-11-04 RX ORDER — DIPHENHYDRAMINE HYDROCHLORIDE 50 MG/ML
50 INJECTION INTRAMUSCULAR; INTRAVENOUS AS NEEDED
Status: DISCONTINUED | OUTPATIENT
Start: 2024-11-04 | End: 2024-11-04 | Stop reason: HOSPADM

## 2024-11-04 ASSESSMENT — PAIN SCALES - GENERAL: PAINLEVEL_OUTOF10: 0-NO PAIN

## 2024-11-04 NOTE — PROGRESS NOTES
"Pt ambulated in room unassisted and alone. Medications and allergies reviewed.    Pt reports intermittent nausea with last chemo treatments and states he takes \"the yellow pill for nausea\". Pt encouraged to take both zofran and compazine as needed.    Labs to be drawn before treatment.  Stoma with scab to be viewed by surgeon    "

## 2024-11-04 NOTE — PROGRESS NOTES
Patient ID: Juan M Mcrae is a 79 y.o. male.    Diagnoses:   1.GE junction adenocarcinoma (signet ring cell), proficient MMR.  Localized (clinical stage II/III).  Status post esophagectomy on July 23, 2024 following neoadjuvant CRT->ypT3 N1.  Adjuvant nivolumab started on October 7, 2024.  2. Type 2 diabetes mellitus on oral hypoglycemics, hypertension controlled with medications.    Genomic profile:  Proficient MMR status on the biopsy by IHC.    Assessment and Plan:  This is a pleasant 79-year-old man with a diagnosis of GE junction adenocarcinoma.  His staging PET/CT and subsequent imaging studies confirmed localized disease.  He had a port placement and a J-tube placement.  He has seen radiation oncology and he has been enrolled in the NRG- (photon versus proton) study.    He completed concurrent chemoradiation in early June 2024 (last chemo was on Bettie 10, 2024).  His surgery took place on July 23, 2024.  He tolerated the surgery well.  The final pathology was ypT3 N1.      Since he has residual tumor in the resected specimen, We discussed adjuvant therapy with nivolumab.  Discussed the data of CheckMate-577 study which showed survival advantage with adjuvant nivolumab. Patient started treatment on 10-, which he has tolerated well with grade 1 nausea and fatigue. He has a non-healing wound at the J-tube incision site for which we will contact Dr. Manzo.     Plan: continue adjuvant nivolumab    Will continue with treatment as planned today. Patient will return in 2 weeks prior to next treatment. He is aware of plan and knows to call with any issues or concerns.     I have placed all orders as outlined above. I advised the patient to schedule the tests and follow-up appointment as discussed by contacting the  on the way out or calling by phone.    Providers:  Surgeon: Dr. Jovany Bucknerc:  Jennie: Donna.    Chief complaint: GE junction adenocarcinoma.    HPI:  ONCOLOGIC HISTORY-    February  28, 2024: Underwent an EGD for progressive dysphagia and weight loss.  EGD revealed an obstructing mass in the distal esophagus.  Biopsy confirmed adenocarcinoma with signet ring cell features.    March 21, 2024: EUS revealed T2 N1 mass extending from 41 cm to 43 cm and involving less than 1 cm of the gastric cardia.    March 22, 2024: CT scan of chest did not show any metastatic disease.    April 1, 2024 24: PET/CT scan done:   1. Hypermetabolic linear focus at gastroesophageal junction which is  extending to the gastric cardia/proximal lesser curvature in  correlation with distal esophageal /gastroesophageal junction wall  thickening. These findings are compatible with biopsy-proven  adenocarcinoma.  2. No evidence of hypermetabolic lymphadenopathy.  3. Abnormal focal increase in FDG uptake in the left hepatic lobe in  correlation with hypoattenuating lesion, concerning for metastatic  disease. Further correlation with dedicated CT abdomen is recommended.  4. Large fat containing left inguinal hernia.  Interval history: He has significant dysphagia although he can get by with soup.  He has lost about 60 pounds in the last 6 months or so.  Feels fatigued.  Denies any pain.  Denies fever or chill or any other sign of ongoing infection.    April 3, 2024: CT scan of abdomen and MRI liver ruled out metastatic disease (liver lesions were hemangiomas).    May 6, 2024: Started concurrent radiation and chemotherapy with weekly carboplatin and Taxol.  Will complete concurrent chemoradiation on June 14, 2024.    6/10/24: RECEIVED THE LAST DOSE OF CHEMO. LAST DOSE OF RADIATION WAS ON 6/14/2024.    July 23, 2024: Underwent surgery.  Pathology ypT3 N1.    October 7, 2024: Started adjuvant nivolumab.    Interval history:  Mr. Mcrae  presents today to continue nivolumab. He tolerated his last cycle well with some occasional nausea relieved by anti-emetics. Appetite is good, weight is stable. Continues to have grade 1 fatigue.  "He notes that the J tube site hasn't healed since it was pulled a few months ago. Notes that a \"mushroom\" like growth or a \"scab\" is there. No fevers, chills, cough, chest pain, dyspnea, abd pain, v/c/d.       Review of systems: Negative unless otherwise stated in HPI     Allergies  Omeprazole, Sulfa, shellfish    Medications:  Acetaminophen, atenolol, dexamethasone, Emla cream, metformin 1000 mg twice daily, multivitamin, naloxone, nystatin, olanzapine, Actos, prochlorperazine, trazodone     Past Medical History:   Type 2 diabetes mellitus on oral hypoglycemics, hypertension controlled with medications.  Cataract, CKD (chronic kidney disease), Colon polyp, Deviated septum, ED (erectile dysfunction), Esophageal cancer, GERD (gastroesophageal reflux disease), Hyperlipidemia, Nephrolithiasis, Sleep apnea (wear CPAP/BiPAP)    Surgical History:    Past Surgical History:   Procedure Laterality Date    BLADDER SURGERY      COLONOSCOPY      Repeat in one year    EYE SURGERY      LITHOTRIPSY      OTHER SURGICAL HISTORY      Vocal cord surgery    TRANSURETHRAL RESECTION OF PROSTATE      UPPER GASTROINTESTINAL ENDOSCOPY  2024      Family History:    Family History   Problem Relation Name Age of Onset    Stroke Father      Kidney cancer Brother Eliu     Stroke Brother Eliu      Family Oncology History:    Cancer-related family history includes Kidney cancer in his brother.    Social History:      Tobacco Use    Smoking status: Former     Current packs/day: 0.00     Average packs/day: 2.0 packs/day for 40.0 years (80.0 ttl pk-yrs)     Types: Cigarettes     Start date: 1959     Quit date: 1999     Years since quittin.4    Smokeless tobacco: Never   Vaping Use    Vaping status: Never Used   Substance Use Topics    Alcohol use: Yes     Alcohol/week: 2.0 standard drinks of alcohol     Types: 2 Cans of beer per week     Comment: couple beers a week    Drug use: Not Currently     Comment: gummies-dispensary " from [place]        Vital Signs:  There were no vitals taken for this visit.    Physical Exam:    ECO  Pain: 0    Constitutional: Well developed, awake/alert/oriented x3, no distress, alert and cooperative  Eyes: PER. sclera anicteric  ENMT: Oral mucosa moist  Respiratory/Thorax: Breathing is non-labored. Lungs are clear to auscultation bilaterally. No adventitious breath sounds  Cardiovascular: S1-S2. Regular rate and rhythm. No murmurs, rubs, or gallops appreciated  Gastrointestinal: Abdomen soft nontender, nondistended, normal active bowel sounds. At  his J-tube incision site there is a 1cm scab appearing papillary growth  Musculoskeletal: ROM intact, no joint swelling, normal strength  Extremities: normal extremities, no cyanosis, no edema, no clubbing  Neurologic: alert and oriented x3. Nonfocal exam. No myoclonus  Psychological: Pleasant, appropriate and easily engaged     Lab Results:  Labs from today pending.    Patient seen with Dr. Jonny Banks.  I agree with the documentation and the plan.    Catarino Fry MD

## 2024-11-07 LAB — ACTH PLAS-MCNC: 14.1 PG/ML (ref 7.2–63.3)

## 2024-11-13 ENCOUNTER — OFFICE VISIT (OUTPATIENT)
Dept: SURGERY | Facility: HOSPITAL | Age: 79
End: 2024-11-13
Payer: MEDICARE

## 2024-11-13 VITALS
DIASTOLIC BLOOD PRESSURE: 69 MMHG | RESPIRATION RATE: 16 BRPM | OXYGEN SATURATION: 98 % | SYSTOLIC BLOOD PRESSURE: 129 MMHG | WEIGHT: 161.16 LBS | HEART RATE: 53 BPM | TEMPERATURE: 96.8 F | BODY MASS INDEX: 26.01 KG/M2

## 2024-11-13 DIAGNOSIS — C15.5 MALIGNANT NEOPLASM OF LOWER THIRD OF ESOPHAGUS (MULTI): Primary | ICD-10-CM

## 2024-11-13 DIAGNOSIS — D72.0: Primary | ICD-10-CM

## 2024-11-13 PROCEDURE — 99213 OFFICE O/P EST LOW 20 MIN: CPT | Performed by: THORACIC SURGERY (CARDIOTHORACIC VASCULAR SURGERY)

## 2024-11-13 RX ORDER — SILVER NITRATE 38.21; 12.74 MG/1; MG/1
STICK TOPICAL
Status: SHIPPED | OUTPATIENT
Start: 2024-11-14 | End: 2024-12-14

## 2024-11-13 NOTE — PROGRESS NOTES
Mr. 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 He completed neoadjuvant CRT on 6/14/24. Final pathology was ypT3N1 G3 with all margins negative.  He has noted a growth around the J-tube site and presents now for evaluation.  There is minimal drainage.  He has no pains or fevers.    On exam there is excess granulation tissue at the J-tube site.  Silver nitrate was applied.    Mr. Mcrae is doing well.  He does have some excess granulation tissue which should be treated by silver nitrate daily.  I will see him back in January for his regularly scheduled follow-up.

## 2024-11-18 ENCOUNTER — OFFICE VISIT (OUTPATIENT)
Dept: HEMATOLOGY/ONCOLOGY | Facility: CLINIC | Age: 79
End: 2024-11-18
Payer: MEDICARE

## 2024-11-18 ENCOUNTER — INFUSION (OUTPATIENT)
Dept: HEMATOLOGY/ONCOLOGY | Facility: CLINIC | Age: 79
End: 2024-11-18
Payer: MEDICARE

## 2024-11-18 VITALS
DIASTOLIC BLOOD PRESSURE: 87 MMHG | WEIGHT: 159.83 LBS | HEART RATE: 52 BPM | OXYGEN SATURATION: 97 % | TEMPERATURE: 97.8 F | SYSTOLIC BLOOD PRESSURE: 151 MMHG | RESPIRATION RATE: 18 BRPM | BODY MASS INDEX: 25.8 KG/M2

## 2024-11-18 DIAGNOSIS — K62.1 RECTAL POLYP: ICD-10-CM

## 2024-11-18 DIAGNOSIS — C15.5 MALIGNANT NEOPLASM OF LOWER THIRD OF ESOPHAGUS (MULTI): ICD-10-CM

## 2024-11-18 LAB
ALBUMIN SERPL BCP-MCNC: 4.3 G/DL (ref 3.4–5)
ALP SERPL-CCNC: 73 U/L (ref 33–136)
ALT SERPL W P-5'-P-CCNC: 10 U/L (ref 10–52)
ANION GAP SERPL CALC-SCNC: 13 MMOL/L (ref 10–20)
AST SERPL W P-5'-P-CCNC: 16 U/L (ref 9–39)
BASOPHILS # BLD AUTO: 0.09 X10*3/UL (ref 0–0.1)
BASOPHILS NFR BLD AUTO: 1.4 %
BILIRUB SERPL-MCNC: 0.5 MG/DL (ref 0–1.2)
BUN SERPL-MCNC: 12 MG/DL (ref 6–23)
CALCIUM SERPL-MCNC: 9.6 MG/DL (ref 8.6–10.3)
CHLORIDE SERPL-SCNC: 102 MMOL/L (ref 98–107)
CO2 SERPL-SCNC: 27 MMOL/L (ref 21–32)
CREAT SERPL-MCNC: 0.77 MG/DL (ref 0.5–1.3)
EGFRCR SERPLBLD CKD-EPI 2021: >90 ML/MIN/1.73M*2
EOSINOPHIL # BLD AUTO: 0.78 X10*3/UL (ref 0–0.4)
EOSINOPHIL NFR BLD AUTO: 12.5 %
ERYTHROCYTE [DISTWIDTH] IN BLOOD BY AUTOMATED COUNT: 15.3 % (ref 11.5–14.5)
GLUCOSE SERPL-MCNC: 119 MG/DL (ref 74–99)
HCT VFR BLD AUTO: 42.6 % (ref 41–52)
HGB BLD-MCNC: 13.9 G/DL (ref 13.5–17.5)
IMM GRANULOCYTES # BLD AUTO: 0.01 X10*3/UL (ref 0–0.5)
IMM GRANULOCYTES NFR BLD AUTO: 0.2 % (ref 0–0.9)
LYMPHOCYTES # BLD AUTO: 0.86 X10*3/UL (ref 0.8–3)
LYMPHOCYTES NFR BLD AUTO: 13.8 %
MCH RBC QN AUTO: 31.1 PG (ref 26–34)
MCHC RBC AUTO-ENTMCNC: 32.6 G/DL (ref 32–36)
MCV RBC AUTO: 95 FL (ref 80–100)
MONOCYTES # BLD AUTO: 0.66 X10*3/UL (ref 0.05–0.8)
MONOCYTES NFR BLD AUTO: 10.6 %
NEUTROPHILS # BLD AUTO: 3.83 X10*3/UL (ref 1.6–5.5)
NEUTROPHILS NFR BLD AUTO: 61.5 %
NRBC BLD-RTO: 0 /100 WBCS (ref 0–0)
PLATELET # BLD AUTO: 233 X10*3/UL (ref 150–450)
POTASSIUM SERPL-SCNC: 4.1 MMOL/L (ref 3.5–5.3)
PROT SERPL-MCNC: 7 G/DL (ref 6.4–8.2)
RBC # BLD AUTO: 4.47 X10*6/UL (ref 4.5–5.9)
SODIUM SERPL-SCNC: 138 MMOL/L (ref 136–145)
WBC # BLD AUTO: 6.2 X10*3/UL (ref 4.4–11.3)

## 2024-11-18 PROCEDURE — 2500000004 HC RX 250 GENERAL PHARMACY W/ HCPCS (ALT 636 FOR OP/ED): Performed by: INTERNAL MEDICINE

## 2024-11-18 PROCEDURE — 80053 COMPREHEN METABOLIC PANEL: CPT

## 2024-11-18 PROCEDURE — 1123F ACP DISCUSS/DSCN MKR DOCD: CPT

## 2024-11-18 PROCEDURE — 85025 COMPLETE CBC W/AUTO DIFF WBC: CPT

## 2024-11-18 PROCEDURE — 1159F MED LIST DOCD IN RCRD: CPT

## 2024-11-18 PROCEDURE — 3079F DIAST BP 80-89 MM HG: CPT

## 2024-11-18 PROCEDURE — 3077F SYST BP >= 140 MM HG: CPT

## 2024-11-18 PROCEDURE — 99215 OFFICE O/P EST HI 40 MIN: CPT | Mod: 25

## 2024-11-18 PROCEDURE — 1125F AMNT PAIN NOTED PAIN PRSNT: CPT

## 2024-11-18 PROCEDURE — 96413 CHEMO IV INFUSION 1 HR: CPT

## 2024-11-18 PROCEDURE — 1160F RVW MEDS BY RX/DR IN RCRD: CPT

## 2024-11-18 PROCEDURE — 2500000004 HC RX 250 GENERAL PHARMACY W/ HCPCS (ALT 636 FOR OP/ED)

## 2024-11-18 PROCEDURE — 1157F ADVNC CARE PLAN IN RCRD: CPT

## 2024-11-18 PROCEDURE — 99215 OFFICE O/P EST HI 40 MIN: CPT

## 2024-11-18 RX ORDER — EPINEPHRINE 0.3 MG/.3ML
0.3 INJECTION SUBCUTANEOUS EVERY 5 MIN PRN
Status: DISCONTINUED | OUTPATIENT
Start: 2024-11-18 | End: 2024-11-18 | Stop reason: HOSPADM

## 2024-11-18 RX ORDER — DIPHENHYDRAMINE HYDROCHLORIDE 50 MG/ML
50 INJECTION INTRAMUSCULAR; INTRAVENOUS AS NEEDED
Status: DISCONTINUED | OUTPATIENT
Start: 2024-11-18 | End: 2024-11-18 | Stop reason: HOSPADM

## 2024-11-18 RX ORDER — ONDANSETRON 8 MG/1
8 TABLET, ORALLY DISINTEGRATING ORAL EVERY 8 HOURS PRN
Qty: 30 TABLET | Refills: 2 | Status: SHIPPED | OUTPATIENT
Start: 2024-11-18

## 2024-11-18 RX ORDER — ALBUTEROL SULFATE 0.83 MG/ML
3 SOLUTION RESPIRATORY (INHALATION) AS NEEDED
Status: CANCELLED | OUTPATIENT
Start: 2024-11-18

## 2024-11-18 RX ORDER — ALBUTEROL SULFATE 0.83 MG/ML
3 SOLUTION RESPIRATORY (INHALATION) AS NEEDED
Status: DISCONTINUED | OUTPATIENT
Start: 2024-11-18 | End: 2024-11-18 | Stop reason: HOSPADM

## 2024-11-18 RX ORDER — HEPARIN 100 UNIT/ML
500 SYRINGE INTRAVENOUS AS NEEDED
OUTPATIENT
Start: 2024-11-18

## 2024-11-18 RX ORDER — FAMOTIDINE 10 MG/ML
20 INJECTION INTRAVENOUS ONCE AS NEEDED
Status: DISCONTINUED | OUTPATIENT
Start: 2024-11-18 | End: 2024-11-18 | Stop reason: HOSPADM

## 2024-11-18 RX ORDER — FAMOTIDINE 20 MG/1
20 TABLET, FILM COATED ORAL DAILY
Qty: 30 TABLET | Refills: 3 | Status: SHIPPED | OUTPATIENT
Start: 2024-11-18

## 2024-11-18 RX ORDER — PROCHLORPERAZINE MALEATE 10 MG
10 TABLET ORAL EVERY 6 HOURS PRN
Status: DISCONTINUED | OUTPATIENT
Start: 2024-11-18 | End: 2024-11-18 | Stop reason: HOSPADM

## 2024-11-18 RX ORDER — DIPHENHYDRAMINE HYDROCHLORIDE 50 MG/ML
50 INJECTION INTRAMUSCULAR; INTRAVENOUS AS NEEDED
Status: CANCELLED | OUTPATIENT
Start: 2024-11-18

## 2024-11-18 RX ORDER — FAMOTIDINE 10 MG/ML
20 INJECTION INTRAVENOUS ONCE AS NEEDED
Status: CANCELLED | OUTPATIENT
Start: 2024-11-18

## 2024-11-18 RX ORDER — METRONIDAZOLE 500 MG/100ML
500 INJECTION, SOLUTION INTRAVENOUS ONCE
OUTPATIENT
Start: 2024-11-18 | End: 2024-11-18

## 2024-11-18 RX ORDER — PROCHLORPERAZINE MALEATE 10 MG
10 TABLET ORAL EVERY 6 HOURS PRN
Qty: 30 TABLET | Refills: 2 | Status: SHIPPED | OUTPATIENT
Start: 2024-11-18

## 2024-11-18 RX ORDER — PROCHLORPERAZINE EDISYLATE 5 MG/ML
10 INJECTION INTRAMUSCULAR; INTRAVENOUS EVERY 6 HOURS PRN
Status: DISCONTINUED | OUTPATIENT
Start: 2024-11-18 | End: 2024-11-18 | Stop reason: HOSPADM

## 2024-11-18 RX ORDER — EPINEPHRINE 0.3 MG/.3ML
0.3 INJECTION SUBCUTANEOUS EVERY 5 MIN PRN
Status: CANCELLED | OUTPATIENT
Start: 2024-11-18

## 2024-11-18 RX ORDER — PROCHLORPERAZINE MALEATE 10 MG
10 TABLET ORAL EVERY 6 HOURS PRN
Status: CANCELLED | OUTPATIENT
Start: 2024-11-18

## 2024-11-18 RX ORDER — HEPARIN SODIUM,PORCINE/PF 10 UNIT/ML
50 SYRINGE (ML) INTRAVENOUS AS NEEDED
Status: DISCONTINUED | OUTPATIENT
Start: 2024-11-18 | End: 2024-11-18 | Stop reason: HOSPADM

## 2024-11-18 RX ORDER — HEPARIN SODIUM,PORCINE/PF 10 UNIT/ML
50 SYRINGE (ML) INTRAVENOUS AS NEEDED
OUTPATIENT
Start: 2024-11-18

## 2024-11-18 RX ORDER — PROCHLORPERAZINE EDISYLATE 5 MG/ML
10 INJECTION INTRAMUSCULAR; INTRAVENOUS EVERY 6 HOURS PRN
Status: CANCELLED | OUTPATIENT
Start: 2024-11-18

## 2024-11-18 RX ORDER — HEPARIN 100 UNIT/ML
500 SYRINGE INTRAVENOUS AS NEEDED
Status: DISCONTINUED | OUTPATIENT
Start: 2024-11-18 | End: 2024-11-18 | Stop reason: HOSPADM

## 2024-11-18 ASSESSMENT — PAIN SCALES - GENERAL: PAINLEVEL_OUTOF10: 3

## 2024-11-18 NOTE — PROGRESS NOTES
Pt ambulated in room alone .  Reconciled and reviewed medication and allergy list with patient.     Pt denies any pain except to stoma area as his scab ws removed last week by surgeon. He stated he has not received his silver nitrate but was told it will delivered to his home soon.    Pt states his appetite has been poor due to his intermittent nausea (no vomiting. ) education done as pt was not taking antiemetics appropriately. Teachback method done  .    Pt to office today for follow up before he is to receive day 15 cycle 2 of Nivolumab (opdivo)    Pt denies any cough, SOB bleeding HA or jaundice.    Follow up reviewed to pt by the NP .  Pt asked for new script for his Pepcid and NP aware.  NP also aware that pt set for TAMIS December 4th and will review with DR CORONADO to determine if it will affect his next chemotherapy cycle

## 2024-11-19 ENCOUNTER — CLINICAL SUPPORT (OUTPATIENT)
Dept: PREADMISSION TESTING | Facility: HOSPITAL | Age: 79
End: 2024-11-19
Payer: MEDICARE

## 2024-11-19 DIAGNOSIS — K62.1 RECTAL POLYP: ICD-10-CM

## 2024-11-19 RX ORDER — IBUPROFEN 400 MG/1
400 TABLET ORAL EVERY 6 HOURS PRN
COMMUNITY

## 2024-11-19 NOTE — PROGRESS NOTES
Patient ID: Jua nM Mcrae is a 79 y.o. male.    Diagnoses:   1.GE junction adenocarcinoma (signet ring cell), proficient MMR.  Localized (clinical stage II/III).  Status post esophagectomy on July 23, 2024 following neoadjuvant CRT->ypT3 N1.  Adjuvant nivolumab started on October 7, 2024.  2. Type 2 diabetes mellitus on oral hypoglycemics, hypertension controlled with medications.    Genomic profile:  Proficient MMR status on the biopsy by IHC.    Assessment and Plan:  This is a pleasant 79-year-old man with a diagnosis of GE junction adenocarcinoma.  His staging PET/CT and subsequent imaging studies confirmed localized disease.  He had a port placement and a J-tube placement.  He has seen radiation oncology and he has been enrolled in the NRG- (photon versus proton) study.    He completed concurrent chemoradiation in early June 2024 (last chemo was on Bettie 10, 2024).  His surgery took place on July 23, 2024.  He tolerated the surgery well.  The final pathology was ypT3 N1.      Since he has residual tumor in the resected specimen, We discussed adjuvant therapy with nivolumab.  Discussed the data of CheckMate-577 study which showed survival advantage with adjuvant nivolumab. Patient started treatment on 10-, which he has tolerated well with grade 1 nausea and fatigue. He has a non-healing wound at the J-tube incision site for which he has seen Dr. Manzo recently and is instructed to apply silver nitrate to site daily. He will continue follow up with Dr. Manzo as well.     Plan: continue adjuvant nivolumab    Will continue with treatment as planned today. Patient will return in 2 weeks prior to next treatment. He is aware of plan and knows to call with any issues or concerns.     I have placed all orders as outlined above. I advised the patient to schedule the tests and follow-up appointment as discussed by contacting the  on the way out or calling by phone.    Providers:  Surgeon:   Jovany  [facility]:  RadOnc: Donna.    Chief complaint: GE junction adenocarcinoma.    HPI:  ONCOLOGIC HISTORY-    February 28, 2024: Underwent an EGD for progressive dysphagia and weight loss.  EGD revealed an obstructing mass in the distal esophagus.  Biopsy confirmed adenocarcinoma with signet ring cell features.    March 21, 2024: EUS revealed T2 N1 mass extending from 41 cm to 43 cm and involving less than 1 cm of the gastric cardia.    March 22, 2024: CT scan of chest did not show any metastatic disease.    April 1, 2024 24: PET/CT scan done:   1. Hypermetabolic linear focus at gastroesophageal junction which is  extending to the gastric cardia/proximal lesser curvature in  correlation with distal esophageal /gastroesophageal junction wall  thickening. These findings are compatible with biopsy-proven  adenocarcinoma.  2. No evidence of hypermetabolic lymphadenopathy.  3. Abnormal focal increase in FDG uptake in the left hepatic lobe in  correlation with hypoattenuating lesion, concerning for metastatic  disease. Further correlation with dedicated CT abdomen is recommended.  4. Large fat containing left inguinal hernia.  Interval history: He has significant dysphagia although he can get by with soup.  He has lost about 60 pounds in the last 6 months or so.  Feels fatigued.  Denies any pain.  Denies fever or chill or any other sign of ongoing infection.    April 3, 2024: CT scan of abdomen and MRI liver ruled out metastatic disease (liver lesions were hemangiomas).    May 6, 2024: Started concurrent radiation and chemotherapy with weekly carboplatin and Taxol.  Will complete concurrent chemoradiation on June 14, 2024.    6/10/24: RECEIVED THE LAST DOSE OF CHEMO. LAST DOSE OF RADIATION WAS ON 6/14/2024.    July 23, 2024: Underwent surgery.  Pathology ypT3 N1.    October 7, 2024: Started adjuvant nivolumab.    Interval history:  Mr. Mcrae  presents today to continue nivolumab. He tolerated his last cycle well with  some occasional nausea relieved by anti-emetics. Appetite is good, weight is stable. Continues to have grade 1 fatigue. He notes that the J tube site hasn't healed since it was pulled a few months ago. He did see Dr. Manzo last week and plan will be to apply silver nitrate to area and continue follow up with Dr. Manzo. He states he should be getting a supply delivered tomorrow. Otherwise no other new concerns.     Review of systems: Negative unless otherwise stated in HPI     Allergies  Omeprazole, Sulfa, shellfish    Medications:  Acetaminophen, atenolol, dexamethasone, Emla cream, metformin 1000 mg twice daily, multivitamin, naloxone, nystatin, olanzapine, Actos, prochlorperazine, trazodone     Past Medical History:   Type 2 diabetes mellitus on oral hypoglycemics, hypertension controlled with medications.  Cataract, CKD (chronic kidney disease), Colon polyp, Deviated septum, ED (erectile dysfunction), Esophageal cancer, GERD (gastroesophageal reflux disease), Hyperlipidemia, Nephrolithiasis, Sleep apnea (wear CPAP/BiPAP)    Surgical History:    Past Surgical History:   Procedure Laterality Date    BLADDER SURGERY      COLONOSCOPY      Repeat in one year    EYE SURGERY      LITHOTRIPSY      OTHER SURGICAL HISTORY      Vocal cord surgery    TRANSURETHRAL RESECTION OF PROSTATE      UPPER GASTROINTESTINAL ENDOSCOPY  2024      Family History:    Family History   Problem Relation Name Age of Onset    Stroke Father      Kidney cancer Brother Eliu     Stroke Brother Eliu      Family Oncology History:    Cancer-related family history includes Kidney cancer in his brother.    Social History:      Tobacco Use    Smoking status: Former     Current packs/day: 0.00     Average packs/day: 2.0 packs/day for 40.0 years (80.0 ttl pk-yrs)     Types: Cigarettes     Start date: 1959     Quit date: 1999     Years since quittin.4    Smokeless tobacco: Never   Vaping Use    Vaping status: Never Used    Substance Use Topics    Alcohol use: Yes     Alcohol/week: 2.0 standard drinks of alcohol     Types: 2 Cans of beer per week     Comment: couple beers a week    Drug use: Not Currently     Comment: gummies-dispensary from [place]        Vital Signs:  /87 (BP Location: Right arm, Patient Position: Sitting, BP Cuff Size: Adult)   Pulse 52   Temp 36.6 °C (97.8 °F) (Temporal)   Resp 18   Wt 72.5 kg (159 lb 13.3 oz)   SpO2 97%   BMI 25.80 kg/m²     Physical Exam:    ECO    Constitutional: Well developed, awake/alert/oriented x3, no distress, alert and cooperative  Eyes: PER. sclera anicteric  ENMT: Oral mucosa moist  Respiratory/Thorax: Breathing is non-labored. Lungs are clear to auscultation bilaterally. No adventitious breath sounds  Cardiovascular: S1-S2. Regular rate and rhythm. No murmurs, rubs, or gallops appreciated  Gastrointestinal: Abdomen soft nontender, nondistended, normal active bowel sounds. At  his J-tube incision site there is a 1cm scab appearing papillary growth  Musculoskeletal: ROM intact, no joint swelling, normal strength  Extremities: normal extremities, no cyanosis, no edema, no clubbing  Neurologic: alert and oriented x3. Nonfocal exam. No myoclonus  Psychological: Pleasant, appropriate and easily engaged     Lab Results:  Labs from today pending.      Kristi Rodriguez, APRN-CNP

## 2024-11-20 ENCOUNTER — PRE-ADMISSION TESTING (OUTPATIENT)
Dept: PREADMISSION TESTING | Facility: HOSPITAL | Age: 79
End: 2024-11-20
Payer: MEDICARE

## 2024-11-20 ENCOUNTER — DOCUMENTATION (OUTPATIENT)
Dept: PREADMISSION TESTING | Facility: HOSPITAL | Age: 79
End: 2024-11-20

## 2024-11-20 ENCOUNTER — APPOINTMENT (OUTPATIENT)
Dept: PRIMARY CARE | Facility: CLINIC | Age: 79
End: 2024-11-20
Payer: MEDICARE

## 2024-11-20 VITALS
WEIGHT: 158.73 LBS | OXYGEN SATURATION: 97 % | HEIGHT: 66 IN | SYSTOLIC BLOOD PRESSURE: 137 MMHG | RESPIRATION RATE: 18 BRPM | TEMPERATURE: 98.5 F | BODY MASS INDEX: 25.51 KG/M2 | HEART RATE: 52 BPM | DIASTOLIC BLOOD PRESSURE: 71 MMHG

## 2024-11-20 DIAGNOSIS — E11.9 TYPE 2 DIABETES MELLITUS WITHOUT COMPLICATION, WITHOUT LONG-TERM CURRENT USE OF INSULIN (MULTI): Primary | ICD-10-CM

## 2024-11-20 PROCEDURE — 99204 OFFICE O/P NEW MOD 45 MIN: CPT | Performed by: NURSE PRACTITIONER

## 2024-11-20 ASSESSMENT — ENCOUNTER SYMPTOMS
ENDOCRINE NEGATIVE: 1
CARDIOVASCULAR NEGATIVE: 1
NAUSEA: 1
RESPIRATORY NEGATIVE: 1
NEUROLOGICAL NEGATIVE: 1
MUSCULOSKELETAL NEGATIVE: 1
CONSTITUTIONAL NEGATIVE: 1
WOUND: 1
NECK NEGATIVE: 1

## 2024-11-20 NOTE — CPM/PAT NURSE NOTE
CPM/PAT Nurse Note      Name: Juan M Mcrae (Juan M Mcrae)  /Age: 1945/79 y.o.       Past Medical History:   Diagnosis Date    Cataract     CKD (chronic kidney disease)     Colon polyp     Deviated nasal septum 10/17/2024    Deviated septum     Disorder of lipoprotein metabolism, unspecified     Elevated serum cholesterol    Dysfunction of right eustachian tube 10/17/2024    Dysphagia     ED (erectile dysfunction)     Elevated serum cholesterol 10/17/2024    Esophageal cancer (Multi)     GERD (gastroesophageal reflux disease)     Hearing aid worn     HL (hearing loss)     Hoarseness 10/17/2024    Horseshoe tear of retina without detachment 2021    Hypercholesterolemia     Hyperlipidemia     Hypertension     Left knee pain 2022    Malignant neoplasm of prostate (Multi) 10/17/2024    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    Nephrolithiasis     Obesity     Other specified diabetes mellitus without complications     last A1c= 6.8 on 2024    Otitis media of right ear 10/18/2022    Rash 10/17/2024    Sleep apnea     wear CPAP/BiPAP    Vision loss     wears glasses       Past Surgical History:   Procedure Laterality Date    BLADDER SURGERY      COLONOSCOPY      Repeat in one year    COLONOSCOPY  2018    EYE SURGERY      LITHOTRIPSY      OTHER SURGICAL HISTORY      Vocal cord surgery    TRANSURETHRAL RESECTION OF PROSTATE      UPPER GASTROINTESTINAL ENDOSCOPY  2024       Patient Sexual activity questions deferred to the physician.    Family History   Problem Relation Name Age of Onset    Stroke Father      Kidney cancer Brother Eliu     Stroke Brother Eliu        Allergies   Allergen Reactions    Omeprazole Itching and Unknown    Shellfish Containing Products Hives    Sulfa (Sulfonamide Antibiotics) Rash       Prior to Admission medications    Medication Sig Start Date End Date Taking? Authorizing Provider   atenolol (Tenormin) 50 mg tablet 1 tablet (50 mg) by j-tube  route once daily. 8/7/24   Candy Grossman MD   chlorhexidine (Peridex) 0.12 % solution Rinse mouth with 15 ml after toothbrushing the night before surgery and on the morning of surgery.  Expectorate after rinsing.  Do not swallow.  Patient not taking: Reported on 11/18/2024 10/22/24   Cruz Sahni MD   famotidine (Pepcid) 20 mg tablet Take 1 tablet (20 mg) by mouth once daily. 11/18/24   VINAY Olsen   ibuprofen 400 mg tablet Take 1 tablet (400 mg) by mouth every 6 hours if needed for moderate pain (4 - 6).    Historical Provider, MD   metFORMIN (Glucophage) 500 mg tablet Take 1 tablet (500 mg) by mouth 2 times daily (morning and late afternoon). 1 tab bid for 1 week then 2 tab bid after    Historical Provider, MD   metroNIDAZOLE (Flagyl) 250 mg tablet Take one tablet at 6p, 7p, and 11p the night before surgery.  Patient not taking: Reported on 11/20/2024 10/22/24   Cruz Sahni MD   neomycin (Mycifradin) 500 mg tablet Take two tabs by mouth at 6p, 7pm, and 11p the night before surgery.  Patient not taking: Reported on 11/20/2024 10/22/24   Cruz Sahni MD   ondansetron ODT (Zofran-ODT) 8 mg disintegrating tablet Take 1 tablet (8 mg) by mouth every 8 hours if needed for nausea or vomiting. 11/18/24   OBIE Olsen-RANGEL   prochlorperazine (Compazine) 10 mg tablet Take 1 tablet (10 mg) by mouth every 6 hours if needed for nausea or vomiting. 11/18/24   OBIE Olsen-CNP   traZODone (Desyrel) 100 mg tablet Take 1 tablet (100 mg) by mouth as needed at bedtime for sleep. 9/8/24 12/7/24  Candy Grossman MD   famotidine (Pepcid) 20 mg tablet Take 1 tablet (20 mg) by mouth once daily. 7/30/24 11/18/24  VINAY Arita   ondansetron (Zofran) 8 mg tablet Take 1 tablet (8 mg) by mouth every 8 hours if needed for nausea or vomiting.  11/18/24  Historical Provider, MD   prochlorperazine (Compazine) 10 mg tablet Take 1 tablet (10 mg) by mouth every 6 hours if needed for  nausea or vomiting.  11/18/24  Historical Provider, MD CELSO ARCHER     DASI Risk Score      Flowsheet Row Pre-Admission Testing from 4/2/2024 in [facility] Telemedicine Clinical Support from 4/1/2024 in [facility]   Can you take care of yourself (eat, dress, bathe, or use toilet)?  2.75 filed at 04/02/2024 1026 2.75 filed at 04/01/2024 1415   Can you walk indoors, such as around your house? 1.75 filed at 04/02/2024 1026 1.75 filed at 04/01/2024 1415   Can you walk a block or two on level ground?  2.75 filed at 04/02/2024 1026 2.75 filed at 04/01/2024 1415   Can you climb a flight of stairs or walk up a hill? 5.5 filed at 04/02/2024 1026 5.5 filed at 04/01/2024 1415   Can you run a short distance? 8 filed at 04/02/2024 1026 8 filed at 04/01/2024 1415   Can you do light work around the house like dusting or washing dishes? 2.7 filed at 04/02/2024 1026 2.7 filed at 04/01/2024 1415   Can you do moderate work around the house like vacuuming, sweeping floors or carrying groceries? 3.5 filed at 04/02/2024 1026 3.5 filed at 04/01/2024 1415   Can you do heavy work around the house like scrubbing floors or lifting and moving heavy furniture?  0 filed at 04/02/2024 1026 8 filed at 04/01/2024 1415   Can you do yard work like raking leaves, weeding or pushing a mower? 4.5 filed at 04/02/2024 1026 4.5 filed at 04/01/2024 1415   Can you have sexual relations? 0 filed at 04/02/2024 1026 5.25 filed at 04/01/2024 1415   Can you participate in moderate recreational activities like golf, bowling, dancing, doubles tennis or throwing a baseball or football? 6 filed at 04/02/2024 1026 6 filed at 04/01/2024 1415   Can you participate in strenous sports like swimming, singles tennis, football, basketball, or skiing? 0 filed at 04/02/2024 1026 7.5 filed at 04/01/2024 1415   DASI SCORE 37.45 filed at 04/02/2024 1026 58.2 filed at 04/01/2024 1415   METS Score (Will be calculated only when all the questions  are answered) 7.3 filed at 04/02/2024 1026 9.9 filed at 04/01/2024 1415          Caprini DVT Assessment      Flowsheet Row Admission (Discharged) from 7/23/2024 in [place] [id number] with Macho Manzo MD Admission (Discharged) from 4/8/2024 in [place] [address] with Macho Manzo MD   DVT Score 11 filed at 07/23/2024 1402 5 filed at 04/08/2024 1027   BMI 30 or less filed at 07/23/2024 1402 30 or less filed at 04/08/2024 1027   RETIRED: Current Status Major surgery planned, lasting over 3 hours, Present cancer or chemotherapy filed at 07/23/2024 1402 --   RETIRED: History -- Prior major surgery filed at 04/08/2024 1027   RETIRED: Age Over 75 years filed at 07/23/2024 1402 Over 75 years filed at 04/08/2024 1027          Modified Frailty Index      Flowsheet Row Pre-Admission Testing from 4/2/2024 in [facility]   Non-independent functional status (problems with dressing, bathing, personal grooming, or cooking) 0 filed at 04/02/2024 1255   History of diabetes mellitus  0 filed at 04/02/2024 1255   History of COPD 0 filed at 04/02/2024 1255   History of CHF No filed at 04/02/2024 1255   History of MI 0 filed at 04/02/2024 1255   History of Percutaneous Coronary Intervention, Cardiac Surgery, or Angina No filed at 04/02/2024 1255   Hypertension requiring the use of medication  0.0909 filed at 04/02/2024 1255   Peripheral vascular disease 0 filed at 04/02/2024 1255   Impaired sensorium (cognitive impairement or loss, clouding, or delirium) 0 filed at 04/02/2024 1255   TIA or CVA withouy residual deficit 0 filed at 04/02/2024 1255   Cerebrovascular accident with deficit 0 filed at 04/02/2024 1255   Modified Frailty Index Calculator .0909 filed at 04/02/2024 1255          CHADS2 Stroke Risk  Current as of 2 hours ago        5.9% 3 to 100%: High Risk   2 to < 3%: Medium Risk   0 to < 2%: Low Risk     No Change          This score determines the patient's  risk of having a stroke if the patient has atrial fibrillation.          Points Metrics   0 Has Congestive Heart Failure:  No     Patients with congestive heart failure get 1 point.    Current as of 2 hours ago   1 Has Hypertension:  Yes     Patients with hypertension get 1 point.    Current as of 2 hours ago   1 Age:  79     Patients who are 75 years of age or older get 1 point.    Current as of 2 hours ago   1 Has Diabetes Excluding Gestational Diabetes:  Yes     Patients with diabetes get 1 point.    Current as of 2 hours ago   0 Had Stroke:  No  Had TIA:  No  Had Thromboembolism:  No     Patients who have had a stroke, TIA, or thromboembolism get 2 points.    Current as of 2 hours ago             Revised Cardiac Risk Index      Flowsheet Row Pre-Admission Testing from 4/2/2024 in [facility]   High-Risk Surgery (Intraperitoneal, Intrathoracic,Suprainguinal vascular) 1 filed at 04/02/2024 1255   History of ischemic heart disease (History of MI, History of positive exercuse test, Current chest paint considered due to myocardial ischemia, Use of nitrate therapy, ECG with pathological Q Waves) 0 filed at 04/02/2024 1255   History of congestive heart failure (pulmonary edemia, bilateral rales or S3 gallop, Paroxysmal nocturnal dyspnea, CXR showing pulmonary vascular redistribution) 0 filed at 04/02/2024 1255   History of cerebrovascular disease (Prior TIA or stroke) 0 filed at 04/02/2024 1255   Pre-operative insulin treatment 0 filed at 04/02/2024 1255   Pre-operative creatinine>2 mg/dl 0 filed at 04/02/2024 1255   Revised Cardiac Risk Calculator 1 filed at 04/02/2024 1255          Apfel Simplified Score      Flowsheet Row Pre-Admission Testing from 4/2/2024 in [facility]   Smoking status 1 filed at 04/02/2024 1255   History of motion sickness or PONV  0 filed at 04/02/2024 1255   Use of postoperative opioids 1 filed at 04/02/2024 1255   Apfel Simplified Score Calculator 2 filed at  04/02/2024 1255          Risk Analysis Index Results This Encounter    No data found in the last 10 encounters.       Stop Bang Score      Flowsheet Row Pre-Admission Testing from 4/2/2024 in [facility]   Do you snore loudly? 1 filed at 04/02/2024 1026   Do you often feel tired or fatigued after your sleep? 1 filed at 04/02/2024 1026   Has anyone ever observed you stop breathing in your sleep? 1 filed at 04/02/2024 1026   Do you have or are you being treated for high blood pressure? 0 filed at 04/02/2024 1026   Recent BMI (Calculated) 27.4 filed at 04/02/2024 1026   Is BMI greater than 35 kg/m2? 0=No filed at 04/02/2024 1026   Age older than 50 years old? 1=Yes filed at 04/02/2024 1026   Is your neck circumference greater than 17 inches (Male) or 16 inches (Female)? 0 filed at 04/02/2024 1026   Gender - Male 1=Yes filed at 04/02/2024 1026   STOP-BANG Total Score 5 filed at 04/02/2024 1026          Prodigy: High Risk  Total Score: 24              Prodigy Age Score      Prodigy Gender Score          ARISCAT Score for Postoperative Pulmonary Complications    No data to display       Cruz Perioperative Risk for Myocardial Infarction or Cardiac Arrest (MICHELE)    No data to display         Nurse Plan of Action:

## 2024-11-20 NOTE — H&P (VIEW-ONLY)
[facility]/PAT Evaluation       Name: Juan M Mcrae (Juan M Mcrae)  /Age: 1945/79 y.o.     In-Person         Date of Consult: 24    Referring Provider:  Dr. Sahni    Date, Surgery, and Length: 24, transanal minimally invasive surgery, excision of rectal polyp,  150 minutes    Patient presents to [facility] for perioperative risk assessment prior to scheduled surgery. He presents with rectal mass. 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 He underwent colonoscopy 2024 with shows recurrent rectal polyp that he will undergo TAMIS for.    This note was created in part upon personal review of patient's medical records.      Pt denies any past history of anesthetic complications such as PONV, awareness, prolonged sedation, dental damage, aspiration, cardiac arrest, difficult intubation, difficult I.V. access or unexpected hospital admissions. No history of malignant hyperthermia and or pseudocholinesterase deficiency.    No history of blood transfusions.    The patient IS NOT a Buddhist and will accept blood and blood products if medically indicated.     Type and screen NOT sent.      Past Medical History:   Diagnosis Date    Cataract     CKD (chronic kidney disease)     Colon polyp     Deviated nasal septum 10/17/2024    Deviated septum     Disorder of lipoprotein metabolism, unspecified     Elevated serum cholesterol    Dysfunction of right eustachian tube 10/17/2024    Dysphagia     ED (erectile dysfunction)     Elevated serum cholesterol 10/17/2024    Esophageal cancer (Multi)     GERD (gastroesophageal reflux disease)     Hearing aid worn     HL (hearing loss)     Hoarseness 10/17/2024    Horseshoe tear of retina without detachment 2021    Hypercholesterolemia     Hyperlipidemia     Hypertension     Left knee pain 2022    Malignant neoplasm of prostate (Multi) 10/17/2024    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    Nephrolithiasis     Obesity     Other specified diabetes mellitus without complications     last A1c= 6.8 on 2024    Otitis media of right ear 10/18/2022    Rash 10/17/2024    Sleep apnea     wear CPAP/BiPAP    Vision loss     wears glasses       Past Surgical History:   Procedure Laterality Date    BLADDER SURGERY      COLONOSCOPY      Repeat in one year    COLONOSCOPY  2018    EYE SURGERY      LITHOTRIPSY      OTHER SURGICAL HISTORY      Vocal cord surgery    TRANSURETHRAL RESECTION OF PROSTATE      UPPER GASTROINTESTINAL ENDOSCOPY  2024       Family History   Problem Relation Name Age of Onset    Stroke Father      Kidney cancer Brother Eliu     Stroke Brother Eliu      Social History     Tobacco Use   Smoking Status Former    Current packs/day: 0.00    Average packs/day: 2.0 packs/day for 40.0 years (80.0 ttl pk-yrs)    Types: Cigarettes    Start date: 1959    Quit date: 1999    Years since quittin.9   Smokeless Tobacco Never     Social History     Substance and Sexual Activity   Alcohol Use Yes    Alcohol/week: 2.0 standard drinks of alcohol    Types: 2 Cans of beer per week    Comment: couple beers a week     Social History     Substance and Sexual Activity   Drug Use Not Currently    Comment: gummies-dispensary from [place]       Allergies   Allergen Reactions    Omeprazole Itching and Unknown    Shellfish Containing Products Hives    Sulfa (Sulfonamide Antibiotics) Rash       Current Outpatient Medications:     atenolol (Tenormin) 50 mg tablet, 1 tablet (50 mg) by j-tube route once daily., Disp: 90 tablet, Rfl: 0    famotidine (Pepcid) 20 mg tablet, Take 1 tablet (20 mg) by mouth once daily., Disp: 30 tablet, Rfl: 3    ibuprofen 400 mg tablet, Take 1 tablet (400 mg) by mouth every 6 hours if needed for moderate pain (4 - 6)., Disp: , Rfl:     metFORMIN (Glucophage) 500 mg tablet, Take 1 tablet (500 mg) by mouth 2 times daily (morning and late  afternoon). 1 tab bid for 1 week then 2 tab bid after, Disp: , Rfl:     ondansetron ODT (Zofran-ODT) 8 mg disintegrating tablet, Take 1 tablet (8 mg) by mouth every 8 hours if needed for nausea or vomiting., Disp: 30 tablet, Rfl: 2    prochlorperazine (Compazine) 10 mg tablet, Take 1 tablet (10 mg) by mouth every 6 hours if needed for nausea or vomiting., Disp: 30 tablet, Rfl: 2    traZODone (Desyrel) 100 mg tablet, Take 1 tablet (100 mg) by mouth as needed at bedtime for sleep., Disp: 90 tablet, Rfl: 0    chlorhexidine (Peridex) 0.12 % solution, Rinse mouth with 15 ml after toothbrushing the night before surgery and on the morning of surgery.  Expectorate after rinsing.  Do not swallow. (Patient not taking: Reported on 11/18/2024), Disp: 120 mL, Rfl: 0    metroNIDAZOLE (Flagyl) 250 mg tablet, Take one tablet at 6p, 7p, and 11p the night before surgery. (Patient not taking: Reported on 11/20/2024), Disp: 3 tablet, Rfl: 0    neomycin (Mycifradin) 500 mg tablet, Take two tabs by mouth at 6p, 7pm, and 11p the night before surgery. (Patient not taking: Reported on 11/20/2024), Disp: 6 tablet, Rfl: 0    Current Facility-Administered Medications:     silver nitrate applicators applicator, , Topical, Daily before lunch, Macho Manzo MD    Facility-Administered Medications Ordered in Other Visits:     heparin flush 100 unit/mL syringe 500 Units, 500 Units, intra-catheter, PRN, Catarino Fry MD, 500 Units at 07/15/24 1135      PAT ROS:   Constitutional:   neg    Neuro/Psych:   neg    Eyes:    use of corrective lenses  Ears:    hearing loss   hearing aides  Nose:   neg    Mouth:   neg    Throat:   neg    Neck:   neg    Cardio:   neg    Respiratory:   neg    Endocrine:   neg    GI:    nausea  :   neg    Musculoskeletal:   neg    Hematologic:   neg    Skin:   sores/wound (old j-tube site, oozing pink tinged fluid, surgeon ENT Dr. Manzo prescribed silver nitrate. Patient started to use on 11/19/24)      Physical  "Exam  Vitals reviewed. Physical exam within normal limits.          PAT AIRWAY:   Airway:     Mallampati::  II    Neck ROM::  Full  normal          Visit Vitals  /71   Pulse 52   Temp 36.9 °C (98.5 °F)   Resp 18   Ht 1.676 m (5' 5.98\")   Wt 72 kg (158 lb 11.7 oz)   SpO2 97%   BMI 25.63 kg/m²   Smoking Status Former   BSA 1.83 m²     Assessment and Plan:     Patient is a 79 year old male scheduled for transanal minimally invasive surgery, excision of rectal polyp with Dr. Sahni on 12/4/24.    Patient is at acceptable risk to proceed with planned surgical procedure. Further cardiac risk stratification deferred at this time.This patient is INTERMEDIATE risk candidate undergoing MODERATE risk procedure, patient is medically optimized for surgery.    Plan    Cardiovascular:    RCRI: 1  Risk of Mace: 6%      Patient denies any chest pain, tightness, heaviness, pressure, radiating pain, palpitations, irregular heartbeats, lightheadedness, cough, congestion, shortness of breath, ROBERTSON, PND, near syncope, weight loss or gain.    Good functional capacity      EKG in PAT not indicated. Reviewed last EKG  Encounter Date: 07/23/24   Electrocardiogram, 12-lead PRN ACS symptoms   Result Value    Ventricular Rate 97    Atrial Rate 97    PA Interval 138    QRS Duration 86    QT Interval 348    QTC Calculation(Bazett) 441    P Axis 54    R Axis 68    T Axis 15    QRS Count 16    Q Onset 211    P Onset 142    P Offset 196    T Offset 385    QTC Fredericia 408    Narrative    Sinus rhythm with occasional Premature ventricular complexes  Low voltage QRS  Borderline ECG  When compared with ECG of 24-JUL-2024 16:59,  Sinus rhythm has replaced Atrial fibrillation  Confirmed by Fausto Davila ([id number]) on 7/28/2024 4:26:45 AM     HTN- will continue Atenolol      Pulm:  Known and treated  STACY- Patient was instructed to bring CPAP machine the morning of surgery. Recommend prioritizing  nonopioid analgesic techniques (regional and local " anesthesia, nonsteroidal medications, etc) before the administration of opioids and close monitoring for hypoventilation after surgery due to STACY. Increased vigilance is recommended with the use of narcotics due to an increased risk for opioid induced respiratory depression      Stop Bang=  known STACY, uses CPAP      Renal/endo:  Recommendations to avoid nephrotoxic drugs and carefully monitor fluid status to maintain euvolemia. Use dose adjusted medications as needed for the underlying level of renal function.      DM II- will hold Metformin DOS    Lab Results   Component Value Date    HGBA1C 7.0 (A) 10/17/2024       GI/:    Pending TAMIS procedure for excision of rectal polyp    Heme:  Patient instructed to ambulate as soon as possible postoperatively to decrease thromboembolic risk.    Initiate mechanical DVT prophylaxis as soon as possible and initiate chemical prophylaxis when deemed safe from a bleeding standpoint post surgery.        Caprini= 7        Risk assessment complete.  Patient is scheduled for a LOW-INTERMEDIATE surgical risk procedure.     He IS considered medically optimized for the planned procedure.        Labs/testing obtained in PAT on 11/20/24: NONE. Reviewed last CBC, BMP, EKG    Lab Results   Component Value Date    WBC 6.2 11/18/2024    HGB 13.9 11/18/2024    HCT 42.6 11/18/2024    MCV 95 11/18/2024     11/18/2024     Lab Results   Component Value Date    GLUCOSE 119 (H) 11/18/2024    CALCIUM 9.6 11/18/2024     11/18/2024    K 4.1 11/18/2024    CO2 27 11/18/2024     11/18/2024    BUN 12 11/18/2024    CREATININE 0.77 11/18/2024     Follow up/communication: none    Preoperative medication instructions were provided and reviewed with the patient.  Any additional testing or evaluation was explained to the patient.  Nothing by mouth instructions were discussed and patient's questions were answered prior to conclusion to this encounter.  Patient verbalized understanding of  preoperative instructions given in preadmission testing; discharge instructions available in EMR.    This note was dictated with speech recognition.  Minor errors may have been detected during use of speech recognition.

## 2024-11-20 NOTE — PROGRESS NOTES
DM FOLLOW UP  E11.9    Juan M Mcrae presents to the office for his DM review. Referring Provider: Candy Grossman MD     HPI  Pt here today for DM check.  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 Plans for Nivolumab bimonthly until next October, states that he experiences nausea x2 days after treatment.  Discussed preemptively taking zofran.  Pt states that this prescription is $60 for a 30 day supply so he has been taking compazine instead without much relief.        CURRENT DM PHARMACOTHERAPY   Metformn 1000mg bid   Pt denies SE/intolerances.  Reviewed all medications by prescribing practitioner (such as prescriptions, OTCs, herbal therapies and supplements) and documented in the medical record.         Primary/Secondary Prevention   - Statin? No  - ACE-I/ARB? No  - Aspirin? No    Summary of CGM Findings:  Patient is using: continuous glucose monitor  -----------------------------  [employer] Clarity  -----------------------------  Juan M Mcrae  YOB: 1945  Generated at: Wed, Nov 20, 2024 11:47 PM EST  Reporting period: Thu Nov 7, 2024 - Wed Nov 20, 2024  -----------------------------  Glucose Details  Average glucose: 182 mg/dL  Standard deviation: 43 mg/dL  -----------------------------  Time in Range  Very High: 7%  High: 38%  In Range: 55%  Low: 0%  Very Low: 0%  Target Range   mg/dL  -----------------------------  CGM Details  Sensor usage: 100%  Days with CGM data: 14/14      CGM Reports reviewed with patient  *See attached documents*  Summary of CGM Findings:    Last Labs/Vitals/Meds  POC HEMOGLOBIN A1c (%)   Date Value   10/17/2024 7.0 (A)   06/19/2024 11.0 (A)     Hemoglobin A1C (%)   Date Value   07/17/2024 8.8 (H)   02/05/2024 6.8 (H)   08/01/2023 6.1 (H)   01/30/2023 6.6 (H)   07/27/2022 7.0 (H)     Glucose (mg/dL)   Date Value   11/18/2024 119 (H)     Creatinine (mg/dL)   Date Value   11/18/2024 0.77     eGFR (mL/min/1.73m*2)   Date Value   11/18/2024 >90        BP Readings from Last 6 Encounters:   11/20/24 137/71   11/18/24 151/87   11/13/24 129/69   11/04/24 143/76   10/22/24 127/67   10/21/24 143/76        Wt Readings from Last 6 Encounters:   11/20/24 72 kg (158 lb 11.7 oz)   11/18/24 72.5 kg (159 lb 13.3 oz)   11/13/24 73.1 kg (161 lb 2.5 oz)   11/04/24 74.4 kg (164 lb 0.4 oz)   10/24/24 73.9 kg (163 lb)   10/22/24 73 kg (161 lb)       Current Outpatient Medications on File Prior to Visit   Medication Sig Dispense Refill    atenolol (Tenormin) 50 mg tablet 1 tablet (50 mg) by j-tube route once daily. 90 tablet 0    chlorhexidine (Peridex) 0.12 % solution Rinse mouth with 15 ml after toothbrushing the night before surgery and on the morning of surgery.  Expectorate after rinsing.  Do not swallow. (Patient not taking: Reported on 11/18/2024) 120 mL 0    famotidine (Pepcid) 20 mg tablet Take 1 tablet (20 mg) by mouth once daily. 30 tablet 3    ibuprofen 400 mg tablet Take 1 tablet (400 mg) by mouth every 6 hours if needed for moderate pain (4 - 6).      metFORMIN (Glucophage) 500 mg tablet Take 1 tablet (500 mg) by mouth 2 times daily (morning and late afternoon). 1 tab bid for 1 week then 2 tab bid after      metroNIDAZOLE (Flagyl) 250 mg tablet Take one tablet at 6p, 7p, and 11p the night before surgery. (Patient not taking: Reported on 11/20/2024) 3 tablet 0    neomycin (Mycifradin) 500 mg tablet Take two tabs by mouth at 6p, 7pm, and 11p the night before surgery. (Patient not taking: Reported on 11/20/2024) 6 tablet 0    ondansetron ODT (Zofran-ODT) 8 mg disintegrating tablet Take 1 tablet (8 mg) by mouth every 8 hours if needed for nausea or vomiting. 30 tablet 2    prochlorperazine (Compazine) 10 mg tablet Take 1 tablet (10 mg) by mouth every 6 hours if needed for nausea or vomiting. 30 tablet 2    traZODone (Desyrel) 100 mg tablet Take 1 tablet (100 mg) by mouth as needed at bedtime for sleep. 90 tablet 0    [DISCONTINUED] famotidine (Pepcid) 20 mg tablet  Take 1 tablet (20 mg) by mouth once daily. 30 tablet 0    [DISCONTINUED] ondansetron (Zofran) 8 mg tablet Take 1 tablet (8 mg) by mouth every 8 hours if needed for nausea or vomiting.      [DISCONTINUED] prochlorperazine (Compazine) 10 mg tablet Take 1 tablet (10 mg) by mouth every 6 hours if needed for nausea or vomiting.       Current Facility-Administered Medications on File Prior to Visit   Medication Dose Route Frequency Provider Last Rate Last Admin    heparin flush 100 unit/mL syringe 500 Units  500 Units intra-catheter PRN Catarino Fry MD   500 Units at 07/15/24 1135    silver nitrate applicators applicator   Topical Daily before lunch Macho Manzo MD         Assessment/Plan:   Discussed starting second agent for blood sugar control.  Could consider SGLT2 but concerned that pt is not eating/drinking normally.  GLP-1 would provide better glycemic control but pt cannot afford additional wt loss.  Risk of hypoglycemia with SHAH does not make this a good choice.  Will discuss addition of DPP4 at next visit if A1c remains in the high 7's.  2.  Gave [facility] price for ondansetron tablets which would save $30 monthly.  Pt to call office when he needs refills.  [employer]ch[employer] printout provided  3.  Follow up with PCP as prev scheduled.  Follow up with Melina in April 2025.    Plan:  Continue all current meds under the continuation of care with the referring provider and clinical pharmacy team.    Data reviewed and evaluated by ARMANI Leroy, WHITNEY, [facility]  Treatment and plan changes discussed with Candy Grossman MD

## 2024-11-20 NOTE — PREPROCEDURE INSTRUCTIONS
Medication List            Accurate as of November 20, 2024  1:17 PM. Always use your most recent med list.                atenolol 50 mg tablet  Commonly known as: Tenormin  1 tablet (50 mg) by j-tube route once daily.  Medication Adjustments for Surgery: Take/Use as prescribed     chlorhexidine 0.12 % solution  Commonly known as: Peridex  Rinse mouth with 15 ml after toothbrushing the night before surgery and on the morning of surgery.  Expectorate after rinsing.  Do not swallow.  Medication Adjustments for Surgery: Take/Use as prescribed     famotidine 20 mg tablet  Commonly known as: Pepcid  Take 1 tablet (20 mg) by mouth once daily.  Medication Adjustments for Surgery: Take/Use as prescribed     ibuprofen 400 mg tablet  Additional Medication Adjustments for Surgery: Take last dose 7 days before surgery     metFORMIN 500 mg tablet  Commonly known as: Glucophage  Medication Adjustments for Surgery: Do Not take on the morning of surgery     metroNIDAZOLE 250 mg tablet  Commonly known as: Flagyl  Take one tablet at 6p, 7p, and 11p the night before surgery.  Medication Adjustments for Surgery: Take/Use as prescribed     neomycin 500 mg tablet  Commonly known as: Mycifradin  Take two tabs by mouth at 6p, 7pm, and 11p the night before surgery.  Medication Adjustments for Surgery: Take/Use as prescribed     ondansetron ODT 8 mg disintegrating tablet  Commonly known as: Zofran-ODT  Take 1 tablet (8 mg) by mouth every 8 hours if needed for nausea or vomiting.  Medication Adjustments for Surgery: Take/Use as prescribed     prochlorperazine 10 mg tablet  Commonly known as: Compazine  Take 1 tablet (10 mg) by mouth every 6 hours if needed for nausea or vomiting.  Medication Adjustments for Surgery: Take/Use as prescribed     traZODone 100 mg tablet  Commonly known as: Desyrel  Take 1 tablet (100 mg) by mouth as needed at bedtime for sleep.  Medication Adjustments for Surgery: Take/Use as prescribed            CONTACT  SURGEON'S OFFICE IF YOU DEVELOP:  * Fever = 100.4 F   * New respiratory symptoms (e.g. cough, shortness of breath, respiratory distress, sore throat)  * Recent loss of taste or smell  *Flu like symptoms such as headache, fatigue or gastrointestinal symptoms  * You develop any open sores, shingles, burning or painful urination   AND/OR:  * You no longer wish to have the surgery.  * Any other personal circumstances change that may lead to the need to cancel or defer this surgery.  *You were admitted to any hospital within one week of your planned procedure.    SMOKING:  *Quitting smoking can make a huge difference to your health and recovery from surgery.    *If you need help with quitting, call 9-247-QUIT-NOW.    THE DAY OF SURGERY:  *Do not eat any food after midnight the night before your surgery.   *YOU MUST drink 14 OUNCES of clear liquids TWO hours before your instructed ARRIVAL TIME to the hospital. This includes water, black tea/coffee (no milk or cream), apple juice, clear broth and electrolyte drinks (Gatorade).  Please avoid clear liquids that are red in color.   *You may chew gum/mints up to TWO hours before your surgery/procedure.    SURGICAL TIME:  *You will be contacted between 2 p.m. and 6 p.m. the business day before your surgery with your arrival time.  *If you haven't received a call by 6pm, call [phone].  *Scheduled surgery times may change and you will be notified if this occurs-check your personal voicemail for any updates.    ON THE MORNING OF SURGERY:  *Wear comfortable, loose fitting clothing.   *Do not use moisturizers, creams, lotions or perfume.  *All jewelry and valuables should be left at home.  *Prosthetic devices such as contact lenses, hearing aids, dentures, eyelash extensions, hairpins and body piercing must be removed before surgery.    BRING WITH YOU:  *Photo ID and insurance card  *Current list of medications and allergies  *Pacemaker/Defibrillator/Heart stent cards  *CPAP  machine and mask  *Slings/splints/crutches  *Copy of your complete Advanced Directive/DHPOA-if applicable  *Neurostimulator implant remote    PARKING AND ARRIVAL:  *Check in at the Main Entrance desk and let them know you are here for surgery.  *You will be directed to the 2nd floor surgical waiting area.    IF YOU ARE HAVING OUTPATIENT/SAME DAY SURGERY:  *A responsible adult MUST accompany you at the time of discharge and stay with you for 24 hours after your surgery.  *You may NOT drive yourself home after surgery.  *You may use a taxi or ride sharing service ([employer], Uber) to return home ONLY if you are accompanied by a friend or family member.  *Instructions for resuming your medications will be provided by your surgeon.

## 2024-11-20 NOTE — CPM/PAT H&P
[facility]/PAT Evaluation       Name: Juan M Mcrae (Juan M Mcrae)  /Age: 1945/79 y.o.     In-Person         Date of Consult: 24    Referring Provider:  Dr. Sahni    Date, Surgery, and Length: 24, transanal minimally invasive surgery, excision of rectal polyp,  150 minutes    Patient presents to [facility] for perioperative risk assessment prior to scheduled surgery. He presents with rectal mass. 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 He underwent colonoscopy 2024 with shows recurrent rectal polyp that he will undergo TAMIS for.    This note was created in part upon personal review of patient's medical records.      Pt denies any past history of anesthetic complications such as PONV, awareness, prolonged sedation, dental damage, aspiration, cardiac arrest, difficult intubation, difficult I.V. access or unexpected hospital admissions. No history of malignant hyperthermia and or pseudocholinesterase deficiency.    No history of blood transfusions.    The patient IS NOT a [employer] and will accept blood and blood products if medically indicated.     Type and screen NOT sent.      Past Medical History:   Diagnosis Date    Cataract     CKD (chronic kidney disease)     Colon polyp     Deviated nasal septum 10/17/2024    Deviated septum     Disorder of lipoprotein metabolism, unspecified     Elevated serum cholesterol    Dysfunction of right eustachian tube 10/17/2024    Dysphagia     ED (erectile dysfunction)     Elevated serum cholesterol 10/17/2024    Esophageal cancer (Multi)     GERD (gastroesophageal reflux disease)     Hearing aid worn     HL (hearing loss)     Hoarseness 10/17/2024    Horseshoe tear of retina without detachment 2021    Hypercholesterolemia     Hyperlipidemia     Hypertension     Left knee pain 2022    Malignant neoplasm of prostate (Multi) 10/17/2024    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    Nephrolithiasis     Obesity     Other specified diabetes mellitus without complications     last A1c= 6.8 on 2024    Otitis media of right ear 10/18/2022    Rash 10/17/2024    Sleep apnea     wear CPAP/BiPAP    Vision loss     wears glasses       Past Surgical History:   Procedure Laterality Date    BLADDER SURGERY      COLONOSCOPY      Repeat in one year    COLONOSCOPY  2018    EYE SURGERY      LITHOTRIPSY      OTHER SURGICAL HISTORY      Vocal cord surgery    TRANSURETHRAL RESECTION OF PROSTATE      UPPER GASTROINTESTINAL ENDOSCOPY  2024       Family History   Problem Relation Name Age of Onset    Stroke Father      Kidney cancer Brother Eliu     Stroke Brother Eliu      Social History     Tobacco Use   Smoking Status Former    Current packs/day: 0.00    Average packs/day: 2.0 packs/day for 40.0 years (80.0 ttl pk-yrs)    Types: Cigarettes    Start date: 1959    Quit date: 1999    Years since quittin.9   Smokeless Tobacco Never     Social History     Substance and Sexual Activity   Alcohol Use Yes    Alcohol/week: 2.0 standard drinks of alcohol    Types: 2 Cans of beer per week    Comment: couple beers a week     Social History     Substance and Sexual Activity   Drug Use Not Currently    Comment: gummies-dispensary from [place]       Allergies   Allergen Reactions    Omeprazole Itching and Unknown    Shellfish Containing Products Hives    Sulfa (Sulfonamide Antibiotics) Rash       Current Outpatient Medications:     atenolol (Tenormin) 50 mg tablet, 1 tablet (50 mg) by j-tube route once daily., Disp: 90 tablet, Rfl: 0    famotidine (Pepcid) 20 mg tablet, Take 1 tablet (20 mg) by mouth once daily., Disp: 30 tablet, Rfl: 3    ibuprofen 400 mg tablet, Take 1 tablet (400 mg) by mouth every 6 hours if needed for moderate pain (4 - 6)., Disp: , Rfl:     metFORMIN (Glucophage) 500 mg tablet, Take 1 tablet (500 mg) by mouth 2 times daily (morning and late  afternoon). 1 tab bid for 1 week then 2 tab bid after, Disp: , Rfl:     ondansetron ODT (Zofran-ODT) 8 mg disintegrating tablet, Take 1 tablet (8 mg) by mouth every 8 hours if needed for nausea or vomiting., Disp: 30 tablet, Rfl: 2    prochlorperazine (Compazine) 10 mg tablet, Take 1 tablet (10 mg) by mouth every 6 hours if needed for nausea or vomiting., Disp: 30 tablet, Rfl: 2    traZODone (Desyrel) 100 mg tablet, Take 1 tablet (100 mg) by mouth as needed at bedtime for sleep., Disp: 90 tablet, Rfl: 0    chlorhexidine (Peridex) 0.12 % solution, Rinse mouth with 15 ml after toothbrushing the night before surgery and on the morning of surgery.  Expectorate after rinsing.  Do not swallow. (Patient not taking: Reported on 11/18/2024), Disp: 120 mL, Rfl: 0    metroNIDAZOLE (Flagyl) 250 mg tablet, Take one tablet at 6p, 7p, and 11p the night before surgery. (Patient not taking: Reported on 11/20/2024), Disp: 3 tablet, Rfl: 0    neomycin (Mycifradin) 500 mg tablet, Take two tabs by mouth at 6p, 7pm, and 11p the night before surgery. (Patient not taking: Reported on 11/20/2024), Disp: 6 tablet, Rfl: 0    Current Facility-Administered Medications:     silver nitrate applicators applicator, , Topical, Daily before lunch, Macho Manzo MD    Facility-Administered Medications Ordered in Other Visits:     heparin flush 100 unit/mL syringe 500 Units, 500 Units, intra-catheter, PRN, Catarino Fry MD, 500 Units at 07/15/24 1135      PAT ROS:   Constitutional:   neg    Neuro/Psych:   neg    Eyes:    use of corrective lenses  Ears:    hearing loss   hearing aides  Nose:   neg    Mouth:   neg    Throat:   neg    Neck:   neg    Cardio:   neg    Respiratory:   neg    Endocrine:   neg    GI:    nausea  :   neg    Musculoskeletal:   neg    Hematologic:   neg    Skin:   sores/wound (old j-tube site, oozing pink tinged fluid, surgeon ENT Dr. Manzo prescribed silver nitrate. Patient started to use on 11/19/24)      Physical  "Exam  Vitals reviewed. Physical exam within normal limits.          PAT AIRWAY:   Airway:     Mallampati::  II    Neck ROM::  Full  normal          Visit Vitals  /71   Pulse 52   Temp 36.9 °C (98.5 °F)   Resp 18   Ht 1.676 m (5' 5.98\")   Wt 72 kg (158 lb 11.7 oz)   SpO2 97%   BMI 25.63 kg/m²   Smoking Status Former   BSA 1.83 m²     Assessment and Plan:     Patient is a 79 year old male scheduled for transanal minimally invasive surgery, excision of rectal polyp with Dr. Sahni on 12/4/24.    Patient is at acceptable risk to proceed with planned surgical procedure. Further cardiac risk stratification deferred at this time.This patient is INTERMEDIATE risk candidate undergoing MODERATE risk procedure, patient is medically optimized for surgery.    Plan    Cardiovascular:    RCRI: 1  Risk of Mace: 6%      Patient denies any chest pain, tightness, heaviness, pressure, radiating pain, palpitations, irregular heartbeats, lightheadedness, cough, congestion, shortness of breath, ROBERTSON, PND, near syncope, weight loss or gain.    Good functional capacity      EKG in PAT not indicated. Reviewed last EKG  Encounter Date: 07/23/24   Electrocardiogram, 12-lead PRN ACS symptoms   Result Value    Ventricular Rate 97    Atrial Rate 97    UT Interval 138    QRS Duration 86    QT Interval 348    QTC Calculation(Bazett) 441    P Axis 54    R Axis 68    T Axis 15    QRS Count 16    Q Onset 211    P Onset 142    P Offset 196    T Offset 385    QTC Fredericia 408    Narrative    Sinus rhythm with occasional Premature ventricular complexes  Low voltage QRS  Borderline ECG  When compared with ECG of 24-JUL-2024 16:59,  Sinus rhythm has replaced Atrial fibrillation  Confirmed by Fausto Davila ([id number]) on 7/28/2024 4:26:45 AM     HTN- will continue Atenolol      Pulm:  Known and treated  STACY- Patient was instructed to bring CPAP machine the morning of surgery. Recommend prioritizing  nonopioid analgesic techniques (regional and local " anesthesia, nonsteroidal medications, etc) before the administration of opioids and close monitoring for hypoventilation after surgery due to STACY. Increased vigilance is recommended with the use of narcotics due to an increased risk for opioid induced respiratory depression      Stop Bang=  known STACY, uses CPAP      Renal/endo:  Recommendations to avoid nephrotoxic drugs and carefully monitor fluid status to maintain euvolemia. Use dose adjusted medications as needed for the underlying level of renal function.      DM II- will hold Metformin DOS    Lab Results   Component Value Date    HGBA1C 7.0 (A) 10/17/2024       GI/:    Pending TAMIS procedure for excision of rectal polyp    Heme:  Patient instructed to ambulate as soon as possible postoperatively to decrease thromboembolic risk.    Initiate mechanical DVT prophylaxis as soon as possible and initiate chemical prophylaxis when deemed safe from a bleeding standpoint post surgery.        Caprini= 7        Risk assessment complete.  Patient is scheduled for a LOW-INTERMEDIATE surgical risk procedure.     He IS considered medically optimized for the planned procedure.        Labs/testing obtained in PAT on 11/20/24: NONE. Reviewed last CBC, BMP, EKG    Lab Results   Component Value Date    WBC 6.2 11/18/2024    HGB 13.9 11/18/2024    HCT 42.6 11/18/2024    MCV 95 11/18/2024     11/18/2024     Lab Results   Component Value Date    GLUCOSE 119 (H) 11/18/2024    CALCIUM 9.6 11/18/2024     11/18/2024    K 4.1 11/18/2024    CO2 27 11/18/2024     11/18/2024    BUN 12 11/18/2024    CREATININE 0.77 11/18/2024     Follow up/communication: none    Preoperative medication instructions were provided and reviewed with the patient.  Any additional testing or evaluation was explained to the patient.  Nothing by mouth instructions were discussed and patient's questions were answered prior to conclusion to this encounter.  Patient verbalized understanding of  preoperative instructions given in preadmission testing; discharge instructions available in EMR.    This note was dictated with speech recognition.  Minor errors may have been detected during use of speech recognition.

## 2024-11-26 DIAGNOSIS — C15.5 MALIGNANT NEOPLASM OF LOWER THIRD OF ESOPHAGUS (MULTI): ICD-10-CM

## 2024-12-02 ENCOUNTER — OFFICE VISIT (OUTPATIENT)
Dept: HEMATOLOGY/ONCOLOGY | Facility: CLINIC | Age: 79
End: 2024-12-02
Payer: MEDICARE

## 2024-12-02 ENCOUNTER — INFUSION (OUTPATIENT)
Dept: HEMATOLOGY/ONCOLOGY | Facility: CLINIC | Age: 79
End: 2024-12-02
Payer: MEDICARE

## 2024-12-02 VITALS
OXYGEN SATURATION: 96 % | TEMPERATURE: 96.8 F | SYSTOLIC BLOOD PRESSURE: 131 MMHG | HEART RATE: 57 BPM | DIASTOLIC BLOOD PRESSURE: 63 MMHG | BODY MASS INDEX: 24.74 KG/M2 | RESPIRATION RATE: 18 BRPM | WEIGHT: 153.22 LBS

## 2024-12-02 DIAGNOSIS — C15.5 MALIGNANT NEOPLASM OF LOWER THIRD OF ESOPHAGUS (MULTI): ICD-10-CM

## 2024-12-02 LAB
ALBUMIN SERPL BCP-MCNC: 4.3 G/DL (ref 3.4–5)
ALP SERPL-CCNC: 69 U/L (ref 33–136)
ALT SERPL W P-5'-P-CCNC: 12 U/L (ref 10–52)
ANION GAP SERPL CALC-SCNC: 15 MMOL/L (ref 10–20)
AST SERPL W P-5'-P-CCNC: 17 U/L (ref 9–39)
BASOPHILS # BLD AUTO: 0.07 X10*3/UL (ref 0–0.1)
BASOPHILS NFR BLD AUTO: 1.1 %
BILIRUB SERPL-MCNC: 0.8 MG/DL (ref 0–1.2)
BUN SERPL-MCNC: 25 MG/DL (ref 6–23)
CALCIUM SERPL-MCNC: 9.4 MG/DL (ref 8.6–10.3)
CHLORIDE SERPL-SCNC: 103 MMOL/L (ref 98–107)
CO2 SERPL-SCNC: 26 MMOL/L (ref 21–32)
CORTIS AM PEAK SERPL-MSCNC: 15.7 UG/DL (ref 5–20)
CREAT SERPL-MCNC: 1.14 MG/DL (ref 0.5–1.3)
EGFRCR SERPLBLD CKD-EPI 2021: 65 ML/MIN/1.73M*2
EOSINOPHIL # BLD AUTO: 0.36 X10*3/UL (ref 0–0.4)
EOSINOPHIL NFR BLD AUTO: 5.6 %
ERYTHROCYTE [DISTWIDTH] IN BLOOD BY AUTOMATED COUNT: 15.6 % (ref 11.5–14.5)
GLUCOSE SERPL-MCNC: 147 MG/DL (ref 74–99)
HCT VFR BLD AUTO: 41.8 % (ref 41–52)
HGB BLD-MCNC: 13.7 G/DL (ref 13.5–17.5)
IMM GRANULOCYTES # BLD AUTO: 0.01 X10*3/UL (ref 0–0.5)
IMM GRANULOCYTES NFR BLD AUTO: 0.2 % (ref 0–0.9)
LYMPHOCYTES # BLD AUTO: 0.69 X10*3/UL (ref 0.8–3)
LYMPHOCYTES NFR BLD AUTO: 10.8 %
MCH RBC QN AUTO: 31.6 PG (ref 26–34)
MCHC RBC AUTO-ENTMCNC: 32.8 G/DL (ref 32–36)
MCV RBC AUTO: 97 FL (ref 80–100)
MONOCYTES # BLD AUTO: 0.77 X10*3/UL (ref 0.05–0.8)
MONOCYTES NFR BLD AUTO: 12 %
NEUTROPHILS # BLD AUTO: 4.51 X10*3/UL (ref 1.6–5.5)
NEUTROPHILS NFR BLD AUTO: 70.3 %
NRBC BLD-RTO: 0 /100 WBCS (ref 0–0)
PLATELET # BLD AUTO: 235 X10*3/UL (ref 150–450)
POTASSIUM SERPL-SCNC: 4.1 MMOL/L (ref 3.5–5.3)
PROT SERPL-MCNC: 7.3 G/DL (ref 6.4–8.2)
RBC # BLD AUTO: 4.33 X10*6/UL (ref 4.5–5.9)
SODIUM SERPL-SCNC: 140 MMOL/L (ref 136–145)
TSH SERPL-ACNC: 0.85 MIU/L (ref 0.44–3.98)
WBC # BLD AUTO: 6.4 X10*3/UL (ref 4.4–11.3)

## 2024-12-02 PROCEDURE — 85025 COMPLETE CBC W/AUTO DIFF WBC: CPT

## 2024-12-02 PROCEDURE — 99214 OFFICE O/P EST MOD 30 MIN: CPT | Performed by: INTERNAL MEDICINE

## 2024-12-02 PROCEDURE — 36591 DRAW BLOOD OFF VENOUS DEVICE: CPT

## 2024-12-02 PROCEDURE — 3078F DIAST BP <80 MM HG: CPT | Performed by: INTERNAL MEDICINE

## 2024-12-02 PROCEDURE — 1159F MED LIST DOCD IN RCRD: CPT | Performed by: INTERNAL MEDICINE

## 2024-12-02 PROCEDURE — 1126F AMNT PAIN NOTED NONE PRSNT: CPT | Performed by: INTERNAL MEDICINE

## 2024-12-02 PROCEDURE — 1123F ACP DISCUSS/DSCN MKR DOCD: CPT | Performed by: INTERNAL MEDICINE

## 2024-12-02 PROCEDURE — 82024 ASSAY OF ACTH: CPT

## 2024-12-02 PROCEDURE — 82533 TOTAL CORTISOL: CPT

## 2024-12-02 PROCEDURE — 1157F ADVNC CARE PLAN IN RCRD: CPT | Performed by: INTERNAL MEDICINE

## 2024-12-02 PROCEDURE — 3075F SYST BP GE 130 - 139MM HG: CPT | Performed by: INTERNAL MEDICINE

## 2024-12-02 PROCEDURE — 80053 COMPREHEN METABOLIC PANEL: CPT

## 2024-12-02 PROCEDURE — 84443 ASSAY THYROID STIM HORMONE: CPT

## 2024-12-02 ASSESSMENT — PAIN SCALES - GENERAL: PAINLEVEL_OUTOF10: 0-NO PAIN

## 2024-12-02 NOTE — PROGRESS NOTES
Patient ID: Juan M Mcrae is a 79 y.o. male.    Diagnoses:   1.GE junction adenocarcinoma (signet ring cell), proficient MMR.  Localized (clinical stage II/III).  Status post esophagectomy on July 23, 2024 following neoadjuvant CRT->ypT3 N1.  Adjuvant nivolumab started on October 7, 2024.  2. Type 2 diabetes mellitus on oral hypoglycemics, hypertension controlled with medications.    Genomic profile:  Proficient MMR status on the biopsy by IHC.    Assessment and Plan:  This is a pleasant 79-year-old man with a diagnosis of GE junction adenocarcinoma.  His staging PET/CT and subsequent imaging studies confirmed localized disease.  He had a port placement and a J-tube placement.  He has seen radiation oncology and he has been enrolled in the NRG- (photon versus proton) study.    He completed concurrent chemoradiation in early June 2024 (last chemo was on Bettie 10, 2024).  His surgery took place on July 23, 2024.  He tolerated the surgery well.  The final pathology was ypT3 N1.      Since he has residual tumor in the resected specimen, We discussed adjuvant therapy with nivolumab.  Discussed the data of CheckMate-577 study which showed survival advantage with adjuvant nivolumab. Patient started treatment on 10-, which he has tolerated well with grade 1 nausea and fatigue.  However, after the last dose his nausea is much worse.  Has lost about 5 pounds in the last 2 weeks.    Plan: Hold adjuvant nivolumab    I am holding his nivolumab for 2 weeks to see if his nausea gets better.  If nausea is disabling, I plan to discontinue nivolumab.    I have placed all orders as outlined above. I advised the patient to schedule the tests and follow-up appointment as discussed by contacting the  on the way out or calling by phone.    Providers:  Surgeon: Dr. Jovany Bucknerc:  Jennie: Donna.    Chief complaint: GE junction adenocarcinoma.    HPI:  ONCOLOGIC HISTORY-    February 28, 2024: Underwent an EGD for  progressive dysphagia and weight loss.  EGD revealed an obstructing mass in the distal esophagus.  Biopsy confirmed adenocarcinoma with signet ring cell features.    March 21, 2024: EUS revealed T2 N1 mass extending from 41 cm to 43 cm and involving less than 1 cm of the gastric cardia.    March 22, 2024: CT scan of chest did not show any metastatic disease.    April 1, 2024 24: PET/CT scan done:   1. Hypermetabolic linear focus at gastroesophageal junction which is  extending to the gastric cardia/proximal lesser curvature in  correlation with distal esophageal /gastroesophageal junction wall  thickening. These findings are compatible with biopsy-proven  adenocarcinoma.  2. No evidence of hypermetabolic lymphadenopathy.  3. Abnormal focal increase in FDG uptake in the left hepatic lobe in  correlation with hypoattenuating lesion, concerning for metastatic  disease. Further correlation with dedicated CT abdomen is recommended.  4. Large fat containing left inguinal hernia.  Interval history: He has significant dysphagia although he can get by with soup.  He has lost about 60 pounds in the last 6 months or so.  Feels fatigued.  Denies any pain.  Denies fever or chill or any other sign of ongoing infection.    April 3, 2024: CT scan of abdomen and MRI liver ruled out metastatic disease (liver lesions were hemangiomas).    May 6, 2024: Started concurrent radiation and chemotherapy with weekly carboplatin and Taxol.  Will complete concurrent chemoradiation on June 14, 2024.    6/10/24: RECEIVED THE LAST DOSE OF CHEMO. LAST DOSE OF RADIATION WAS ON 6/14/2024.    July 23, 2024: Underwent surgery.  Pathology ypT3 N1.    October 7, 2024: Started adjuvant nivolumab.  December 2, 2024-nivolumab was placed on hold because of increasing nausea.    Interval history:  Mr. Mcrae  presents today for evaluation before nivolumab.  He reports that his nausea is getting increasingly worse and in last 2 weeks he was nauseated every  single day and on several occasions he threw up.  Zofran helps his nausea.  He is not eating well and he lost about 5 pounds in 2 weeks.      Allergies  Omeprazole, Sulfa, shellfish    Medications:  Acetaminophen, atenolol, dexamethasone, Emla cream, metformin 1000 mg twice daily, multivitamin, naloxone, nystatin, olanzapine, Actos, prochlorperazine, trazodone     Past Medical History:   Type 2 diabetes mellitus on oral hypoglycemics, hypertension controlled with medications.  Cataract, CKD (chronic kidney disease), Colon polyp, Deviated septum, ED (erectile dysfunction), Esophageal cancer, GERD (gastroesophageal reflux disease), Hyperlipidemia, Nephrolithiasis, Sleep apnea (wear CPAP/BiPAP)    Surgical History:    Past Surgical History:   Procedure Laterality Date    BLADDER SURGERY      COLONOSCOPY      Repeat in one year    EYE SURGERY      LITHOTRIPSY      OTHER SURGICAL HISTORY      Vocal cord surgery    TRANSURETHRAL RESECTION OF PROSTATE      UPPER GASTROINTESTINAL ENDOSCOPY  2024      Family History:    Family History   Problem Relation Name Age of Onset    Stroke Father      Kidney cancer Brother Eliu     Stroke Brother Eliu      Family Oncology History:    Cancer-related family history includes Kidney cancer in his brother.    Social History:      Tobacco Use    Smoking status: Former     Current packs/day: 0.00     Average packs/day: 2.0 packs/day for 40.0 years (80.0 ttl pk-yrs)     Types: Cigarettes     Start date: 1959     Quit date: 1999     Years since quittin.4    Smokeless tobacco: Never   Vaping Use    Vaping status: Never Used   Substance Use Topics    Alcohol use: Yes     Alcohol/week: 2.0 standard drinks of alcohol     Types: 2 Cans of beer per week     Comment: couple beers a week    Drug use: Not Currently     Comment: gummies-dispensary from [place]        Vital Signs:  /63 (BP Location: Left arm, Patient Position: Sitting, BP Cuff Size: Adult long)   Pulse 57    Temp 36 °C (96.8 °F) (Temporal)   Resp 18   Wt 69.5 kg (153 lb 3.5 oz)   SpO2 96%   BMI 24.74 kg/m²     Physical Exam:    ECO    Constitutional: Well developed, awake/alert/oriented x3, no distress, alert and cooperative  Eyes: PER. sclera anicteric  ENMT: Oral mucosa moist  Respiratory/Thorax: Breathing is non-labored. Lungs are clear to auscultation bilaterally. No adventitious breath sounds  Cardiovascular: S1-S2. Regular rate and rhythm. No murmurs, rubs, or gallops appreciated  Gastrointestinal: Abdomen soft nontender, nondistended, normal active bowel sounds. At  his J-tube incision site there is a 1cm scab appearing papillary growth  Musculoskeletal: ROM intact, no joint swelling, normal strength  Extremities: normal extremities, no cyanosis, no edema, no clubbing  Neurologic: alert and oriented x3. Nonfocal exam. No myoclonus  Psychological: Pleasant, appropriate and easily engaged     Lab Results:  Labs from today pending.      Catarino Fry MD

## 2024-12-02 NOTE — PROGRESS NOTES
Patient ambulated into clinic for follow up with Dr. Fry alone. Medications and allergies reviewed.     Reports being nauseous a lot of the time and has a loss of appetite.  Lost 5 lbs since last treatment on 11/20.   Taking zofran every morning before breakfast which helps slightly.   Reports diarrhea today but he is prepping for a colonoscopy that is scheduled on 12/5.   Denies fatigue.   Has a rash on his face, which he stated is from his new Bipap mask.     Treatment to be held today. Follow up in 2 weeks to for symptom check and possible treatment depending on he is feeling.

## 2024-12-04 ENCOUNTER — ANESTHESIA (OUTPATIENT)
Dept: OPERATING ROOM | Facility: HOSPITAL | Age: 79
End: 2024-12-04
Payer: MEDICARE

## 2024-12-04 ENCOUNTER — ANESTHESIA EVENT (OUTPATIENT)
Dept: OPERATING ROOM | Facility: HOSPITAL | Age: 79
End: 2024-12-04
Payer: MEDICARE

## 2024-12-04 ENCOUNTER — HOSPITAL ENCOUNTER (OUTPATIENT)
Facility: HOSPITAL | Age: 79
Setting detail: OUTPATIENT SURGERY
Discharge: HOME | End: 2024-12-04
Attending: SURGERY | Admitting: SURGERY
Payer: MEDICARE

## 2024-12-04 VITALS
OXYGEN SATURATION: 99 % | DIASTOLIC BLOOD PRESSURE: 68 MMHG | HEIGHT: 66 IN | RESPIRATION RATE: 16 BRPM | WEIGHT: 154.54 LBS | HEART RATE: 83 BPM | SYSTOLIC BLOOD PRESSURE: 127 MMHG | TEMPERATURE: 97.7 F | BODY MASS INDEX: 24.84 KG/M2

## 2024-12-04 DIAGNOSIS — K62.1 RECTAL POLYP: ICD-10-CM

## 2024-12-04 LAB
ACTH PLAS-MCNC: 15.2 PG/ML (ref 7.2–63.3)
GLUCOSE BLD MANUAL STRIP-MCNC: 139 MG/DL (ref 74–99)

## 2024-12-04 PROCEDURE — 2500000005 HC RX 250 GENERAL PHARMACY W/O HCPCS: Performed by: SURGERY

## 2024-12-04 PROCEDURE — 2500000004 HC RX 250 GENERAL PHARMACY W/ HCPCS (ALT 636 FOR OP/ED): Mod: JZ | Performed by: SURGERY

## 2024-12-04 PROCEDURE — 3700000001 HC GENERAL ANESTHESIA TIME - INITIAL BASE CHARGE: Performed by: SURGERY

## 2024-12-04 PROCEDURE — 2500000005 HC RX 250 GENERAL PHARMACY W/O HCPCS: Performed by: NURSE ANESTHETIST, CERTIFIED REGISTERED

## 2024-12-04 PROCEDURE — 82947 ASSAY GLUCOSE BLOOD QUANT: CPT

## 2024-12-04 PROCEDURE — A0184T PR RECTAL TUMOR EXCISION TRANSANAL ENDOSCOPIC: Performed by: STUDENT IN AN ORGANIZED HEALTH CARE EDUCATION/TRAINING PROGRAM

## 2024-12-04 PROCEDURE — 2500000004 HC RX 250 GENERAL PHARMACY W/ HCPCS (ALT 636 FOR OP/ED): Performed by: NURSE ANESTHETIST, CERTIFIED REGISTERED

## 2024-12-04 PROCEDURE — A0184T PR RECTAL TUMOR EXCISION TRANSANAL ENDOSCOPIC: Performed by: NURSE ANESTHETIST, CERTIFIED REGISTERED

## 2024-12-04 PROCEDURE — 99100 ANES PT EXTEME AGE<1 YR&>70: CPT | Performed by: STUDENT IN AN ORGANIZED HEALTH CARE EDUCATION/TRAINING PROGRAM

## 2024-12-04 PROCEDURE — 7100000010 HC PHASE TWO TIME - EACH INCREMENTAL 1 MINUTE: Performed by: SURGERY

## 2024-12-04 PROCEDURE — 3600000004 HC OR TIME - INITIAL BASE CHARGE - PROCEDURE LEVEL FOUR: Performed by: SURGERY

## 2024-12-04 PROCEDURE — 7100000002 HC RECOVERY ROOM TIME - EACH INCREMENTAL 1 MINUTE: Performed by: SURGERY

## 2024-12-04 PROCEDURE — 3600000009 HC OR TIME - EACH INCREMENTAL 1 MINUTE - PROCEDURE LEVEL FOUR: Performed by: SURGERY

## 2024-12-04 PROCEDURE — 7100000001 HC RECOVERY ROOM TIME - INITIAL BASE CHARGE: Performed by: SURGERY

## 2024-12-04 PROCEDURE — 0184T EXC RECTAL TUMOR ENDOSCOPIC: CPT | Performed by: SURGERY

## 2024-12-04 PROCEDURE — 7100000009 HC PHASE TWO TIME - INITIAL BASE CHARGE: Performed by: SURGERY

## 2024-12-04 PROCEDURE — 3700000002 HC GENERAL ANESTHESIA TIME - EACH INCREMENTAL 1 MINUTE: Performed by: SURGERY

## 2024-12-04 PROCEDURE — 2720000007 HC OR 272 NO HCPCS: Performed by: SURGERY

## 2024-12-04 PROCEDURE — 88305 TISSUE EXAM BY PATHOLOGIST: CPT | Mod: TC,AHULAB | Performed by: SURGERY

## 2024-12-04 RX ORDER — BUPIVACAINE HCL/EPINEPHRINE 0.5-1:200K
VIAL (ML) INJECTION AS NEEDED
Status: DISCONTINUED | OUTPATIENT
Start: 2024-12-04 | End: 2024-12-04 | Stop reason: HOSPADM

## 2024-12-04 RX ORDER — LABETALOL HYDROCHLORIDE 5 MG/ML
5 INJECTION, SOLUTION INTRAVENOUS ONCE AS NEEDED
Status: DISCONTINUED | OUTPATIENT
Start: 2024-12-04 | End: 2024-12-04 | Stop reason: HOSPADM

## 2024-12-04 RX ORDER — OXYCODONE HYDROCHLORIDE 5 MG/1
5 TABLET ORAL EVERY 6 HOURS PRN
Qty: 24 TABLET | Refills: 0 | Status: SHIPPED | OUTPATIENT
Start: 2024-12-04

## 2024-12-04 RX ORDER — IBUPROFEN 600 MG/1
600 TABLET ORAL EVERY 6 HOURS PRN
Qty: 30 TABLET | Refills: 0 | Status: SHIPPED | OUTPATIENT
Start: 2024-12-04

## 2024-12-04 RX ORDER — MEPERIDINE HYDROCHLORIDE 25 MG/ML
12.5 INJECTION INTRAMUSCULAR; INTRAVENOUS; SUBCUTANEOUS EVERY 10 MIN PRN
Status: DISCONTINUED | OUTPATIENT
Start: 2024-12-04 | End: 2024-12-04 | Stop reason: HOSPADM

## 2024-12-04 RX ORDER — CEFAZOLIN SODIUM 1 G/50ML
1 SOLUTION INTRAVENOUS ONCE
Status: DISCONTINUED | OUTPATIENT
Start: 2024-12-04 | End: 2024-12-04 | Stop reason: HOSPADM

## 2024-12-04 RX ORDER — ROCURONIUM BROMIDE 10 MG/ML
INJECTION, SOLUTION INTRAVENOUS AS NEEDED
Status: DISCONTINUED | OUTPATIENT
Start: 2024-12-04 | End: 2024-12-04

## 2024-12-04 RX ORDER — CEFAZOLIN 1 G/1
INJECTION, POWDER, FOR SOLUTION INTRAVENOUS AS NEEDED
Status: DISCONTINUED | OUTPATIENT
Start: 2024-12-04 | End: 2024-12-04

## 2024-12-04 RX ORDER — METRONIDAZOLE 500 MG/100ML
500 INJECTION, SOLUTION INTRAVENOUS ONCE
Status: COMPLETED | OUTPATIENT
Start: 2024-12-04 | End: 2024-12-04

## 2024-12-04 RX ORDER — ALBUTEROL SULFATE 0.83 MG/ML
2.5 SOLUTION RESPIRATORY (INHALATION) ONCE AS NEEDED
Status: DISCONTINUED | OUTPATIENT
Start: 2024-12-04 | End: 2024-12-04 | Stop reason: HOSPADM

## 2024-12-04 RX ORDER — SODIUM CHLORIDE, SODIUM LACTATE, POTASSIUM CHLORIDE, CALCIUM CHLORIDE 600; 310; 30; 20 MG/100ML; MG/100ML; MG/100ML; MG/100ML
100 INJECTION, SOLUTION INTRAVENOUS CONTINUOUS
Status: DISCONTINUED | OUTPATIENT
Start: 2024-12-04 | End: 2024-12-04 | Stop reason: HOSPADM

## 2024-12-04 RX ORDER — ONDANSETRON HYDROCHLORIDE 2 MG/ML
4 INJECTION, SOLUTION INTRAVENOUS ONCE AS NEEDED
Status: DISCONTINUED | OUTPATIENT
Start: 2024-12-04 | End: 2024-12-04 | Stop reason: HOSPADM

## 2024-12-04 RX ORDER — POLYETHYLENE GLYCOL 3350 17 G/17G
17 POWDER, FOR SOLUTION ORAL DAILY PRN
Qty: 10 PACKET | Refills: 0 | Status: SHIPPED | OUTPATIENT
Start: 2024-12-04

## 2024-12-04 RX ORDER — ONDANSETRON HYDROCHLORIDE 2 MG/ML
INJECTION, SOLUTION INTRAVENOUS AS NEEDED
Status: DISCONTINUED | OUTPATIENT
Start: 2024-12-04 | End: 2024-12-04

## 2024-12-04 RX ORDER — DIPHENHYDRAMINE HYDROCHLORIDE 50 MG/ML
12.5 INJECTION INTRAMUSCULAR; INTRAVENOUS ONCE AS NEEDED
Status: DISCONTINUED | OUTPATIENT
Start: 2024-12-04 | End: 2024-12-04 | Stop reason: HOSPADM

## 2024-12-04 RX ORDER — SODIUM CHLORIDE 9 MG/ML
INJECTION, SOLUTION INTRAVENOUS CONTINUOUS PRN
Status: DISCONTINUED | OUTPATIENT
Start: 2024-12-04 | End: 2024-12-04

## 2024-12-04 RX ORDER — OXYCODONE HYDROCHLORIDE 5 MG/1
5 TABLET ORAL EVERY 4 HOURS PRN
Status: DISCONTINUED | OUTPATIENT
Start: 2024-12-04 | End: 2024-12-04 | Stop reason: HOSPADM

## 2024-12-04 RX ORDER — PROPOFOL 10 MG/ML
INJECTION, EMULSION INTRAVENOUS AS NEEDED
Status: DISCONTINUED | OUTPATIENT
Start: 2024-12-04 | End: 2024-12-04

## 2024-12-04 RX ORDER — OXYCODONE HYDROCHLORIDE 5 MG/1
10 TABLET ORAL EVERY 4 HOURS PRN
Status: DISCONTINUED | OUTPATIENT
Start: 2024-12-04 | End: 2024-12-04 | Stop reason: HOSPADM

## 2024-12-04 RX ORDER — HYDRALAZINE HYDROCHLORIDE 20 MG/ML
5 INJECTION INTRAMUSCULAR; INTRAVENOUS EVERY 30 MIN PRN
Status: DISCONTINUED | OUTPATIENT
Start: 2024-12-04 | End: 2024-12-04 | Stop reason: HOSPADM

## 2024-12-04 RX ORDER — DROPERIDOL 2.5 MG/ML
0.62 INJECTION, SOLUTION INTRAMUSCULAR; INTRAVENOUS ONCE AS NEEDED
Status: DISCONTINUED | OUTPATIENT
Start: 2024-12-04 | End: 2024-12-04 | Stop reason: HOSPADM

## 2024-12-04 RX ORDER — NORETHINDRONE AND ETHINYL ESTRADIOL 0.5-0.035
KIT ORAL AS NEEDED
Status: DISCONTINUED | OUTPATIENT
Start: 2024-12-04 | End: 2024-12-04

## 2024-12-04 RX ORDER — LIDOCAINE HYDROCHLORIDE 10 MG/ML
0.1 INJECTION, SOLUTION EPIDURAL; INFILTRATION; INTRACAUDAL; PERINEURAL ONCE
Status: DISCONTINUED | OUTPATIENT
Start: 2024-12-04 | End: 2024-12-04 | Stop reason: HOSPADM

## 2024-12-04 RX ORDER — ACETAMINOPHEN 325 MG/1
650 TABLET ORAL EVERY 6 HOURS PRN
Qty: 30 TABLET | Refills: 0 | Status: SHIPPED | OUTPATIENT
Start: 2024-12-04

## 2024-12-04 RX ORDER — SUCCINYLCHOLINE CHLORIDE 20 MG/ML
INJECTION INTRAMUSCULAR; INTRAVENOUS AS NEEDED
Status: DISCONTINUED | OUTPATIENT
Start: 2024-12-04 | End: 2024-12-04

## 2024-12-04 RX ORDER — FENTANYL CITRATE 50 UG/ML
INJECTION, SOLUTION INTRAMUSCULAR; INTRAVENOUS AS NEEDED
Status: DISCONTINUED | OUTPATIENT
Start: 2024-12-04 | End: 2024-12-04

## 2024-12-04 RX ORDER — LIDOCAINE HYDROCHLORIDE 20 MG/ML
INJECTION, SOLUTION EPIDURAL; INFILTRATION; INTRACAUDAL; PERINEURAL AS NEEDED
Status: DISCONTINUED | OUTPATIENT
Start: 2024-12-04 | End: 2024-12-04

## 2024-12-04 ASSESSMENT — PAIN SCALES - GENERAL
PAINLEVEL_OUTOF10: 0 - NO PAIN

## 2024-12-04 ASSESSMENT — PAIN - FUNCTIONAL ASSESSMENT
PAIN_FUNCTIONAL_ASSESSMENT: 0-10

## 2024-12-04 NOTE — ANESTHESIA POSTPROCEDURE EVALUATION
Patient: Juan M Mcrae    Procedure Summary       Date: 12/04/24 Room / Location: [facility] A OR 05 / Virtual [facility] OR    Anesthesia Start: 0723 Anesthesia Stop: 0942    Procedure: Transanal Minimally Invasive Surgery; Excision of Rectal Polyp (Anus) Diagnosis:       Rectal polyp      (Rectal polyp [K62.1])    Surgeons: Cruz Sahni MD Responsible Provider: Mariah Mckeon MD    Anesthesia Type: general ASA Status: 3            Anesthesia Type: general    Vitals Value Taken Time   /68 12/04/24 1035   Temp 36.5 °C (97.7 °F) 12/04/24 1035   Pulse 83 12/04/24 1035   Resp 15 12/04/24 1035   SpO2 99 % 12/04/24 1035       Anesthesia Post Evaluation    Patient location during evaluation: PACU  Patient participation: complete - patient participated  Level of consciousness: awake and alert  Pain management: adequate  Airway patency: patent  Cardiovascular status: acceptable  Respiratory status: acceptable  Hydration status: acceptable  Postoperative Nausea and Vomiting: none        There were no known notable events for this encounter.

## 2024-12-04 NOTE — ANESTHESIA PREPROCEDURE EVALUATION
Patient: Juan M Mcrae    Procedure Information       Anesthesia Start Date/Time: 12/04/24 0726    Procedure: Transanal Minimally Invasive Surgery; Excision of Rectal Polyp (Anus)    Location: [facility] OR 05 / [facility] OR    Surgeons: Cruz Sahni MD            Relevant Problems   Anesthesia (within normal limits)      Cardiac   (+) Benign essential HTN   (+) HTN (hypertension)   (+) Mixed hyperlipidemia   (+) Pure hypercholesterolemia, unspecified      Pulmonary   (+) STACY (obstructive sleep apnea)      GI  Esophagectomy    (+) Dysphagia   (+) Gastroesophageal reflux disease   (+) Malignant neoplasm of esophagus   (+) Malignant neoplasm of esophagus, unspecified location (Multi)   (+) 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)      /Renal   (+) Chronic renal insufficiency      Liver   (+) Malignant neoplasm of esophagus   (+) Malignant neoplasm of esophagus, unspecified location (Multi)   (+) 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)      Endocrine   (+) Type 2 diabetes mellitus without complications (Multi)      HEENT   (+) Conductive hearing loss of right ear   (+) Sensorineural hearing loss (SNHL) of both ears      Skin   (+) Rash       Clinical information reviewed:   Tobacco  Allergies  Meds   Med Hx  Surg Hx   Fam Hx  Soc Hx        NPO Detail:  NPO/Void Status  Carbohydrate Drink Given Prior to Surgery? : Y  Date of Last Liquid: 12/04/24  Time of Last Liquid: 0400  Date of Last Solid: 12/02/24  Time of Last Solid: 1800  Last Intake Type: Clear fluids         Physical Exam    Airway  Mallampati: III  TM distance: >3 FB  Neck ROM: full     Cardiovascular   Rhythm: regular  Rate: normal     Dental   (+) upper dentures, lower dentures     Pulmonary - normal exam     Abdominal - normal exam             Anesthesia Plan    History of general anesthesia?: yes  History of complications of general anesthesia?: no    ASA 3     general   (RSI with ETT)  intravenous induction    Postoperative administration of opioids is intended.  Trial extubation is planned.  Anesthetic plan and risks discussed with patient.  Use of blood products discussed with patient who consented to blood products.    Plan discussed with CRNA and attending.

## 2024-12-04 NOTE — ANESTHESIA PROCEDURE NOTES
Airway  Date/Time: 12/4/2024 7:36 AM  Urgency: elective    Airway not difficult    Staffing  Performed: CRNA   Authorized by: Mariah Mckeon MD    Performed by: OBIE Leach-COREY, MAIDA  Patient location during procedure: OR    Indications and Patient Condition  Indications for airway management: anesthesia and airway protection  Spontaneous Ventilation: absent  Sedation level: deep  Preoxygenated: yes  Patient position: sniffing  MILS maintained throughout  Mask difficulty assessment: 0 - not attempted  No planned trial extubation    Final Airway Details  Final airway type: endotracheal airway      Successful airway: ETT  Cuffed: yes   Successful intubation technique: direct laryngoscopy  Blade: Markell  Blade size: #3  ETT size (mm): 8.0  Cormack-Lehane Classification: grade I - full view of glottis  Placement verified by: chest auscultation and capnometry   Cuff volume (mL): 10  Measured from: lips  ETT to lips (cm): 21  Number of attempts at approach: 1  Ventilation between attempts: none  Number of other approaches attempted: 0

## 2024-12-04 NOTE — SIGNIFICANT EVENT
Pt and son at bedside educated on and provided with discharge instructions. Both state an understanding of the teaching and all questions answered at this time. IV removed and bleeding controlled.

## 2024-12-04 NOTE — OP NOTE
Transanal Minimally Invasive Surgery; Excision of Rectal Polyp Operative Note     Date: 2024  OR Location: [facility] A OR    Name: Juan M Mcrae, : 1945, Age: 79 y.o., MRN: [id number], Sex: male    Diagnosis  Pre-op Diagnosis      * Rectal polyp [K62.1] Post-op Diagnosis     * Rectal polyp [K62.1]     Procedures  Transanal Minimally Invasive Surgery; Excision of Rectal Polyp  0184T - [place] RECTAL TUMOR EXCISION TRANSANAL ENDOSCOPIC      Surgeons      * Cruz Sahni - Primary    Resident/Fellow/Other Assistant:  Surgeons and Role:  * No surgeons found with a matching role *    Staff:   Circulator: Angela Mataub Person: Maria Elena  Surgical Assistant: Paulo Larios Circulator: Marlene    Anesthesia Staff: Anesthesiologist: Mariah Mckeon MD  CRNA: AKOSUA Leach DNP  C-AA: MAGDALENO Black    Procedure Summary  Anesthesia: General  ASA: III  Estimated Blood Loss: 10 mL  Intra-op Medications:   Administrations occurring from 0730 to 1000 on 24:   Medication Name Total Dose   BUPivacaine-EPINEPHrine (Marcaine w/EPI) 0.5 %-1:200,000 injection 20 mL   gelatin absorbable (Gelfoam) 100 sponge 1 each   ceFAZolin (Ancef) vial 1 g 2 g   dexAMETHasone (Decadron) 4 mg/mL 4 mg   ePHEDrine injection 50 mg   fentaNYL (Sublimaze) injection 50 mcg/mL 100 mcg   lidocaine PF (Xylocaine-MPF) local injection 2 % 50 mg   ondansetron 2 mg/mL 4 mg   propofol (Diprivan) injection 10 mg/mL 200 mg   rocuronium (ZeMuron) 50 mg/5 mL injection 40 mg   NaCl 0.9 % infusion 100 mL   succinylcholine 20 mg/mL vial 100 mg   metroNIDAZOLE (Flagyl) 500 mg in sodium chloride (iso)  mL 500 mg              Anesthesia Record               Intraprocedure I/O Totals          Intake    NaCl 0.9 % infusion 500.00 mL    metroNIDAZOLE (Flagyl) 500 mg in sodium chloride (iso)  mL 100.00 mL    Total Intake 600 mL          Specimen:   ID Type Source Tests Collected by Time   1 : rectal polyp Tissue RECTUM POLYPECTOMY  SURGICAL PATHOLOGY EXAM Cruz Sahni MD 12/4/2024 0906                 Drains and/or Catheters: * None in log *    Tourniquet Times:         Implants:     Findings: 3 cm left lateral rectal polyp overlying the first rectal valve    Indications: Juan M Mcrae is an 79 y.o. male who is having surgery for Rectal polyp [K62.1].  Large rectal polyp found on colonoscopy    The patient was seen in the preoperative area. The risks, benefits, complications, treatment options, non-operative alternatives, expected recovery and outcomes were discussed with the patient. The possibilities of reaction to medication, pulmonary aspiration, injury to surrounding structures, bleeding, recurrent infection, the need for additional procedures, failure to diagnose a condition, and creating a complication requiring transfusion or operation were discussed with the patient. The patient concurred with the proposed plan, giving informed consent.  The site of surgery was properly noted/marked if necessary per policy. The patient has been actively warmed in preoperative area. Preoperative antibiotics have been ordered and given within 1 hours of incision. Venous thrombosis prophylaxis have been ordered including bilateral sequential compression devices    Procedure Details:   Patient was identified in preoperative area and transported to the operating room.  General anesthesia was induced.  They were then repositioned to modified lithotomy with yellowfin stirrups and appropriate padding on the table.  Perianal region was clipped, prepped, draped in usual sterile fashion.  Time-out was performed confirming patient, procedure, perioperative criteria.  Anus was effaced with 4 quadrant 2-0 PDS sutures.  Perianal block was completed with 1% lidocaine with epinephrine and 0.5% Marcaine.  Anus was manually dilated.  Transanal GelPort path was then inserted.  Cap was placed with 3 10 millimeter ports and the anus was insufflated to 10 millimeters  mercury.  On survey polyp was identified in the left lateral quadrant overlying the first rectal valve.  Polyp was scored circumferentially with 5 millimeter margins.  On the distal border, this was overlying mucosa was dissected free and the specimen was completely removed with hot scissors.  The defect was then closed with interrupted 0 Vicryl using the Endo Stitch.  This had adequate closure of rectal wall without significant narrowing or obstruction.  Camera was able to be passed proximally without issue.  Surgical bed was confirmed hemostatic.  Gas was desufflated instruments removed.  There is a small mucosal tear from the GelPort placement at the anal verge which was closed with interrupted 3-0 Vicryl.  Gelfoam was placed within the anal canal.  Patient was awakened transferred to the PACU in stable condition.   Complications:  None; patient tolerated the procedure well.    Disposition: PACU - hemodynamically stable.  Condition: stable         Task Performed by RNFA or Surgical Assistant:            Additional Details:     Attending Attestation: I was present and scrubbed for the entire procedure.    Cruz Sahni  Phone Number: [phone]

## 2024-12-05 ASSESSMENT — PAIN SCALES - GENERAL: PAINLEVEL_OUTOF10: 4

## 2024-12-06 LAB
LABORATORY COMMENT REPORT: NORMAL
PATH REPORT.FINAL DX SPEC: NORMAL
PATH REPORT.GROSS SPEC: NORMAL
PATH REPORT.RELEVANT HX SPEC: NORMAL
PATH REPORT.TOTAL CANCER: NORMAL

## 2024-12-09 ENCOUNTER — TELEPHONE (OUTPATIENT)
Dept: SURGERY | Facility: CLINIC | Age: 79
End: 2024-12-09
Payer: MEDICARE

## 2024-12-09 NOTE — TELEPHONE ENCOUNTER
Post op call. He is s/p 12/04/2024 TAMIS for rectal polyp.  Patient reports that he is feeling well.  Pain is almost resolved.    He is moving his bowels without issues.  Denies urgency and incontinence.  Denies rectal bleeding.  Denies fever/chills.  Eating and drinking. He had an issue with nausea prior to the surgery which another doctor is investigating.  No vomiting.  Normal post op.  Iraida Pederson RN

## 2024-12-12 ENCOUNTER — TELEPHONE (OUTPATIENT)
Dept: PRIMARY CARE | Facility: CLINIC | Age: 79
End: 2024-12-12
Payer: MEDICARE

## 2024-12-12 DIAGNOSIS — F51.01 PRIMARY INSOMNIA: ICD-10-CM

## 2024-12-12 RX ORDER — TRAZODONE HYDROCHLORIDE 100 MG/1
100 TABLET ORAL NIGHTLY PRN
Qty: 90 TABLET | Refills: 0 | Status: SHIPPED | OUTPATIENT
Start: 2024-12-12 | End: 2025-03-12

## 2024-12-12 NOTE — TELEPHONE ENCOUNTER
Pt requests a refill on trazodone 100mg. He uses [facility] pharmacy on [address] in [place]. He can be reached at [phone] if needed.

## 2024-12-16 ENCOUNTER — DOCUMENTATION (OUTPATIENT)
Dept: HEMATOLOGY/ONCOLOGY | Facility: CLINIC | Age: 79
End: 2024-12-16

## 2024-12-16 ENCOUNTER — APPOINTMENT (OUTPATIENT)
Dept: HEMATOLOGY/ONCOLOGY | Facility: CLINIC | Age: 79
End: 2024-12-16
Payer: MEDICARE

## 2024-12-16 ENCOUNTER — OFFICE VISIT (OUTPATIENT)
Dept: HEMATOLOGY/ONCOLOGY | Facility: CLINIC | Age: 79
End: 2024-12-16
Payer: MEDICARE

## 2024-12-16 VITALS
RESPIRATION RATE: 16 BRPM | SYSTOLIC BLOOD PRESSURE: 138 MMHG | OXYGEN SATURATION: 98 % | DIASTOLIC BLOOD PRESSURE: 79 MMHG | BODY MASS INDEX: 24.52 KG/M2 | WEIGHT: 151.9 LBS | TEMPERATURE: 97 F | HEART RATE: 67 BPM

## 2024-12-16 DIAGNOSIS — C15.5 MALIGNANT NEOPLASM OF LOWER THIRD OF ESOPHAGUS (MULTI): ICD-10-CM

## 2024-12-16 PROCEDURE — 99215 OFFICE O/P EST HI 40 MIN: CPT

## 2024-12-16 ASSESSMENT — PAIN SCALES - GENERAL: PAINLEVEL_OUTOF10: 0-NO PAIN

## 2024-12-16 NOTE — PROGRESS NOTES
Patient ID: Juan M Mcrae is a 79 y.o. male.    Diagnoses:   1.GE junction adenocarcinoma (signet ring cell), proficient MMR.  Localized (clinical stage II/III).  Status post esophagectomy on July 23, 2024 following neoadjuvant CRT->ypT3 N1.  Adjuvant nivolumab started on October 7, 2024.  2. Type 2 diabetes mellitus on oral hypoglycemics, hypertension controlled with medications.    Genomic profile:  Proficient MMR status on the biopsy by IHC.    Assessment and Plan:  This is a pleasant 79-year-old man with a diagnosis of GE junction adenocarcinoma.  His staging PET/CT and subsequent imaging studies confirmed localized disease.  He had a port placement and a J-tube placement.  He has seen radiation oncology and he has been enrolled in the NRG- (photon versus proton) study.    He completed concurrent chemoradiation in early June 2024 (last chemo was on Bettie 10, 2024).  His surgery took place on July 23, 2024.  He tolerated the surgery well.  The final pathology was ypT3 N1.      Since he has residual tumor in the resected specimen, We discussed adjuvant therapy with nivolumab.  Discussed the data of CheckMate-577 study which showed survival advantage with adjuvant nivolumab. Patient started treatment on 10-, which he has tolerated well with grade 1 nausea and fatigue.      Last visit, for increasing nausea, we held treatment. He returns today to discuss restarting Nivolumab. Mr. Mcrae reports that his nausea is slightly improved, but he continues to report decreased appetite and weight loss. I reviewed this with Dr. Fry and at this time we would recommend holding Nivolumab and ordering EGD for further assessment of symptoms. I reviewed plan with Mr. Mcrae and he was in agreement. EGD ordered today.     Plan: Hold adjuvant nivolumab and EGD Ordered. RTC 2 weeks      I have placed all orders as outlined above. I advised the patient to schedule the tests and follow-up appointment as discussed  by contacting the  on the way out or calling by phone.    Providers:  Surgeon: Dr. Jovany Bucknerc:  Rochellec: Donna.    Chief complaint: GE junction adenocarcinoma.    HPI:  ONCOLOGIC HISTORY-    February 28, 2024: Underwent an EGD for progressive dysphagia and weight loss.  EGD revealed an obstructing mass in the distal esophagus.  Biopsy confirmed adenocarcinoma with signet ring cell features.    March 21, 2024: EUS revealed T2 N1 mass extending from 41 cm to 43 cm and involving less than 1 cm of the gastric cardia.    March 22, 2024: CT scan of chest did not show any metastatic disease.    April 1, 2024 24: PET/CT scan done:   1. Hypermetabolic linear focus at gastroesophageal junction which is  extending to the gastric cardia/proximal lesser curvature in  correlation with distal esophageal /gastroesophageal junction wall  thickening. These findings are compatible with biopsy-proven  adenocarcinoma.  2. No evidence of hypermetabolic lymphadenopathy.  3. Abnormal focal increase in FDG uptake in the left hepatic lobe in  correlation with hypoattenuating lesion, concerning for metastatic  disease. Further correlation with dedicated CT abdomen is recommended.  4. Large fat containing left inguinal hernia.  Interval history: He has significant dysphagia although he can get by with soup.  He has lost about 60 pounds in the last 6 months or so.  Feels fatigued.  Denies any pain.  Denies fever or chill or any other sign of ongoing infection.    April 3, 2024: CT scan of abdomen and MRI liver ruled out metastatic disease (liver lesions were hemangiomas).    May 6, 2024: Started concurrent radiation and chemotherapy with weekly carboplatin and Taxol.  Will complete concurrent chemoradiation on June 14, 2024.    6/10/24: RECEIVED THE LAST DOSE OF CHEMO. LAST DOSE OF RADIATION WAS ON 6/14/2024.    July 23, 2024: Underwent surgery.  Pathology ypT3 N1.    October 7, 2024: Started adjuvant nivolumab.  December 2,  -nivolumab was placed on hold because of increasing nausea.    Interval history:  Mr. Mcrae  presents today for evaluation before nivolumab.  He reports that his nausea is somewhat improved, not resolved, and his appetite has decreased.  Since last visit, he did lose another 2 lbs. Also, notes increased mouth dryness. Otherwise, no other new concerns today.    Review of 12 systems: Negative unless otherwise stated in HPI       Allergies  Omeprazole, Sulfa, shellfish    Medications:  Acetaminophen, atenolol, dexamethasone, Emla cream, metformin 1000 mg twice daily, multivitamin, naloxone, nystatin, olanzapine, Actos, prochlorperazine, trazodone     Past Medical History:   Type 2 diabetes mellitus on oral hypoglycemics, hypertension controlled with medications.  Cataract, CKD (chronic kidney disease), Colon polyp, Deviated septum, ED (erectile dysfunction), Esophageal cancer, GERD (gastroesophageal reflux disease), Hyperlipidemia, Nephrolithiasis, Sleep apnea (wear CPAP/BiPAP)    Surgical History:    Past Surgical History:   Procedure Laterality Date    BLADDER SURGERY      COLONOSCOPY      Repeat in one year    EYE SURGERY      LITHOTRIPSY      OTHER SURGICAL HISTORY      Vocal cord surgery    TRANSURETHRAL RESECTION OF PROSTATE      UPPER GASTROINTESTINAL ENDOSCOPY  2024      Family History:    Family History   Problem Relation Name Age of Onset    Stroke Father      Kidney cancer Brother Eliu     Stroke Brother Eliu      Family Oncology History:    Cancer-related family history includes Kidney cancer in his brother.    Social History:      Tobacco Use    Smoking status: Former     Current packs/day: 0.00     Average packs/day: 2.0 packs/day for 40.0 years (80.0 ttl pk-yrs)     Types: Cigarettes     Start date: 1959     Quit date: 1999     Years since quittin.4    Smokeless tobacco: Never   Vaping Use    Vaping status: Never Used   Substance Use Topics    Alcohol use: Yes      Alcohol/week: 2.0 standard drinks of alcohol     Types: 2 Cans of beer per week     Comment: couple beers a week    Drug use: Not Currently     Comment: gummies-dispensary from [place]        Vital Signs:  /79 (BP Location: Right arm, Patient Position: Sitting, BP Cuff Size: Adult long)   Pulse 67   Temp 36.1 °C (97 °F) (Temporal)   Resp 16   Wt 68.9 kg (151 lb 14.4 oz)   SpO2 98%   BMI 24.52 kg/m²     Physical Exam:    ECO    Constitutional: Well developed, awake/alert/oriented x3, no distress, alert and cooperative  Eyes: PER. sclera anicteric  ENMT: Oral mucosa moist  Respiratory/Thorax: Breathing is non-labored. Lungs are clear to auscultation bilaterally. No adventitious breath sounds  Cardiovascular: S1-S2. Regular rate and rhythm. No murmurs, rubs, or gallops appreciated  Gastrointestinal: Abdomen soft nontender, nondistended, normal active bowel sounds. At  his J-tube incision site there is a 1cm scab appearing papillary growth  Musculoskeletal: ROM intact, no joint swelling, normal strength  Extremities: normal extremities, no cyanosis, no edema, no clubbing  Neurologic: alert and oriented x3. Nonfocal exam. No myoclonus  Psychological: Pleasant, appropriate and easily engaged         OBIE Olsen-CNP

## 2024-12-16 NOTE — PROGRESS NOTES
Pt seen in office today for a follow up visit with Kristi Rodriguez NP for management of Malignant Neoplasm of Esophagus.  Patient is scheduled to receive Nivolumab today.    Treatment will be held today.    Medication list and allergies were removed.      Patient is not having pain.    Patient reports nausea at times but it has improved since his last treatment.  Educated patient on taking nausea medications.  Patient also states that he has been losing weight.  He does not have an appetite and states his mouth is very dry.  Patient may benefit from Palliative Medicine.  Discussed with JUSTICE Berry.    Education Documentation  Nutrition/Diet, taught by Amber Tillman RN at 12/16/2024  1:36 PM.  Learner: Patient  Readiness: Acceptance  Method: Explanation  Response: Verbalizes Understanding    Nutrition, taught by Amber Tillman RN at 12/16/2024  1:36 PM.  Learner: Patient  Readiness: Acceptance  Method: Explanation  Response: Verbalizes Understanding    Nausea Management, taught by Amber Tillman RN at 12/16/2024  1:36 PM.  Learner: Patient  Readiness: Acceptance  Method: Explanation  Response: Verbalizes Understanding    Supportive Medications, taught by Amber Tillman RN at 12/16/2024  1:36 PM.  Learner: Patient  Readiness: Acceptance  Method: Explanation  Response: Verbalizes Understanding    Oriented to Facility, taught by Amber Tillman RN at 12/16/2024  1:36 PM.  Learner: Patient  Readiness: Acceptance  Method: Explanation  Response: Verbalizes Understanding    Education Comments  No comments found.

## 2024-12-23 ENCOUNTER — TELEPHONE (OUTPATIENT)
Dept: PRIMARY CARE | Facility: CLINIC | Age: 79
End: 2024-12-23
Payer: MEDICARE

## 2024-12-23 DIAGNOSIS — E11.9 TYPE 2 DIABETES MELLITUS WITHOUT COMPLICATION, WITHOUT LONG-TERM CURRENT USE OF INSULIN (MULTI): ICD-10-CM

## 2024-12-23 RX ORDER — METFORMIN HYDROCHLORIDE 500 MG/1
500 TABLET ORAL
Qty: 180 TABLET | Refills: 1 | Status: SHIPPED | OUTPATIENT
Start: 2024-12-23 | End: 2025-06-21

## 2024-12-30 ENCOUNTER — HOSPITAL ENCOUNTER (OUTPATIENT)
Dept: RADIOLOGY | Facility: CLINIC | Age: 79
Discharge: HOME | End: 2024-12-30
Payer: MEDICARE

## 2024-12-30 ENCOUNTER — APPOINTMENT (OUTPATIENT)
Dept: HEMATOLOGY/ONCOLOGY | Facility: CLINIC | Age: 79
End: 2024-12-30
Payer: MEDICARE

## 2024-12-30 ENCOUNTER — TELEMEDICINE (OUTPATIENT)
Dept: HEMATOLOGY/ONCOLOGY | Facility: CLINIC | Age: 79
End: 2024-12-30
Payer: MEDICARE

## 2024-12-30 DIAGNOSIS — C15.5 MALIGNANT NEOPLASM OF LOWER THIRD OF ESOPHAGUS (MULTI): ICD-10-CM

## 2024-12-30 DIAGNOSIS — B37.0 THRUSH: Primary | ICD-10-CM

## 2024-12-30 DIAGNOSIS — R05.1 ACUTE COUGH: ICD-10-CM

## 2024-12-30 PROCEDURE — 71046 X-RAY EXAM CHEST 2 VIEWS: CPT

## 2024-12-30 RX ORDER — NYSTATIN 100000 [USP'U]/ML
500000 SUSPENSION ORAL 4 TIMES DAILY
Qty: 280 ML | Refills: 0 | Status: SHIPPED | OUTPATIENT
Start: 2024-12-30

## 2024-12-30 ASSESSMENT — PAIN SCALES - GENERAL: PAINLEVEL_OUTOF10: 0-NO PAIN

## 2024-12-30 NOTE — PROGRESS NOTES
Patient ID: Juan M Mcrae is a 79 y.o. male.    Virtual or Telephone Consent    A telephone visit (audio only) between the patient (at the originating site) and the provider (at the distant site) was utilized to provide this telehealth service.   Verbal consent was requested and obtained from Juan M Mcrae on this date, 12/30/24 for a telehealth visit.        Diagnoses:   1.GE junction adenocarcinoma (signet ring cell), proficient MMR.  Localized (clinical stage II/III).  Status post esophagectomy on July 23, 2024 following neoadjuvant CRT->ypT3 N1.  Adjuvant nivolumab started on October 7, 2024.  2. Type 2 diabetes mellitus on oral hypoglycemics, hypertension controlled with medications.    Genomic profile:  Proficient MMR status on the biopsy by IHC.    Assessment and Plan:  This is a pleasant 79-year-old man with a diagnosis of GE junction adenocarcinoma.  His staging PET/CT and subsequent imaging studies confirmed localized disease.  He had a port placement and a J-tube placement.  He has seen radiation oncology and he has been enrolled in the NRG- (photon versus proton) study.    He completed concurrent chemoradiation in early June 2024 (last chemo was on Bettie 10, 2024).  His surgery took place on July 23, 2024.  He tolerated the surgery well.  The final pathology was ypT3 N1.      Since he has residual tumor in the resected specimen, We discussed adjuvant therapy with nivolumab.  Discussed the data of CheckMate-577 study which showed survival advantage with adjuvant nivolumab. Patient started treatment on 10-, which he has tolerated well with grade 1 nausea and fatigue.      Since November, for increasing nausea, poor appetite and weight loss, we have held treatment. Last visit, we ordered an EGD for further evaluation. Unfortunately, today Mr. Mcrae told me that this is not scheduled until 1/27/25. I reviewed with Dr. Fry and we will reach out for sooner scheduling of EGD.     I also  sent nystatin to his pharmacy as he endorses a thick coating on his tongue and mouth- (likely thrush). He also notes ongoing cough and sinus congestion. A CXR was also ordered today. He is using OTC cold medications.        Plan: Hold adjuvant nivolumab and EGD Ordered and team has reached out for sooner scheduling. RTC 2 weeks      I have placed all orders as outlined above. I advised the patient to schedule the tests and follow-up appointment as discussed by contacting the  on the way out or calling by phone.    Providers:  Surgeon: Dr. Jovany Bucknerc:  Rochellec: Donna.    Chief complaint: GE junction adenocarcinoma.    HPI:  ONCOLOGIC HISTORY-    February 28, 2024: Underwent an EGD for progressive dysphagia and weight loss.  EGD revealed an obstructing mass in the distal esophagus.  Biopsy confirmed adenocarcinoma with signet ring cell features.    March 21, 2024: EUS revealed T2 N1 mass extending from 41 cm to 43 cm and involving less than 1 cm of the gastric cardia.    March 22, 2024: CT scan of chest did not show any metastatic disease.    April 1, 2024 24: PET/CT scan done:   1. Hypermetabolic linear focus at gastroesophageal junction which is  extending to the gastric cardia/proximal lesser curvature in  correlation with distal esophageal /gastroesophageal junction wall  thickening. These findings are compatible with biopsy-proven  adenocarcinoma.  2. No evidence of hypermetabolic lymphadenopathy.  3. Abnormal focal increase in FDG uptake in the left hepatic lobe in  correlation with hypoattenuating lesion, concerning for metastatic  disease. Further correlation with dedicated CT abdomen is recommended.  4. Large fat containing left inguinal hernia.  Interval history: He has significant dysphagia although he can get by with soup.  He has lost about 60 pounds in the last 6 months or so.  Feels fatigued.  Denies any pain.  Denies fever or chill or any other sign of ongoing infection.    April 3, 2024:  CT scan of abdomen and MRI liver ruled out metastatic disease (liver lesions were hemangiomas).    May 6, 2024: Started concurrent radiation and chemotherapy with weekly carboplatin and Taxol.  Will complete concurrent chemoradiation on June 14, 2024.    6/10/24: RECEIVED THE LAST DOSE OF CHEMO. LAST DOSE OF RADIATION WAS ON 6/14/2024.    July 23, 2024: Underwent surgery.  Pathology ypT3 N1.    October 7, 2024: Started adjuvant nivolumab.  December 2, 2024-nivolumab was placed on hold because of increasing nausea.    Interval history:  Mr. Mcrae and I completed a phone visit today to review current POC. Nivolumab has been on hold for issues with nausea, poor po intake, and weight loss. Last visit, we did order an EGD.    Today, he reports that his nausea is still present and his appetite continues to decrease.   Also, he notes increased mouth dryness and a coating now on his tongue and roof of his mouth. He also reports ongoing cough and sinus congestion. Is using OTC medications for this. Denies fevers or chills, but endorses a cough with yellow sputum.    Review of 12 systems: Negative unless otherwise stated in HPI       Allergies  Omeprazole, Sulfa, shellfish    Medications:  Acetaminophen, atenolol, dexamethasone, Emla cream, metformin 1000 mg twice daily, multivitamin, naloxone, nystatin, olanzapine, Actos, prochlorperazine, trazodone     Past Medical History:   Type 2 diabetes mellitus on oral hypoglycemics, hypertension controlled with medications.  Cataract, CKD (chronic kidney disease), Colon polyp, Deviated septum, ED (erectile dysfunction), Esophageal cancer, GERD (gastroesophageal reflux disease), Hyperlipidemia, Nephrolithiasis, Sleep apnea (wear CPAP/BiPAP)    Surgical History:    Past Surgical History:   Procedure Laterality Date    BLADDER SURGERY      COLONOSCOPY  2023    Repeat in one year    EYE SURGERY      LITHOTRIPSY      OTHER SURGICAL HISTORY      Vocal cord surgery    TRANSURETHRAL  RESECTION OF PROSTATE      UPPER GASTROINTESTINAL ENDOSCOPY  2024      Family History:    Family History   Problem Relation Name Age of Onset    Stroke Father      Kidney cancer Brother Eliu     Stroke Brother Eliu      Family Oncology History:    Cancer-related family history includes Kidney cancer in his brother.    Social History:      Tobacco Use    Smoking status: Former     Current packs/day: 0.00     Average packs/day: 2.0 packs/day for 40.0 years (80.0 ttl pk-yrs)     Types: Cigarettes     Start date: 1959     Quit date: 1999     Years since quittin.4    Smokeless tobacco: Never   Vaping Use    Vaping status: Never Used   Substance Use Topics    Alcohol use: Yes     Alcohol/week: 2.0 standard drinks of alcohol     Types: 2 Cans of beer per week     Comment: couple beers a week    Drug use: Not Currently     Comment: gummies-dispensary from [place]        Vital Signs:  There were no vitals taken for this visit.    Physical Exam: Phone visit today    ECO        Kristi oRdriguez, OBIE-CNP

## 2025-01-07 LAB
CLINICAL SIG UPDATED INFO: NORMAL
LAB AP ASR DISCLAIMER: NORMAL
LABORATORY COMMENT REPORT: NORMAL
PATH REPORT.FINAL DX SPEC: NORMAL
PATH REPORT.GROSS SPEC: NORMAL
PATH REPORT.TOTAL CANCER: NORMAL

## 2025-01-07 NOTE — PROGRESS NOTES
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)    Sensorineural hearing loss (SNHL) of both ears    Primary insomnia    Malignant neoplasm of esophagus    Chronic renal insufficiency    STACY (obstructive sleep apnea)    HTN (hypertension)    Dysphagia    Gastroesophageal reflux disease    Malignant neoplasm of esophagus, unspecified location (Multi)    Benign essential HTN    Benign hypertensive heart disease    Bilateral impacted cerumen    Conductive hearing loss of right ear    History of hypertension    Hypertrophy of both inferior nasal turbinates    Obesity with body mass index 30 or greater    Pure hypercholesterolemia, unspecified    Rash    Tinnitus of both ears    Rectal polyp     Subjective   Juan M Mcrae is a 79 y.o. male that is presenting today for post-op transanal excision of rectal polyp on 12/4/2024.  Procedures  Transanal Minimally Invasive Surgery; Excision of Rectal Polyp  0184T - [place] RECTAL TUMOR EXCISION TRANSANAL ENDOSCOPIC    Findings: 3 cm left lateral rectal polyp overlying the first rectal valve     FINAL DIAGNOSIS   A.  Rectum, polyp, polypectomy:  -Tubular adenoma (clinically, 3 cm) with focal high-grade dysplasia.  -Resection margins are negative for dysplasia.  -See comment.     Comment:  A superficial portion of muscularis propria is present.      The preliminary findings were communicated to Dr. Sahni via secure messaging system on 12/06/2024.      No issues since surgery.  No blood discharge incontinence or constipation.  Review of Systems   Constitutional: Negative.    HENT: Negative.     Eyes: Negative.    Respiratory: Negative.     Cardiovascular: Negative.    Gastrointestinal:  Negative.    Genitourinary: Negative.    Skin: Negative.    All other systems reviewed and are negative.     Objective   Vitals:    01/09/25 1248   BP: 98/66   Pulse: 62   Temp: 36.6 °C (97.9 °F)   SpO2: 99%      Physical Exam  Constitutional:       Appearance: Normal appearance.   HENT:      Head: Normocephalic.   Eyes:      Pupils: Pupils are equal, round, and reactive to light.   Cardiovascular:      Rate and Rhythm: Normal rate.   Pulmonary:      Effort: Pulmonary effort is normal.   Abdominal:      General: Abdomen is flat. Bowel sounds are normal.      Palpations: Abdomen is soft.   Genitourinary:     Comments: Verbal consent was obtained and with chaperone present the patient was placed in left lateral decubitus position. Perianal skin was examined without abnormality.  No external hemorrhoids identified.  Digital rectal exam revealed normal tone with good squeeze.  No palpable masses appreciated.  Incision site palpably healing as expected without defects or stricture.  No blood on finger.  Skin:     General: Skin is warm.   Neurological:      General: No focal deficit present.      Mental Status: He is alert.         Diagnostic Results   Lab Results   Component Value Date    GLUCOSE 147 (H) 12/02/2024    CALCIUM 9.4 12/02/2024     12/02/2024    K 4.1 12/02/2024    CO2 26 12/02/2024     12/02/2024    BUN 25 (H) 12/02/2024    CREATININE 1.14 12/02/2024     Lab Results   Component Value Date    ALT 12 12/02/2024    AST 17 12/02/2024    ALKPHOS 69 12/02/2024    BILITOT 0.8 12/02/2024     Lab Results   Component Value Date    WBC 6.4 12/02/2024    HGB 13.7 12/02/2024    HCT 41.8 12/02/2024    MCV 97 12/02/2024     12/02/2024     Lab Results   Component Value Date    CHOL 172 07/17/2024    CHOL 142 02/05/2024    CHOL 157 08/01/2023     Lab Results   Component Value Date    HDL 42.6 07/17/2024    HDL 76.0 02/05/2024    HDL 82 08/01/2023     Lab Results   Component Value Date    LDLCALC 107 (H)  "07/17/2024    LDLCALC 55 (L) 02/05/2024    LDLCALC 63 (L) 08/01/2023     Lab Results   Component Value Date    TRIG 113 07/17/2024    TRIG 57 02/05/2024    TRIG 61 08/01/2023     No components found for: \"CHOLHDL\"  Lab Results   Component Value Date    HGBA1C 7.0 (A) 10/17/2024     Other labs not included in the list above were reviewed either before or during this encounter.    History    Past Medical History:   Diagnosis Date    Cataract     CKD (chronic kidney disease)     Colon polyp     Deviated nasal septum 10/17/2024    Deviated septum     Disorder of lipoprotein metabolism, unspecified     Elevated serum cholesterol    Diverticulitis of colon 2024    Dysfunction of right eustachian tube 10/17/2024    Dysphagia     ED (erectile dysfunction)     Elevated serum cholesterol 10/17/2024    Esophageal cancer (Multi)     GERD (gastroesophageal reflux disease)     Hearing aid worn     HL (hearing loss)     Hoarseness 10/17/2024    Horseshoe tear of retina without detachment 06/18/2021    Hypercholesterolemia     Hyperlipidemia     Hypertension     Left knee pain 04/21/2022    Malignant neoplasm of prostate (Multi) 10/17/2024    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    Nephrolithiasis     Obesity     Other specified diabetes mellitus without complications     last A1c= 6.8 on 2/5/2024    Otitis media of right ear 10/18/2022    Rash 10/17/2024    Sleep apnea     wear CPAP/BiPAP    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    Vision loss     wears glasses     Past Surgical History:   Procedure Laterality Date    BLADDER SURGERY      COLONOSCOPY  2023    Repeat in one year    COLONOSCOPY  02/05/2018    EYE SURGERY      LITHOTRIPSY      OTHER SURGICAL HISTORY      Vocal cord surgery    TRANSURETHRAL RESECTION OF PROSTATE      UPPER GASTROINTESTINAL ENDOSCOPY  03/02/2024     Family History   Problem Relation Name Age of Onset    Stroke Father Nikko     Kidney cancer Brother Eliu     Stroke Brother Eliu     Cancer " Daughter Velia      Allergies   Allergen Reactions    Omeprazole Itching and Unknown    Shellfish Containing Products Hives    Other Rash     Rubber on Cpap machine face mask    Sulfa (Sulfonamide Antibiotics) Rash     Current Outpatient Medications on File Prior to Visit   Medication Sig Dispense Refill    acetaminophen (Tylenol) 325 mg tablet Take 2 tablets (650 mg) by mouth every 6 hours if needed for mild pain (1 - 3) for up to 30 doses. 30 tablet 0    atenolol (Tenormin) 50 mg tablet 1 tablet (50 mg) by j-tube route once daily. 90 tablet 0    chlorhexidine (Peridex) 0.12 % solution Rinse mouth with 15 ml after toothbrushing the night before surgery and on the morning of surgery.  Expectorate after rinsing.  Do not swallow. (Patient not taking: Reported on 12/16/2024) 120 mL 0    famotidine (Pepcid) 20 mg tablet Take 1 tablet (20 mg) by mouth once daily. 30 tablet 3    ibuprofen 400 mg tablet Take 1 tablet (400 mg) by mouth every 6 hours if needed for moderate pain (4 - 6).      ibuprofen 600 mg tablet Take 1 tablet (600 mg) by mouth every 6 hours if needed for moderate pain (4 - 6) for up to 30 doses. 30 tablet 0    metFORMIN (Glucophage) 500 mg tablet Take 1 tablet (500 mg) by mouth 2 times daily (morning and late afternoon). 1 tab bid for 1 week then 2 tab bid after 180 tablet 1    metroNIDAZOLE (Flagyl) 250 mg tablet Take one tablet at 6p, 7p, and 11p the night before surgery. 3 tablet 0    neomycin (Mycifradin) 500 mg tablet Take two tabs by mouth at 6p, 7pm, and 11p the night before surgery. (Patient not taking: Reported on 12/16/2024) 6 tablet 0    nystatin (Mycostatin) 100,000 unit/mL suspension Take 5 mL (500,000 Units) by mouth 4 times a day. Swish in mouth and swallow. 280 mL 0    ondansetron ODT (Zofran-ODT) 8 mg disintegrating tablet Take 1 tablet (8 mg) by mouth every 8 hours if needed for nausea or vomiting. 30 tablet 2    oxyCODONE (Roxicodone) 5 mg immediate release tablet Take 1 tablet (5 mg)  by mouth every 6 hours if needed for severe pain (7 - 10) for up to 24 doses. 24 tablet 0    polyethylene glycol (Glycolax, Miralax) 17 gram packet Take 17 g by mouth once daily as needed (for constipation) for up to 10 doses. 10 packet 0    prochlorperazine (Compazine) 10 mg tablet Take 1 tablet (10 mg) by mouth every 6 hours if needed for nausea or vomiting. 30 tablet 2    traZODone (Desyrel) 100 mg tablet Take 1 tablet (100 mg) by mouth as needed at bedtime for sleep. 90 tablet 0     Current Facility-Administered Medications on File Prior to Visit   Medication Dose Route Frequency Provider Last Rate Last Admin    heparin flush 100 unit/mL syringe 500 Units  500 Units intra-catheter PRN Catarino Fry MD   500 Units at 07/15/24 1135     Immunization History   Administered Date(s) Administered    Flu vaccine (IIV4), preservative free *Check age/dose* 09/03/2020    Flu vaccine, quadrivalent, high-dose, preservative free, age 65y+ (FLUZONE) 09/16/2021    Flu vaccine, trivalent, preservative free, HIGH-DOSE, age 65y+ (Fluzone) 12/08/2014, 09/26/2017, 11/01/2018, 10/09/2019    Influenza, Seasonal, Quadrivalent, Adjuvanted 09/22/2022, 09/15/2023    Influenza, seasonal, injectable 11/16/2015, 09/15/2016, 09/26/2016, 10/01/2018, 09/30/2019    Influenza, trivalent, adjuvanted 09/25/2024    Moderna COVID-19 vaccine, 12 years and older (50mcg/0.5mL)(Spikevax) 09/25/2024    Moderna COVID-19 vaccine, bivalent, blue cap/gray label *Check age/dose* 09/29/2022    Moderna SARS-CoV-2 Vaccination 02/18/2021, 03/18/2021, 11/02/2021, 05/23/2022    Pfizer COVID-19 vaccine, 12 years and older, (30mcg/0.3mL) (Comirnaty) 09/21/2023    Pneumococcal conjugate vaccine, 13-valent (PREVNAR 13) 11/16/2015, 11/16/2015    Pneumococcal polysaccharide vaccine, 23-valent, age 2 years and older (PNEUMOVAX 23) 09/24/2012    RESPIRATORY SYNCYTIAL VIRUS (RSV), ELIGIBLE PREGNANT PTS, 0.5 ML (ABRYSVO) 09/15/2023     Patient's medical history was  reviewed and updated either before or during this encounter.    Cruz Sahni MD

## 2025-01-09 ENCOUNTER — OFFICE VISIT (OUTPATIENT)
Dept: SURGERY | Facility: CLINIC | Age: 80
End: 2025-01-09
Payer: MEDICARE

## 2025-01-09 VITALS
TEMPERATURE: 97.9 F | DIASTOLIC BLOOD PRESSURE: 66 MMHG | SYSTOLIC BLOOD PRESSURE: 98 MMHG | BODY MASS INDEX: 23.5 KG/M2 | HEIGHT: 66 IN | OXYGEN SATURATION: 99 % | WEIGHT: 146.2 LBS | HEART RATE: 62 BPM

## 2025-01-09 DIAGNOSIS — K62.1 RECTAL POLYP: Primary | ICD-10-CM

## 2025-01-09 PROCEDURE — 99211 OFF/OP EST MAY X REQ PHY/QHP: CPT | Performed by: SURGERY

## 2025-01-09 ASSESSMENT — ENCOUNTER SYMPTOMS
CARDIOVASCULAR NEGATIVE: 1
EYES NEGATIVE: 1
LOSS OF SENSATION IN FEET: 0
CONSTITUTIONAL NEGATIVE: 1
DEPRESSION: 0
RESPIRATORY NEGATIVE: 1
OCCASIONAL FEELINGS OF UNSTEADINESS: 0
GASTROINTESTINAL NEGATIVE: 1

## 2025-01-09 ASSESSMENT — PAIN SCALES - GENERAL: PAINLEVEL_OUTOF10: 0-NO PAIN

## 2025-01-13 ENCOUNTER — OFFICE VISIT (OUTPATIENT)
Dept: HEMATOLOGY/ONCOLOGY | Facility: CLINIC | Age: 80
End: 2025-01-13
Payer: MEDICARE

## 2025-01-13 ENCOUNTER — APPOINTMENT (OUTPATIENT)
Dept: HEMATOLOGY/ONCOLOGY | Facility: CLINIC | Age: 80
End: 2025-01-13
Payer: MEDICARE

## 2025-01-13 VITALS
OXYGEN SATURATION: 94 % | RESPIRATION RATE: 18 BRPM | WEIGHT: 144.4 LBS | DIASTOLIC BLOOD PRESSURE: 63 MMHG | HEART RATE: 75 BPM | TEMPERATURE: 96.6 F | BODY MASS INDEX: 23.31 KG/M2 | SYSTOLIC BLOOD PRESSURE: 110 MMHG

## 2025-01-13 DIAGNOSIS — C15.5 MALIGNANT NEOPLASM OF LOWER THIRD OF ESOPHAGUS (MULTI): Primary | ICD-10-CM

## 2025-01-13 PROCEDURE — 99214 OFFICE O/P EST MOD 30 MIN: CPT | Performed by: INTERNAL MEDICINE

## 2025-01-13 ASSESSMENT — PAIN SCALES - GENERAL: PAINLEVEL_OUTOF10: 0-NO PAIN

## 2025-01-13 NOTE — PROGRESS NOTES
Patient ID: Juan M Mcrae is a 79 y.o. male.    Diagnoses:   1.GE junction adenocarcinoma (signet ring cell), proficient MMR.  Localized (clinical stage II/III).  Status post esophagectomy on July 23, 2024 following neoadjuvant CRT->ypT3 N1.  Adjuvant nivolumab started on October 7, 2024.  2. Type 2 diabetes mellitus on oral hypoglycemics, hypertension controlled with medications.    Genomic profile:  Proficient MMR status on the biopsy by IHC.    Assessment and Plan:  This is a pleasant 79-year-old man with a diagnosis of GE junction adenocarcinoma.  His staging PET/CT and subsequent imaging studies confirmed localized disease.  He had a port placement and a J-tube placement.  He has seen radiation oncology and he has been enrolled in the NRG- (photon versus proton) study.    He completed concurrent chemoradiation in early June 2024 (last chemo was on Bettie 10, 2024).  His surgery took place on July 23, 2024.  He tolerated the surgery well.  The final pathology was ypT3 N1.      Since he has residual tumor in the resected specimen, We discussed adjuvant therapy with nivolumab.  Discussed the data of CheckMate-577 study which showed survival advantage with adjuvant nivolumab. Patient started treatment on 10-, which he initially tolerated well with grade 1 nausea and fatigue.      Since November of 2024, for increasing nausea, poor appetite and weight loss, we have held treatment.  Today he reports that his nausea is much better.  His EGD scheduled for January 27, 2025 and CT scan for January 21, 2025.    Plan: Hold adjuvant nivolumab.  Follow-up after the EGD.    Follow-up: Ordered for early February 2025.    I have placed all orders as outlined above. I advised the patient to schedule the tests and follow-up appointment as discussed by contacting the  on the way out or calling by phone.    Providers:  Surgeon: Dr. Jovany Solares:  Jennie: Donna.    Chief complaint: GE junction  adenocarcinoma.    HPI:  ONCOLOGIC HISTORY-    February 28, 2024: Underwent an EGD for progressive dysphagia and weight loss.  EGD revealed an obstructing mass in the distal esophagus.  Biopsy confirmed adenocarcinoma with signet ring cell features.    March 21, 2024: EUS revealed T2 N1 mass extending from 41 cm to 43 cm and involving less than 1 cm of the gastric cardia.    March 22, 2024: CT scan of chest did not show any metastatic disease.    April 1, 2024 24: PET/CT scan done:   1. Hypermetabolic linear focus at gastroesophageal junction which is  extending to the gastric cardia/proximal lesser curvature in  correlation with distal esophageal /gastroesophageal junction wall  thickening. These findings are compatible with biopsy-proven  adenocarcinoma.  2. No evidence of hypermetabolic lymphadenopathy.  3. Abnormal focal increase in FDG uptake in the left hepatic lobe in  correlation with hypoattenuating lesion, concerning for metastatic  disease. Further correlation with dedicated CT abdomen is recommended.  4. Large fat containing left inguinal hernia.  Interval history: He has significant dysphagia although he can get by with soup.  He has lost about 60 pounds in the last 6 months or so.  Feels fatigued.  Denies any pain.  Denies fever or chill or any other sign of ongoing infection.    April 3, 2024: CT scan of abdomen and MRI liver ruled out metastatic disease (liver lesions were hemangiomas).    May 6, 2024: Started concurrent radiation and chemotherapy with weekly carboplatin and Taxol.  Will complete concurrent chemoradiation on June 14, 2024.    6/10/24: RECEIVED THE LAST DOSE OF CHEMO. LAST DOSE OF RADIATION WAS ON 6/14/2024.    July 23, 2024: Underwent surgery.  Pathology ypT3 N1.    October 7, 2024: Started adjuvant nivolumab.  December 2, 2024-nivolumab was placed on hold because of increasing nausea.    Interval history:  Mr. Mcrae is here for follow-up today.  His nausea is somewhat better.  His  appetite is slightly better but he has lost weight.  Denies abdominal pain.  Denies diarrhea.    Allergies  Omeprazole, Sulfa, shellfish    Medications:  Acetaminophen, atenolol, dexamethasone, Emla cream, metformin 1000 mg twice daily, multivitamin, naloxone, nystatin, olanzapine, Actos, prochlorperazine, trazodone     Past Medical History:   Type 2 diabetes mellitus on oral hypoglycemics, hypertension controlled with medications.  Cataract, CKD (chronic kidney disease), Colon polyp, Deviated septum, ED (erectile dysfunction), Esophageal cancer, GERD (gastroesophageal reflux disease), Hyperlipidemia, Nephrolithiasis, Sleep apnea (wear CPAP/BiPAP)    Surgical History:    Past Surgical History:   Procedure Laterality Date    BLADDER SURGERY      COLONOSCOPY      Repeat in one year    EYE SURGERY      LITHOTRIPSY      OTHER SURGICAL HISTORY      Vocal cord surgery    TRANSURETHRAL RESECTION OF PROSTATE      UPPER GASTROINTESTINAL ENDOSCOPY  2024      Family History:    Family History   Problem Relation Name Age of Onset    Stroke Father      Kidney cancer Brother Eliu     Stroke Brother Eliu      Family Oncology History:    Cancer-related family history includes Kidney cancer in his brother.    Social History:      Tobacco Use    Smoking status: Former     Current packs/day: 0.00     Average packs/day: 2.0 packs/day for 40.0 years (80.0 ttl pk-yrs)     Types: Cigarettes     Start date: 1959     Quit date: 1999     Years since quittin.4    Smokeless tobacco: Never   Vaping Use    Vaping status: Never Used   Substance Use Topics    Alcohol use: Yes     Alcohol/week: 2.0 standard drinks of alcohol     Types: 2 Cans of beer per week     Comment: couple beers a week    Drug use: Not Currently     Comment: gummies-dispensary from [place]        Vital Signs:  /63 (BP Location: Right arm, Patient Position: Sitting, BP Cuff Size: Adult long)   Pulse 75   Temp 35.9 °C (96.6 °F) (Temporal)   Resp  18   Wt 65.5 kg (144 lb 6.4 oz)   SpO2 94%   BMI 23.31 kg/m²     Physical Exam:   ECOG PS- 1.  Alert, ambulant, and answers questions appropriately.  Eyes- No pallor or icterus.  Neck- no swelling, lymph node enlargement or thyroid gland enlargement.  Chest- Bilateral good air entry. No crackles/ronchi.  Heart- no audible abnormal heart sounds.  Abdomen- Soft, non-tender. No mass or ascites.  Extremities- no swelling or pitting edema.    Catarino Fry MD

## 2025-01-13 NOTE — PROGRESS NOTES
Patient ambulated into clinic for follow up with Dr. Fry alone. Medications and allergies reviewed.     Reports that he has been using nasal spray at night for nasal congestion which has increased over the last 3 weeks. Stated he has a dry mouth and he thinks its because he has to sleep with him mouth open. Discussed that he can use Biotene lozenges or rinse to help with his dry mouth.     Reports he can taste fruits and vegetables but nothing else, has a poor appetite. Stated he has been making an effort to eat 3 meals and focus on proteins. Stated he feels like his weight has stabilized over the past few days.   Reports no issues swallowing food.    Denies nausea and vomiting.     EGD scheduled on 1/27/25.     Holding Nivolumab due to toxicity.     Follow up in 3 weeks to review EGD and scan results.

## 2025-01-21 ENCOUNTER — INFUSION (OUTPATIENT)
Dept: HEMATOLOGY/ONCOLOGY | Facility: CLINIC | Age: 80
End: 2025-01-21
Payer: MEDICARE

## 2025-01-21 ENCOUNTER — HOSPITAL ENCOUNTER (OUTPATIENT)
Dept: RADIOLOGY | Facility: CLINIC | Age: 80
Discharge: HOME | End: 2025-01-21
Payer: MEDICARE

## 2025-01-21 VITALS
BODY MASS INDEX: 22.68 KG/M2 | OXYGEN SATURATION: 95 % | WEIGHT: 140.54 LBS | TEMPERATURE: 96.4 F | RESPIRATION RATE: 18 BRPM | SYSTOLIC BLOOD PRESSURE: 129 MMHG | DIASTOLIC BLOOD PRESSURE: 69 MMHG | HEART RATE: 51 BPM

## 2025-01-21 DIAGNOSIS — C15.9 MALIGNANT NEOPLASM OF ESOPHAGUS, UNSPECIFIED LOCATION (MULTI): ICD-10-CM

## 2025-01-21 DIAGNOSIS — C15.5 MALIGNANT NEOPLASM OF LOWER THIRD OF ESOPHAGUS (MULTI): ICD-10-CM

## 2025-01-21 LAB
ALBUMIN SERPL BCP-MCNC: 3.9 G/DL (ref 3.4–5)
ALP SERPL-CCNC: 148 U/L (ref 33–136)
ALT SERPL W P-5'-P-CCNC: 21 U/L (ref 10–52)
ANION GAP SERPL CALC-SCNC: 12 MMOL/L (ref 10–20)
AST SERPL W P-5'-P-CCNC: 27 U/L (ref 9–39)
BASOPHILS # BLD AUTO: 0.06 X10*3/UL (ref 0–0.1)
BASOPHILS NFR BLD AUTO: 1 %
BILIRUB SERPL-MCNC: 0.5 MG/DL (ref 0–1.2)
BUN SERPL-MCNC: 10 MG/DL (ref 6–23)
CALCIUM SERPL-MCNC: 9.1 MG/DL (ref 8.6–10.3)
CHLORIDE SERPL-SCNC: 103 MMOL/L (ref 98–107)
CO2 SERPL-SCNC: 27 MMOL/L (ref 21–32)
CREAT SERPL-MCNC: 0.74 MG/DL (ref 0.5–1.3)
EGFRCR SERPLBLD CKD-EPI 2021: >90 ML/MIN/1.73M*2
EOSINOPHIL # BLD AUTO: 0.09 X10*3/UL (ref 0–0.4)
EOSINOPHIL NFR BLD AUTO: 1.4 %
ERYTHROCYTE [DISTWIDTH] IN BLOOD BY AUTOMATED COUNT: 15.2 % (ref 11.5–14.5)
GLUCOSE SERPL-MCNC: 134 MG/DL (ref 74–99)
HCT VFR BLD AUTO: 42.3 % (ref 41–52)
HGB BLD-MCNC: 14 G/DL (ref 13.5–17.5)
IMM GRANULOCYTES # BLD AUTO: 0.02 X10*3/UL (ref 0–0.5)
IMM GRANULOCYTES NFR BLD AUTO: 0.3 % (ref 0–0.9)
LYMPHOCYTES # BLD AUTO: 0.75 X10*3/UL (ref 0.8–3)
LYMPHOCYTES NFR BLD AUTO: 12 %
MCH RBC QN AUTO: 31.7 PG (ref 26–34)
MCHC RBC AUTO-ENTMCNC: 33.1 G/DL (ref 32–36)
MCV RBC AUTO: 96 FL (ref 80–100)
MONOCYTES # BLD AUTO: 0.64 X10*3/UL (ref 0.05–0.8)
MONOCYTES NFR BLD AUTO: 10.3 %
NEUTROPHILS # BLD AUTO: 4.68 X10*3/UL (ref 1.6–5.5)
NEUTROPHILS NFR BLD AUTO: 75 %
NRBC BLD-RTO: 0 /100 WBCS (ref 0–0)
PLATELET # BLD AUTO: 287 X10*3/UL (ref 150–450)
POTASSIUM SERPL-SCNC: 4 MMOL/L (ref 3.5–5.3)
PROT SERPL-MCNC: 6.6 G/DL (ref 6.4–8.2)
RBC # BLD AUTO: 4.42 X10*6/UL (ref 4.5–5.9)
SODIUM SERPL-SCNC: 138 MMOL/L (ref 136–145)
WBC # BLD AUTO: 6.2 X10*3/UL (ref 4.4–11.3)

## 2025-01-21 PROCEDURE — 96377 APPLICATON ON-BODY INJECTOR: CPT

## 2025-01-21 PROCEDURE — 2550000001 HC RX 255 CONTRASTS: Performed by: INTERNAL MEDICINE

## 2025-01-21 PROCEDURE — 80053 COMPREHEN METABOLIC PANEL: CPT

## 2025-01-21 PROCEDURE — 74160 CT ABDOMEN W/CONTRAST: CPT

## 2025-01-21 PROCEDURE — 85025 COMPLETE CBC W/AUTO DIFF WBC: CPT

## 2025-01-21 PROCEDURE — 2500000004 HC RX 250 GENERAL PHARMACY W/ HCPCS (ALT 636 FOR OP/ED): Performed by: INTERNAL MEDICINE

## 2025-01-21 PROCEDURE — 71260 CT THORAX DX C+: CPT

## 2025-01-21 PROCEDURE — 36591 DRAW BLOOD OFF VENOUS DEVICE: CPT

## 2025-01-21 RX ORDER — HEPARIN SODIUM,PORCINE/PF 10 UNIT/ML
50 SYRINGE (ML) INTRAVENOUS AS NEEDED
OUTPATIENT
Start: 2025-01-21

## 2025-01-21 RX ORDER — HEPARIN SODIUM,PORCINE/PF 10 UNIT/ML
50 SYRINGE (ML) INTRAVENOUS AS NEEDED
Status: DISCONTINUED | OUTPATIENT
Start: 2025-01-21 | End: 2025-01-21 | Stop reason: HOSPADM

## 2025-01-21 RX ORDER — HEPARIN 100 UNIT/ML
500 SYRINGE INTRAVENOUS AS NEEDED
Status: DISCONTINUED | OUTPATIENT
Start: 2025-01-21 | End: 2025-01-21 | Stop reason: HOSPADM

## 2025-01-21 RX ORDER — HEPARIN 100 UNIT/ML
500 SYRINGE INTRAVENOUS AS NEEDED
OUTPATIENT
Start: 2025-01-21

## 2025-01-21 RX ADMIN — HEPARIN 500 UNITS: 100 SYRINGE at 14:24

## 2025-01-21 RX ADMIN — IOHEXOL 75 ML: 350 INJECTION, SOLUTION INTRAVENOUS at 14:40

## 2025-01-21 ASSESSMENT — PAIN SCALES - GENERAL: PAINLEVEL_OUTOF10: 4

## 2025-01-21 NOTE — PROGRESS NOTES
Patient here for blood draw from [facility]. Accessed,  with CT port kit, good blood return, sent blood to lab, flushed per orders. Denies questions or needs at this time.    Sent to CT Scan, back from CT scan, flushed mediport per policy. Reviewed schedule, denies questions.

## 2025-01-23 ENCOUNTER — DOCUMENTATION (OUTPATIENT)
Dept: PRIMARY CARE | Facility: CLINIC | Age: 80
End: 2025-01-23

## 2025-01-23 ENCOUNTER — APPOINTMENT (OUTPATIENT)
Dept: PRIMARY CARE | Facility: CLINIC | Age: 80
End: 2025-01-23
Payer: MEDICARE

## 2025-01-23 ENCOUNTER — TELEPHONE (OUTPATIENT)
Dept: PRIMARY CARE | Facility: CLINIC | Age: 80
End: 2025-01-23

## 2025-01-23 VITALS
OXYGEN SATURATION: 97 % | HEART RATE: 74 BPM | BODY MASS INDEX: 23.08 KG/M2 | SYSTOLIC BLOOD PRESSURE: 118 MMHG | WEIGHT: 143 LBS | DIASTOLIC BLOOD PRESSURE: 68 MMHG

## 2025-01-23 DIAGNOSIS — E11.9 TYPE 2 DIABETES MELLITUS WITHOUT COMPLICATION, WITHOUT LONG-TERM CURRENT USE OF INSULIN (MULTI): ICD-10-CM

## 2025-01-23 LAB — POC HEMOGLOBIN A1C: 6.6 % (ref 4.2–6.5)

## 2025-01-23 ASSESSMENT — PAIN SCALES - GENERAL: PAINLEVEL_OUTOF10: 0-NO PAIN

## 2025-01-23 NOTE — PROGRESS NOTES
Subjective   Juan M Mcrae is a 79 y.o. male who presents for Diabetes Follow up    Chemotherapy for esophageal cancer s/o esophagectomy stopped due to side effects. Was experiencing nausea, dry mouth, loss of taste. Symptoms slowly improving since discontinuation     #DM review  Last A1c - 6.6, 7.0, 6.8, 6.1, 6.6, 7.0, 6.8, 6.6, 6.7   Current treatment:  -Metformin 1000 mg BID    Last eGFR -  >90  Last Creatinine -  0.74  Last microalbumin -   Last eye exam - 11/2024  Pneumovax done? - Complete    LAB REVIEW   POC HEMOGLOBIN A1c (%)   Date Value   10/17/2024 7.0 (A)   06/19/2024 11.0 (A)     Hemoglobin A1C (%)   Date Value   07/17/2024 8.8 (H)   02/05/2024 6.8 (H)   08/01/2023 6.1 (H)   01/30/2023 6.6 (H)   07/27/2022 7.0 (H)     Glucose (mg/dL)   Date Value   01/21/2025 134 (H)   12/02/2024 147 (H)   11/18/2024 119 (H)     Creatinine (mg/dL)   Date Value   01/21/2025 0.74   12/02/2024 1.14   11/18/2024 0.77     eGFR (mL/min/1.73m*2)   Date Value   01/21/2025 >90   12/02/2024 65   11/18/2024 >90     Lab Results   Component Value Date    ALBUR 21 08/01/2023    CREATININE 0.74 01/21/2025     Lab Results   Component Value Date    CHOL 172 07/17/2024    CHOL 142 02/05/2024    CHOL 157 08/01/2023     Lab Results   Component Value Date    HDL 42.6 07/17/2024    HDL 76.0 02/05/2024    HDL 82 08/01/2023     Lab Results   Component Value Date    LDLCALC 107 (H) 07/17/2024    LDLCALC 55 (L) 02/05/2024    LDLCALC 63 (L) 08/01/2023     Lab Results   Component Value Date    TRIG 113 07/17/2024    TRIG 57 02/05/2024    TRIG 61 08/01/2023       ROS otherwise negative aside from what was mentioned above in HPI.      Objective   /68 (BP Location: Left arm)   Pulse 74   Wt 64.9 kg (143 lb)   SpO2 97%   BMI 23.08 kg/m²     Physical Exam  Constitutional:       Appearance: Normal appearance.   Cardiovascular:      Rate and Rhythm: Normal rate and regular rhythm.      Heart sounds: Normal heart sounds. No murmur  heard.  Pulmonary:      Effort: Pulmonary effort is normal.      Breath sounds: Normal breath sounds. No wheezing, rhonchi or rales.   Musculoskeletal:      Right lower leg: No edema.      Left lower leg: No edema.   Neurological:      Mental Status: He is alert.         Assessment/Plan   1. Type 2 diabetes mellitus without complication, without long-term current use of insulin (Multi)  Controlled  Continue current medication regimen.   Not on statin  Routine diabetic retinopathy screening up to date  Continue dietary efforts and physical activity as tolerated  - POCT glycosylated hemoglobin (Hb A1C) manually resulted

## 2025-01-23 NOTE — PROGRESS NOTES
-----------------------------  [employer]  -----------------------------  Juan M Clementina    YOB: 1945    Generated at: Thu, Jan 23, 2025 3:05 PM EST    Reporting period: Fri Sarthak 10, 2025 - Thu Jan 23, 2025  -----------------------------  Glucose Details    Average glucose: 173 mg/dL    GMI: 7.5%    Standard deviation: 39 mg/dL    Coefficient of Variation: 22.7%  -----------------------------  Time in Range    Very High: 4%    High: 36%    In Range: 60%    Low: 0%    Very Low: 0%    Target Range   mg/dL    -----------------------------  Sensor usage    Days with data: 13/14    Time active: 91%    Avg. calibrations per day: 0.0

## 2025-01-26 ENCOUNTER — ANESTHESIA EVENT (OUTPATIENT)
Dept: GASTROENTEROLOGY | Facility: HOSPITAL | Age: 80
End: 2025-01-26
Payer: MEDICARE

## 2025-01-27 ENCOUNTER — HOSPITAL ENCOUNTER (OUTPATIENT)
Dept: GASTROENTEROLOGY | Facility: HOSPITAL | Age: 80
Discharge: HOME | End: 2025-01-27
Payer: MEDICARE

## 2025-01-27 ENCOUNTER — ANESTHESIA (OUTPATIENT)
Dept: GASTROENTEROLOGY | Facility: HOSPITAL | Age: 80
End: 2025-01-27
Payer: MEDICARE

## 2025-01-27 ENCOUNTER — OFFICE VISIT (OUTPATIENT)
Dept: HEMATOLOGY/ONCOLOGY | Facility: CLINIC | Age: 80
End: 2025-01-27
Payer: MEDICARE

## 2025-01-27 VITALS
DIASTOLIC BLOOD PRESSURE: 86 MMHG | HEART RATE: 51 BPM | TEMPERATURE: 96.8 F | HEIGHT: 66 IN | BODY MASS INDEX: 22.56 KG/M2 | SYSTOLIC BLOOD PRESSURE: 147 MMHG | RESPIRATION RATE: 17 BRPM | OXYGEN SATURATION: 96 %

## 2025-01-27 VITALS
RESPIRATION RATE: 18 BRPM | OXYGEN SATURATION: 97 % | DIASTOLIC BLOOD PRESSURE: 60 MMHG | TEMPERATURE: 98.4 F | WEIGHT: 139.77 LBS | SYSTOLIC BLOOD PRESSURE: 136 MMHG | HEART RATE: 63 BPM | BODY MASS INDEX: 22.56 KG/M2

## 2025-01-27 DIAGNOSIS — C15.9 ESOPHAGEAL ADENOCARCINOMA (MULTI): Primary | ICD-10-CM

## 2025-01-27 DIAGNOSIS — C15.5 MALIGNANT NEOPLASM OF LOWER THIRD OF ESOPHAGUS (MULTI): ICD-10-CM

## 2025-01-27 DIAGNOSIS — R63.4 WEIGHT LOSS: Primary | ICD-10-CM

## 2025-01-27 PROCEDURE — 2500000004 HC RX 250 GENERAL PHARMACY W/ HCPCS (ALT 636 FOR OP/ED)

## 2025-01-27 PROCEDURE — 99214 OFFICE O/P EST MOD 30 MIN: CPT | Performed by: INTERNAL MEDICINE

## 2025-01-27 PROCEDURE — 36593 DECLOT VASCULAR DEVICE: CPT

## 2025-01-27 PROCEDURE — 3700000001 HC GENERAL ANESTHESIA TIME - INITIAL BASE CHARGE

## 2025-01-27 PROCEDURE — 2500000005 HC RX 250 GENERAL PHARMACY W/O HCPCS

## 2025-01-27 PROCEDURE — 43235 EGD DIAGNOSTIC BRUSH WASH: CPT | Performed by: INTERNAL MEDICINE

## 2025-01-27 PROCEDURE — 7100000010 HC PHASE TWO TIME - EACH INCREMENTAL 1 MINUTE

## 2025-01-27 PROCEDURE — 99215 OFFICE O/P EST HI 40 MIN: CPT | Mod: 25 | Performed by: INTERNAL MEDICINE

## 2025-01-27 PROCEDURE — 7100000009 HC PHASE TWO TIME - INITIAL BASE CHARGE

## 2025-01-27 PROCEDURE — 3700000002 HC GENERAL ANESTHESIA TIME - EACH INCREMENTAL 1 MINUTE

## 2025-01-27 RX ORDER — PHENYLEPHRINE HCL IN 0.9% NACL 0.4MG/10ML
SYRINGE (ML) INTRAVENOUS AS NEEDED
Status: DISCONTINUED | OUTPATIENT
Start: 2025-01-27 | End: 2025-01-27

## 2025-01-27 RX ORDER — LIDOCAINE HYDROCHLORIDE 40 MG/ML
SOLUTION TOPICAL AS NEEDED
Status: DISCONTINUED | OUTPATIENT
Start: 2025-01-27 | End: 2025-01-27

## 2025-01-27 RX ORDER — PROPOFOL 10 MG/ML
INJECTION, EMULSION INTRAVENOUS AS NEEDED
Status: DISCONTINUED | OUTPATIENT
Start: 2025-01-27 | End: 2025-01-27

## 2025-01-27 RX ORDER — DROPERIDOL 2.5 MG/ML
0.62 INJECTION, SOLUTION INTRAMUSCULAR; INTRAVENOUS ONCE AS NEEDED
Status: DISCONTINUED | OUTPATIENT
Start: 2025-01-27 | End: 2025-01-28 | Stop reason: HOSPADM

## 2025-01-27 RX ORDER — SUCCINYLCHOLINE CHLORIDE 20 MG/ML
INJECTION INTRAMUSCULAR; INTRAVENOUS AS NEEDED
Status: DISCONTINUED | OUTPATIENT
Start: 2025-01-27 | End: 2025-01-27

## 2025-01-27 RX ORDER — ONDANSETRON HYDROCHLORIDE 2 MG/ML
4 INJECTION, SOLUTION INTRAVENOUS ONCE AS NEEDED
Status: DISCONTINUED | OUTPATIENT
Start: 2025-01-27 | End: 2025-01-28 | Stop reason: HOSPADM

## 2025-01-27 RX ORDER — ONDANSETRON HYDROCHLORIDE 2 MG/ML
INJECTION, SOLUTION INTRAVENOUS AS NEEDED
Status: DISCONTINUED | OUTPATIENT
Start: 2025-01-27 | End: 2025-01-27

## 2025-01-27 RX ORDER — LIDOCAINE HYDROCHLORIDE 10 MG/ML
0.1 INJECTION, SOLUTION EPIDURAL; INFILTRATION; INTRACAUDAL; PERINEURAL ONCE
Status: DISCONTINUED | OUTPATIENT
Start: 2025-01-27 | End: 2025-01-28 | Stop reason: HOSPADM

## 2025-01-27 RX ORDER — OLANZAPINE 2.5 MG/1
2.5 TABLET ORAL NIGHTLY
Qty: 90 TABLET | Refills: 0 | Status: SHIPPED | OUTPATIENT
Start: 2025-01-27 | End: 2025-04-27

## 2025-01-27 RX ADMIN — PROPOFOL 100 MG: 10 INJECTION, EMULSION INTRAVENOUS at 16:21

## 2025-01-27 RX ADMIN — SUCCINYLCHOLINE CHLORIDE 100 MG: 20 INJECTION, SOLUTION INTRAMUSCULAR; INTRAVENOUS at 16:21

## 2025-01-27 RX ADMIN — Medication 80 MCG: at 16:32

## 2025-01-27 RX ADMIN — ONDANSETRON 4 MG: 2 INJECTION, SOLUTION INTRAMUSCULAR; INTRAVENOUS at 16:25

## 2025-01-27 RX ADMIN — LIDOCAINE HYDROCHLORIDE 4 ML: 40 SOLUTION TOPICAL at 16:23

## 2025-01-27 RX ADMIN — SODIUM CHLORIDE: 9 INJECTION, SOLUTION INTRAVENOUS at 16:20

## 2025-01-27 RX ADMIN — Medication 80 MCG: at 16:27

## 2025-01-27 RX ADMIN — DEXAMETHASONE SODIUM PHOSPHATE 8 MG: 4 INJECTION INTRA-ARTICULAR; INTRALESIONAL; INTRAMUSCULAR; INTRAVENOUS; SOFT TISSUE at 16:25

## 2025-01-27 ASSESSMENT — PAIN SCALES - GENERAL
PAINLEVEL_OUTOF10: 1
PAINLEVEL_OUTOF10: 0 - NO PAIN
PAINLEVEL_OUTOF10: 0 - NO PAIN
PAINLEVEL_OUTOF10: 4
PAINLEVEL_OUTOF10: 0 - NO PAIN
PAINLEVEL_OUTOF10: 0 - NO PAIN

## 2025-01-27 ASSESSMENT — PAIN - FUNCTIONAL ASSESSMENT
PAIN_FUNCTIONAL_ASSESSMENT: 0-10

## 2025-01-27 NOTE — ANESTHESIA PROCEDURE NOTES
Airway  Date/Time: 1/27/2025 4:23 PM  Urgency: elective    Airway not difficult    Staffing  Performed: CRNA   Authorized by: Mesfin Slater MD    Performed by: OBIE Boles-COREY  Patient location during procedure: OR    Indications and Patient Condition  Indications for airway management: anesthesia  Spontaneous Ventilation: absent  Sedation level: deep  Preoxygenated: yes  Patient position: sniffing  Mask difficulty assessment: 0 - not attempted  Planned trial extubation    Final Airway Details  Final airway type: endotracheal airway      Successful airway: ETT  Cuffed: yes   Successful intubation technique: direct laryngoscopy  Facilitating devices/methods: intubating stylet and cricoid pressure  Blade: Markell  Blade size: #4  ETT size (mm): 7.5  Cormack-Lehane Classification: grade I - full view of glottis  Placement verified by: chest auscultation and capnometry   Measured from: teeth  ETT to teeth (cm): 22  Number of attempts at approach: 1    Additional Comments  RSI with cricoid pressure. Lips and teeth in pre-anesthetic condition.

## 2025-01-27 NOTE — PROGRESS NOTES
Patient ID: Juan M Mcrae is a 79 y.o. male.    Diagnoses:   1.GE junction adenocarcinoma (signet ring cell), proficient MMR.  Localized (clinical stage II/III).  Status post esophagectomy on July 23, 2024 following neoadjuvant CRT->ypT3 N1.  Adjuvant nivolumab started on October 7, 2024. Stopped within 2 months because of severe nausea.  Cancer recurrence predominantly in the liver in January 2025.  2. Type 2 diabetes mellitus on oral hypoglycemics, hypertension controlled with medications.    Genomic profile:  Proficient MMR status on the biopsy by IHC.    Assessment and Plan:  This is a pleasant 79-year-old man with a diagnosis of GE junction adenocarcinoma.  His staging PET/CT and subsequent imaging studies confirmed localized disease.  He had a port placement and a J-tube placement.  He has seen radiation oncology and he has been enrolled in the NRG- (photon versus proton) study.    He completed concurrent chemoradiation in early June 2024 (last chemo was on Bettie 10, 2024).  His surgery took place on July 23, 2024.  He tolerated the surgery well.  The final pathology was ypT3 N1.      Since he has residual tumor in the resected specimen, We discussed adjuvant therapy with nivolumab.  Discussed the data of CheckMate-577 study which showed survival advantage with adjuvant nivolumab. Patient started treatment on 10-, which he initially tolerated well with grade 1 nausea and fatigue.  Since November of 2024, for increasing nausea, poor appetite and weight loss, we have held treatment.      A CT scan from January 21, 2025 showed numerous liver lesions suggestive of metastatic disease.  I have reviewed the scan with the patient and his wife.  He also has lung lesion suggestive of metastatic disease.  He complains of lack of appetite and fatigue.  I explained that this is not a curable condition but his cancer can be potentially controlled with various systemic therapies.    I recommended the following  today:  I ordered a biopsy of the liver lesion.  I ordered olanzapine 2.5 mg p.o. at bedtime to support his appetite.    Lab: After the biopsy.    I have placed all orders as outlined above. I advised the patient to schedule the tests and follow-up appointment as discussed by contacting the  on the way out or calling by phone.    Providers:  Surgeon: Dr. Jovany Bucknerc:  Jennie: Donna.    Chief complaint: GE junction adenocarcinoma.    HPI:  ONCOLOGIC HISTORY-    February 28, 2024: Underwent an EGD for progressive dysphagia and weight loss.  EGD revealed an obstructing mass in the distal esophagus.  Biopsy confirmed adenocarcinoma with signet ring cell features.    March 21, 2024: EUS revealed T2 N1 mass extending from 41 cm to 43 cm and involving less than 1 cm of the gastric cardia.    March 22, 2024: CT scan of chest did not show any metastatic disease.    April 1, 2024 24: PET/CT scan done:   1. Hypermetabolic linear focus at gastroesophageal junction which is  extending to the gastric cardia/proximal lesser curvature in  correlation with distal esophageal /gastroesophageal junction wall  thickening. These findings are compatible with biopsy-proven  adenocarcinoma.  2. No evidence of hypermetabolic lymphadenopathy.  3. Abnormal focal increase in FDG uptake in the left hepatic lobe in  correlation with hypoattenuating lesion, concerning for metastatic  disease. Further correlation with dedicated CT abdomen is recommended.  4. Large fat containing left inguinal hernia.  Interval history: He has significant dysphagia although he can get by with soup.  He has lost about 60 pounds in the last 6 months or so.  Feels fatigued.  Denies any pain.  Denies fever or chill or any other sign of ongoing infection.    April 3, 2024: CT scan of abdomen and MRI liver ruled out metastatic disease (liver lesions were hemangiomas).    May 6, 2024: Started concurrent radiation and chemotherapy with weekly carboplatin and  Taxol.  Will complete concurrent chemoradiation on June 14, 2024.    6/10/24: RECEIVED THE LAST DOSE OF CHEMO. LAST DOSE OF RADIATION WAS ON 6/14/2024.    July 23, 2024: Underwent surgery.  Pathology ypT3 N1.    October 7, 2024: Started adjuvant nivolumab.  December 2, 2024-nivolumab was placed on hold because of increasing nausea.    January 21, 2025: CT scan of chest abdomen pelvis showed metastatic cancer recurrence.      Interval history:  Mr. Mcrae is here for follow-up today.  His nausea is somewhat better.  His appetite is slightly better but he has lost weight.  Denies abdominal pain.  Denies diarrhea.    Allergies  Omeprazole, Sulfa, shellfish    Medications:  Acetaminophen, atenolol, dexamethasone, Emla cream, metformin 1000 mg twice daily, multivitamin, naloxone, nystatin, olanzapine, Actos, prochlorperazine, trazodone     Past Medical History:   Type 2 diabetes mellitus on oral hypoglycemics, hypertension controlled with medications.  Cataract, CKD (chronic kidney disease), Colon polyp, Deviated septum, ED (erectile dysfunction), Esophageal cancer, GERD (gastroesophageal reflux disease), Hyperlipidemia, Nephrolithiasis, Sleep apnea (wear CPAP/BiPAP)    Surgical History:    Past Surgical History:   Procedure Laterality Date    BLADDER SURGERY      COLONOSCOPY  2023    Repeat in one year    EYE SURGERY      LITHOTRIPSY      OTHER SURGICAL HISTORY      Vocal cord surgery    TRANSURETHRAL RESECTION OF PROSTATE      UPPER GASTROINTESTINAL ENDOSCOPY  03/02/2024      Family History:    Family History   Problem Relation Name Age of Onset    Stroke Father      Kidney cancer Brother Eliu     Stroke Brother Eliu      Family Oncology History:    Cancer-related family history includes Kidney cancer in his brother.    Social History:      Tobacco Use    Smoking status: Former     Current packs/day: 0.00     Average packs/day: 2.0 packs/day for 40.0 years (80.0 ttl pk-yrs)     Types: Cigarettes     Start date:  1959     Quit date: 1999     Years since quittin.4    Smokeless tobacco: Never   Vaping Use    Vaping status: Never Used   Substance Use Topics    Alcohol use: Yes     Alcohol/week: 2.0 standard drinks of alcohol     Types: 2 Cans of beer per week     Comment: couple beers a week    Drug use: Not Currently     Comment: gummies-dispensary from [place]        Vital Signs:  /60 (BP Location: Right arm, Patient Position: Sitting, BP Cuff Size: Adult)   Pulse 63   Temp 36.9 °C (98.4 °F) (Temporal)   Resp 18   Wt 63.4 kg (139 lb 12.4 oz)   SpO2 97%   BMI 22.56 kg/m²     Physical Exam:   ECOG PS- 1.  Alert, ambulant, and answers questions appropriately.  Eyes- No pallor or icterus.  Neck- no swelling, lymph node enlargement or thyroid gland enlargement.  Chest- Bilateral good air entry. No crackles/ronchi.  Heart- no audible abnormal heart sounds.  Abdomen- Soft, non-tender. No mass or ascites.  Extremities- no swelling or pitting edema.    Catarino Fry MD

## 2025-01-27 NOTE — SIGNIFICANT EVENT
Cony DELONG at bedside to discuss results of procedure as well as suggested followup care/expectations

## 2025-01-27 NOTE — PROGRESS NOTES
Patient ambulated into clinic for follow up with Dr. Rhoades accompanied by his significant other Helga. Medications and allergies reviewed.    Dry mouth, unable to eat or taste things. Lost 4 lbs since last visit. Reports he has been sipping on water due to dry mouth. Discussed using lemon drop candy or biotene substitute.     CT C/A/P completed on 1/21/25, reviewed with Juan M and Helga by Dr. Wolfe.  Has EGD scheduled for today.     IR referral sent for liver biopsy.     Follow up after biopsy is completed with port flush.

## 2025-01-27 NOTE — ANESTHESIA PREPROCEDURE EVALUATION
Patient: Juan M Mcrae    Procedure Information       Date/Time: 01/27/25 1440    Scheduled providers: Kemi Donnelly MD    Procedure: EGD    Location: [facility]            Relevant Problems   Cardiac   (+) Benign essential HTN   (+) HTN (hypertension)   (+) Mixed hyperlipidemia   (+) Pure hypercholesterolemia, unspecified      Pulmonary   (+) STACY (obstructive sleep apnea)      GI   (+) Dysphagia   (+) Gastroesophageal reflux disease   (+) Malignant neoplasm of esophagus   (+) Malignant neoplasm of esophagus, unspecified location (Multi)   (+) 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)      /Renal   (+) Chronic renal insufficiency      Liver   (+) Malignant neoplasm of esophagus   (+) Malignant neoplasm of esophagus, unspecified location (Multi)   (+) 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)      Endocrine   (+) Type 2 diabetes mellitus without complications (Multi)      HEENT   (+) Conductive hearing loss of right ear   (+) Sensorineural hearing loss (SNHL) of both ears      Skin   (+) Rash   1.GE junction adenocarcinoma (signet ring cell), proficient MMR.  Localized (clinical stage II/III).  Status post esophagectomy on July 23, 2024 following neoadjuvant CRT->ypT3 N1.  Adjuvant nivolumab started on October 7, 2024.  2. Type 2 diabetes mellitus on oral hypoglycemics, hypertension controlled with medications    Clinical information reviewed:                   NPO Detail:  No data recorded     PHYSICAL EXAM    Anesthesia Plan    History of general anesthesia?: yes  History of complications of general anesthesia?: no    ASA 3     general     intravenous induction   Trial extubation is planned.  Anesthetic plan and risks discussed with patient.  Use of blood products discussed with patient who.    Plan discussed with CRNA, CAA and attending.

## 2025-01-27 NOTE — PROGRESS NOTES
Subjective   Juan M Mcrae  is a 79 y.o. male who presents for esophageal cancer surveillance    He underwent esophagectomy on 7/23/24 for what was originally diagnosed as a T2N1 distal esophageal adenocarcinoma. He completed neoadjuvant CRT on 6/14/24. Final pathology was ypT3N1 G3 with all margins negative. Post operatively, He has noted a growth around the J-tube site with excess granulation tissue at the J-tube site 11/13/2024. Granulation tissue has been bleeding over the past 3 weeks.    Currently the patient is presenting for routine follow-up. He denies the following symptoms: shortness of breath at rest, shortness of breath with activity, cough, fevers, chills, weight loss, abdominal pain, difficulty swallowing, vomiting, changes to bowel function, and reflux symptoms.      There have been the following significant changes to their documented medical, surgical and family history.   Follows with Catarino Fry MD for medical oncology. Started Adjuvant nivolumab in October but wasn't tolerated well due to nausea, poor appetite and weight loss. Had repeat imaging to evaluate for recurrence on 1/21/2025 showing metastatic disease in the right lung and abdomen, Report included below. Given evidence of recurrent metastatic disease, he is scheduled for EGD 1/27 and IR liver biopsy    EGD showed Mildly ulcerated mucosa from previous esophagectomy in the esophagus. A large amount of food was visualized in the gastric body and antrum obscuring visualization. Food was visualized in the duodenum.        He  reports that he quit smoking about 26 years ago. His smoking use included cigarettes. He started smoking about 66 years ago. He has a 80 pack-year smoking history. He has never used smokeless tobacco. He reports that he does not currently use alcohol after a past usage of about 2.0 standard drinks of alcohol per week. He reports that he does not currently use drugs.    Objective   Physical Exam  The patient is  well-appearing and in no acute distress. The trachea is midline and there is no crepitus. The lungs were clear to auscultation grossly. There was good effort and excursion. The heart had a regular rate and rhythm. The abdomen was soft, nontender and nondistended. The extremities had no edema or gross deformities. Mood and affect are appropriate.   J tube sit LUQ with 1 cm granulation tissue no active bleeding, nontender no erythema, edema drainage    /65   Pulse 50   Temp 36.5 °C (97.7 °F) (Core)   Resp 20   Wt 62.7 kg (138 lb 3.7 oz)   SpO2 100%   BMI 22.31 kg/m²     Wt Readings from Last 15 Encounters:   01/29/25 62.7 kg (138 lb 3.7 oz)   01/27/25 63.4 kg (139 lb 12.4 oz)   01/23/25 64.9 kg (143 lb)   01/21/25 63.7 kg (140 lb 8.7 oz)   01/13/25 65.5 kg (144 lb 6.4 oz)   01/09/25 66.3 kg (146 lb 3.2 oz)   12/16/24 68.9 kg (151 lb 14.4 oz)   12/04/24 70.1 kg (154 lb 8.7 oz)   12/02/24 69.5 kg (153 lb 3.5 oz)   11/20/24 72 kg (158 lb 11.7 oz)   11/18/24 72.5 kg (159 lb 13.3 oz)   11/13/24 73.1 kg (161 lb 2.5 oz)   11/04/24 74.4 kg (164 lb 0.4 oz)   10/24/24 73.9 kg (163 lb)   10/22/24 73 kg (161 lb)     Has lost 23 lbs since 10/2024    Diagnostic Studies  CT C/A/P 1/21/2025  IMPRESSION:  CHEST:  1.  Postoperative changes of esophagectomy with gastric pull-through.  Diffuse thickened appearance of the gastric pull-through probably  sequela of postoperative changes as well as mild distention distally  with some prominent ingested content throughout. Continued clinical  correlation and attention on follow-up imaging advised.  2. New irregular approximate 7 mm middle lobe nodule concerning for  metastatic disease. There is also a new subcentimeter left perihilar  nodule could also be metastatic. Additional subcentimeter lung  nodules and nodular densities elsewhere without substantial change.  Continued follow-up recommended.  3. Trace bilateral effusions and bibasilar consolidation/atelectasis  new since  prior. Mild patchy bibasilar infiltrates or atelectasis.  4. Additional findings as above.      ABDOMEN-PELVIS:  1.  Numerous heterogeneous hypoattenuating hepatic lesions now  present throughout most consistent with metastatic disease. New  retroperitoneal and proximal iliac chain and retrocrural adenopathy  also consistent with metastatic involvement. Similar mildly prominent  nonspecific mesenteric nodes scattered throughout.  2. Small amount of free fluid about the liver and spleen and diffuse  nonspecific mesenteric stranding now present. Continued clinical  correlation and follow-up advised.  3. Relative mild atrophic appearance of the pancreas since prior.  Vague 1 cm hypoattenuating focus at the pancreatic body of uncertain  significance. Continued attention on follow-up imaging recommended  and correlation with MRI may also be considered for further  assessment.  4. Marked atherosclerotic vascular disease as above with apparent at  least marked stenosis at the origin of the SMA with poststenotic  dilatation and at least moderate to marked stenosis at the celiac  origin with poststenotic dilatation although overall similar to  prior. Evaluation otherwise limited on this examination. Correlation  with dedicated CTA may be considered for further evaluation.  5. Mildly distended fecalized small bowel loops in the right abdomen  could be due to ileus and/or slow transit. Partial or developing  obstruction not entirely excluded. Stool-filled distended visualized  ascending colon and apparent wall thickening at the distal transverse  and visualized proximal descending colon which could be due to mild  colitis/diverticulitis. Small bowel loop abutting and appearing to  partially extend into left anterior abdominal wall with tract like  density extending from this to the skin surface as described, could  be sequela of previous J-tube placement including the possibility of  hernia/enterocutaneous fistula, or  enterostomy at this location.  Close clinical correlation and follow-up advised.  6. Mild dilatation common bile duct as described but similar to  prior. Correlate with LFTs. This could also be further evaluated by  MRI.  7. Mild prominence renal pelvis bilaterally but no definite  significant hydroureter or obstructing stone in the visualized  proximal ureters..     Assessment/Plan   Mr Mcrae presenting with weight loss, loss of appetite. Tolerating regular diet, EGD without evidence of recurrent disease. CT C/A/P 1/21/2025 showing concern for metastatic disease. Due for IR biopsy of the liver.     Regarding the granulation tissue at J tube site, Rx provided for silver nitrate sticks.     Mr Mcrae will be following closely with Dr Fry, he can follow with our clinic as needed,     I discussed this in detail with the patient, including a discussion of alternatives. They were comfortable with this approach.       Bindu Cage PA-C  Department of Thoracic and Esophageal Surgery  [facility]  [phone]

## 2025-01-27 NOTE — H&P (VIEW-ONLY)
History Of Present Illness  Juan M Mcrae is a 79 y.o. male presenting with anorexia and weight loss.  H/o esophageal adenocarcinoma s/p neoadjuvant chemoradiation, esophagectomy July 2024, and adjuvant immunotherapy (now on hold).     Past Medical History  Past Medical History:   Diagnosis Date    Cataract     CKD (chronic kidney disease)     Colon polyp     Deviated nasal septum 10/17/2024    Deviated septum     Disorder of lipoprotein metabolism, unspecified     Elevated serum cholesterol    Diverticulitis of colon 2024    Dysfunction of right eustachian tube 10/17/2024    Dysphagia     ED (erectile dysfunction)     Elevated serum cholesterol 10/17/2024    Esophageal cancer (Multi)     GERD (gastroesophageal reflux disease)     Hearing aid worn     HL (hearing loss)     Hoarseness 10/17/2024    Horseshoe tear of retina without detachment 06/18/2021    Hypercholesterolemia     Hyperlipidemia     Hypertension     Left knee pain 04/21/2022    Malignant neoplasm of prostate (Multi) 10/17/2024    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    Nephrolithiasis     Obesity     Other specified diabetes mellitus without complications     last A1c= 6.8 on 2/5/2024    Otitis media of right ear 10/18/2022    Rash 10/17/2024    Sleep apnea     wear CPAP/BiPAP    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 His smoking use included cigarettes. He started smoking about 66 years ago. He has a 80 pack-year smoking history. He has never used smokeless tobacco. He reports that he does not currently use  "alcohol after a past usage of about 2.0 standard drinks of alcohol per week. He reports that he does not currently use drugs.    Family History  Family History   Problem Relation Name Age of Onset    Stroke Father Nikko     Kidney cancer Brother Eliu     Stroke Brother Eliu     Cancer Daughter Velia         Allergies  Allergies   Allergen Reactions    Omeprazole Itching and Unknown    Shellfish Containing Products Hives    Other Rash     Rubber on Cpap machine face mask    Sulfa (Sulfonamide Antibiotics) Rash     Review of Systems     Physical Exam  Cardiovascular:      Rate and Rhythm: Regular rhythm.      Heart sounds: Normal heart sounds.   Pulmonary:      Breath sounds: No wheezing.   Abdominal:      Palpations: Abdomen is soft.      Tenderness: There is no abdominal tenderness.          Last Recorded Vitals  Blood pressure 155/87, pulse 86, temperature 36.4 °C (97.5 °F), temperature source Temporal, resp. rate 16, height 1.676 m (5' 6\"), SpO2 97%.    Assessment/Plan   EGD with general anesthesia for evaluation of anorexia and weight loss, history of esophageal adenocarcinoma     Kemi Donnelly MD  "

## 2025-01-27 NOTE — DISCHARGE INSTRUCTIONS

## 2025-01-29 ENCOUNTER — OFFICE VISIT (OUTPATIENT)
Dept: SURGERY | Facility: HOSPITAL | Age: 80
End: 2025-01-29
Payer: MEDICARE

## 2025-01-29 VITALS
OXYGEN SATURATION: 100 % | RESPIRATION RATE: 20 BRPM | DIASTOLIC BLOOD PRESSURE: 65 MMHG | TEMPERATURE: 97.7 F | HEART RATE: 50 BPM | BODY MASS INDEX: 22.31 KG/M2 | WEIGHT: 138.23 LBS | SYSTOLIC BLOOD PRESSURE: 140 MMHG

## 2025-01-29 DIAGNOSIS — C15.5 MALIGNANT NEOPLASM OF LOWER THIRD OF ESOPHAGUS (MULTI): Primary | ICD-10-CM

## 2025-01-29 DIAGNOSIS — L92.9 GRANULATION TISSUE: ICD-10-CM

## 2025-01-29 PROCEDURE — 99214 OFFICE O/P EST MOD 30 MIN: CPT | Performed by: PHYSICIAN ASSISTANT

## 2025-01-29 RX ORDER — SILVER NITRATE 38.21; 12.74 MG/1; MG/1
STICK TOPICAL DAILY
Qty: 30 EACH | Refills: 3 | Status: SHIPPED | OUTPATIENT
Start: 2025-01-29 | End: 2025-02-28

## 2025-01-29 ASSESSMENT — PAIN SCALES - GENERAL: PAINLEVEL_OUTOF10: 0-NO PAIN

## 2025-01-29 NOTE — ANESTHESIA POSTPROCEDURE EVALUATION
Patient: Juan M Mcrae    Procedure Summary       Date: 01/27/25 Room / Location: [facility]    Anesthesia Start: 1609 Anesthesia Stop: 1650    Procedure: EGD Diagnosis:       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)      Weight loss    Scheduled Providers: Kemi Donnelly MD; Wayne Fernandes MA; Nia Banda RN; Suma Mancia RN Responsible Provider: Mesfin Slater MD    Anesthesia Type: general ASA Status: 3            Anesthesia Type: general    Vitals Value Taken Time   /86 01/27/25 1735   Temp 36 °C (96.8 °F) 01/27/25 1650   Pulse 51 01/27/25 1735   Resp 17 01/27/25 1735   SpO2 96 % 01/27/25 1735       Anesthesia Post Evaluation    Patient location during evaluation: bedside  Patient participation: complete - patient participated  Level of consciousness: awake  Pain management: adequate  Airway patency: patent  Cardiovascular status: acceptable  Respiratory status: acceptable  Hydration status: acceptable  Postoperative Nausea and Vomiting: none    There were no known notable events for this encounter.

## 2025-02-03 ENCOUNTER — APPOINTMENT (OUTPATIENT)
Dept: HEMATOLOGY/ONCOLOGY | Facility: CLINIC | Age: 80
End: 2025-02-03
Payer: MEDICARE

## 2025-02-12 ENCOUNTER — HOSPITAL ENCOUNTER (OUTPATIENT)
Dept: RADIOLOGY | Facility: HOSPITAL | Age: 80
Discharge: HOME | End: 2025-02-12
Payer: MEDICARE

## 2025-02-12 VITALS
RESPIRATION RATE: 20 BRPM | HEART RATE: 59 BPM | DIASTOLIC BLOOD PRESSURE: 69 MMHG | OXYGEN SATURATION: 93 % | SYSTOLIC BLOOD PRESSURE: 104 MMHG | TEMPERATURE: 96.7 F

## 2025-02-12 DIAGNOSIS — C15.9 ESOPHAGEAL ADENOCARCINOMA (MULTI): ICD-10-CM

## 2025-02-12 LAB
GLUCOSE BLD MANUAL STRIP-MCNC: 157 MG/DL (ref 74–99)
POCT INTERNATIONAL NORMALIZATION RATIO: 1.1
POCT PROTHROMBIN TIME: 12.7 SECONDS

## 2025-02-12 PROCEDURE — 82947 ASSAY GLUCOSE BLOOD QUANT: CPT

## 2025-02-12 PROCEDURE — 2500000004 HC RX 250 GENERAL PHARMACY W/ HCPCS (ALT 636 FOR OP/ED): Performed by: RADIOLOGY

## 2025-02-12 PROCEDURE — 76942 ECHO GUIDE FOR BIOPSY: CPT

## 2025-02-12 PROCEDURE — 2720000007 HC OR 272 NO HCPCS

## 2025-02-12 RX ORDER — LIDOCAINE HYDROCHLORIDE 10 MG/ML
INJECTION, SOLUTION EPIDURAL; INFILTRATION; INTRACAUDAL; PERINEURAL
Status: COMPLETED | OUTPATIENT
Start: 2025-02-12 | End: 2025-02-12

## 2025-02-12 RX ADMIN — LIDOCAINE HYDROCHLORIDE 8 ML: 10 INJECTION, SOLUTION EPIDURAL; INFILTRATION; INTRACAUDAL; PERINEURAL at 10:08

## 2025-02-12 ASSESSMENT — PAIN SCALES - GENERAL
PAINLEVEL_OUTOF10: 0 - NO PAIN

## 2025-02-12 ASSESSMENT — PAIN - FUNCTIONAL ASSESSMENT: PAIN_FUNCTIONAL_ASSESSMENT: 0-10

## 2025-02-12 NOTE — DISCHARGE INSTRUCTIONS
No NSAIDS (ibuprofen, naproxen, aleve, advil) or aspirin for 48 hours after procedure. You may take tylenol if needed but do not exceed 10 tablets in 24 hour period      Follow these Instructions for a Safer Recovery:  Activity:  ? Rest by sitting up for 4 to 6 hours.  ? Limited activity for 24 hours after the test.  ? No driving for 24 hours. You will need someone to drive you home.  ? Do not do any heavy lifting, such as groceries or toddlers, for 48 hours.  ? Avoid intense exercise and contact sports for 48 hours.  Diet:  ? You may resume your normal diet.  Medicines:  ? If you take blood-thinning medications.  ? You may take your other medicines as ordered by your doctor.  Call your Doctor if you have:  ? Redness, swelling or pus-like drainage at the biopsy site.  ? Fever over 100.4 F or higher.  ? Pain or tenderness at the biopsy site that gets worst after the procedure.  ? Any Questions.  Call your Doctor right away, if you have any of these signs:  ? Bleeding from the biopsy site.  ? Shortness of breath or trouble breathing.  ? Increase in pain in the procedure site.  ? Dizziness or fainting.  If you are not able to contact your doctor, call 911 and go to the nearest Emergency Room.   How to Reach your Doctor:    Call Dr. Fry at [phone] with problems or questions.  This info is a general resource. It is not meant to replace your health care provider’s advice.  Ask your doctor or health care team any questions. Always follow their instructions.

## 2025-02-12 NOTE — Clinical Note
Between pt and dr aquino, they both discussed not to give any twilight medication and to just use lidocaine to numb the skin for the procedure

## 2025-02-12 NOTE — Clinical Note
Pt calm without any c/o discomforts.  Vital signs stable.  SB on the monitor 53-55bpm with a bp of 110/71.  O2 sats 96% breathing on room air  Pt now on EKG monitor and placed on 3liters o2 nc

## 2025-02-12 NOTE — Clinical Note
Biopsy samples x2 obtained.  Needle out and 4x4 folded and clear tegaderm.  Pt tolerated procedure well.  Pt denies any discomforts.

## 2025-02-17 ENCOUNTER — APPOINTMENT (OUTPATIENT)
Dept: HEMATOLOGY/ONCOLOGY | Facility: CLINIC | Age: 80
End: 2025-02-17
Payer: MEDICARE

## 2025-02-21 LAB
LAB AP ASR DISCLAIMER: NORMAL
LABORATORY COMMENT REPORT: NORMAL
PATH REPORT.ADDENDUM SPEC: NORMAL
PATH REPORT.FINAL DX SPEC: NORMAL
PATH REPORT.GROSS SPEC: NORMAL
PATH REPORT.RELEVANT HX SPEC: NORMAL
PATH REPORT.TOTAL CANCER: NORMAL
RESIDENT REVIEW: NORMAL

## 2025-02-24 ENCOUNTER — OFFICE VISIT (OUTPATIENT)
Dept: HEMATOLOGY/ONCOLOGY | Facility: CLINIC | Age: 80
End: 2025-02-24
Payer: MEDICARE

## 2025-02-24 VITALS
TEMPERATURE: 97.5 F | OXYGEN SATURATION: 97 % | SYSTOLIC BLOOD PRESSURE: 117 MMHG | HEART RATE: 51 BPM | WEIGHT: 135.03 LBS | BODY MASS INDEX: 21.79 KG/M2 | RESPIRATION RATE: 18 BRPM | DIASTOLIC BLOOD PRESSURE: 73 MMHG

## 2025-02-24 DIAGNOSIS — C15.5 MALIGNANT NEOPLASM OF LOWER THIRD OF ESOPHAGUS (MULTI): ICD-10-CM

## 2025-02-24 DIAGNOSIS — C15.9 ESOPHAGEAL ADENOCARCINOMA (MULTI): Primary | ICD-10-CM

## 2025-02-24 PROCEDURE — 99215 OFFICE O/P EST HI 40 MIN: CPT | Performed by: INTERNAL MEDICINE

## 2025-02-24 ASSESSMENT — PAIN SCALES - GENERAL: PAINLEVEL_OUTOF10: 0-NO PAIN

## 2025-02-24 NOTE — PROGRESS NOTES
Patient ambulated into clinic for follow up with Dr. Fry accompanied by his significant other Helga, daughter and son. Medications and allergies reviewed.     Reports lack of appetite and food does not taste right. He can only taste fruits and vegetables. Has lost 3 lbs since we saw him last.   Reports fatigue after activity, stated he needs to nap.     Pathology reviewed with Mr. Mcrae and his family by Dr. Fry.     Request for handicap placard. RX printed per Dr. Fry.     Follow up in one week to talk about decision on to pursue chemotherapy or no treatment.

## 2025-02-24 NOTE — PROGRESS NOTES
Patient ID: Juan M Mcrae is a 79 y.o. male.    Diagnoses:   1.GE junction adenocarcinoma (signet ring cell), proficient MMR.  Localized (clinical stage II/III).  Status post esophagectomy on July 23, 2024 following neoadjuvant CRT->ypT3 N1.  Adjuvant nivolumab started on October 7, 2024. Stopped within 2 months because of severe nausea.  Cancer recurrence predominantly in the liver in January 2025.  2. Type 2 diabetes mellitus on oral hypoglycemics, hypertension controlled with medications.    Genomic profile:  Proficient MMR status on the biopsy by IHC.  HER2 positive (3+) by IHC.  Tempus XT requested on the liver biopsy specimen (from February 12, 2025) on February 24, 2025.    Assessment and Plan:  This is a pleasant 79-year-old man with a diagnosis of GE junction adenocarcinoma.  His staging PET/CT and subsequent imaging studies confirmed localized disease.  He had a port placement and a J-tube placement.  He has seen radiation oncology and he has been enrolled in the NRG- (photon versus proton) study.    He completed concurrent chemoradiation in early June 2024 (last chemo was on Bettie 10, 2024).  His surgery took place on July 23, 2024.  He tolerated the surgery well.  The final pathology was ypT3 N1.      Since he has residual tumor in the resected specimen, We discussed adjuvant therapy with nivolumab.  Discussed the data of CheckMate-577 study which showed survival advantage with adjuvant nivolumab. Patient started treatment on 10-, which he initially tolerated well with grade 1 nausea and fatigue.  Since November of 2024, for increasing nausea, poor appetite and weight loss, we have held treatment.      A CT scan from January 21, 2025 showed numerous liver lesions suggestive of metastatic disease.  A biopsy of the liver lesion on February 12, 2025 confirmed metastatic adenocarcinoma.     I discussed the overall outlook and the plan since he has metastatic recurrence in the liver.  I discussed  with him that this is an incurable condition.  His liver disease is rather extensive.  His disease is HER2 positive.  However, he did not tolerate immunotherapy at all which was discontinued because of intolerable nausea.  If he wants to pursue palliative chemotherapy, I plan to treat him with FOLFOX plus trastuzumab only and we will skip immunotherapy.  However, he is not sure if he wants to pursue palliative chemotherapy.  He is also considering supportive care only.    I recommended the following today-  I ordered Tempus XT on the liver biopsy specimen from February 2025.  I have set up a phone appointment in a week so that he can tell me his decision regarding pursuing palliative chemotherapy versus supportive care only.    I have placed all orders as outlined above. I advised the patient to schedule the tests and follow-up appointment as discussed by contacting the  on the way out or calling by phone.    Providers:  Surgeon: Dr. Jovany Bucknerc:  Jennie: Donna.    Chief complaint: GE junction adenocarcinoma.    HPI:  ONCOLOGIC HISTORY-    February 28, 2024: Underwent an EGD for progressive dysphagia and weight loss.  EGD revealed an obstructing mass in the distal esophagus.  Biopsy confirmed adenocarcinoma with signet ring cell features.    March 21, 2024: EUS revealed T2 N1 mass extending from 41 cm to 43 cm and involving less than 1 cm of the gastric cardia.    March 22, 2024: CT scan of chest did not show any metastatic disease.    April 1, 2024 24: PET/CT scan done:   1. Hypermetabolic linear focus at gastroesophageal junction which is  extending to the gastric cardia/proximal lesser curvature in  correlation with distal esophageal /gastroesophageal junction wall  thickening. These findings are compatible with biopsy-proven  adenocarcinoma.  2. No evidence of hypermetabolic lymphadenopathy.  3. Abnormal focal increase in FDG uptake in the left hepatic lobe in  correlation with hypoattenuating  lesion, concerning for metastatic  disease. Further correlation with dedicated CT abdomen is recommended.  4. Large fat containing left inguinal hernia.  Interval history: He has significant dysphagia although he can get by with soup.  He has lost about 60 pounds in the last 6 months or so.  Feels fatigued.  Denies any pain.  Denies fever or chill or any other sign of ongoing infection.    April 3, 2024: CT scan of abdomen and MRI liver ruled out metastatic disease (liver lesions were hemangiomas).    May 6, 2024: Started concurrent radiation and chemotherapy with weekly carboplatin and Taxol.  Will complete concurrent chemoradiation on June 14, 2024.    6/10/24: RECEIVED THE LAST DOSE OF CHEMO. LAST DOSE OF RADIATION WAS ON 6/14/2024.    July 23, 2024: Underwent surgery.  Pathology ypT3 N1.    October 7, 2024: Started adjuvant nivolumab.  December 2, 2024-nivolumab was placed on hold because of increasing nausea.    January 21, 2025: CT scan of chest abdomen pelvis showed metastatic cancer recurrence.      February 12, 2025: Ultrasound-guided biopsy of the liver lesion confirmed metastatic adenocarcinoma.    Interval history:  Mr. Mcrae is here for follow-up today.  His nausea is somewhat better.  His appetite is slightly better but he has lost weight.  Denies abdominal pain.  Denies diarrhea.    Allergies  Omeprazole, Sulfa, shellfish    Medications:  Acetaminophen, atenolol, dexamethasone, Emla cream, metformin 1000 mg twice daily, multivitamin, naloxone, nystatin, olanzapine, Actos, prochlorperazine, trazodone     Past Medical History:   Type 2 diabetes mellitus on oral hypoglycemics, hypertension controlled with medications.  Cataract, CKD (chronic kidney disease), Colon polyp, Deviated septum, ED (erectile dysfunction), Esophageal cancer, GERD (gastroesophageal reflux disease), Hyperlipidemia, Nephrolithiasis, Sleep apnea (wear CPAP/BiPAP)    Surgical History:    Past Surgical History:   Procedure Laterality  Date    BLADDER SURGERY      COLONOSCOPY      Repeat in one year    EYE SURGERY      LITHOTRIPSY      OTHER SURGICAL HISTORY      Vocal cord surgery    TRANSURETHRAL RESECTION OF PROSTATE      UPPER GASTROINTESTINAL ENDOSCOPY  2024      Family History:    Family History   Problem Relation Name Age of Onset    Stroke Father      Kidney cancer Brother Eliu     Stroke Brother Eliu      Family Oncology History:    Cancer-related family history includes Kidney cancer in his brother.    Social History:      Tobacco Use    Smoking status: Former     Current packs/day: 0.00     Average packs/day: 2.0 packs/day for 40.0 years (80.0 ttl pk-yrs)     Types: Cigarettes     Start date: 1959     Quit date: 1999     Years since quittin.4    Smokeless tobacco: Never   Vaping Use    Vaping status: Never Used   Substance Use Topics    Alcohol use: Yes     Alcohol/week: 2.0 standard drinks of alcohol     Types: 2 Cans of beer per week     Comment: couple beers a week    Drug use: Not Currently     Comment: gummies-dispensary from [place]        Vital Signs:  /73 (BP Location: Right arm, Patient Position: Sitting, BP Cuff Size: Adult)   Pulse 51   Temp 36.4 °C (97.5 °F) (Temporal)   Resp 18   Wt 61.3 kg (135 lb 0.5 oz)   SpO2 97%   BMI 21.79 kg/m²     Physical Exam:   ECOG PS- 1.  Alert, ambulant, and answers questions appropriately.  Eyes- No pallor or icterus.  Neck- no swelling, lymph node enlargement or thyroid gland enlargement.  Chest- Bilateral good air entry. No crackles/ronchi.  Heart- no audible abnormal heart sounds.  Abdomen- Soft, non-tender. No mass or ascites.  Extremities- no swelling or pitting edema.    Catarino Fry MD

## 2025-03-04 ENCOUNTER — TELEPHONE (OUTPATIENT)
Dept: HEMATOLOGY/ONCOLOGY | Facility: CLINIC | Age: 80
End: 2025-03-04
Payer: MEDICARE

## 2025-03-06 ENCOUNTER — TELEMEDICINE (OUTPATIENT)
Dept: HEMATOLOGY/ONCOLOGY | Facility: HOSPITAL | Age: 80
End: 2025-03-06
Payer: MEDICARE

## 2025-03-06 DIAGNOSIS — Z51.11 CHEMOTHERAPY MANAGEMENT, ENCOUNTER FOR: ICD-10-CM

## 2025-03-06 DIAGNOSIS — C15.5 MALIGNANT NEOPLASM OF LOWER THIRD OF ESOPHAGUS (MULTI): Primary | ICD-10-CM

## 2025-03-06 RX ORDER — ONDANSETRON HYDROCHLORIDE 8 MG/1
8 TABLET, FILM COATED ORAL EVERY 8 HOURS PRN
Qty: 30 TABLET | Refills: 5 | Status: SHIPPED | OUTPATIENT
Start: 2025-03-06

## 2025-03-06 RX ORDER — LOPERAMIDE HYDROCHLORIDE 2 MG/1
CAPSULE ORAL
Qty: 100 CAPSULE | Refills: 2 | Status: SHIPPED | OUTPATIENT
Start: 2025-03-06

## 2025-03-06 RX ORDER — PROCHLORPERAZINE MALEATE 10 MG
10 TABLET ORAL EVERY 6 HOURS PRN
Qty: 30 TABLET | Refills: 5 | Status: SHIPPED | OUTPATIENT
Start: 2025-03-06

## 2025-03-06 NOTE — PROGRESS NOTES
Patient ID: Juan M Mcrae is a 79 y.o. male.  This is a phone appointment with consent.  Diagnoses:   1.GE junction adenocarcinoma (signet ring cell), proficient MMR.  Localized (clinical stage II/III).  Status post esophagectomy on July 23, 2024 following neoadjuvant CRT->ypT3 N1.  Adjuvant nivolumab started on October 7, 2024. Stopped within 2 months because of severe nausea.  Cancer recurrence predominantly in the liver in January 2025.  2. Type 2 diabetes mellitus on oral hypoglycemics, hypertension controlled with medications.    Genomic profile:  Proficient MMR status on the biopsy by IHC.  HER2 positive (3+) by IHC.  Tempus XT requested on the liver biopsy specimen (from February 12, 2025 specimen) on February 24, 2025.    Assessment and Plan:  This is a pleasant 79-year-old man with a diagnosis of GE junction adenocarcinoma.  His staging PET/CT and subsequent imaging studies confirmed localized disease.  He had a port placement and a J-tube placement.  He has seen radiation oncology and he has been enrolled in the NRG- (photon versus proton) study.    He completed concurrent chemoradiation in early June 2024 (last chemo was on Bettie 10, 2024).  His surgery took place on July 23, 2024.  He tolerated the surgery well.  The final pathology was ypT3 N1.      Since he has residual tumor in the resected specimen, We discussed adjuvant therapy with nivolumab.  Discussed the data of CheckMate-577 study which showed survival advantage with adjuvant nivolumab. Patient started treatment on 10-, which he initially tolerated well with grade 1 nausea and fatigue.  Since November of 2024, for increasing nausea, poor appetite and weight loss, we have held treatment.      A CT scan from January 21, 2025 showed numerous liver lesions suggestive of metastatic disease.  A biopsy of the liver lesion on February 12, 2025 confirmed metastatic adenocarcinoma.     I discussed the overall outlook and the plan since he has  metastatic recurrence in the liver.  I discussed with him that this is an incurable condition.  His liver disease is rather extensive.  His disease is HER2 positive.  However, he did not tolerate immunotherapy at all which was discontinued because of intolerable nausea.  If he wants to pursue palliative chemotherapy, I plan to treat him with FOLFOX plus trastuzumab only and we will skip immunotherapy.  Today he tells me that he wants to pursue chemotherapy.  I discussed the chemotherapy regimen with FOLFOX and trastuzumab.  I ordered his chemotherapy after discussion.  Tentatively his treatment will start on March 17, 2025.    Plan: Treatment with FOLFOX plus Herceptin.  Pembrolizumab was withheld at patient's request because of his severe nausea from nivolumab.    Follow-up: 17, 2025 to start FOLFOX plus Herceptin.    I have placed all orders as outlined above. I advised the patient to schedule the tests and follow-up appointment as discussed by contacting the  on the way out or calling by phone.    Providers:  Surgeon: Dr. Jovany Bucknerc:  EdgardoOnc: Donna.    Chief complaint: GE junction adenocarcinoma.    HPI:  ONCOLOGIC HISTORY-    February 28, 2024: Underwent an EGD for progressive dysphagia and weight loss.  EGD revealed an obstructing mass in the distal esophagus.  Biopsy confirmed adenocarcinoma with signet ring cell features.    March 21, 2024: EUS revealed T2 N1 mass extending from 41 cm to 43 cm and involving less than 1 cm of the gastric cardia.    March 22, 2024: CT scan of chest did not show any metastatic disease.    April 1, 2024 24: PET/CT scan done:   1. Hypermetabolic linear focus at gastroesophageal junction which is  extending to the gastric cardia/proximal lesser curvature in  correlation with distal esophageal /gastroesophageal junction wall  thickening. These findings are compatible with biopsy-proven  adenocarcinoma.  2. No evidence of hypermetabolic lymphadenopathy.  3. Abnormal  focal increase in FDG uptake in the left hepatic lobe in  correlation with hypoattenuating lesion, concerning for metastatic  disease. Further correlation with dedicated CT abdomen is recommended.  4. Large fat containing left inguinal hernia.  Interval history: He has significant dysphagia although he can get by with soup.  He has lost about 60 pounds in the last 6 months or so.  Feels fatigued.  Denies any pain.  Denies fever or chill or any other sign of ongoing infection.    April 3, 2024: CT scan of abdomen and MRI liver ruled out metastatic disease (liver lesions were hemangiomas).    May 6, 2024: Started concurrent radiation and chemotherapy with weekly carboplatin and Taxol.  Will complete concurrent chemoradiation on June 14, 2024.    6/10/24: RECEIVED THE LAST DOSE OF CHEMO. LAST DOSE OF RADIATION WAS ON 6/14/2024.    July 23, 2024: Underwent surgery.  Pathology ypT3 N1.    October 7, 2024: Started adjuvant nivolumab.  December 2, 2024-nivolumab was placed on hold because of increasing nausea.    January 21, 2025: CT scan of chest abdomen pelvis showed metastatic cancer recurrence.      February 12, 2025: Ultrasound-guided biopsy of the liver lesion confirmed metastatic adenocarcinoma.    Interval history:  His nausea is almost resolved. His appetite is still poor.  He is losing weight as well.  Denies abdominal pain.  Denies diarrhea.    Allergies  Omeprazole, Sulfa, shellfish    Medications:  Acetaminophen, atenolol, dexamethasone, Emla cream, metformin 1000 mg twice daily, multivitamin, naloxone, nystatin, olanzapine, Actos, prochlorperazine, trazodone     Past Medical History:   Type 2 diabetes mellitus on oral hypoglycemics, hypertension controlled with medications.  Cataract, CKD (chronic kidney disease), Colon polyp, Deviated septum, ED (erectile dysfunction), Esophageal cancer, GERD (gastroesophageal reflux disease), Hyperlipidemia, Nephrolithiasis, Sleep apnea (wear CPAP/BiPAP)    Surgical  History:    Past Surgical History:   Procedure Laterality Date    BLADDER SURGERY      COLONOSCOPY      Repeat in one year    EYE SURGERY      LITHOTRIPSY      OTHER SURGICAL HISTORY      Vocal cord surgery    TRANSURETHRAL RESECTION OF PROSTATE      UPPER GASTROINTESTINAL ENDOSCOPY  2024      Family History:    Family History   Problem Relation Name Age of Onset    Stroke Father      Kidney cancer Brother Eliu     Stroke Brother Eliu      Family Oncology History:    Cancer-related family history includes Kidney cancer in his brother.    Social History:      Tobacco Use    Smoking status: Former     Current packs/day: 0.00     Average packs/day: 2.0 packs/day for 40.0 years (80.0 ttl pk-yrs)     Types: Cigarettes     Start date: 1959     Quit date: 1999     Years since quittin.4    Smokeless tobacco: Never   Vaping Use    Vaping status: Never Used   Substance Use Topics    Alcohol use: Yes     Alcohol/week: 2.0 standard drinks of alcohol     Types: 2 Cans of beer per week     Comment: couple beers a week    Drug use: Not Currently     Comment: gummies-dispensary from [place]        Vital Signs:  There were no vitals taken for this visit.    Physical Exam:   Total time spent 30 minutes.    Catarino Fry MD

## 2025-03-14 ENCOUNTER — TELEPHONE (OUTPATIENT)
Dept: PRIMARY CARE | Facility: CLINIC | Age: 80
End: 2025-03-14
Payer: MEDICARE

## 2025-03-14 DIAGNOSIS — F51.01 PRIMARY INSOMNIA: ICD-10-CM

## 2025-03-14 RX ORDER — TRAZODONE HYDROCHLORIDE 100 MG/1
100 TABLET ORAL NIGHTLY PRN
Qty: 90 TABLET | Refills: 0 | Status: SHIPPED | OUTPATIENT
Start: 2025-03-14 | End: 2025-09-10

## 2025-03-14 NOTE — TELEPHONE ENCOUNTER
Patient is calling in for a refill of Trazodone 100 mg tabs.  Last seen 01/23/25 for DM check.  Scheduled 04/18/25.  Please send to [facility] in [place].

## 2025-03-16 LAB
DNA RANGE(S) EXAMINED NAR: NORMAL
GENE DIS ANL INTERP-IMP: POSITIVE
GENE DIS ASSESSED: NORMAL
GENE MUT TESTED BLD/T: 7.9 M/MB
MSI CA SPEC-IMP: NORMAL
PD-L1 BY 22C3 TISS IMSTN DOC: NEGATIVE
REASON FOR STUDY: NORMAL
TEMPUS GERMLINE NOTE: NORMAL
TEMPUS LCA: NORMAL
TEMPUS PD-L1 (22C3) COMBINED POSITIVE SCORE: <1
TEMPUS PD-L1 (22C3) TUMOR PROPORTION SCORE: <1 %
TEMPUS PORTAL: NORMAL
TEMPUS TREATMENT IMPLICATIONS NOTE: NORMAL
TEMPUS TRIALCOUNT: 3
TEMPUS TRIALMATCHES1: NORMAL
TEMPUS TRIALMATCHES2: NORMAL
TEMPUS TRIALMATCHES3: NORMAL

## 2025-03-17 ENCOUNTER — OFFICE VISIT (OUTPATIENT)
Dept: HEMATOLOGY/ONCOLOGY | Facility: CLINIC | Age: 80
End: 2025-03-17
Payer: MEDICARE

## 2025-03-17 ENCOUNTER — LAB (OUTPATIENT)
Dept: HEMATOLOGY/ONCOLOGY | Facility: CLINIC | Age: 80
End: 2025-03-17
Payer: MEDICARE

## 2025-03-17 VITALS
TEMPERATURE: 96.6 F | SYSTOLIC BLOOD PRESSURE: 106 MMHG | RESPIRATION RATE: 18 BRPM | HEART RATE: 102 BPM | WEIGHT: 128.42 LBS | BODY MASS INDEX: 20.73 KG/M2 | DIASTOLIC BLOOD PRESSURE: 73 MMHG | OXYGEN SATURATION: 97 %

## 2025-03-17 DIAGNOSIS — C15.5 MALIGNANT NEOPLASM OF LOWER THIRD OF ESOPHAGUS (MULTI): Primary | ICD-10-CM

## 2025-03-17 DIAGNOSIS — C15.5 MALIGNANT NEOPLASM OF LOWER THIRD OF ESOPHAGUS (MULTI): ICD-10-CM

## 2025-03-17 LAB
ALBUMIN SERPL BCP-MCNC: 3.6 G/DL (ref 3.4–5)
ALP SERPL-CCNC: 323 U/L (ref 33–136)
ALT SERPL W P-5'-P-CCNC: 51 U/L (ref 10–52)
ANION GAP SERPL CALC-SCNC: 14 MMOL/L (ref 10–20)
AST SERPL W P-5'-P-CCNC: 98 U/L (ref 9–39)
BASOPHILS # BLD AUTO: 0.07 X10*3/UL (ref 0–0.1)
BASOPHILS NFR BLD AUTO: 1 %
BILIRUB SERPL-MCNC: 0.9 MG/DL (ref 0–1.2)
BUN SERPL-MCNC: 15 MG/DL (ref 6–23)
CALCIUM SERPL-MCNC: 9 MG/DL (ref 8.6–10.3)
CHLORIDE SERPL-SCNC: 105 MMOL/L (ref 98–107)
CO2 SERPL-SCNC: 26 MMOL/L (ref 21–32)
CORTIS AM PEAK SERPL-MSCNC: 27.6 UG/DL (ref 5–20)
CREAT SERPL-MCNC: 0.66 MG/DL (ref 0.5–1.3)
EGFRCR SERPLBLD CKD-EPI 2021: >90 ML/MIN/1.73M*2
EOSINOPHIL # BLD AUTO: 0.03 X10*3/UL (ref 0–0.4)
EOSINOPHIL NFR BLD AUTO: 0.4 %
ERYTHROCYTE [DISTWIDTH] IN BLOOD BY AUTOMATED COUNT: 17.1 % (ref 11.5–14.5)
GLUCOSE SERPL-MCNC: 137 MG/DL (ref 74–99)
HCT VFR BLD AUTO: 45.8 % (ref 41–52)
HGB BLD-MCNC: 15.5 G/DL (ref 13.5–17.5)
IMM GRANULOCYTES # BLD AUTO: 0.03 X10*3/UL (ref 0–0.5)
IMM GRANULOCYTES NFR BLD AUTO: 0.4 % (ref 0–0.9)
LYMPHOCYTES # BLD AUTO: 0.48 X10*3/UL (ref 0.8–3)
LYMPHOCYTES NFR BLD AUTO: 6.7 %
MCH RBC QN AUTO: 32.9 PG (ref 26–34)
MCHC RBC AUTO-ENTMCNC: 33.8 G/DL (ref 32–36)
MCV RBC AUTO: 97 FL (ref 80–100)
MONOCYTES # BLD AUTO: 0.73 X10*3/UL (ref 0.05–0.8)
MONOCYTES NFR BLD AUTO: 10.2 %
NEUTROPHILS # BLD AUTO: 5.82 X10*3/UL (ref 1.6–5.5)
NEUTROPHILS NFR BLD AUTO: 81.3 %
NRBC BLD-RTO: 0 /100 WBCS (ref 0–0)
PLATELET # BLD AUTO: 228 X10*3/UL (ref 150–450)
POTASSIUM SERPL-SCNC: 3.8 MMOL/L (ref 3.5–5.3)
PROT SERPL-MCNC: 6.3 G/DL (ref 6.4–8.2)
RBC # BLD AUTO: 4.71 X10*6/UL (ref 4.5–5.9)
SODIUM SERPL-SCNC: 141 MMOL/L (ref 136–145)
TSH SERPL-ACNC: 1.18 MIU/L (ref 0.44–3.98)
WBC # BLD AUTO: 7.2 X10*3/UL (ref 4.4–11.3)

## 2025-03-17 PROCEDURE — 2500000004 HC RX 250 GENERAL PHARMACY W/ HCPCS (ALT 636 FOR OP/ED): Performed by: INTERNAL MEDICINE

## 2025-03-17 PROCEDURE — 99215 OFFICE O/P EST HI 40 MIN: CPT

## 2025-03-17 PROCEDURE — 1159F MED LIST DOCD IN RCRD: CPT

## 2025-03-17 PROCEDURE — 1160F RVW MEDS BY RX/DR IN RCRD: CPT

## 2025-03-17 PROCEDURE — 81232 DPYD GENE COMMON VARIANTS: CPT

## 2025-03-17 PROCEDURE — 82024 ASSAY OF ACTH: CPT

## 2025-03-17 PROCEDURE — 1157F ADVNC CARE PLAN IN RCRD: CPT

## 2025-03-17 PROCEDURE — 1036F TOBACCO NON-USER: CPT

## 2025-03-17 PROCEDURE — 82533 TOTAL CORTISOL: CPT

## 2025-03-17 PROCEDURE — 80053 COMPREHEN METABOLIC PANEL: CPT

## 2025-03-17 PROCEDURE — 36591 DRAW BLOOD OFF VENOUS DEVICE: CPT

## 2025-03-17 PROCEDURE — 85025 COMPLETE CBC W/AUTO DIFF WBC: CPT

## 2025-03-17 PROCEDURE — 1123F ACP DISCUSS/DSCN MKR DOCD: CPT

## 2025-03-17 PROCEDURE — 84443 ASSAY THYROID STIM HORMONE: CPT

## 2025-03-17 RX ORDER — DIPHENHYDRAMINE HYDROCHLORIDE 50 MG/ML
50 INJECTION, SOLUTION INTRAMUSCULAR; INTRAVENOUS AS NEEDED
Status: CANCELLED | OUTPATIENT
Start: 2025-03-17

## 2025-03-17 RX ORDER — ALBUTEROL SULFATE 0.83 MG/ML
3 SOLUTION RESPIRATORY (INHALATION) AS NEEDED
OUTPATIENT
Start: 2025-04-14

## 2025-03-17 RX ORDER — PALONOSETRON 0.05 MG/ML
0.25 INJECTION, SOLUTION INTRAVENOUS ONCE
Status: CANCELLED | OUTPATIENT
Start: 2025-03-17

## 2025-03-17 RX ORDER — FAMOTIDINE 10 MG/ML
20 INJECTION, SOLUTION INTRAVENOUS ONCE AS NEEDED
OUTPATIENT
Start: 2025-03-31

## 2025-03-17 RX ORDER — LORAZEPAM 2 MG/ML
1 INJECTION INTRAMUSCULAR AS NEEDED
OUTPATIENT
Start: 2025-04-14

## 2025-03-17 RX ORDER — HEPARIN SODIUM,PORCINE/PF 10 UNIT/ML
50 SYRINGE (ML) INTRAVENOUS AS NEEDED
Status: CANCELLED | OUTPATIENT
Start: 2025-03-17

## 2025-03-17 RX ORDER — OLANZAPINE 5 MG/1
5 TABLET ORAL NIGHTLY
Qty: 30 TABLET | Refills: 1 | Status: SHIPPED | OUTPATIENT
Start: 2025-03-17

## 2025-03-17 RX ORDER — HEPARIN 100 UNIT/ML
500 SYRINGE INTRAVENOUS AS NEEDED
Status: CANCELLED | OUTPATIENT
Start: 2025-03-17

## 2025-03-17 RX ORDER — DEXAMETHASONE 6 MG/1
12 TABLET ORAL ONCE
OUTPATIENT
Start: 2025-03-31 | End: 2025-03-31

## 2025-03-17 RX ORDER — PROCHLORPERAZINE MALEATE 10 MG
10 TABLET ORAL EVERY 6 HOURS PRN
Status: CANCELLED | OUTPATIENT
Start: 2025-03-17

## 2025-03-17 RX ORDER — FAMOTIDINE 10 MG/ML
20 INJECTION, SOLUTION INTRAVENOUS ONCE AS NEEDED
OUTPATIENT
Start: 2025-04-14

## 2025-03-17 RX ORDER — EPINEPHRINE 0.3 MG/.3ML
0.3 INJECTION SUBCUTANEOUS EVERY 5 MIN PRN
OUTPATIENT
Start: 2025-04-14

## 2025-03-17 RX ORDER — FAMOTIDINE 10 MG/ML
20 INJECTION, SOLUTION INTRAVENOUS ONCE AS NEEDED
Status: CANCELLED | OUTPATIENT
Start: 2025-03-17

## 2025-03-17 RX ORDER — PROCHLORPERAZINE EDISYLATE 5 MG/ML
10 INJECTION INTRAMUSCULAR; INTRAVENOUS EVERY 6 HOURS PRN
Status: CANCELLED | OUTPATIENT
Start: 2025-03-17

## 2025-03-17 RX ORDER — EPINEPHRINE 0.3 MG/.3ML
0.3 INJECTION SUBCUTANEOUS EVERY 5 MIN PRN
Status: CANCELLED | OUTPATIENT
Start: 2025-03-17

## 2025-03-17 RX ORDER — DEXAMETHASONE 6 MG/1
12 TABLET ORAL ONCE
Status: CANCELLED | OUTPATIENT
Start: 2025-03-17 | End: 2025-03-17

## 2025-03-17 RX ORDER — LORAZEPAM 2 MG/ML
1 INJECTION INTRAMUSCULAR AS NEEDED
OUTPATIENT
Start: 2025-03-31

## 2025-03-17 RX ORDER — TRAMADOL HYDROCHLORIDE 50 MG/1
50 TABLET ORAL EVERY 6 HOURS PRN
Qty: 60 TABLET | Refills: 0 | Status: SHIPPED | OUTPATIENT
Start: 2025-03-17 | End: 2025-05-16

## 2025-03-17 RX ORDER — DIPHENHYDRAMINE HYDROCHLORIDE 50 MG/ML
50 INJECTION, SOLUTION INTRAMUSCULAR; INTRAVENOUS AS NEEDED
OUTPATIENT
Start: 2025-03-31

## 2025-03-17 RX ORDER — PALONOSETRON 0.05 MG/ML
0.25 INJECTION, SOLUTION INTRAVENOUS ONCE
OUTPATIENT
Start: 2025-03-31

## 2025-03-17 RX ORDER — PROCHLORPERAZINE MALEATE 10 MG
10 TABLET ORAL EVERY 6 HOURS PRN
OUTPATIENT
Start: 2025-03-31

## 2025-03-17 RX ORDER — PROCHLORPERAZINE EDISYLATE 5 MG/ML
10 INJECTION INTRAMUSCULAR; INTRAVENOUS EVERY 6 HOURS PRN
OUTPATIENT
Start: 2025-04-14

## 2025-03-17 RX ORDER — PROCHLORPERAZINE MALEATE 10 MG
10 TABLET ORAL EVERY 6 HOURS PRN
OUTPATIENT
Start: 2025-04-14

## 2025-03-17 RX ORDER — ALBUTEROL SULFATE 0.83 MG/ML
3 SOLUTION RESPIRATORY (INHALATION) AS NEEDED
OUTPATIENT
Start: 2025-03-31

## 2025-03-17 RX ORDER — PROCHLORPERAZINE EDISYLATE 5 MG/ML
10 INJECTION INTRAMUSCULAR; INTRAVENOUS EVERY 6 HOURS PRN
OUTPATIENT
Start: 2025-03-31

## 2025-03-17 RX ORDER — DEXAMETHASONE 6 MG/1
12 TABLET ORAL ONCE
OUTPATIENT
Start: 2025-04-14 | End: 2025-04-14

## 2025-03-17 RX ORDER — HEPARIN SODIUM,PORCINE/PF 10 UNIT/ML
50 SYRINGE (ML) INTRAVENOUS AS NEEDED
Status: DISCONTINUED | OUTPATIENT
Start: 2025-03-17 | End: 2025-03-17 | Stop reason: HOSPADM

## 2025-03-17 RX ORDER — EPINEPHRINE 0.3 MG/.3ML
0.3 INJECTION SUBCUTANEOUS EVERY 5 MIN PRN
OUTPATIENT
Start: 2025-03-31

## 2025-03-17 RX ORDER — LORAZEPAM 2 MG/ML
1 INJECTION INTRAMUSCULAR AS NEEDED
Status: CANCELLED | OUTPATIENT
Start: 2025-03-17

## 2025-03-17 RX ORDER — HEPARIN 100 UNIT/ML
500 SYRINGE INTRAVENOUS AS NEEDED
Status: DISCONTINUED | OUTPATIENT
Start: 2025-03-17 | End: 2025-03-17 | Stop reason: HOSPADM

## 2025-03-17 RX ORDER — PALONOSETRON 0.05 MG/ML
0.25 INJECTION, SOLUTION INTRAVENOUS ONCE
OUTPATIENT
Start: 2025-04-14

## 2025-03-17 RX ORDER — ALBUTEROL SULFATE 0.83 MG/ML
3 SOLUTION RESPIRATORY (INHALATION) AS NEEDED
Status: CANCELLED | OUTPATIENT
Start: 2025-03-17

## 2025-03-17 RX ORDER — DIPHENHYDRAMINE HYDROCHLORIDE 50 MG/ML
50 INJECTION, SOLUTION INTRAMUSCULAR; INTRAVENOUS AS NEEDED
OUTPATIENT
Start: 2025-04-14

## 2025-03-17 RX ADMIN — HEPARIN 500 UNITS: 100 SYRINGE at 10:47

## 2025-03-17 ASSESSMENT — PAIN SCALES - GENERAL: PAINLEVEL_OUTOF10: 0-NO PAIN

## 2025-03-17 NOTE — PROGRESS NOTES
Patient ID: Juan M Mcrae is a 79 y.o. male.  Diagnoses:   1.GE junction adenocarcinoma (signet ring cell), proficient MMR.  Localized (clinical stage II/III).  Status post esophagectomy on July 23, 2024 following neoadjuvant CRT->ypT3 N1.  Adjuvant nivolumab started on October 7, 2024. Stopped within 2 months because of severe nausea.  Cancer recurrence predominantly in the liver in January 2025.  2. Type 2 diabetes mellitus on oral hypoglycemics, hypertension controlled with medications.    Genomic profile:  Proficient MMR status on the biopsy by IHC.  HER2 positive (3+) by IHC.  Tempus XT requested on the liver biopsy specimen (from February 12, 2025 specimen) on February 24, 2025.    Assessment and Plan:  This is a pleasant 79-year-old man with a diagnosis of GE junction adenocarcinoma.  His staging PET/CT and subsequent imaging studies confirmed localized disease.  He had a port placement and a J-tube placement.  He has seen radiation oncology and he has been enrolled in the NRG- (photon versus proton) study.    He completed concurrent chemoradiation in early June 2024 (last chemo was on Bettie 10, 2024).  His surgery took place on July 23, 2024.  He tolerated the surgery well.  The final pathology was ypT3 N1.      Since he has residual tumor in the resected specimen, We discussed adjuvant therapy with nivolumab.  Discussed the data of CheckMate-577 study which showed survival advantage with adjuvant nivolumab. Patient started treatment on 10-, which he initially tolerated well with grade 1 nausea and fatigue.  Since November of 2024, for increasing nausea, poor appetite and weight loss, we have held treatment.      A CT scan from January 21, 2025 showed numerous liver lesions suggestive of metastatic disease.  A biopsy of the liver lesion on February 12, 2025 confirmed metastatic adenocarcinoma.     We discussed the overall outlook and the plan since he has metastatic recurrence in the liver.  We  discussed with him that this is an incurable condition.  His liver disease is rather extensive.  His disease is HER2 positive.  However, he did not tolerate immunotherapy at all which was discontinued because of intolerable nausea. We discussed the chemotherapy regimen with FOLFOX and trastuzumab and patient consented to treatment.     Plan: Treatment with FOLFOX plus Herceptin.  Pembrolizumab was withheld at patient's request because of his severe nausea from nivolumab.--- Starting 3/18/25    Mr. Mcrae will start cycle 1 FOLFOX + Herceptin as planned tomorrow. In terms of appetite, we will increase olanzapine to 5 mg at bedtime. For intermittent pain, we will also trial tramadol. I also placed supportive oncology referral.    Plan discussed in detail with patient and he agreed. He knows to call with any issues or concerns.     Follow-up: 2 Weeks with next treatment and exam.     I have placed all orders as outlined above. I advised the patient to schedule the tests and follow-up appointment as discussed by contacting the  on the way out or calling by phone.    Providers:  Surgeon: Dr. Jovany Solares: Dr. Lisandra Schneider: Donna.    Chief complaint: GE junction adenocarcinoma.    HPI:  ONCOLOGIC HISTORY-    February 28, 2024: Underwent an EGD for progressive dysphagia and weight loss.  EGD revealed an obstructing mass in the distal esophagus.  Biopsy confirmed adenocarcinoma with signet ring cell features.    March 21, 2024: EUS revealed T2 N1 mass extending from 41 cm to 43 cm and involving less than 1 cm of the gastric cardia.    March 22, 2024: CT scan of chest did not show any metastatic disease.    April 1, 2024 24: PET/CT scan done:   1. Hypermetabolic linear focus at gastroesophageal junction which is  extending to the gastric cardia/proximal lesser curvature in  correlation with distal esophageal /gastroesophageal junction wall  thickening. These findings are compatible with  biopsy-proven  adenocarcinoma.  2. No evidence of hypermetabolic lymphadenopathy.  3. Abnormal focal increase in FDG uptake in the left hepatic lobe in  correlation with hypoattenuating lesion, concerning for metastatic  disease. Further correlation with dedicated CT abdomen is recommended.  4. Large fat containing left inguinal hernia.  Interval history: He has significant dysphagia although he can get by with soup.  He has lost about 60 pounds in the last 6 months or so.  Feels fatigued.  Denies any pain.  Denies fever or chill or any other sign of ongoing infection.    April 3, 2024: CT scan of abdomen and MRI liver ruled out metastatic disease (liver lesions were hemangiomas).    May 6, 2024: Started concurrent radiation and chemotherapy with weekly carboplatin and Taxol.  Will complete concurrent chemoradiation on June 14, 2024.    6/10/24: RECEIVED THE LAST DOSE OF CHEMO. LAST DOSE OF RADIATION WAS ON 6/14/2024.    July 23, 2024: Underwent surgery.  Pathology ypT3 N1.    October 7, 2024: Started adjuvant nivolumab.  December 2, 2024-nivolumab was placed on hold because of increasing nausea.    January 21, 2025: CT scan of chest abdomen pelvis showed metastatic cancer recurrence.      February 12, 2025: Ultrasound-guided biopsy of the liver lesion confirmed metastatic adenocarcinoma.    3/18/25: Start FOLFOX + Trastuzumab     Interval history:  Mr. Mcrae returns today prior to treatment tomorrow 3/18/25. Overall, he reports some ongoing fatigue. Appetite is still decreased and he reports taste changes. Nausea is no longer an issue.  He did lose some weight since last visit, but states he is making himself eat.     Review of 12 systems: Negative unless otherwise stated in HPI       Allergies  Omeprazole, Sulfa, shellfish    Medications:  Acetaminophen, atenolol, dexamethasone, Emla cream, metformin 1000 mg twice daily, multivitamin, naloxone, nystatin, olanzapine, Actos, prochlorperazine, trazodone     Past  Medical History:   Type 2 diabetes mellitus on oral hypoglycemics, hypertension controlled with medications.  Cataract, CKD (chronic kidney disease), Colon polyp, Deviated septum, ED (erectile dysfunction), Esophageal cancer, GERD (gastroesophageal reflux disease), Hyperlipidemia, Nephrolithiasis, Sleep apnea (wear CPAP/BiPAP)    Surgical History:    Past Surgical History:   Procedure Laterality Date    BLADDER SURGERY      COLONOSCOPY      Repeat in one year    EYE SURGERY      LITHOTRIPSY      OTHER SURGICAL HISTORY      Vocal cord surgery    TRANSURETHRAL RESECTION OF PROSTATE      UPPER GASTROINTESTINAL ENDOSCOPY  2024      Family History:    Family History   Problem Relation Name Age of Onset    Stroke Father      Kidney cancer Brother Eliu     Stroke Brother Eliu      Family Oncology History:    Cancer-related family history includes Kidney cancer in his brother.    Social History:      Tobacco Use    Smoking status: Former     Current packs/day: 0.00     Average packs/day: 2.0 packs/day for 40.0 years (80.0 ttl pk-yrs)     Types: Cigarettes     Start date: 1959     Quit date: 1999     Years since quittin.4    Smokeless tobacco: Never   Vaping Use    Vaping status: Never Used   Substance Use Topics    Alcohol use: Yes     Alcohol/week: 2.0 standard drinks of alcohol     Types: 2 Cans of beer per week     Comment: couple beers a week    Drug use: Not Currently     Comment: gummies-dispensary from [place]        Vital Signs:  Vital signs done in infusion area     Physical Exam:   ECOG PS- 1.  General: Alert, ambulant, and answers questions appropriately.  Eyes- No pallor or icterus.  Neck- no swelling, lymph node enlargement or thyroid gland enlargement.  Chest- Bilateral good air entry. Lungs clear No crackles/ronchi.  Heart- no audible abnormal heart sounds.  Abdomen- Soft, non-tender. No mass or ascites. + BS  Extremities- no swelling or pitting edema.  Neuro- Alert and oriented X  4  Psych- Normal behavior and thought process            Kristi Rodriguez, APRN-CNP

## 2025-03-17 NOTE — PROGRESS NOTES
Patient ambulated into clinic for follow up with Dr. Fry accompanied by his significant other Helga and daughter. Medications and allergies reviewed.      Reports lack of appetite and states that he has no taste for food. He can only taste fruits and vegetables.   Reports increased fatigue and has no desire to participate in daily tasks.    Patient's daughter had questions in regards to Palliative vs Hospice.  Discussed both and will discuss referral to Palliative with JUSTICE Berry.    Patient to start treatment tomorrow for trastuzumab and Folfox.

## 2025-03-18 ENCOUNTER — TELEPHONE (OUTPATIENT)
Dept: PALLIATIVE MEDICINE | Facility: HOSPITAL | Age: 80
End: 2025-03-18
Payer: MEDICARE

## 2025-03-18 ENCOUNTER — INFUSION (OUTPATIENT)
Dept: HEMATOLOGY/ONCOLOGY | Facility: CLINIC | Age: 80
End: 2025-03-18
Payer: MEDICARE

## 2025-03-18 VITALS
TEMPERATURE: 95.9 F | BODY MASS INDEX: 20.85 KG/M2 | OXYGEN SATURATION: 98 % | WEIGHT: 129.19 LBS | SYSTOLIC BLOOD PRESSURE: 90 MMHG | DIASTOLIC BLOOD PRESSURE: 63 MMHG | RESPIRATION RATE: 17 BRPM | HEART RATE: 74 BPM

## 2025-03-18 DIAGNOSIS — C15.5 MALIGNANT NEOPLASM OF LOWER THIRD OF ESOPHAGUS (MULTI): ICD-10-CM

## 2025-03-18 LAB — ACTH PLAS-MCNC: 12.6 PG/ML (ref 7.2–63.3)

## 2025-03-18 PROCEDURE — 96415 CHEMO IV INFUSION ADDL HR: CPT

## 2025-03-18 PROCEDURE — 2500000004 HC RX 250 GENERAL PHARMACY W/ HCPCS (ALT 636 FOR OP/ED)

## 2025-03-18 PROCEDURE — 96417 CHEMO IV INFUS EACH ADDL SEQ: CPT

## 2025-03-18 PROCEDURE — 2500000005 HC RX 250 GENERAL PHARMACY W/O HCPCS

## 2025-03-18 PROCEDURE — 96413 CHEMO IV INFUSION 1 HR: CPT

## 2025-03-18 PROCEDURE — 96375 TX/PRO/DX INJ NEW DRUG ADDON: CPT | Mod: INF

## 2025-03-18 PROCEDURE — 96416 CHEMO PROLONG INFUSE W/PUMP: CPT

## 2025-03-18 RX ORDER — HEPARIN SODIUM,PORCINE/PF 10 UNIT/ML
50 SYRINGE (ML) INTRAVENOUS AS NEEDED
Status: CANCELLED | OUTPATIENT
Start: 2025-03-18

## 2025-03-18 RX ORDER — HEPARIN SODIUM,PORCINE/PF 10 UNIT/ML
50 SYRINGE (ML) INTRAVENOUS AS NEEDED
Status: DISCONTINUED | OUTPATIENT
Start: 2025-03-18 | End: 2025-03-18 | Stop reason: HOSPADM

## 2025-03-18 RX ORDER — HEPARIN 100 UNIT/ML
500 SYRINGE INTRAVENOUS AS NEEDED
Status: DISCONTINUED | OUTPATIENT
Start: 2025-03-18 | End: 2025-03-18 | Stop reason: HOSPADM

## 2025-03-18 RX ORDER — HEPARIN 100 UNIT/ML
500 SYRINGE INTRAVENOUS AS NEEDED
Status: CANCELLED | OUTPATIENT
Start: 2025-03-18

## 2025-03-18 RX ORDER — PROCHLORPERAZINE MALEATE 10 MG
10 TABLET ORAL EVERY 6 HOURS PRN
Status: DISCONTINUED | OUTPATIENT
Start: 2025-03-18 | End: 2025-03-18 | Stop reason: HOSPADM

## 2025-03-18 RX ORDER — ALBUTEROL SULFATE 0.83 MG/ML
3 SOLUTION RESPIRATORY (INHALATION) AS NEEDED
Status: DISCONTINUED | OUTPATIENT
Start: 2025-03-18 | End: 2025-03-18 | Stop reason: HOSPADM

## 2025-03-18 RX ORDER — DIPHENHYDRAMINE HYDROCHLORIDE 50 MG/ML
50 INJECTION, SOLUTION INTRAMUSCULAR; INTRAVENOUS AS NEEDED
Status: DISCONTINUED | OUTPATIENT
Start: 2025-03-18 | End: 2025-03-18 | Stop reason: HOSPADM

## 2025-03-18 RX ORDER — PALONOSETRON 0.05 MG/ML
0.25 INJECTION, SOLUTION INTRAVENOUS ONCE
Status: COMPLETED | OUTPATIENT
Start: 2025-03-18 | End: 2025-03-18

## 2025-03-18 RX ORDER — FAMOTIDINE 10 MG/ML
20 INJECTION, SOLUTION INTRAVENOUS ONCE AS NEEDED
Status: DISCONTINUED | OUTPATIENT
Start: 2025-03-18 | End: 2025-03-18 | Stop reason: HOSPADM

## 2025-03-18 RX ORDER — PROCHLORPERAZINE EDISYLATE 5 MG/ML
10 INJECTION INTRAMUSCULAR; INTRAVENOUS EVERY 6 HOURS PRN
Status: DISCONTINUED | OUTPATIENT
Start: 2025-03-18 | End: 2025-03-18 | Stop reason: HOSPADM

## 2025-03-18 RX ORDER — LORAZEPAM 2 MG/ML
1 INJECTION INTRAMUSCULAR AS NEEDED
Status: DISCONTINUED | OUTPATIENT
Start: 2025-03-18 | End: 2025-03-18 | Stop reason: HOSPADM

## 2025-03-18 RX ORDER — EPINEPHRINE 0.3 MG/.3ML
0.3 INJECTION SUBCUTANEOUS EVERY 5 MIN PRN
Status: DISCONTINUED | OUTPATIENT
Start: 2025-03-18 | End: 2025-03-18 | Stop reason: HOSPADM

## 2025-03-18 RX ORDER — DEXAMETHASONE 6 MG/1
12 TABLET ORAL ONCE
Status: COMPLETED | OUTPATIENT
Start: 2025-03-18 | End: 2025-03-18

## 2025-03-18 RX ADMIN — TRASTUZUMAB-ANNS 367.5 MG: 420 INJECTION, POWDER, LYOPHILIZED, FOR SOLUTION INTRAVENOUS at 09:56

## 2025-03-18 RX ADMIN — PALONOSETRON HYDROCHLORIDE 0.25 MG: 0.25 INJECTION INTRAVENOUS at 09:26

## 2025-03-18 RX ADMIN — DEXAMETHASONE 12 MG: 6 TABLET ORAL at 09:25

## 2025-03-18 RX ADMIN — FLUOROURACIL 4050 MG: 50 INJECTION, SOLUTION INTRAVENOUS at 13:58

## 2025-03-18 RX ADMIN — OXALIPLATIN 110 MG: 5 INJECTION, SOLUTION INTRAVENOUS at 11:44

## 2025-03-18 ASSESSMENT — PAIN SCALES - GENERAL: PAINLEVEL_OUTOF10: 0-NO PAIN

## 2025-03-18 NOTE — PROGRESS NOTES
Patient arrived ambulatory to infusion for first dose herceptin, oxaliplatin and 5 fluorouracil. Port easily accessed with brisk blood return. Patient educated on the medications and the CADD pump. Dr. Fry notified that the echo was never scheduled. Okay to treat placed for today but he wants the patient to have it done prior to his next visit. Pt aware to stop at scheduling and get it scheduled. Pt verbalized understanding of all the above.   Pt tolerated treatment without incident. Verified CADD pump locked and infusing with second RN. Pt sent home with spill kit and extra batteries. Pt given AVS. Pt aware of disconnect time and date. Pt left infusion ambulatory.

## 2025-03-18 NOTE — PATIENT INSTRUCTIONS
Today you were given a nausea medication called Aloxi. This is a long acting medication. Please do not take zofran until Friday 3/21/25. If you need to treat nausea and/or vomiting prior to that date, you make take the prescribed compazine. Starting Friday 3/21/25 you may alternate zofran and compazine as needed for nausea and/or vomiting.

## 2025-03-18 NOTE — PROGRESS NOTES
Learner: patient and significant other  Educated on: Herceptin, Oxaliplatin, 5-Fluorouracil  Readiness: acceptance  Preferred learning method: preferred: reading and listening  Method used: explanation  Response: verbalizes understanding  Barriers: None  Preferred language: English  Resources given: infusystem packet and spill kit.

## 2025-03-18 NOTE — TELEPHONE ENCOUNTER
Patient contacted after referral sent to supportive oncology by Kristi Rodriguez NP.  Patient has a history of GE junction adenocarcinoma.  Patient agreed to a NPV with Janet Coates at  [place] on 4/14 at 10 am.

## 2025-03-20 ENCOUNTER — INFUSION (OUTPATIENT)
Dept: HEMATOLOGY/ONCOLOGY | Facility: CLINIC | Age: 80
End: 2025-03-20
Payer: MEDICARE

## 2025-03-20 ENCOUNTER — TELEPHONE (OUTPATIENT)
Dept: PRIMARY CARE | Facility: CLINIC | Age: 80
End: 2025-03-20

## 2025-03-20 VITALS
TEMPERATURE: 97 F | BODY MASS INDEX: 21.37 KG/M2 | SYSTOLIC BLOOD PRESSURE: 89 MMHG | WEIGHT: 132.39 LBS | HEART RATE: 93 BPM | OXYGEN SATURATION: 97 % | RESPIRATION RATE: 18 BRPM | DIASTOLIC BLOOD PRESSURE: 61 MMHG

## 2025-03-20 DIAGNOSIS — C15.5 MALIGNANT NEOPLASM OF LOWER THIRD OF ESOPHAGUS (MULTI): ICD-10-CM

## 2025-03-20 LAB
DPYD GENE MUT ANL BLD/T: NORMAL
ELECTRONICALLY SIGNED BY: NORMAL

## 2025-03-20 PROCEDURE — 2500000004 HC RX 250 GENERAL PHARMACY W/ HCPCS (ALT 636 FOR OP/ED): Performed by: INTERNAL MEDICINE

## 2025-03-20 PROCEDURE — 96523 IRRIG DRUG DELIVERY DEVICE: CPT

## 2025-03-20 RX ORDER — HEPARIN 100 UNIT/ML
500 SYRINGE INTRAVENOUS AS NEEDED
OUTPATIENT
Start: 2025-03-20

## 2025-03-20 RX ORDER — HEPARIN 100 UNIT/ML
500 SYRINGE INTRAVENOUS AS NEEDED
Status: DISCONTINUED | OUTPATIENT
Start: 2025-03-20 | End: 2025-03-20 | Stop reason: HOSPADM

## 2025-03-20 RX ORDER — HEPARIN SODIUM,PORCINE/PF 10 UNIT/ML
50 SYRINGE (ML) INTRAVENOUS AS NEEDED
Status: DISCONTINUED | OUTPATIENT
Start: 2025-03-20 | End: 2025-03-20 | Stop reason: HOSPADM

## 2025-03-20 RX ORDER — HEPARIN SODIUM,PORCINE/PF 10 UNIT/ML
50 SYRINGE (ML) INTRAVENOUS AS NEEDED
OUTPATIENT
Start: 2025-03-20

## 2025-03-20 RX ADMIN — HEPARIN 500 UNITS: 100 SYRINGE at 12:05

## 2025-03-20 ASSESSMENT — PAIN SCALES - GENERAL: PAINLEVEL_OUTOF10: 0-NO PAIN

## 2025-03-20 NOTE — TELEPHONE ENCOUNTER
Patient's Significant other Helga called and stated Patient had Chemo this week and his BP has been low it is 89/72. Should Patient continue taking BP meds? He's dizzy when he gets up from a sitting position. Call was triaged to BRADLEY and she advised patient should stop blood pressure meds. if he is dizzy and feels like he will pass out needs to go to the ED. Helga was told what [facility] advised and she voiced understanding.

## 2025-03-20 NOTE — PROGRESS NOTES
Pt bp running low last few visits, takes atenolol daily, has intermittent light headedness, especially when changing positions, pt provided with last 4 bp & pulse reading and will call PCP who prescribed atenolol for guidance

## 2025-03-21 ENCOUNTER — HOSPITAL ENCOUNTER (OUTPATIENT)
Dept: CARDIOLOGY | Facility: CLINIC | Age: 80
Discharge: HOME | End: 2025-03-21
Payer: MEDICARE

## 2025-03-21 DIAGNOSIS — C15.5 MALIGNANT NEOPLASM OF LOWER THIRD OF ESOPHAGUS (MULTI): ICD-10-CM

## 2025-03-21 DIAGNOSIS — Z51.11 CHEMOTHERAPY MANAGEMENT, ENCOUNTER FOR: ICD-10-CM

## 2025-03-21 PROCEDURE — 93306 TTE W/DOPPLER COMPLETE: CPT

## 2025-03-24 LAB
AORTIC VALVE PEAK VELOCITY: 1.04 M/S
AV PEAK GRADIENT: 4 MMHG
AVA (PEAK VEL): 2.24 CM2
EJECTION FRACTION APICAL 4 CHAMBER: 55.3
EJECTION FRACTION: 63 %
LEFT ATRIUM VOLUME AREA LENGTH INDEX BSA: 10.9 ML/M2
LEFT VENTRICLE INTERNAL DIMENSION DIASTOLE: 2.65 CM (ref 3.5–6)
LEFT VENTRICULAR OUTFLOW TRACT DIAMETER: 1.93 CM
MITRAL VALVE E/A RATIO: 0.53
RIGHT VENTRICLE FREE WALL PEAK S': 12.96 CM/S
RIGHT VENTRICLE PEAK SYSTOLIC PRESSURE: 32.5 MMHG
TRICUSPID ANNULAR PLANE SYSTOLIC EXCURSION: 2.1 CM

## 2025-03-31 ENCOUNTER — LAB (OUTPATIENT)
Dept: HEMATOLOGY/ONCOLOGY | Facility: CLINIC | Age: 80
End: 2025-03-31
Payer: MEDICARE

## 2025-03-31 ENCOUNTER — OFFICE VISIT (OUTPATIENT)
Dept: HEMATOLOGY/ONCOLOGY | Facility: CLINIC | Age: 80
End: 2025-03-31
Payer: MEDICARE

## 2025-03-31 VITALS
BODY MASS INDEX: 20.32 KG/M2 | RESPIRATION RATE: 16 BRPM | OXYGEN SATURATION: 97 % | TEMPERATURE: 96.3 F | HEART RATE: 74 BPM | DIASTOLIC BLOOD PRESSURE: 73 MMHG | WEIGHT: 125.88 LBS | SYSTOLIC BLOOD PRESSURE: 114 MMHG

## 2025-03-31 DIAGNOSIS — C15.5 MALIGNANT NEOPLASM OF LOWER THIRD OF ESOPHAGUS (MULTI): ICD-10-CM

## 2025-03-31 LAB
ALBUMIN SERPL BCP-MCNC: 3.5 G/DL (ref 3.4–5)
ALP SERPL-CCNC: 404 U/L (ref 33–136)
ALT SERPL W P-5'-P-CCNC: 36 U/L (ref 10–52)
ANION GAP SERPL CALC-SCNC: 12 MMOL/L (ref 10–20)
AST SERPL W P-5'-P-CCNC: 58 U/L (ref 9–39)
BASOPHILS # BLD AUTO: 0.06 X10*3/UL (ref 0–0.1)
BASOPHILS NFR BLD AUTO: 0.9 %
BILIRUB SERPL-MCNC: 0.9 MG/DL (ref 0–1.2)
BUN SERPL-MCNC: 18 MG/DL (ref 6–23)
CALCIUM SERPL-MCNC: 9.2 MG/DL (ref 8.6–10.3)
CHLORIDE SERPL-SCNC: 103 MMOL/L (ref 98–107)
CO2 SERPL-SCNC: 27 MMOL/L (ref 21–32)
CREAT SERPL-MCNC: 0.69 MG/DL (ref 0.5–1.3)
EGFRCR SERPLBLD CKD-EPI 2021: >90 ML/MIN/1.73M*2
EOSINOPHIL # BLD AUTO: 0.03 X10*3/UL (ref 0–0.4)
EOSINOPHIL NFR BLD AUTO: 0.4 %
ERYTHROCYTE [DISTWIDTH] IN BLOOD BY AUTOMATED COUNT: 17.1 % (ref 11.5–14.5)
GLUCOSE SERPL-MCNC: 133 MG/DL (ref 74–99)
HCT VFR BLD AUTO: 39.7 % (ref 41–52)
HGB BLD-MCNC: 13.3 G/DL (ref 13.5–17.5)
IMM GRANULOCYTES # BLD AUTO: 0.02 X10*3/UL (ref 0–0.5)
IMM GRANULOCYTES NFR BLD AUTO: 0.3 % (ref 0–0.9)
LYMPHOCYTES # BLD AUTO: 0.43 X10*3/UL (ref 0.8–3)
LYMPHOCYTES NFR BLD AUTO: 6.3 %
MCH RBC QN AUTO: 32.9 PG (ref 26–34)
MCHC RBC AUTO-ENTMCNC: 33.5 G/DL (ref 32–36)
MCV RBC AUTO: 98 FL (ref 80–100)
MONOCYTES # BLD AUTO: 0.82 X10*3/UL (ref 0.05–0.8)
MONOCYTES NFR BLD AUTO: 12.1 %
NEUTROPHILS # BLD AUTO: 5.44 X10*3/UL (ref 1.6–5.5)
NEUTROPHILS NFR BLD AUTO: 80 %
NRBC BLD-RTO: 0 /100 WBCS (ref 0–0)
PLATELET # BLD AUTO: 287 X10*3/UL (ref 150–450)
POTASSIUM SERPL-SCNC: 3.9 MMOL/L (ref 3.5–5.3)
PROT SERPL-MCNC: 6.2 G/DL (ref 6.4–8.2)
RBC # BLD AUTO: 4.04 X10*6/UL (ref 4.5–5.9)
SODIUM SERPL-SCNC: 138 MMOL/L (ref 136–145)
WBC # BLD AUTO: 6.8 X10*3/UL (ref 4.4–11.3)

## 2025-03-31 PROCEDURE — 36591 DRAW BLOOD OFF VENOUS DEVICE: CPT

## 2025-03-31 PROCEDURE — 3078F DIAST BP <80 MM HG: CPT

## 2025-03-31 PROCEDURE — 1157F ADVNC CARE PLAN IN RCRD: CPT

## 2025-03-31 PROCEDURE — 1159F MED LIST DOCD IN RCRD: CPT

## 2025-03-31 PROCEDURE — 1160F RVW MEDS BY RX/DR IN RCRD: CPT

## 2025-03-31 PROCEDURE — 99215 OFFICE O/P EST HI 40 MIN: CPT

## 2025-03-31 PROCEDURE — 1123F ACP DISCUSS/DSCN MKR DOCD: CPT

## 2025-03-31 PROCEDURE — 80053 COMPREHEN METABOLIC PANEL: CPT

## 2025-03-31 PROCEDURE — 1126F AMNT PAIN NOTED NONE PRSNT: CPT

## 2025-03-31 PROCEDURE — 3074F SYST BP LT 130 MM HG: CPT

## 2025-03-31 PROCEDURE — 85025 COMPLETE CBC W/AUTO DIFF WBC: CPT

## 2025-03-31 PROCEDURE — 2500000004 HC RX 250 GENERAL PHARMACY W/ HCPCS (ALT 636 FOR OP/ED): Performed by: INTERNAL MEDICINE

## 2025-03-31 RX ORDER — ONDANSETRON 8 MG/1
8 TABLET, ORALLY DISINTEGRATING ORAL EVERY 8 HOURS PRN
Qty: 30 TABLET | Refills: 2 | Status: SHIPPED | OUTPATIENT
Start: 2025-03-31

## 2025-03-31 RX ORDER — HEPARIN SODIUM,PORCINE/PF 10 UNIT/ML
50 SYRINGE (ML) INTRAVENOUS AS NEEDED
Status: DISCONTINUED | OUTPATIENT
Start: 2025-03-31 | End: 2025-03-31 | Stop reason: HOSPADM

## 2025-03-31 RX ORDER — HEPARIN 100 UNIT/ML
500 SYRINGE INTRAVENOUS AS NEEDED
Status: CANCELLED | OUTPATIENT
Start: 2025-03-31

## 2025-03-31 RX ORDER — HEPARIN 100 UNIT/ML
500 SYRINGE INTRAVENOUS AS NEEDED
Status: DISCONTINUED | OUTPATIENT
Start: 2025-03-31 | End: 2025-03-31 | Stop reason: HOSPADM

## 2025-03-31 RX ORDER — HEPARIN SODIUM,PORCINE/PF 10 UNIT/ML
50 SYRINGE (ML) INTRAVENOUS AS NEEDED
Status: CANCELLED | OUTPATIENT
Start: 2025-03-31

## 2025-03-31 RX ORDER — FAMOTIDINE 20 MG/1
20 TABLET, FILM COATED ORAL DAILY
Qty: 30 TABLET | Refills: 3 | Status: SHIPPED | OUTPATIENT
Start: 2025-03-31

## 2025-03-31 RX ORDER — OLANZAPINE 5 MG/1
5 TABLET ORAL NIGHTLY
Qty: 30 TABLET | Refills: 1 | Status: SHIPPED | OUTPATIENT
Start: 2025-03-31

## 2025-03-31 RX ADMIN — HEPARIN 500 UNITS: 100 SYRINGE at 11:56

## 2025-03-31 ASSESSMENT — PAIN SCALES - GENERAL: PAINLEVEL_OUTOF10: 0-NO PAIN

## 2025-04-01 ENCOUNTER — INFUSION (OUTPATIENT)
Dept: HEMATOLOGY/ONCOLOGY | Facility: CLINIC | Age: 80
End: 2025-04-01
Payer: MEDICARE

## 2025-04-01 VITALS
BODY MASS INDEX: 20.42 KG/M2 | TEMPERATURE: 96.6 F | RESPIRATION RATE: 18 BRPM | DIASTOLIC BLOOD PRESSURE: 74 MMHG | WEIGHT: 126.54 LBS | OXYGEN SATURATION: 97 % | HEART RATE: 123 BPM | SYSTOLIC BLOOD PRESSURE: 111 MMHG

## 2025-04-01 DIAGNOSIS — C15.5 MALIGNANT NEOPLASM OF LOWER THIRD OF ESOPHAGUS (MULTI): ICD-10-CM

## 2025-04-01 PROCEDURE — 2500000005 HC RX 250 GENERAL PHARMACY W/O HCPCS

## 2025-04-01 PROCEDURE — 96375 TX/PRO/DX INJ NEW DRUG ADDON: CPT | Mod: INF

## 2025-04-01 PROCEDURE — 96415 CHEMO IV INFUSION ADDL HR: CPT

## 2025-04-01 PROCEDURE — 96411 CHEMO IV PUSH ADDL DRUG: CPT

## 2025-04-01 PROCEDURE — 96416 CHEMO PROLONG INFUSE W/PUMP: CPT

## 2025-04-01 PROCEDURE — 2500000004 HC RX 250 GENERAL PHARMACY W/ HCPCS (ALT 636 FOR OP/ED)

## 2025-04-01 PROCEDURE — 96417 CHEMO IV INFUS EACH ADDL SEQ: CPT

## 2025-04-01 PROCEDURE — 96413 CHEMO IV INFUSION 1 HR: CPT

## 2025-04-01 RX ORDER — PALONOSETRON 0.05 MG/ML
0.25 INJECTION, SOLUTION INTRAVENOUS ONCE
Status: COMPLETED | OUTPATIENT
Start: 2025-04-01 | End: 2025-04-01

## 2025-04-01 RX ORDER — DEXAMETHASONE 6 MG/1
12 TABLET ORAL ONCE
Status: COMPLETED | OUTPATIENT
Start: 2025-04-01 | End: 2025-04-01

## 2025-04-01 RX ORDER — FAMOTIDINE 10 MG/ML
20 INJECTION, SOLUTION INTRAVENOUS ONCE AS NEEDED
Status: DISCONTINUED | OUTPATIENT
Start: 2025-04-01 | End: 2025-04-01 | Stop reason: HOSPADM

## 2025-04-01 RX ORDER — DIPHENHYDRAMINE HYDROCHLORIDE 50 MG/ML
50 INJECTION, SOLUTION INTRAMUSCULAR; INTRAVENOUS AS NEEDED
Status: DISCONTINUED | OUTPATIENT
Start: 2025-04-01 | End: 2025-04-01 | Stop reason: HOSPADM

## 2025-04-01 RX ORDER — ALBUTEROL SULFATE 0.83 MG/ML
3 SOLUTION RESPIRATORY (INHALATION) AS NEEDED
Status: DISCONTINUED | OUTPATIENT
Start: 2025-04-01 | End: 2025-04-01 | Stop reason: HOSPADM

## 2025-04-01 RX ORDER — EPINEPHRINE 0.3 MG/.3ML
0.3 INJECTION SUBCUTANEOUS EVERY 5 MIN PRN
Status: DISCONTINUED | OUTPATIENT
Start: 2025-04-01 | End: 2025-04-01 | Stop reason: HOSPADM

## 2025-04-01 RX ADMIN — PALONOSETRON HYDROCHLORIDE 0.25 MG: 0.25 INJECTION INTRAVENOUS at 07:41

## 2025-04-01 RX ADMIN — FLUOROURACIL 4050 MG: 50 INJECTION, SOLUTION INTRAVENOUS at 10:55

## 2025-04-01 RX ADMIN — OXALIPLATIN 110 MG: 5 INJECTION, SOLUTION INTRAVENOUS at 08:43

## 2025-04-01 RX ADMIN — DEXAMETHASONE 12 MG: 6 TABLET ORAL at 07:40

## 2025-04-01 RX ADMIN — TRASTUZUMAB-ANNS 245.7 MG: 420 INJECTION, POWDER, LYOPHILIZED, FOR SOLUTION INTRAVENOUS at 08:08

## 2025-04-01 ASSESSMENT — PAIN SCALES - GENERAL: PAINLEVEL_OUTOF10: 0-NO PAIN

## 2025-04-03 ENCOUNTER — INFUSION (OUTPATIENT)
Dept: HEMATOLOGY/ONCOLOGY | Facility: CLINIC | Age: 80
End: 2025-04-03
Payer: MEDICARE

## 2025-04-03 VITALS
BODY MASS INDEX: 20.76 KG/M2 | DIASTOLIC BLOOD PRESSURE: 70 MMHG | SYSTOLIC BLOOD PRESSURE: 102 MMHG | OXYGEN SATURATION: 95 % | HEART RATE: 118 BPM | TEMPERATURE: 96.8 F | RESPIRATION RATE: 18 BRPM | WEIGHT: 128.64 LBS

## 2025-04-03 DIAGNOSIS — C15.5 MALIGNANT NEOPLASM OF LOWER THIRD OF ESOPHAGUS (MULTI): ICD-10-CM

## 2025-04-03 PROCEDURE — 2500000004 HC RX 250 GENERAL PHARMACY W/ HCPCS (ALT 636 FOR OP/ED): Performed by: INTERNAL MEDICINE

## 2025-04-03 PROCEDURE — 96360 HYDRATION IV INFUSION INIT: CPT | Mod: INF

## 2025-04-03 RX ORDER — ONDANSETRON HYDROCHLORIDE 2 MG/ML
8 INJECTION, SOLUTION INTRAVENOUS ONCE
Status: DISCONTINUED | OUTPATIENT
Start: 2025-04-03 | End: 2025-04-03 | Stop reason: HOSPADM

## 2025-04-03 RX ORDER — HEPARIN 100 UNIT/ML
500 SYRINGE INTRAVENOUS AS NEEDED
OUTPATIENT
Start: 2025-04-03

## 2025-04-03 RX ORDER — ONDANSETRON HYDROCHLORIDE 2 MG/ML
8 INJECTION, SOLUTION INTRAVENOUS ONCE
Status: CANCELLED | OUTPATIENT
Start: 2025-04-03

## 2025-04-03 RX ORDER — HEPARIN SODIUM,PORCINE/PF 10 UNIT/ML
50 SYRINGE (ML) INTRAVENOUS AS NEEDED
OUTPATIENT
Start: 2025-04-03

## 2025-04-03 RX ORDER — HEPARIN SODIUM,PORCINE/PF 10 UNIT/ML
50 SYRINGE (ML) INTRAVENOUS AS NEEDED
Status: DISCONTINUED | OUTPATIENT
Start: 2025-04-03 | End: 2025-04-03 | Stop reason: HOSPADM

## 2025-04-03 RX ORDER — HEPARIN 100 UNIT/ML
500 SYRINGE INTRAVENOUS AS NEEDED
Status: DISCONTINUED | OUTPATIENT
Start: 2025-04-03 | End: 2025-04-03 | Stop reason: HOSPADM

## 2025-04-03 RX ADMIN — SODIUM CHLORIDE 1000 ML: 9 INJECTION, SOLUTION INTRAVENOUS at 09:34

## 2025-04-03 RX ADMIN — HEPARIN 500 UNITS: 100 SYRINGE at 10:28

## 2025-04-03 ASSESSMENT — PAIN SCALES - GENERAL: PAINLEVEL_OUTOF10: 0-NO PAIN

## 2025-04-03 NOTE — PROGRESS NOTES
Patient arrived ambulatory for pump disconnect. Verified infusion complete and 0mls were left in the bag. Patients HR was 130s-140s upon arrival. It has been elevated for a few days. Pt denies lightheaded or dizziness, nausea, vomiting, diarrhea and palpitations. Does have sob with moderate exertion. Patient states he was told to stop his atenolol a while ago. Dr. Fry and Kristi Rodriguez aware of the above. Fuids were ordered.   Pt tolerated iv fluids well. HR still elevated at 118bpm. Dr. Fry aware. No more orders. Pt to go home and rest. Pt encouraged to continue drinking fluids. Blood return present. Port de-accessed and band aid applied. Pt given AVS. Pt left infusion ambulatory,

## 2025-04-14 ENCOUNTER — OFFICE VISIT (OUTPATIENT)
Dept: HEMATOLOGY/ONCOLOGY | Facility: CLINIC | Age: 80
End: 2025-04-14
Payer: MEDICARE

## 2025-04-14 ENCOUNTER — OFFICE VISIT (OUTPATIENT)
Dept: PALLIATIVE MEDICINE | Facility: CLINIC | Age: 80
End: 2025-04-14
Payer: MEDICARE

## 2025-04-14 ENCOUNTER — LAB (OUTPATIENT)
Dept: HEMATOLOGY/ONCOLOGY | Facility: CLINIC | Age: 80
End: 2025-04-14
Payer: MEDICARE

## 2025-04-14 VITALS
RESPIRATION RATE: 17 BRPM | SYSTOLIC BLOOD PRESSURE: 116 MMHG | DIASTOLIC BLOOD PRESSURE: 77 MMHG | WEIGHT: 128.53 LBS | TEMPERATURE: 97.9 F | HEART RATE: 120 BPM | BODY MASS INDEX: 20.74 KG/M2 | OXYGEN SATURATION: 93 %

## 2025-04-14 DIAGNOSIS — Z79.891 ENCOUNTER FOR MONITORING OPIOID MAINTENANCE THERAPY: ICD-10-CM

## 2025-04-14 DIAGNOSIS — E11.9 TYPE 2 DIABETES MELLITUS WITHOUT COMPLICATION, WITHOUT LONG-TERM CURRENT USE OF INSULIN: ICD-10-CM

## 2025-04-14 DIAGNOSIS — Z51.5 PALLIATIVE CARE ENCOUNTER: Primary | ICD-10-CM

## 2025-04-14 DIAGNOSIS — G47.00 INSOMNIA, UNSPECIFIED TYPE: ICD-10-CM

## 2025-04-14 DIAGNOSIS — Z51.81 ENCOUNTER FOR MONITORING OPIOID MAINTENANCE THERAPY: ICD-10-CM

## 2025-04-14 DIAGNOSIS — Z71.89 GOALS OF CARE, COUNSELING/DISCUSSION: ICD-10-CM

## 2025-04-14 DIAGNOSIS — C15.5 MALIGNANT NEOPLASM OF LOWER THIRD OF ESOPHAGUS (MULTI): ICD-10-CM

## 2025-04-14 DIAGNOSIS — F51.01 PRIMARY INSOMNIA: ICD-10-CM

## 2025-04-14 DIAGNOSIS — K11.7 XEROSTOMIA: ICD-10-CM

## 2025-04-14 DIAGNOSIS — G89.3 CANCER RELATED PAIN: ICD-10-CM

## 2025-04-14 LAB
ALBUMIN SERPL BCP-MCNC: 3.2 G/DL (ref 3.4–5)
ALP SERPL-CCNC: 396 U/L (ref 33–136)
ALT SERPL W P-5'-P-CCNC: 23 U/L (ref 10–52)
ANION GAP SERPL CALC-SCNC: 13 MMOL/L (ref 10–20)
AST SERPL W P-5'-P-CCNC: 37 U/L (ref 9–39)
BILIRUB SERPL-MCNC: 0.9 MG/DL (ref 0–1.2)
BUN SERPL-MCNC: 16 MG/DL (ref 6–23)
CALCIUM SERPL-MCNC: 8.8 MG/DL (ref 8.6–10.3)
CHLORIDE SERPL-SCNC: 104 MMOL/L (ref 98–107)
CO2 SERPL-SCNC: 26 MMOL/L (ref 21–32)
CREAT SERPL-MCNC: 0.6 MG/DL (ref 0.5–1.3)
EGFRCR SERPLBLD CKD-EPI 2021: >90 ML/MIN/1.73M*2
EST. AVERAGE GLUCOSE BLD GHB EST-MCNC: 117 MG/DL
GLUCOSE SERPL-MCNC: 114 MG/DL (ref 74–99)
HBA1C MFR BLD: 5.7 %
POTASSIUM SERPL-SCNC: 3.5 MMOL/L (ref 3.5–5.3)
PROT SERPL-MCNC: 5.8 G/DL (ref 6.4–8.2)
SODIUM SERPL-SCNC: 139 MMOL/L (ref 136–145)

## 2025-04-14 PROCEDURE — 83036 HEMOGLOBIN GLYCOSYLATED A1C: CPT

## 2025-04-14 PROCEDURE — 36591 DRAW BLOOD OFF VENOUS DEVICE: CPT

## 2025-04-14 PROCEDURE — 99214 OFFICE O/P EST MOD 30 MIN: CPT | Performed by: INTERNAL MEDICINE

## 2025-04-14 PROCEDURE — 99215 OFFICE O/P EST HI 40 MIN: CPT | Performed by: NURSE PRACTITIONER

## 2025-04-14 PROCEDURE — 2500000004 HC RX 250 GENERAL PHARMACY W/ HCPCS (ALT 636 FOR OP/ED): Performed by: INTERNAL MEDICINE

## 2025-04-14 PROCEDURE — 1123F ACP DISCUSS/DSCN MKR DOCD: CPT | Performed by: INTERNAL MEDICINE

## 2025-04-14 PROCEDURE — 1157F ADVNC CARE PLAN IN RCRD: CPT | Performed by: INTERNAL MEDICINE

## 2025-04-14 PROCEDURE — 80053 COMPREHEN METABOLIC PANEL: CPT

## 2025-04-14 RX ORDER — OLANZAPINE 5 MG/1
5 TABLET, FILM COATED ORAL NIGHTLY
Qty: 30 TABLET | Refills: 3 | Status: SHIPPED | OUTPATIENT
Start: 2025-04-14 | End: 2025-05-14

## 2025-04-14 RX ORDER — HEPARIN SODIUM,PORCINE/PF 10 UNIT/ML
50 SYRINGE (ML) INTRAVENOUS AS NEEDED
Status: CANCELLED | OUTPATIENT
Start: 2025-04-14

## 2025-04-14 RX ORDER — PILOCARPINE HYDROCHLORIDE 5 MG/1
5 TABLET, FILM COATED ORAL 3 TIMES DAILY PRN
Qty: 30 TABLET | Refills: 1 | Status: SHIPPED | OUTPATIENT
Start: 2025-04-14 | End: 2025-04-24

## 2025-04-14 RX ORDER — TRAMADOL HYDROCHLORIDE 50 MG/1
50 TABLET ORAL EVERY 6 HOURS PRN
Qty: 120 TABLET | Refills: 0 | Status: SHIPPED | OUTPATIENT
Start: 2025-04-14 | End: 2025-05-14

## 2025-04-14 RX ORDER — TRAZODONE HYDROCHLORIDE 100 MG/1
100-200 TABLET ORAL NIGHTLY PRN
Qty: 60 TABLET | Refills: 3 | Status: SHIPPED | OUTPATIENT
Start: 2025-04-14 | End: 2025-05-14

## 2025-04-14 RX ORDER — HEPARIN 100 UNIT/ML
500 SYRINGE INTRAVENOUS AS NEEDED
Status: CANCELLED | OUTPATIENT
Start: 2025-04-14

## 2025-04-14 RX ORDER — HEPARIN SODIUM,PORCINE/PF 10 UNIT/ML
50 SYRINGE (ML) INTRAVENOUS AS NEEDED
Status: DISCONTINUED | OUTPATIENT
Start: 2025-04-14 | End: 2025-04-14 | Stop reason: HOSPADM

## 2025-04-14 RX ORDER — HEPARIN 100 UNIT/ML
500 SYRINGE INTRAVENOUS AS NEEDED
Status: DISCONTINUED | OUTPATIENT
Start: 2025-04-14 | End: 2025-04-14 | Stop reason: HOSPADM

## 2025-04-14 RX ADMIN — HEPARIN 500 UNITS: 100 SYRINGE at 11:00

## 2025-04-14 ASSESSMENT — PAIN SCALES - GENERAL: PAINLEVEL_OUTOF10: 0-NO PAIN

## 2025-04-14 NOTE — PROGRESS NOTES
SUPPORTIVE AND PALLIATIVE ONCOLOGY CONSULT - OUTPATIENT      SERVICE DATE: 4/14/2025    Referred by:  Jennifer MULTANI  Medical Oncologist: Catarino Fry MD   Radiation Oncologist: No care team member to display  Primary Physician: Candy Grossman  [phone]    REASON FOR CONSULT/CHIEF CONSULT COMPLAINT: { :98841}    Subjective   HISTORY OF PRESENT ILLNESS: Juan M Mcrae is a 79 y.o. male who presents with  ***     Pain Assessment:  Pain Score:    Location:    Education:        Symptom Assessment:  {ROS - Unable to Obtain:99944}  Pain:{ :61652}  Headache: {  :02517}  Dizziness:{ :58541}  Lack of energy: { :00665}  Difficulty sleeping: { :31697}  Worrying: {  :29949}  Anxiety: { :81386}  Depression: { :64977}  Pain in mouth/swallowing: { :23332}  Dry mouth: { :98597}  Taste changes: { :07631}  Shortness of breath: { :39691}  Lack of appetite: {  :70266}   Nausea: { :96181}  Vomiting: { :88086}  Constipation: { :86822}  Diarrhea: { :53448}  Sore muscles: { :53700}  Numbness or tingling in hands/feet/other: { :86462}  Weight loss: { :32106}  Other: { :74833}      Information obtained from: { :20269}  ______________________________________________________________________     Oncology History   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)   3/28/2024 Initial Diagnosis    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)     4/3/2024 Cancer Staged    Staging form: Esophagus - Adenocarcinoma, AJCC 8th Edition, Clinical stage from 4/3/2024: Stage III (cT2, cN1, cM0, G2) - Signed by Irish Thompson MD on 9/11/2024 4/29/2024 - 4/29/2024 Chemotherapy    PACLitaxel / CARBOplatin with Concurrent Radiation, Weekly     5/6/2024 - 6/10/2024 Research Study Participant    (RS) NRG- - PACLitaxel / CARBOplatin with Concurrent Radiation (Weekly), 6 Week Cycle  Plan Provider: Catarino Fry MD  Treatment goal: Curative  Line of treatment: Neoadjuvant  Associated studies: Comparing Proton Therapy to Photon  Radiation Therapy for Esophageal Cancer     10/7/2024 - 11/18/2024 Chemotherapy    Nivolumab 240 mg (Biweekly), 28 Day Cycles     3/18/2025 -  Chemotherapy    Trastuzumab + mFOLFOX6 (Fluorouracil Continuous Infusion / Leucovorin / Oxaliplatin), 42 Day Cycles         Past Medical History:   Diagnosis Date   • Cataract    • CKD (chronic kidney disease)    • Colon polyp    • Deviated nasal septum 10/17/2024   • Deviated septum    • Disorder of lipoprotein metabolism, unspecified     Elevated serum cholesterol   • Diverticulitis of colon 2024   • Dysfunction of right eustachian tube 10/17/2024   • Dysphagia    • ED (erectile dysfunction)    • Elevated serum cholesterol 10/17/2024   • Esophageal cancer (Multi)    • GERD (gastroesophageal reflux disease)    • Hearing aid worn    • HL (hearing loss)    • Hoarseness 10/17/2024   • Horseshoe tear of retina without detachment 06/18/2021   • Hypercholesterolemia    • Hyperlipidemia    • Hypertension    • Left knee pain 04/21/2022   • Malignant neoplasm of prostate (Multi) 10/17/2024   • 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   • Nephrolithiasis    • Obesity    • Other specified diabetes mellitus without complications     last A1c= 6.8 on 2/5/2024   • Otitis media of right ear 10/18/2022   • Rash 10/17/2024   • Sleep apnea     wear CPAP/BiPAP   • 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   • Vision loss     wears glasses     Past Surgical History:   Procedure Laterality Date   • BLADDER SURGERY     • COLONOSCOPY  2023    Repeat in one year   • COLONOSCOPY  02/05/2018   • EYE SURGERY     • LITHOTRIPSY     • OTHER SURGICAL HISTORY      Vocal cord surgery   • TRANSURETHRAL RESECTION OF PROSTATE     • UPPER GASTROINTESTINAL ENDOSCOPY  03/02/2024     Family History   Problem Relation Name Age of Onset   • Stroke Father Nikko    • Kidney cancer Brother Eliu    • Stroke Brother Eliu    • Cancer Daughter Velia         SOCIAL HISTORY  {SOC HX:13130}   Social History:  reports  that he quit smoking about 26 years ago. His smoking use included cigarettes. He started smoking about 66 years ago. He has a 80 pack-year smoking history. He has never used smokeless tobacco. He reports that he does not currently use alcohol after a past usage of about 2.0 standard drinks of alcohol per week. He reports that he does not currently use drugs.  ***    Gnosticism and Importance of Gnosticism:  {Gnosticism:33004}  Role of sandra in daily life/ Role of spirituality in health care decision-making: ***  Spiritual or Cheondoism community: ***    REVIEW OF SYSTEMS  Review of systems negative unless noted in HPI.       Objective     Palliative Performance Scale % (PPS)       Current Outpatient Medications   Medication Instructions   • acetaminophen (TYLENOL) 650 mg, oral, Every 6 hours PRN   • atenolol (TENORMIN) 50 mg, j-tube, Daily   • chlorhexidine (Peridex) 0.12 % solution Rinse mouth with 15 ml after toothbrushing the night before surgery and on the morning of surgery.  Expectorate after rinsing.  Do not swallow.   • famotidine (PEPCID) 20 mg, oral, Daily   • ibuprofen 600 mg, oral, Every 6 hours PRN   • loperamide (Imodium) 2 mg capsule Take 2 capsules (4 mg) by mouth with the first episode of diarrhea and 1 capsule (2 mg) by mouth with any additional episodes. Maximum 8 capsules (16 mg) per day.   • metFORMIN (GLUCOPHAGE) 500 mg, oral, 2 times daily (morning and late afternoon), 1 tab bid for 1 week then 2 tab bid after   • metroNIDAZOLE (Flagyl) 250 mg tablet Take one tablet at 6p, 7p, and 11p the night before surgery.   • nystatin (MYCOSTATIN) 500,000 Units, oral, 4 times daily, Swish in mouth and swallow.   • OLANZapine (ZYPREXA) 5 mg, oral, Nightly, Take 1 tablet daily at bedtime   • ondansetron (ZOFRAN) 8 mg, oral, Every 8 hours PRN   • ondansetron ODT (ZOFRAN-ODT) 8 mg, oral, Every 8 hours PRN   • polyethylene glycol (GLYCOLAX, MIRALAX) 17 g, oral, Daily PRN   • prochlorperazine (COMPAZINE) 10 mg, oral,  Every 6 hours PRN   • prochlorperazine (COMPAZINE) 10 mg, oral, Every 6 hours PRN   • traMADol (ULTRAM) 50 mg, oral, Every 6 hours PRN   • traZODone (DESYREL) 100 mg, oral, Nightly PRN       Allergies:   Allergies   Allergen Reactions   • Omeprazole Itching and Unknown   • Shellfish Containing Products Hives   • Other Rash     Rubber on Cpap machine face mask   • Sulfa (Sulfonamide Antibiotics) Rash       {If you would like to pull in Lab results for the last 24 hours, type .[id number] :99}  {If you would like to pull in Imaging results, type .imgrslt :99}       PHYSICAL EXAMINATION  Vital Signs:   Vital signs reviewed  There were no vitals filed for this visit.  Pain Score:           Physical Exam    ASSESSMENT/PLAN    Pain  Pain is: { :14974}  Type: { :44875}  Pain control: { :46309}  Home regimen: ***  Intolerances/previously tried: ***  Personalized pain goal: ***  ORT-OUD Score: Total Score:    due to { :51584}  indicating Opioid Risk Category:   of future opioid use disorder.   ***    Opioid Use  Medication Management:   - OARRS report reviewed with no aberrant behavior; consistent with  prescriptions/records and patient history  - MED ***.  Overdose Risk Score ***.   This has been discussed with patient.   - We will continue to closely monitor the patient for signs of prescription misuse including UDS, OARRS review and subjective reports at each visit.  - *** concurrent benzodiazepine use   - I am a provider who either is or has consulted and collaborated with a provider certified in Hospice and Palliative Medicine and have conducted a face-face visit and examination for this patient.  - Routine Urine Drug Screen completed *** appropriately positive for opioids and negative for illicit substances  - Controlled Substance Agreement completed ***  - Specifically discussed that controlled substance prescriptions will only be provided by our group as outlined in the completed agreement  - Prescribed naloxone  ***  - Red Flags: ***     Nausea   { :66921} nausea {with or without:46770} vomiting related to { :43530} ***    Constipation   At risk for constipation related to opioids, ***,  { :40700}   Usual bowel pattern: ***   Current regimen: ***   LBM ***   ***     Altered Mood  {Acute/Chronic:46293} {anxiety/depression:20416} related to {related to:28820}   {controlled/uncontrolled:33249} with home regimen  Current regimen: ***    Sleeping Difficulty:  Impaired sleep related to ***  Current regimen:  ***  ***    Decreased appetite  Related to { :52600}  Nutrition { :06202}  Weight loss ***  Current regimen:  ***  ***    Supportive Interventions: { :48943}    Introduction to Supportive and Palliative Oncology:  Spoke with ***   Introduced the role and philosophy of Supportive and Palliative oncology in the evaluation and management of symptoms during cancer treatment  Palliative care was introduced as a service for patients with serious illness to help with symptoms, assist with goals of care conversations, navigate complex decision making, improve quality of life for patients, and provide support both patients and families.  Patient seemed to appreciate the extra layer of support.    Medical Decision Making/Goals of Care/Advance Care Planning:  Patient's current clinical condition, including diagnosis, prognosis, and management plan, and goals of care were discussed.   Life limiting disease: { :96669}  Family: Supportive ***  Performance status: Major limitations due to { :42213}  Joys/meaning/strength: ***  Understanding of health: Demonstrates good prognostic understanding of disease process, understands plan for ***  Information:{ :18377}  Medical update:***  Prognosis:***  Goals: {Goals:15453}  Worries and fears now and future: { :01846}   Minimum acceptable outcome/QOL:  ***  Code status discussion:  ***    Advance Directives  Existence of Advance Directives:{ :46388}  Decision maker: { :43621} ***  Code Status: {  :70461}    Next Follow-Up Visit:  Return to clinic in ***    Signature and billing  Thank you for allowing us to participate in the care of this patient. Recommendations will be communicated back to the consulting service by way of shared electronic medical record or face-to-face.    Medical complexity was {medical complexity:16622} level due to due to complexity of problems, extensive data review, and high risk of management/treatment.  Time was spent on the following: {time spent:92758}. Total time spent: ***      DATA   Diagnostic tests and information reviewed for today's visit:  { :27421}       Some elements copied from *** note on ***, the elements have been updated and all reflect current decision making from today, 4/14/2025.      Plan of Care discussed with: { :05952}      SIGNATURE: OBIE Pederson-CNP    Contact information:  Supportive and Palliative Oncology  Monday-Friday 8 AM-5 PM  Phone:  [phone], press option #5, then option #1.   Or Epic Secure Chat

## 2025-04-14 NOTE — PROGRESS NOTES
Patient ID: Juan M Mcrae is a 79 y.o. male.  Diagnoses:   1.GE junction adenocarcinoma (signet ring cell), proficient MMR.  Localized (clinical stage II/III).  Status post esophagectomy on July 23, 2024 following neoadjuvant CRT->ypT3 N1.  Adjuvant nivolumab started on October 7, 2024. Stopped within 2 months because of severe nausea.  Cancer recurrence predominantly in the liver in January 2025.  2. Type 2 diabetes mellitus on oral hypoglycemics, hypertension controlled with medications.    Genomic profile:  Proficient MMR status on the biopsy by IHC.  HER2 positive (3+) by IHC.  Tempus XT requested on the liver biopsy specimen (from February 12, 2025 specimen) on February 24, 2025.    Assessment and Plan:  This is a pleasant 79-year-old man with a diagnosis of GE junction adenocarcinoma.  His staging PET/CT and subsequent imaging studies confirmed localized disease.  He had a port placement and a J-tube placement.  He has seen radiation oncology and he has been enrolled in the NRG- (photon versus proton) study.    He completed concurrent chemoradiation in early June 2024 (last chemo was on Bettie 10, 2024).  His surgery took place on July 23, 2024.  He tolerated the surgery well.  The final pathology was ypT3 N1.      Since he has residual tumor in the resected specimen, We discussed adjuvant therapy with nivolumab.  Discussed the data of CheckMate-577 study which showed survival advantage with adjuvant nivolumab. Patient started treatment on 10-, which he initially tolerated well with grade 1 nausea and fatigue.  Since November of 2024, for increasing nausea, poor appetite and weight loss, we have held treatment.      A CT scan from January 21, 2025 showed numerous liver lesions suggestive of metastatic disease.  A biopsy of the liver lesion on February 12, 2025 confirmed metastatic adenocarcinoma.     We discussed the overall outlook and the plan since he has metastatic recurrence in the liver.  We  discussed with him that this is an incurable condition.  His liver disease is rather extensive.  His disease is HER2 positive.  However, he did not tolerate immunotherapy at all which was discontinued because of intolerable nausea. We discussed the chemotherapy regimen with FOLFOX and trastuzumab and patient consented to treatment.     Plan: Treatment with FOLFOX plus Herceptin.  Pembrolizumab was withheld at patient's request because of his severe nausea from nivolumab.--- Started 3/18/25    Mr. Mcrae will continue with cycle 2 D1 FOLFOX + Herceptin as planned tomorrow. He tolerated C1 well with some grade 1 fatigue and cold sensitivity. Labs are pending.     Plan discussed in detail with patient and he agreed. He knows to call with any issues or concerns.     Follow-up: 2 Weeks with next treatment and exam.     I have placed all orders as outlined above. I advised the patient to schedule the tests and follow-up appointment as discussed by contacting the  on the way out or calling by phone.    Providers:  Surgeon: Dr. Jovany Solares: Dr. Lisandra Byrdc: Donna.    Chief complaint: GE junction adenocarcinoma.    HPI:  ONCOLOGIC HISTORY-    February 28, 2024: Underwent an EGD for progressive dysphagia and weight loss.  EGD revealed an obstructing mass in the distal esophagus.  Biopsy confirmed adenocarcinoma with signet ring cell features.    March 21, 2024: EUS revealed T2 N1 mass extending from 41 cm to 43 cm and involving less than 1 cm of the gastric cardia.    March 22, 2024: CT scan of chest did not show any metastatic disease.    April 1, 2024 24: PET/CT scan done:   1. Hypermetabolic linear focus at gastroesophageal junction which is  extending to the gastric cardia/proximal lesser curvature in  correlation with distal esophageal /gastroesophageal junction wall  thickening. These findings are compatible with biopsy-proven  adenocarcinoma.  2. No evidence of hypermetabolic lymphadenopathy.  3.  Abnormal focal increase in FDG uptake in the left hepatic lobe in  correlation with hypoattenuating lesion, concerning for metastatic  disease. Further correlation with dedicated CT abdomen is recommended.  4. Large fat containing left inguinal hernia.  Interval history: He has significant dysphagia although he can get by with soup.  He has lost about 60 pounds in the last 6 months or so.  Feels fatigued.  Denies any pain.  Denies fever or chill or any other sign of ongoing infection.    April 3, 2024: CT scan of abdomen and MRI liver ruled out metastatic disease (liver lesions were hemangiomas).    May 6, 2024: Started concurrent radiation and chemotherapy with weekly carboplatin and Taxol.  Will complete concurrent chemoradiation on June 14, 2024.    6/10/24: RECEIVED THE LAST DOSE OF CHEMO. LAST DOSE OF RADIATION WAS ON 6/14/2024.    July 23, 2024: Underwent surgery.  Pathology ypT3 N1.    October 7, 2024: Started adjuvant nivolumab.  December 2, 2024-nivolumab was placed on hold because of increasing nausea.    January 21, 2025: CT scan of chest abdomen pelvis showed metastatic cancer recurrence.      February 12, 2025: Ultrasound-guided biopsy of the liver lesion confirmed metastatic adenocarcinoma.    3/18/25: Start FOLFOX + Trastuzumab     Interval history:  Mr. Mcrae returns today prior to treatment tomorrow 4/15/25. Patient is feeling well overall. He notes that his taste continues to improve. Feels he has gained one pound. Fatigue is stable, napping one hour a day. Has cold sensitivity lasting the entire two weeks. Has a dry mouth. No fevers, chills, chest pain, dyspnea, v/c/d. Has intermittent nausea managed with anti-emetics.     Review of 12 systems: Negative unless otherwise stated in HPI       Allergies  Omeprazole, Sulfa, shellfish    Medications:  Acetaminophen, atenolol, dexamethasone, Emla cream, metformin 1000 mg twice daily, multivitamin, naloxone, nystatin, olanzapine, Actos,  prochlorperazine, trazodone     Past Medical History:   Type 2 diabetes mellitus on oral hypoglycemics, hypertension controlled with medications.  Cataract, CKD (chronic kidney disease), Colon polyp, Deviated septum, ED (erectile dysfunction), Esophageal cancer, GERD (gastroesophageal reflux disease), Hyperlipidemia, Nephrolithiasis, Sleep apnea (wear CPAP/BiPAP)    Surgical History:    Past Surgical History:   Procedure Laterality Date    BLADDER SURGERY      COLONOSCOPY      Repeat in one year    EYE SURGERY      LITHOTRIPSY      OTHER SURGICAL HISTORY      Vocal cord surgery    TRANSURETHRAL RESECTION OF PROSTATE      UPPER GASTROINTESTINAL ENDOSCOPY  2024      Family History:    Family History   Problem Relation Name Age of Onset    Stroke Father      Kidney cancer Brother Eliu     Stroke Brother Eliu      Family Oncology History:    Cancer-related family history includes Kidney cancer in his brother.    Social History:      Tobacco Use    Smoking status: Former     Current packs/day: 0.00     Average packs/day: 2.0 packs/day for 40.0 years (80.0 ttl pk-yrs)     Types: Cigarettes     Start date: 1959     Quit date: 1999     Years since quittin.4    Smokeless tobacco: Never   Vaping Use    Vaping status: Never Used   Substance Use Topics    Alcohol use: Yes     Alcohol/week: 2.0 standard drinks of alcohol     Types: 2 Cans of beer per week     Comment: couple beers a week    Drug use: Not Currently     Comment: gummies-dispensary from [place]      Vital Signs: reviewed today     Physical Exam:   ECOG PS- 1.  General: well-appearing, NAD  Eyes: anicteric  ENT: MMM  Neck: Supple, no LAD  CV: RRR  Resp: CTAB, non-labored  Abd: Soft, NT, ND  Ext: wwp, no edema  Neuro: awake, alert, oriented, fluent speech, moving all extremities  Skin: no rash     Patient seen and examined with Dr. Fry.     Jonny Banks MD  Hematology Oncology PGYVI    Patient seen with Dr. Jonny Banks.  I agree with  the documentation and the plan.  Catarino Fry MD.

## 2025-04-15 ENCOUNTER — APPOINTMENT (OUTPATIENT)
Dept: PRIMARY CARE | Facility: CLINIC | Age: 80
End: 2025-04-15
Payer: MEDICARE

## 2025-04-15 ENCOUNTER — INFUSION (OUTPATIENT)
Dept: HEMATOLOGY/ONCOLOGY | Facility: CLINIC | Age: 80
End: 2025-04-15
Payer: MEDICARE

## 2025-04-15 VITALS
TEMPERATURE: 97 F | HEART RATE: 118 BPM | DIASTOLIC BLOOD PRESSURE: 72 MMHG | WEIGHT: 128.42 LBS | SYSTOLIC BLOOD PRESSURE: 115 MMHG | BODY MASS INDEX: 20.73 KG/M2 | OXYGEN SATURATION: 97 % | RESPIRATION RATE: 17 BRPM

## 2025-04-15 DIAGNOSIS — C15.5 MALIGNANT NEOPLASM OF LOWER THIRD OF ESOPHAGUS (MULTI): ICD-10-CM

## 2025-04-15 LAB
BASOPHILS # BLD AUTO: 0.06 X10*3/UL (ref 0–0.1)
BASOPHILS NFR BLD AUTO: 0.8 %
EOSINOPHIL # BLD AUTO: 0.06 X10*3/UL (ref 0–0.4)
EOSINOPHIL NFR BLD AUTO: 0.8 %
ERYTHROCYTE [DISTWIDTH] IN BLOOD BY AUTOMATED COUNT: 17.3 % (ref 11.5–14.5)
HCT VFR BLD AUTO: 39.4 % (ref 41–52)
HGB BLD-MCNC: 13.2 G/DL (ref 13.5–17.5)
IMM GRANULOCYTES # BLD AUTO: 0.03 X10*3/UL (ref 0–0.5)
IMM GRANULOCYTES NFR BLD AUTO: 0.4 % (ref 0–0.9)
LYMPHOCYTES # BLD AUTO: 1 X10*3/UL (ref 0.8–3)
LYMPHOCYTES NFR BLD AUTO: 12.5 %
MCH RBC QN AUTO: 32.7 PG (ref 26–34)
MCHC RBC AUTO-ENTMCNC: 33.5 G/DL (ref 32–36)
MCV RBC AUTO: 98 FL (ref 80–100)
MONOCYTES # BLD AUTO: 1.24 X10*3/UL (ref 0.05–0.8)
MONOCYTES NFR BLD AUTO: 15.6 %
NEUTROPHILS # BLD AUTO: 5.58 X10*3/UL (ref 1.6–5.5)
NEUTROPHILS NFR BLD AUTO: 69.9 %
NRBC BLD-RTO: 0 /100 WBCS (ref 0–0)
PLATELET # BLD AUTO: 267 X10*3/UL (ref 150–450)
RBC # BLD AUTO: 4.04 X10*6/UL (ref 4.5–5.9)
WBC # BLD AUTO: 8 X10*3/UL (ref 4.4–11.3)

## 2025-04-15 PROCEDURE — 96415 CHEMO IV INFUSION ADDL HR: CPT

## 2025-04-15 PROCEDURE — 2500000005 HC RX 250 GENERAL PHARMACY W/O HCPCS

## 2025-04-15 PROCEDURE — 2500000004 HC RX 250 GENERAL PHARMACY W/ HCPCS (ALT 636 FOR OP/ED)

## 2025-04-15 PROCEDURE — 96374 THER/PROPH/DIAG INJ IV PUSH: CPT | Mod: INF

## 2025-04-15 PROCEDURE — 96416 CHEMO PROLONG INFUSE W/PUMP: CPT

## 2025-04-15 PROCEDURE — 96413 CHEMO IV INFUSION 1 HR: CPT

## 2025-04-15 PROCEDURE — 96417 CHEMO IV INFUS EACH ADDL SEQ: CPT

## 2025-04-15 PROCEDURE — 85025 COMPLETE CBC W/AUTO DIFF WBC: CPT

## 2025-04-15 PROCEDURE — 96375 TX/PRO/DX INJ NEW DRUG ADDON: CPT | Mod: INF

## 2025-04-15 RX ORDER — ALBUTEROL SULFATE 0.83 MG/ML
3 SOLUTION RESPIRATORY (INHALATION) AS NEEDED
Status: DISCONTINUED | OUTPATIENT
Start: 2025-04-15 | End: 2025-04-15 | Stop reason: HOSPADM

## 2025-04-15 RX ORDER — PROCHLORPERAZINE MALEATE 10 MG
10 TABLET ORAL EVERY 6 HOURS PRN
Status: DISCONTINUED | OUTPATIENT
Start: 2025-04-15 | End: 2025-04-15 | Stop reason: HOSPADM

## 2025-04-15 RX ORDER — HEPARIN SODIUM,PORCINE/PF 10 UNIT/ML
50 SYRINGE (ML) INTRAVENOUS AS NEEDED
Status: CANCELLED | OUTPATIENT
Start: 2025-04-15

## 2025-04-15 RX ORDER — PALONOSETRON 0.05 MG/ML
0.25 INJECTION, SOLUTION INTRAVENOUS ONCE
Status: COMPLETED | OUTPATIENT
Start: 2025-04-15 | End: 2025-04-15

## 2025-04-15 RX ORDER — LORAZEPAM 2 MG/ML
1 INJECTION INTRAMUSCULAR AS NEEDED
Status: DISCONTINUED | OUTPATIENT
Start: 2025-04-15 | End: 2025-04-15 | Stop reason: HOSPADM

## 2025-04-15 RX ORDER — DEXAMETHASONE 6 MG/1
12 TABLET ORAL ONCE
Status: COMPLETED | OUTPATIENT
Start: 2025-04-15 | End: 2025-04-15

## 2025-04-15 RX ORDER — PROCHLORPERAZINE EDISYLATE 5 MG/ML
10 INJECTION INTRAMUSCULAR; INTRAVENOUS EVERY 6 HOURS PRN
Status: DISCONTINUED | OUTPATIENT
Start: 2025-04-15 | End: 2025-04-15 | Stop reason: HOSPADM

## 2025-04-15 RX ORDER — FAMOTIDINE 10 MG/ML
20 INJECTION, SOLUTION INTRAVENOUS ONCE AS NEEDED
Status: DISCONTINUED | OUTPATIENT
Start: 2025-04-15 | End: 2025-04-15 | Stop reason: HOSPADM

## 2025-04-15 RX ORDER — HEPARIN 100 UNIT/ML
500 SYRINGE INTRAVENOUS AS NEEDED
Status: CANCELLED | OUTPATIENT
Start: 2025-04-15

## 2025-04-15 RX ORDER — DIPHENHYDRAMINE HYDROCHLORIDE 50 MG/ML
50 INJECTION, SOLUTION INTRAMUSCULAR; INTRAVENOUS AS NEEDED
Status: DISCONTINUED | OUTPATIENT
Start: 2025-04-15 | End: 2025-04-15 | Stop reason: HOSPADM

## 2025-04-15 RX ORDER — EPINEPHRINE 0.3 MG/.3ML
0.3 INJECTION SUBCUTANEOUS EVERY 5 MIN PRN
Status: DISCONTINUED | OUTPATIENT
Start: 2025-04-15 | End: 2025-04-15 | Stop reason: HOSPADM

## 2025-04-15 RX ADMIN — PALONOSETRON HYDROCHLORIDE 0.25 MG: 0.25 INJECTION INTRAVENOUS at 08:20

## 2025-04-15 RX ADMIN — OXALIPLATIN 110 MG: 5 INJECTION, SOLUTION INTRAVENOUS at 09:16

## 2025-04-15 RX ADMIN — DEXAMETHASONE 12 MG: 6 TABLET ORAL at 08:20

## 2025-04-15 RX ADMIN — FLUOROURACIL 4050 MG: 50 INJECTION, SOLUTION INTRAVENOUS at 11:20

## 2025-04-15 RX ADMIN — TRASTUZUMAB-ANNS 245.7 MG: 420 INJECTION, POWDER, LYOPHILIZED, FOR SOLUTION INTRAVENOUS at 08:35

## 2025-04-15 ASSESSMENT — PAIN SCALES - GENERAL: PAINLEVEL_OUTOF10: 0-NO PAIN

## 2025-04-16 ENCOUNTER — TELEPHONE (OUTPATIENT)
Dept: PRIMARY CARE | Facility: CLINIC | Age: 80
End: 2025-04-16
Payer: MEDICARE

## 2025-04-16 DIAGNOSIS — E11.9 TYPE 2 DIABETES MELLITUS WITHOUT COMPLICATION, WITHOUT LONG-TERM CURRENT USE OF INSULIN: ICD-10-CM

## 2025-04-16 RX ORDER — METFORMIN HYDROCHLORIDE 500 MG/1
500 TABLET ORAL
Qty: 180 TABLET | Refills: 1 | Status: SHIPPED | OUTPATIENT
Start: 2025-04-16 | End: 2025-04-18

## 2025-04-16 NOTE — TELEPHONE ENCOUNTER
Patient called Rx line and requested a refill for Metformin 500 mg. Last ov 10/17/2024. Next ov 4/18/2025.  Pharmacy: [facility]

## 2025-04-17 ENCOUNTER — INFUSION (OUTPATIENT)
Dept: HEMATOLOGY/ONCOLOGY | Facility: CLINIC | Age: 80
End: 2025-04-17
Payer: MEDICARE

## 2025-04-17 VITALS
OXYGEN SATURATION: 96 % | RESPIRATION RATE: 18 BRPM | HEART RATE: 129 BPM | SYSTOLIC BLOOD PRESSURE: 125 MMHG | DIASTOLIC BLOOD PRESSURE: 80 MMHG | BODY MASS INDEX: 21.07 KG/M2 | WEIGHT: 130.51 LBS | TEMPERATURE: 97.2 F

## 2025-04-17 DIAGNOSIS — C15.5 MALIGNANT NEOPLASM OF LOWER THIRD OF ESOPHAGUS (MULTI): ICD-10-CM

## 2025-04-17 PROCEDURE — 96523 IRRIG DRUG DELIVERY DEVICE: CPT

## 2025-04-17 PROCEDURE — 2500000004 HC RX 250 GENERAL PHARMACY W/ HCPCS (ALT 636 FOR OP/ED): Mod: JZ | Performed by: INTERNAL MEDICINE

## 2025-04-17 RX ORDER — HEPARIN SODIUM,PORCINE/PF 10 UNIT/ML
50 SYRINGE (ML) INTRAVENOUS AS NEEDED
OUTPATIENT
Start: 2025-04-17

## 2025-04-17 RX ORDER — HEPARIN SODIUM,PORCINE/PF 10 UNIT/ML
50 SYRINGE (ML) INTRAVENOUS AS NEEDED
Status: DISCONTINUED | OUTPATIENT
Start: 2025-04-17 | End: 2025-04-17 | Stop reason: HOSPADM

## 2025-04-17 RX ORDER — HEPARIN 100 UNIT/ML
500 SYRINGE INTRAVENOUS AS NEEDED
Status: DISCONTINUED | OUTPATIENT
Start: 2025-04-17 | End: 2025-04-17 | Stop reason: HOSPADM

## 2025-04-17 RX ORDER — HEPARIN 100 UNIT/ML
500 SYRINGE INTRAVENOUS AS NEEDED
OUTPATIENT
Start: 2025-04-17

## 2025-04-17 RX ADMIN — HEPARIN 500 UNITS: 100 SYRINGE at 09:45

## 2025-04-17 ASSESSMENT — PAIN SCALES - GENERAL: PAINLEVEL_OUTOF10: 0-NO PAIN

## 2025-04-18 ENCOUNTER — APPOINTMENT (OUTPATIENT)
Dept: PRIMARY CARE | Facility: CLINIC | Age: 80
End: 2025-04-18
Payer: MEDICARE

## 2025-04-18 VITALS
BODY MASS INDEX: 20.73 KG/M2 | SYSTOLIC BLOOD PRESSURE: 108 MMHG | HEART RATE: 112 BPM | OXYGEN SATURATION: 92 % | DIASTOLIC BLOOD PRESSURE: 80 MMHG | HEIGHT: 66 IN | WEIGHT: 129 LBS

## 2025-04-18 DIAGNOSIS — I10 PRIMARY HYPERTENSION: Primary | ICD-10-CM

## 2025-04-18 DIAGNOSIS — E11.9 TYPE 2 DIABETES MELLITUS WITHOUT COMPLICATION, WITHOUT LONG-TERM CURRENT USE OF INSULIN: ICD-10-CM

## 2025-04-18 PROCEDURE — 1123F ACP DISCUSS/DSCN MKR DOCD: CPT | Performed by: STUDENT IN AN ORGANIZED HEALTH CARE EDUCATION/TRAINING PROGRAM

## 2025-04-18 PROCEDURE — 3074F SYST BP LT 130 MM HG: CPT | Performed by: STUDENT IN AN ORGANIZED HEALTH CARE EDUCATION/TRAINING PROGRAM

## 2025-04-18 PROCEDURE — 1157F ADVNC CARE PLAN IN RCRD: CPT | Performed by: STUDENT IN AN ORGANIZED HEALTH CARE EDUCATION/TRAINING PROGRAM

## 2025-04-18 PROCEDURE — 99214 OFFICE O/P EST MOD 30 MIN: CPT | Performed by: STUDENT IN AN ORGANIZED HEALTH CARE EDUCATION/TRAINING PROGRAM

## 2025-04-18 PROCEDURE — 1126F AMNT PAIN NOTED NONE PRSNT: CPT | Performed by: STUDENT IN AN ORGANIZED HEALTH CARE EDUCATION/TRAINING PROGRAM

## 2025-04-18 PROCEDURE — 1159F MED LIST DOCD IN RCRD: CPT | Performed by: STUDENT IN AN ORGANIZED HEALTH CARE EDUCATION/TRAINING PROGRAM

## 2025-04-18 PROCEDURE — G2211 COMPLEX E/M VISIT ADD ON: HCPCS | Performed by: STUDENT IN AN ORGANIZED HEALTH CARE EDUCATION/TRAINING PROGRAM

## 2025-04-18 PROCEDURE — 3079F DIAST BP 80-89 MM HG: CPT | Performed by: STUDENT IN AN ORGANIZED HEALTH CARE EDUCATION/TRAINING PROGRAM

## 2025-04-18 RX ORDER — METFORMIN HYDROCHLORIDE 500 MG/1
1000 TABLET ORAL
Qty: 360 TABLET | Refills: 2 | Status: SHIPPED | OUTPATIENT
Start: 2025-04-18 | End: 2026-01-13

## 2025-04-18 ASSESSMENT — PATIENT HEALTH QUESTIONNAIRE - PHQ9
1. LITTLE INTEREST OR PLEASURE IN DOING THINGS: SEVERAL DAYS
SUM OF ALL RESPONSES TO PHQ9 QUESTIONS 1 AND 2: 2
10. IF YOU CHECKED OFF ANY PROBLEMS, HOW DIFFICULT HAVE THESE PROBLEMS MADE IT FOR YOU TO DO YOUR WORK, TAKE CARE OF THINGS AT HOME, OR GET ALONG WITH OTHER PEOPLE: SOMEWHAT DIFFICULT
2. FEELING DOWN, DEPRESSED OR HOPELESS: SEVERAL DAYS

## 2025-04-18 ASSESSMENT — PAIN SCALES - GENERAL: PAINLEVEL_OUTOF10: 0-NO PAIN

## 2025-04-18 NOTE — PROGRESS NOTES
AGP Report review   Juan M Mcrae is a 79 y.o. male who was referred by Cadny Grossman MD to review current CGM data.    Current DM Meds:  Current Outpatient Medications   Medication Instructions    acetaminophen (TYLENOL) 650 mg, oral, Every 6 hours PRN    chlorhexidine (Peridex) 0.12 % solution Rinse mouth with 15 ml after toothbrushing the night before surgery and on the morning of surgery.  Expectorate after rinsing.  Do not swallow.    famotidine (PEPCID) 20 mg, oral, Daily    ibuprofen 600 mg, oral, Every 6 hours PRN    loperamide (Imodium) 2 mg capsule Take 2 capsules (4 mg) by mouth with the first episode of diarrhea and 1 capsule (2 mg) by mouth with any additional episodes. Maximum 8 capsules (16 mg) per day.    metFORMIN (GLUCOPHAGE) 1,000 mg, oral, 2 times daily (morning and late afternoon), 1 tab bid for 1 week then 2 tab bid after    metroNIDAZOLE (Flagyl) 250 mg tablet Take one tablet at 6p, 7p, and 11p the night before surgery.    nystatin (MYCOSTATIN) 500,000 Units, oral, 4 times daily, Swish in mouth and swallow.    OLANZapine (ZYPREXA) 5 mg, oral, Nightly, Take 1 tablet daily at bedtime    ondansetron (ZOFRAN) 8 mg, oral, Every 8 hours PRN    ondansetron ODT (ZOFRAN-ODT) 8 mg, oral, Every 8 hours PRN    pilocarpine (SALAGEN (PILOCARPINE)) 5 mg, oral, 3 times daily PRN    polyethylene glycol (GLYCOLAX, MIRALAX) 17 g, oral, Daily PRN    prochlorperazine (COMPAZINE) 10 mg, oral, Every 6 hours PRN    prochlorperazine (COMPAZINE) 10 mg, oral, Every 6 hours PRN    traMADol (ULTRAM) 50 mg, oral, Every 6 hours PRN    traZODone (DESYREL) 100-200 mg, oral, Nightly PRN       POC HEMOGLOBIN A1c (%)   Date Value   01/23/2025 6.6 (A)   10/17/2024 7.0 (A)   06/19/2024 11.0 (A)     Hemoglobin A1C (%)   Date Value   04/14/2025 5.7 (H)   07/17/2024 8.8 (H)     Glucose (mg/dL)   Date Value   04/14/2025 114 (H)     Creatinine (mg/dL)   Date Value   04/14/2025 0.60     eGFR (mL/min/1.73m*2)   Date Value   04/14/2025  >90       Summary of CGM Findings:  -----------------------------  [employer]  -----------------------------  Juan M Mcrae    YOB: 1945    Generated at: Fri, Apr 18, 2025 3:30 PM EDT    Reporting period: Sat Apr 5, 2025 - Fri Apr 18, 2025  -----------------------------  Glucose Details    Average glucose: 148 mg/dL    GMI: 6.9%    Standard deviation: 33 mg/dL    Coefficient of Variation: 22.0%  -----------------------------  Time in Range    Very High: 1%    High: 14%    In Range: 85%    Low: 0%    Very Low: 0%    Target Range   mg/dL    -----------------------------  Sensor usage    Days with data: 14/14    Time active: 97%    Avg. calibrations per day: 0.0    See attached documents    Plan:  Continue all medications as prescribed  Purchase Biotene lozenges for dry mouth  Aim for 100g Protein daily      Data reviewed and evaluated by ARMANI Leroy RP, [facility]  Recommendations/report sent to Candy Grossman MD via shared medical record

## 2025-04-18 NOTE — PROGRESS NOTES
Subjective   Juan M Mcrae is a 79 y.o. male who presents for chronic disease management. He is here today with daughter.     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 Immunotherapy started 10/2024, stopped within 2 mos d/t severe nausea. Now on FOLFOX plus Herceptin started 3/2025. Cancer recurrence predominantly in the liver in 2025.      HTN - is now off all antihypertensives d/t low blood pressures. Readings at outpatient appts and today have all remained in normal range <140/90.    T2DM  Current medication regimen: Metformin 1000 mg BID  Tolerating current medication regimen yes  Blood sugars at home averagin-150   CGM? yes   Hypoglycemic episodes no  Last A1C : 5.7  Last eGFR - >90  Last Creatinine - 0.60  Last microalbumin -  Up to date on yearly eye exams yes  -Last eye exam -   Trying to get in more nutrition, has no appetite. Has been drinking [employer] protein shakes - not able to drink dry foods.       LAB REVIEW   POC HEMOGLOBIN A1c (%)   Date Value   2025 6.6 (A)   10/17/2024 7.0 (A)   2024 11.0 (A)     Hemoglobin A1C (%)   Date Value   2025 5.7 (H)   2024 8.8 (H)     Glucose (mg/dL)   Date Value   2025 114 (H)   2025 133 (H)   2025 137 (H)     Creatinine (mg/dL)   Date Value   2025 0.60   2025 0.69   2025 0.66     eGFR (mL/min/1.73m*2)   Date Value   2025 >90   2025 >90   2025 >90     Lab Results   Component Value Date    ALBUR 21 2023    CREATININE 0.60 2025     Lab Results   Component Value Date    CHOL 172 2024    CHOL 142 2024    CHOL 157 2023     Lab Results   Component Value Date    HDL 42.6 2024    HDL 76.0 2024    HDL 82 2023     Lab Results   Component Value Date    LDLCALC 107 (H) 2024    LDLCALC 55 (L) 2024    LDLCALC 63 (L) 2023     Lab Results   Component Value  "Date    TRIG 113 07/17/2024    TRIG 57 02/05/2024    TRIG 61 08/01/2023       ROS otherwise negative aside from what was mentioned above in HPI.      Objective   /80 (BP Location: Left arm, Patient Position: Sitting, BP Cuff Size: Adult)   Pulse (!) 112   Ht 1.676 m (5' 6\")   Wt 58.5 kg (129 lb)   SpO2 92%   BMI 20.82 kg/m²     Physical Exam  Constitutional:       Appearance: Normal appearance.   Cardiovascular:      Rate and Rhythm: Normal rate and regular rhythm.      Heart sounds: Normal heart sounds.   Pulmonary:      Effort: Pulmonary effort is normal.      Breath sounds: Normal breath sounds. No wheezing, rhonchi or rales.   Musculoskeletal:      Right lower leg: No edema.      Left lower leg: No edema.   Neurological:      Mental Status: He is alert.         Assessment/Plan   1. Type 2 diabetes mellitus without complication, without long-term current use of insulin  Controlled  Continue current medication regimen.   Routine diabetic retinopathy screening up to date  Continue dietary efforts - discussed prioritizing protein in diet  - metFORMIN (Glucophage) 500 mg tablet; Take 2 tablets (1,000 mg) by mouth 2 times daily (morning and late afternoon). 1 tab bid for 1 week then 2 tab bid after  Dispense: 360 tablet; Refill: 2    2. Primary hypertension (Primary)  Controlled off medication. Will remain off. Stable renal function on labs. Will continue to monitor.    "

## 2025-04-28 ENCOUNTER — OFFICE VISIT (OUTPATIENT)
Dept: HEMATOLOGY/ONCOLOGY | Facility: CLINIC | Age: 80
End: 2025-04-28
Payer: MEDICARE

## 2025-04-28 ENCOUNTER — LAB (OUTPATIENT)
Dept: HEMATOLOGY/ONCOLOGY | Facility: CLINIC | Age: 80
End: 2025-04-28
Payer: MEDICARE

## 2025-04-28 VITALS
DIASTOLIC BLOOD PRESSURE: 79 MMHG | BODY MASS INDEX: 20.5 KG/M2 | SYSTOLIC BLOOD PRESSURE: 117 MMHG | HEART RATE: 123 BPM | TEMPERATURE: 96.6 F | RESPIRATION RATE: 18 BRPM | WEIGHT: 126.98 LBS | OXYGEN SATURATION: 94 %

## 2025-04-28 VITALS
HEART RATE: 123 BPM | OXYGEN SATURATION: 94 % | BODY MASS INDEX: 20.5 KG/M2 | RESPIRATION RATE: 18 BRPM | DIASTOLIC BLOOD PRESSURE: 79 MMHG | TEMPERATURE: 96.6 F | SYSTOLIC BLOOD PRESSURE: 117 MMHG | WEIGHT: 126.98 LBS

## 2025-04-28 DIAGNOSIS — C15.5 MALIGNANT NEOPLASM OF LOWER THIRD OF ESOPHAGUS (MULTI): ICD-10-CM

## 2025-04-28 DIAGNOSIS — C15.5 MALIGNANT NEOPLASM OF LOWER THIRD OF ESOPHAGUS (MULTI): Primary | ICD-10-CM

## 2025-04-28 LAB
ALBUMIN SERPL BCP-MCNC: 3.2 G/DL (ref 3.4–5)
ALP SERPL-CCNC: 505 U/L (ref 33–136)
ALT SERPL W P-5'-P-CCNC: 27 U/L (ref 10–52)
ANION GAP SERPL CALC-SCNC: 13 MMOL/L (ref 10–20)
AST SERPL W P-5'-P-CCNC: 54 U/L (ref 9–39)
BASOPHILS # BLD AUTO: 0.05 X10*3/UL (ref 0–0.1)
BASOPHILS NFR BLD AUTO: 0.7 %
BILIRUB SERPL-MCNC: 1.4 MG/DL (ref 0–1.2)
BUN SERPL-MCNC: 12 MG/DL (ref 6–23)
CALCIUM SERPL-MCNC: 8.8 MG/DL (ref 8.6–10.3)
CHLORIDE SERPL-SCNC: 104 MMOL/L (ref 98–107)
CO2 SERPL-SCNC: 24 MMOL/L (ref 21–32)
CORTIS AM PEAK SERPL-MSCNC: 30.3 UG/DL (ref 5–20)
CREAT SERPL-MCNC: 0.62 MG/DL (ref 0.5–1.3)
EGFRCR SERPLBLD CKD-EPI 2021: >90 ML/MIN/1.73M*2
EOSINOPHIL # BLD AUTO: 0.04 X10*3/UL (ref 0–0.4)
EOSINOPHIL NFR BLD AUTO: 0.6 %
ERYTHROCYTE [DISTWIDTH] IN BLOOD BY AUTOMATED COUNT: 18.2 % (ref 11.5–14.5)
GLUCOSE SERPL-MCNC: 125 MG/DL (ref 74–99)
HCT VFR BLD AUTO: 40.1 % (ref 41–52)
HGB BLD-MCNC: 13.7 G/DL (ref 13.5–17.5)
IMM GRANULOCYTES # BLD AUTO: 0.01 X10*3/UL (ref 0–0.5)
IMM GRANULOCYTES NFR BLD AUTO: 0.1 % (ref 0–0.9)
LYMPHOCYTES # BLD AUTO: 0.95 X10*3/UL (ref 0.8–3)
LYMPHOCYTES NFR BLD AUTO: 13.9 %
MCH RBC QN AUTO: 33.1 PG (ref 26–34)
MCHC RBC AUTO-ENTMCNC: 34.2 G/DL (ref 32–36)
MCV RBC AUTO: 97 FL (ref 80–100)
MONOCYTES # BLD AUTO: 1 X10*3/UL (ref 0.05–0.8)
MONOCYTES NFR BLD AUTO: 14.6 %
NEUTROPHILS # BLD AUTO: 4.78 X10*3/UL (ref 1.6–5.5)
NEUTROPHILS NFR BLD AUTO: 70.1 %
NRBC BLD-RTO: 0 /100 WBCS (ref 0–0)
PLATELET # BLD AUTO: 235 X10*3/UL (ref 150–450)
POTASSIUM SERPL-SCNC: 3.1 MMOL/L (ref 3.5–5.3)
PROT SERPL-MCNC: 5.9 G/DL (ref 6.4–8.2)
RBC # BLD AUTO: 4.14 X10*6/UL (ref 4.5–5.9)
SODIUM SERPL-SCNC: 138 MMOL/L (ref 136–145)
TSH SERPL-ACNC: 3.38 MIU/L (ref 0.44–3.98)
WBC # BLD AUTO: 6.8 X10*3/UL (ref 4.4–11.3)

## 2025-04-28 PROCEDURE — 1159F MED LIST DOCD IN RCRD: CPT | Performed by: INTERNAL MEDICINE

## 2025-04-28 PROCEDURE — 80053 COMPREHEN METABOLIC PANEL: CPT

## 2025-04-28 PROCEDURE — 84443 ASSAY THYROID STIM HORMONE: CPT

## 2025-04-28 PROCEDURE — 82024 ASSAY OF ACTH: CPT

## 2025-04-28 PROCEDURE — 2500000004 HC RX 250 GENERAL PHARMACY W/ HCPCS (ALT 636 FOR OP/ED): Mod: JZ | Performed by: INTERNAL MEDICINE

## 2025-04-28 PROCEDURE — 36591 DRAW BLOOD OFF VENOUS DEVICE: CPT

## 2025-04-28 PROCEDURE — 82533 TOTAL CORTISOL: CPT

## 2025-04-28 PROCEDURE — 99214 OFFICE O/P EST MOD 30 MIN: CPT | Performed by: INTERNAL MEDICINE

## 2025-04-28 PROCEDURE — 1123F ACP DISCUSS/DSCN MKR DOCD: CPT | Performed by: INTERNAL MEDICINE

## 2025-04-28 PROCEDURE — 3074F SYST BP LT 130 MM HG: CPT | Performed by: INTERNAL MEDICINE

## 2025-04-28 PROCEDURE — 1126F AMNT PAIN NOTED NONE PRSNT: CPT | Performed by: INTERNAL MEDICINE

## 2025-04-28 PROCEDURE — 3078F DIAST BP <80 MM HG: CPT | Performed by: INTERNAL MEDICINE

## 2025-04-28 PROCEDURE — 1157F ADVNC CARE PLAN IN RCRD: CPT | Performed by: INTERNAL MEDICINE

## 2025-04-28 PROCEDURE — 1036F TOBACCO NON-USER: CPT | Performed by: INTERNAL MEDICINE

## 2025-04-28 PROCEDURE — 85025 COMPLETE CBC W/AUTO DIFF WBC: CPT

## 2025-04-28 RX ORDER — HEPARIN 100 UNIT/ML
500 SYRINGE INTRAVENOUS AS NEEDED
Status: DISCONTINUED | OUTPATIENT
Start: 2025-04-28 | End: 2025-04-28 | Stop reason: HOSPADM

## 2025-04-28 RX ORDER — DIPHENHYDRAMINE HYDROCHLORIDE 50 MG/ML
50 INJECTION, SOLUTION INTRAMUSCULAR; INTRAVENOUS AS NEEDED
Status: CANCELLED | OUTPATIENT
Start: 2025-04-28

## 2025-04-28 RX ORDER — PALONOSETRON 0.05 MG/ML
0.25 INJECTION, SOLUTION INTRAVENOUS ONCE
Status: CANCELLED | OUTPATIENT
Start: 2025-04-28

## 2025-04-28 RX ORDER — HEPARIN SODIUM,PORCINE/PF 10 UNIT/ML
50 SYRINGE (ML) INTRAVENOUS AS NEEDED
Status: CANCELLED | OUTPATIENT
Start: 2025-04-28

## 2025-04-28 RX ORDER — ALBUTEROL SULFATE 0.83 MG/ML
3 SOLUTION RESPIRATORY (INHALATION) AS NEEDED
Status: CANCELLED | OUTPATIENT
Start: 2025-04-28

## 2025-04-28 RX ORDER — PROCHLORPERAZINE MALEATE 10 MG
10 TABLET ORAL EVERY 6 HOURS PRN
Status: CANCELLED | OUTPATIENT
Start: 2025-04-28

## 2025-04-28 RX ORDER — FAMOTIDINE 10 MG/ML
20 INJECTION, SOLUTION INTRAVENOUS ONCE AS NEEDED
Status: CANCELLED | OUTPATIENT
Start: 2025-04-28

## 2025-04-28 RX ORDER — PROCHLORPERAZINE EDISYLATE 5 MG/ML
10 INJECTION INTRAMUSCULAR; INTRAVENOUS EVERY 6 HOURS PRN
Status: CANCELLED | OUTPATIENT
Start: 2025-04-28

## 2025-04-28 RX ORDER — LORAZEPAM 2 MG/ML
1 INJECTION INTRAMUSCULAR AS NEEDED
Status: CANCELLED | OUTPATIENT
Start: 2025-04-28

## 2025-04-28 RX ORDER — DEXAMETHASONE 6 MG/1
12 TABLET ORAL ONCE
Status: CANCELLED | OUTPATIENT
Start: 2025-04-28 | End: 2025-04-28

## 2025-04-28 RX ORDER — HEPARIN 100 UNIT/ML
500 SYRINGE INTRAVENOUS AS NEEDED
Status: CANCELLED | OUTPATIENT
Start: 2025-04-28

## 2025-04-28 RX ORDER — HEPARIN SODIUM,PORCINE/PF 10 UNIT/ML
50 SYRINGE (ML) INTRAVENOUS AS NEEDED
Status: DISCONTINUED | OUTPATIENT
Start: 2025-04-28 | End: 2025-04-28 | Stop reason: HOSPADM

## 2025-04-28 RX ORDER — EPINEPHRINE 0.3 MG/.3ML
0.3 INJECTION SUBCUTANEOUS EVERY 5 MIN PRN
Status: CANCELLED | OUTPATIENT
Start: 2025-04-28

## 2025-04-28 RX ADMIN — HEPARIN 500 UNITS: 100 SYRINGE at 07:53

## 2025-04-28 ASSESSMENT — PAIN SCALES - GENERAL
PAINLEVEL_OUTOF10: 0-NO PAIN
PAINLEVEL_OUTOF10: 0-NO PAIN

## 2025-04-28 NOTE — PROGRESS NOTES
"Patient ambulated into clinic for follow up with Dr. Fry accompanied by his significant other Helga. Medications and allergies reviewed.     Cold sensitivity is still lasting today, \"Whatever comes out of the fridge, feels like it was in the freezer.\"   Denies neuropathy.   Reports diarrhea once after his last cycle, took imodium with relief.   Reports he has been feeling weak, lost 3 lbs since last treatment.     CT CAP prior to next md appt.     Follow up in 2 weeks prior to next cycle.     "

## 2025-04-28 NOTE — PROGRESS NOTES
Patient ID: Juan M Mcrae is a 79 y.o. male.  Diagnoses:   1.GE junction adenocarcinoma (signet ring cell), proficient MMR.  Localized (clinical stage II/III).  Status post esophagectomy on July 23, 2024 following neoadjuvant CRT->ypT3 N1.  Adjuvant nivolumab started on October 7, 2024. Stopped within 2 months because of severe nausea.  Cancer recurrence predominantly in the liver in January 2025.  2. Type 2 diabetes mellitus on oral hypoglycemics, hypertension controlled with medications.    Genomic profile:  Proficient MMR status on the biopsy by IHC.  HER2 positive (3+) by IHC.  Tempus XT requested on the liver biopsy specimen (from February 12, 2025 specimen) on February 24, 2025.    Assessment and Plan:  This is a pleasant 79-year-old man with a diagnosis of GE junction adenocarcinoma.  His staging PET/CT and subsequent imaging studies confirmed localized disease.  He had a port placement and a J-tube placement.  He has seen radiation oncology and he has been enrolled in the NRG- (photon versus proton) study.    He completed concurrent chemoradiation in early June 2024 (last chemo was on Bettie 10, 2024).  His surgery took place on July 23, 2024.  He tolerated the surgery well.  The final pathology was ypT3 N1.      Since he has residual tumor in the resected specimen, We discussed adjuvant therapy with nivolumab.  Discussed the data of CheckMate-577 study which showed survival advantage with adjuvant nivolumab. Patient started treatment on 10-, which he initially tolerated well with grade 1 nausea and fatigue.  Since November of 2024, for increasing nausea, poor appetite and weight loss, we have held treatment.      A CT scan from January 21, 2025 showed numerous liver lesions suggestive of metastatic disease.  A biopsy of the liver lesion on February 12, 2025 confirmed metastatic adenocarcinoma.     We discussed the overall outlook and the plan since he has metastatic recurrence in the liver.  We  discussed with him that this is an incurable condition.  His liver disease is rather extensive.  His disease is HER2 positive.  However, he did not tolerate immunotherapy at all which was discontinued because of intolerable nausea. We discussed the chemotherapy regimen with FOLFOX and trastuzumab and patient consented to treatment.     Plan: Treatment with FOLFOX plus Herceptin.  Pembrolizumab was withheld at patient's request because of his severe nausea from nivolumab.--- Started 3/18/25    Mr. Mcrae is here for cycle 4 of chemotherapy.  He has tolerated chemotherapy well so far except some cold sensitivity.  I plan to proceed with the treatment if his labs are in parameter which are pending at this time.  I ordered a CT scan to be done about a week after cycle 4 of treatment.      Follow-up: 2 Weeks with next treatment and exam.     I have placed all orders as outlined above. I advised the patient to schedule the tests and follow-up appointment as discussed by contacting the  on the way out or calling by phone.    Providers:  Surgeon: Dr. Jovany Solares: Dr. Lisandra ReynosoOnc: Donna.    Chief complaint: GE junction adenocarcinoma.    HPI:  ONCOLOGIC HISTORY-    February 28, 2024: Underwent an EGD for progressive dysphagia and weight loss.  EGD revealed an obstructing mass in the distal esophagus.  Biopsy confirmed adenocarcinoma with signet ring cell features.    March 21, 2024: EUS revealed T2 N1 mass extending from 41 cm to 43 cm and involving less than 1 cm of the gastric cardia.    March 22, 2024: CT scan of chest did not show any metastatic disease.    April 1, 2024 24: PET/CT scan done:   1. Hypermetabolic linear focus at gastroesophageal junction which is  extending to the gastric cardia/proximal lesser curvature in  correlation with distal esophageal /gastroesophageal junction wall  thickening. These findings are compatible with biopsy-proven  adenocarcinoma.  2. No evidence of hypermetabolic  lymphadenopathy.  3. Abnormal focal increase in FDG uptake in the left hepatic lobe in  correlation with hypoattenuating lesion, concerning for metastatic  disease. Further correlation with dedicated CT abdomen is recommended.  4. Large fat containing left inguinal hernia.  Interval history: He has significant dysphagia although he can get by with soup.  He has lost about 60 pounds in the last 6 months or so.  Feels fatigued.  Denies any pain.  Denies fever or chill or any other sign of ongoing infection.    April 3, 2024: CT scan of abdomen and MRI liver ruled out metastatic disease (liver lesions were hemangiomas).    May 6, 2024: Started concurrent radiation and chemotherapy with weekly carboplatin and Taxol.  Will complete concurrent chemoradiation on June 14, 2024.    6/10/24: RECEIVED THE LAST DOSE OF CHEMO. LAST DOSE OF RADIATION WAS ON 6/14/2024.    July 23, 2024: Underwent surgery.  Pathology ypT3 N1.    October 7, 2024: Started adjuvant nivolumab.  December 2, 2024-nivolumab was placed on hold because of increasing nausea.    January 21, 2025: CT scan of chest abdomen pelvis showed metastatic cancer recurrence.      February 12, 2025: Ultrasound-guided biopsy of the liver lesion confirmed metastatic adenocarcinoma.    3/18/25: Start FOLFOX + Trastuzumab     Interval history:  Mr. Mcrae returns today prior to treatment tomorrow 4/1/25. Overall, he reports some ongoing fatigue. Reports this as stable. Appetite is still decreased, but improving as he reports he is able to taste more foods now. Did lose some weight but feels that with taste improving, this will help with future weight gain. Nausea is no longer an issue.      Review of 12 systems: Negative unless otherwise stated in HPI       Allergies  Omeprazole, Sulfa, shellfish    Medications:  Acetaminophen, atenolol, dexamethasone, Emla cream, metformin 1000 mg twice daily, multivitamin, naloxone, nystatin, olanzapine, Actos, prochlorperazine,  trazodone     Past Medical History:   Type 2 diabetes mellitus on oral hypoglycemics, hypertension controlled with medications.  Cataract, CKD (chronic kidney disease), Colon polyp, Deviated septum, ED (erectile dysfunction), Esophageal cancer, GERD (gastroesophageal reflux disease), Hyperlipidemia, Nephrolithiasis, Sleep apnea (wear CPAP/BiPAP)    Surgical History:    Past Surgical History:   Procedure Laterality Date    BLADDER SURGERY      COLONOSCOPY      Repeat in one year    EYE SURGERY      LITHOTRIPSY      OTHER SURGICAL HISTORY      Vocal cord surgery    TRANSURETHRAL RESECTION OF PROSTATE      UPPER GASTROINTESTINAL ENDOSCOPY  2024      Family History:    Family History   Problem Relation Name Age of Onset    Stroke Father      Kidney cancer Brother Eliu     Stroke Brother Eliu      Family Oncology History:    Cancer-related family history includes Kidney cancer in his brother.    Social History:      Tobacco Use    Smoking status: Former     Current packs/day: 0.00     Average packs/day: 2.0 packs/day for 40.0 years (80.0 ttl pk-yrs)     Types: Cigarettes     Start date: 1959     Quit date: 1999     Years since quittin.4    Smokeless tobacco: Never   Vaping Use    Vaping status: Never Used   Substance Use Topics    Alcohol use: Yes     Alcohol/week: 2.0 standard drinks of alcohol     Types: 2 Cans of beer per week     Comment: couple beers a week    Drug use: Not Currently     Comment: gummies-dispensary from [place]      Vital Signs: reviewed today     Physical Exam:   ECOG PS- 1.  General: Alert, ambulant, and answers questions appropriately.  Eyes- No pallor or icterus.  Neck- no swelling, lymph node enlargement or thyroid gland enlargement.  Chest- Bilateral good air entry. Lungs clear No crackles/ronchi.  Heart- no audible abnormal heart sounds.  Abdomen- Soft, non-tender. No mass or ascites. + BS  Extremities- no swelling or pitting edema.  Neuro- Alert and oriented X 4  Psych-  Normal behavior and thought process        Catarino Fry MD

## 2025-04-29 ENCOUNTER — INFUSION (OUTPATIENT)
Dept: HEMATOLOGY/ONCOLOGY | Facility: CLINIC | Age: 80
End: 2025-04-29
Payer: MEDICARE

## 2025-04-29 VITALS
OXYGEN SATURATION: 95 % | SYSTOLIC BLOOD PRESSURE: 111 MMHG | TEMPERATURE: 97.3 F | HEART RATE: 121 BPM | RESPIRATION RATE: 18 BRPM | WEIGHT: 127.65 LBS | DIASTOLIC BLOOD PRESSURE: 78 MMHG | BODY MASS INDEX: 20.6 KG/M2

## 2025-04-29 DIAGNOSIS — C15.5 MALIGNANT NEOPLASM OF LOWER THIRD OF ESOPHAGUS (MULTI): ICD-10-CM

## 2025-04-29 PROCEDURE — 96413 CHEMO IV INFUSION 1 HR: CPT

## 2025-04-29 PROCEDURE — 96416 CHEMO PROLONG INFUSE W/PUMP: CPT

## 2025-04-29 PROCEDURE — 96415 CHEMO IV INFUSION ADDL HR: CPT

## 2025-04-29 PROCEDURE — 2500000004 HC RX 250 GENERAL PHARMACY W/ HCPCS (ALT 636 FOR OP/ED): Mod: TB | Performed by: INTERNAL MEDICINE

## 2025-04-29 PROCEDURE — 96417 CHEMO IV INFUS EACH ADDL SEQ: CPT

## 2025-04-29 PROCEDURE — 2500000005 HC RX 250 GENERAL PHARMACY W/O HCPCS: Performed by: INTERNAL MEDICINE

## 2025-04-29 PROCEDURE — 96375 TX/PRO/DX INJ NEW DRUG ADDON: CPT | Mod: INF

## 2025-04-29 RX ORDER — LORAZEPAM 2 MG/ML
1 INJECTION INTRAMUSCULAR AS NEEDED
Status: DISCONTINUED | OUTPATIENT
Start: 2025-04-29 | End: 2025-04-29 | Stop reason: HOSPADM

## 2025-04-29 RX ORDER — DIPHENHYDRAMINE HYDROCHLORIDE 50 MG/ML
50 INJECTION, SOLUTION INTRAMUSCULAR; INTRAVENOUS AS NEEDED
Status: DISCONTINUED | OUTPATIENT
Start: 2025-04-29 | End: 2025-04-29 | Stop reason: HOSPADM

## 2025-04-29 RX ORDER — ALBUTEROL SULFATE 0.83 MG/ML
3 SOLUTION RESPIRATORY (INHALATION) AS NEEDED
Status: DISCONTINUED | OUTPATIENT
Start: 2025-04-29 | End: 2025-04-29 | Stop reason: HOSPADM

## 2025-04-29 RX ORDER — HEPARIN 100 UNIT/ML
500 SYRINGE INTRAVENOUS AS NEEDED
Status: DISCONTINUED | OUTPATIENT
Start: 2025-04-29 | End: 2025-04-29 | Stop reason: HOSPADM

## 2025-04-29 RX ORDER — PROCHLORPERAZINE MALEATE 10 MG
10 TABLET ORAL EVERY 6 HOURS PRN
Status: DISCONTINUED | OUTPATIENT
Start: 2025-04-29 | End: 2025-04-29 | Stop reason: HOSPADM

## 2025-04-29 RX ORDER — PROCHLORPERAZINE EDISYLATE 5 MG/ML
10 INJECTION INTRAMUSCULAR; INTRAVENOUS EVERY 6 HOURS PRN
Status: DISCONTINUED | OUTPATIENT
Start: 2025-04-29 | End: 2025-04-29 | Stop reason: HOSPADM

## 2025-04-29 RX ORDER — PALONOSETRON 0.05 MG/ML
0.25 INJECTION, SOLUTION INTRAVENOUS ONCE
Status: COMPLETED | OUTPATIENT
Start: 2025-04-29 | End: 2025-04-29

## 2025-04-29 RX ORDER — HEPARIN SODIUM,PORCINE/PF 10 UNIT/ML
50 SYRINGE (ML) INTRAVENOUS AS NEEDED
Status: DISCONTINUED | OUTPATIENT
Start: 2025-04-29 | End: 2025-04-29 | Stop reason: HOSPADM

## 2025-04-29 RX ORDER — DEXAMETHASONE 6 MG/1
12 TABLET ORAL ONCE
Status: COMPLETED | OUTPATIENT
Start: 2025-04-29 | End: 2025-04-29

## 2025-04-29 RX ORDER — FAMOTIDINE 10 MG/ML
20 INJECTION, SOLUTION INTRAVENOUS ONCE AS NEEDED
Status: DISCONTINUED | OUTPATIENT
Start: 2025-04-29 | End: 2025-04-29 | Stop reason: HOSPADM

## 2025-04-29 RX ORDER — HEPARIN SODIUM,PORCINE/PF 10 UNIT/ML
50 SYRINGE (ML) INTRAVENOUS AS NEEDED
Status: CANCELLED | OUTPATIENT
Start: 2025-04-29

## 2025-04-29 RX ORDER — HEPARIN 100 UNIT/ML
500 SYRINGE INTRAVENOUS AS NEEDED
Status: CANCELLED | OUTPATIENT
Start: 2025-04-29

## 2025-04-29 RX ORDER — EPINEPHRINE 0.3 MG/.3ML
0.3 INJECTION SUBCUTANEOUS EVERY 5 MIN PRN
Status: DISCONTINUED | OUTPATIENT
Start: 2025-04-29 | End: 2025-04-29 | Stop reason: HOSPADM

## 2025-04-29 RX ADMIN — DEXAMETHASONE 12 MG: 6 TABLET ORAL at 08:04

## 2025-04-29 RX ADMIN — PALONOSETRON HYDROCHLORIDE 0.25 MG: 0.25 INJECTION INTRAVENOUS at 08:04

## 2025-04-29 RX ADMIN — FLUOROURACIL 4050 MG: 50 INJECTION, SOLUTION INTRAVENOUS at 11:14

## 2025-04-29 RX ADMIN — TRASTUZUMAB-ANNS 245.7 MG: 420 INJECTION, POWDER, LYOPHILIZED, FOR SOLUTION INTRAVENOUS at 08:28

## 2025-04-29 RX ADMIN — OXALIPLATIN 110 MG: 5 INJECTION, SOLUTION INTRAVENOUS at 09:06

## 2025-04-29 ASSESSMENT — PAIN SCALES - GENERAL: PAINLEVEL_OUTOF10: 0-NO PAIN

## 2025-04-30 LAB — ACTH PLAS-MCNC: 24.7 PG/ML (ref 7.2–63.3)

## 2025-05-01 ENCOUNTER — INFUSION (OUTPATIENT)
Dept: HEMATOLOGY/ONCOLOGY | Facility: CLINIC | Age: 80
End: 2025-05-01
Payer: MEDICARE

## 2025-05-01 VITALS
OXYGEN SATURATION: 93 % | SYSTOLIC BLOOD PRESSURE: 92 MMHG | TEMPERATURE: 97 F | WEIGHT: 128.75 LBS | BODY MASS INDEX: 20.78 KG/M2 | DIASTOLIC BLOOD PRESSURE: 65 MMHG | RESPIRATION RATE: 18 BRPM | HEART RATE: 145 BPM

## 2025-05-01 DIAGNOSIS — C15.5 MALIGNANT NEOPLASM OF LOWER THIRD OF ESOPHAGUS (MULTI): ICD-10-CM

## 2025-05-01 PROCEDURE — 96523 IRRIG DRUG DELIVERY DEVICE: CPT

## 2025-05-01 PROCEDURE — 2500000004 HC RX 250 GENERAL PHARMACY W/ HCPCS (ALT 636 FOR OP/ED): Mod: JZ | Performed by: INTERNAL MEDICINE

## 2025-05-01 RX ORDER — HEPARIN 100 UNIT/ML
500 SYRINGE INTRAVENOUS AS NEEDED
OUTPATIENT
Start: 2025-05-01

## 2025-05-01 RX ORDER — HEPARIN 100 UNIT/ML
500 SYRINGE INTRAVENOUS AS NEEDED
Status: DISCONTINUED | OUTPATIENT
Start: 2025-05-01 | End: 2025-05-01 | Stop reason: HOSPADM

## 2025-05-01 RX ORDER — HEPARIN SODIUM,PORCINE/PF 10 UNIT/ML
50 SYRINGE (ML) INTRAVENOUS AS NEEDED
OUTPATIENT
Start: 2025-05-01

## 2025-05-01 RX ADMIN — HEPARIN 500 UNITS: 100 SYRINGE at 09:37

## 2025-05-01 ASSESSMENT — PAIN SCALES - GENERAL: PAINLEVEL_OUTOF10: 0-NO PAIN

## 2025-05-01 NOTE — PROGRESS NOTES
CADD pump disconnected, verified total volume infused.  + blood return noted, flushed with 10cc normal saline and 5cc 10 units heparin.  Bush needle removed and site covered with bandaid.  Pump and bag sent home with patient.  Patient tolerated procedure well. Pt aware of upcoming appt schedule and will call with any additional questions or concerns.

## 2025-05-02 ENCOUNTER — TELEPHONE (OUTPATIENT)
Dept: HEMATOLOGY/ONCOLOGY | Facility: HOSPITAL | Age: 80
End: 2025-05-02
Payer: MEDICARE

## 2025-05-02 ENCOUNTER — TELEPHONE (OUTPATIENT)
Dept: HEMATOLOGY/ONCOLOGY | Facility: CLINIC | Age: 80
End: 2025-05-02

## 2025-05-02 NOTE — TELEPHONE ENCOUNTER
Reason for Conversation  Returned call to patient regarding Tx plan    Background   I returned a call to Juan M as he called in this morning with some questions about his treatment plan. Juan M states he was supposed to be scheduled for a scan after cycle 4 of treatment. He also states that his treatments are supposed to be held until after this scan in order to assess the affects that treatment has had. Juan M states he just received cycle 4 on 4/29. I let Juan M know that he is scheduled for a CT scan on 5/22. The patient states he is also scheduled for additional cycles of chemo prior to his 5/22 scan and asks if he should hold off on treatment until his scan is completed because that was his understanding from his last visit with the provider. I let the patient know that typically we would not hold treatment to wait for scan results unless it was a special circumstance and that it is possible the scan just couldn't be scheduled until later this month. I let Juan M know that I would reach out to Dr. rFy to figure out exactly what the plan is. I will call the patient back after hearing from the provider. Juan M expressed understanding.     Returned call to the patient to let him know that I did reach out to Dr. Fry to follow up on the patients treatment plan. I let Juan M know that Dr. Fry would like him to have his scan done prior to his next cycle. The patients scan was rescheduled for 5/6 at 08:30 and the patient had been made aware by scheduling. Juan M had no further needs at this time.  Disposition   No disposition on file.

## 2025-05-06 ENCOUNTER — HOSPITAL ENCOUNTER (OUTPATIENT)
Dept: RADIOLOGY | Facility: CLINIC | Age: 80
Discharge: HOME | End: 2025-05-06
Payer: MEDICARE

## 2025-05-06 ENCOUNTER — TELEPHONE (OUTPATIENT)
Dept: PRIMARY CARE | Facility: CLINIC | Age: 80
End: 2025-05-06

## 2025-05-06 DIAGNOSIS — I10 PRIMARY HYPERTENSION: Primary | ICD-10-CM

## 2025-05-06 DIAGNOSIS — C15.5 MALIGNANT NEOPLASM OF LOWER THIRD OF ESOPHAGUS (MULTI): ICD-10-CM

## 2025-05-06 PROCEDURE — 2550000001 HC RX 255 CONTRASTS: Mod: JZ | Performed by: INTERNAL MEDICINE

## 2025-05-06 PROCEDURE — 71260 CT THORAX DX C+: CPT

## 2025-05-06 RX ADMIN — IOHEXOL 75 ML: 350 INJECTION, SOLUTION INTRAVENOUS at 09:14

## 2025-05-06 NOTE — TELEPHONE ENCOUNTER
Patient is calling in for a refill on Atenolol 50 mg tabs.  Did not see this on med list.  Last seen 04/18/25 Please send to [facility] in [place].

## 2025-05-06 NOTE — TELEPHONE ENCOUNTER
On historical meds from 5yrs ago patient states he has been taking it daily. He said it has been two years since it was last prescribed but he takes daily. Please advise.   Lov- 04/18/2025  Does not have a future appt scheduled.

## 2025-05-07 RX ORDER — ATENOLOL 25 MG/1
25 TABLET ORAL DAILY
Qty: 90 TABLET | Refills: 0 | Status: SHIPPED | OUTPATIENT
Start: 2025-05-07 | End: 2025-05-23

## 2025-05-07 NOTE — TELEPHONE ENCOUNTER
Please let him know that I reviewed his chart and because his HR has been running higher, I sent in rx for atenolol but at lower dose. If his blood pressures are running low or he is feeling lightheaded/dizzy to let me or his oncologist know and we may need to stop it again.

## 2025-05-07 NOTE — TELEPHONE ENCOUNTER
Spoke with pt. Was taken off atenolol for low blood pressures months ago but at some point he restarted. Thought someone had told him to but cannot remember who. Does note since being off his low blood pressures have improved from systolic 90's to 110's and he feels a little better. HR has been elevated past month, restarted chemo with FOLFOX plus herceptin 3/2025 after not tolerating immunotherapy. Will continue atenolol but at lower dose of 25 mg. New rx sent to pharmacy.

## 2025-05-08 NOTE — TELEPHONE ENCOUNTER
Called patient, let him know message from SJB, and that a lower dose of Atenolol was sent to pharmacy. Asked to call office with low BP or lighheaded or dizziness.

## 2025-05-12 ENCOUNTER — LAB (OUTPATIENT)
Dept: HEMATOLOGY/ONCOLOGY | Facility: CLINIC | Age: 80
End: 2025-05-12
Payer: MEDICARE

## 2025-05-12 ENCOUNTER — OFFICE VISIT (OUTPATIENT)
Dept: PALLIATIVE MEDICINE | Facility: CLINIC | Age: 80
End: 2025-05-12
Payer: MEDICARE

## 2025-05-12 ENCOUNTER — DOCUMENTATION (OUTPATIENT)
Dept: PALLIATIVE MEDICINE | Facility: HOSPITAL | Age: 80
End: 2025-05-12

## 2025-05-12 ENCOUNTER — OFFICE VISIT (OUTPATIENT)
Dept: HEMATOLOGY/ONCOLOGY | Facility: CLINIC | Age: 80
End: 2025-05-12
Payer: MEDICARE

## 2025-05-12 VITALS
DIASTOLIC BLOOD PRESSURE: 73 MMHG | TEMPERATURE: 97 F | WEIGHT: 129.63 LBS | HEART RATE: 123 BPM | RESPIRATION RATE: 18 BRPM | BODY MASS INDEX: 20.92 KG/M2 | SYSTOLIC BLOOD PRESSURE: 101 MMHG | OXYGEN SATURATION: 96 %

## 2025-05-12 VITALS
HEART RATE: 123 BPM | DIASTOLIC BLOOD PRESSURE: 73 MMHG | RESPIRATION RATE: 18 BRPM | OXYGEN SATURATION: 96 % | SYSTOLIC BLOOD PRESSURE: 101 MMHG | BODY MASS INDEX: 20.92 KG/M2 | TEMPERATURE: 97 F | WEIGHT: 129.63 LBS

## 2025-05-12 DIAGNOSIS — K11.7 XEROSTOMIA: ICD-10-CM

## 2025-05-12 DIAGNOSIS — C15.5 MALIGNANT NEOPLASM OF LOWER THIRD OF ESOPHAGUS (MULTI): ICD-10-CM

## 2025-05-12 DIAGNOSIS — G47.00 INSOMNIA, UNSPECIFIED TYPE: ICD-10-CM

## 2025-05-12 DIAGNOSIS — R64 CANCER CACHEXIA (MULTI): ICD-10-CM

## 2025-05-12 DIAGNOSIS — Z51.5 PALLIATIVE CARE ENCOUNTER: Primary | ICD-10-CM

## 2025-05-12 DIAGNOSIS — Z71.89 GOALS OF CARE, COUNSELING/DISCUSSION: ICD-10-CM

## 2025-05-12 LAB
ALBUMIN SERPL BCP-MCNC: 3 G/DL (ref 3.4–5)
ALP SERPL-CCNC: 604 U/L (ref 33–136)
ALT SERPL W P-5'-P-CCNC: 24 U/L (ref 10–52)
ANION GAP SERPL CALC-SCNC: 13 MMOL/L (ref 10–20)
AST SERPL W P-5'-P-CCNC: 49 U/L (ref 9–39)
BASOPHILS # BLD AUTO: 0.05 X10*3/UL (ref 0–0.1)
BASOPHILS NFR BLD AUTO: 0.8 %
BILIRUB SERPL-MCNC: 1.9 MG/DL (ref 0–1.2)
BUN SERPL-MCNC: 13 MG/DL (ref 6–23)
CALCIUM SERPL-MCNC: 8.7 MG/DL (ref 8.6–10.3)
CHLORIDE SERPL-SCNC: 103 MMOL/L (ref 98–107)
CO2 SERPL-SCNC: 25 MMOL/L (ref 21–32)
CREAT SERPL-MCNC: 0.67 MG/DL (ref 0.5–1.3)
EGFRCR SERPLBLD CKD-EPI 2021: >90 ML/MIN/1.73M*2
EOSINOPHIL # BLD AUTO: 0.03 X10*3/UL (ref 0–0.4)
EOSINOPHIL NFR BLD AUTO: 0.5 %
ERYTHROCYTE [DISTWIDTH] IN BLOOD BY AUTOMATED COUNT: 18.8 % (ref 11.5–14.5)
GLUCOSE SERPL-MCNC: 124 MG/DL (ref 74–99)
HCT VFR BLD AUTO: 40.8 % (ref 41–52)
HGB BLD-MCNC: 13.8 G/DL (ref 13.5–17.5)
IMM GRANULOCYTES # BLD AUTO: 0.02 X10*3/UL (ref 0–0.5)
IMM GRANULOCYTES NFR BLD AUTO: 0.3 % (ref 0–0.9)
LYMPHOCYTES # BLD AUTO: 0.67 X10*3/UL (ref 0.8–3)
LYMPHOCYTES NFR BLD AUTO: 11.2 %
MCH RBC QN AUTO: 33.5 PG (ref 26–34)
MCHC RBC AUTO-ENTMCNC: 33.8 G/DL (ref 32–36)
MCV RBC AUTO: 99 FL (ref 80–100)
MONOCYTES # BLD AUTO: 0.74 X10*3/UL (ref 0.05–0.8)
MONOCYTES NFR BLD AUTO: 12.4 %
NEUTROPHILS # BLD AUTO: 4.46 X10*3/UL (ref 1.6–5.5)
NEUTROPHILS NFR BLD AUTO: 74.8 %
NRBC BLD-RTO: 0 /100 WBCS (ref 0–0)
PLATELET # BLD AUTO: 197 X10*3/UL (ref 150–450)
POTASSIUM SERPL-SCNC: 3.8 MMOL/L (ref 3.5–5.3)
PROT SERPL-MCNC: 5.8 G/DL (ref 6.4–8.2)
RBC # BLD AUTO: 4.12 X10*6/UL (ref 4.5–5.9)
SODIUM SERPL-SCNC: 137 MMOL/L (ref 136–145)
WBC # BLD AUTO: 6 X10*3/UL (ref 4.4–11.3)

## 2025-05-12 PROCEDURE — 3074F SYST BP LT 130 MM HG: CPT

## 2025-05-12 PROCEDURE — 2500000004 HC RX 250 GENERAL PHARMACY W/ HCPCS (ALT 636 FOR OP/ED): Mod: JZ | Performed by: INTERNAL MEDICINE

## 2025-05-12 PROCEDURE — 99215 OFFICE O/P EST HI 40 MIN: CPT

## 2025-05-12 PROCEDURE — 80053 COMPREHEN METABOLIC PANEL: CPT

## 2025-05-12 PROCEDURE — 3078F DIAST BP <80 MM HG: CPT

## 2025-05-12 PROCEDURE — 1160F RVW MEDS BY RX/DR IN RCRD: CPT

## 2025-05-12 PROCEDURE — 36591 DRAW BLOOD OFF VENOUS DEVICE: CPT

## 2025-05-12 PROCEDURE — 99214 OFFICE O/P EST MOD 30 MIN: CPT | Performed by: NURSE PRACTITIONER

## 2025-05-12 PROCEDURE — 1159F MED LIST DOCD IN RCRD: CPT

## 2025-05-12 PROCEDURE — 1126F AMNT PAIN NOTED NONE PRSNT: CPT

## 2025-05-12 PROCEDURE — 85025 COMPLETE CBC W/AUTO DIFF WBC: CPT

## 2025-05-12 RX ORDER — HEPARIN 100 UNIT/ML
500 SYRINGE INTRAVENOUS AS NEEDED
Status: DISCONTINUED | OUTPATIENT
Start: 2025-05-12 | End: 2025-05-12 | Stop reason: HOSPADM

## 2025-05-12 RX ORDER — HEPARIN SODIUM,PORCINE/PF 10 UNIT/ML
50 SYRINGE (ML) INTRAVENOUS AS NEEDED
Status: DISCONTINUED | OUTPATIENT
Start: 2025-05-12 | End: 2025-05-12 | Stop reason: HOSPADM

## 2025-05-12 RX ORDER — HEPARIN 100 UNIT/ML
500 SYRINGE INTRAVENOUS AS NEEDED
OUTPATIENT
Start: 2025-05-12

## 2025-05-12 RX ORDER — HEPARIN SODIUM,PORCINE/PF 10 UNIT/ML
50 SYRINGE (ML) INTRAVENOUS AS NEEDED
OUTPATIENT
Start: 2025-05-12

## 2025-05-12 RX ADMIN — HEPARIN 500 UNITS: 100 SYRINGE at 08:42

## 2025-05-12 ASSESSMENT — PAIN SCALES - GENERAL
PAINLEVEL_OUTOF10: 0-NO PAIN
PAINLEVEL_OUTOF10: 0-NO PAIN

## 2025-05-12 NOTE — PROGRESS NOTES
Patient here for follow up visit with NP Kristi davies  for Dx of esophagus cancer   Patient here with spouse     Medications and Allergies reviewed and reconciled this visit.    Pt sp labs today in infusion. Treatment set for tomorrow. He will see Palliative care at 10 am today.     Pt reports appetite is  poor  Pt report minimal nausea with treatment managed with antinausea   Pt denies pain     Follow up per np request.    Pt. reports availability and use of mychart, Reviewed this is a good place to communicate with the team as well as review labs and upcoming orders.     No barriers to education noted, patient agrees to current plan and verbalized understanding using teach back method.

## 2025-05-12 NOTE — PROGRESS NOTES
Per Janet Coates CNP, patient is interested in a informational visit with [facility]. Information faxed

## 2025-05-12 NOTE — PROGRESS NOTES
SUPPORTIVE AND PALLIATIVE ONCOLOGY OUTPATIENT FOLLOW-UP      SERVICE DATE: 5/12/2025    Cancer History  Esophageal CA  -completed chemoRT carbo/taxol early June 2024  -s/p surgery 7/23/24  -now with recurrence, liver mets  -s/p 4 cycles mFOLFOX+ trastuzumab  -CT CAP (5/6/25): b/l pleural effusions, numerous masses through the liver compatible with mets, at least 1 of which has increased in size. Remainder either unchanged/decreased in size. Retroperitoneal lymphadenopathy, largest LN has increased in size, but appears necrotic. Ascites has increased in volume.      Onc: Lisandra/ Jennifer    Subjective   HISTORY OF PRESENT ILLNESS: Juan M Mcrae is a 79 y.o. male with PMHx of DMII, HTN, and esophageal CA.      He presents to supportive oncology for pain and symptom management.     Presents for FUV accompanied by significant other, Helga. Currently denies pain, has not required tramadol PRN. No numbness/tingling. Moving bowels daily. No N/V, taking daily pepcid. Eating 3 meals/day, weight has remained stable. Has xerostomia, has to sip lots of water when eating, taking salagen PRN. Mood is up and down. Not sleeping well, has b/l pleural effusions, which are causing SOB, nocturnal cough, currently has to sleep sitting up in a chair. Coughing up brown sputum. Oncology entered order for stat thoracentesis today. Has become profoundly weak, sitting in a wheelchair today. Energy levels are low, and does not feel steady on his feet. Per oncology, may qualify for rotation to alternative treatment regimen, given disease progression. He and his significant other will think about it, and FUV with Dr. Fry scheduled on 5/19. Discussed risk vs benefit of alternative chemo regimen, and possibility that additional chemo may cause more unwanted SE. Pt currently leaning towards pursuing comfort route, and would like to informational session with hospice services. Will place consult today.     Pain Assessment:  Pain Score:   0  Location:    Education:      Symptom Assessment:  Pain:none  Headache: none  Dizziness:none  Lack of energy: very much  Difficulty sleeping: very much  Worrying: a little  Anxiety: none  Depression: somewhat  Pain in mouth/swallowing: none  Dry mouth: somewhat  Taste changes: none  Shortness of breath: somewhat  Lack of appetite: a little   Nausea: none  Vomiting: none  Constipation: none  Diarrhea: none  Sore muscles: a little  Numbness or tingling in hands/feet/other: none  Weight loss: a little      Information obtained from: chart review, interview of patient, and interview of family  ______________________________________________________________________        Objective                PHYSICAL EXAMINATION   Vital Signs:   Vital signs reviewed  101/73, 123, 18, 36.1, 96%  Pain Score:  0     Physical Exam  Vitals reviewed.   Constitutional:       Appearance: He is cachectic. He is ill-appearing.   HENT:      Head: Normocephalic.      Comments: Temporal wasting   Cardiovascular:      Rate and Rhythm: Normal rate.   Pulmonary:      Effort: Pulmonary effort is normal.   Abdominal:      Palpations: Abdomen is soft.   Musculoskeletal:         General: Normal range of motion.      Comments: Generalized weakness, sitting in wheelchair    Skin:     General: Skin is warm and dry.   Neurological:      General: No focal deficit present.      Mental Status: He is alert and oriented to person, place, and time.   Psychiatric:         Mood and Affect: Mood normal.         Judgment: Judgment normal.         ASSESSMENT/PLAN    Pain  Pain is: cancer related pain  Type: visceral and neuropathic  Pain control: sub-optimally controlled  Home regimen:   -continue tramadol 50mg q6hrs PRN, not currently requiring this medication.   -continue ibuprofen PRN.   Intolerances/previously tried:      Opioid Use  Medication Management:   - OARRS report reviewed with no aberrant behavior; consistent with  prescriptions/records and patient  history  - MED 40.  Overdose Risk Score 110.   This has been discussed with patient.   - We will continue to closely monitor the patient for signs of prescription misuse including UDS, OARRS review and subjective reports at each visit.  - no concurrent benzodiazepine use   - I am a provider who either is or has consulted and collaborated with a provider certified in Hospice and Palliative Medicine and have conducted a face-face visit and examination for this patient.  - Routine Urine Drug Screen completed deferred (MED <90)  appropriately positive for opioids and negative for illicit substances  - Controlled Substance Agreement completed deferred (MED <90)   - Specifically discussed that controlled substance prescriptions will only be provided by our group as outlined in the completed agreement  - Prescribed naloxone deferred (MED <90)   - Red Flags: none      Nausea   Intermittent nausea without vomiting related to chemotherapy   -continue zofran 8mg q8hrs PRN.   -continue compazine 10mg q6hrs PRN.   -continue olanzapine 5mg at bedtime.     Constipation   At risk for constipation related to opioids, currently not constipated   Usual bowel pattern: 2x/day   Current regimen:   -educated pt on importance of maintaining regular bowel schedule  -continue colace 100mg bid PRN for hard stools  -continue senna 8.6mg, 1-2tabs, 1-2x/day  -continue MOM 15mL 4x/day PRN     Sleeping Difficulty:  Impaired sleep related to chronic insomnia, SOB/cough iso pleural effusions   Current regimen:    -continue trazodone 100mg to 1-2 pills at bedtime PRN.  -scheduled for thoracentesis 5/15     Decreased appetite/ Xerostomia   Related to taste changes and disease process, xerostomia   Weight loss 40 lbs in 5 months   Current regimen:    -encouraged smaller, more frequent meals through out the day  -encouraged use of supplements such as Boost, Ensure, etc.    -continue Biotene PRN  -continue salagen 5mg tid PRN.     SOB/Cough  In the  setting of b/l pleural effusions  -scheduled for thoracentesis 5/15       Supportive Interventions: referral to [facility] for informational session     Supportive and Palliative Oncology encounter:  Emotional support provided  Coordination of care  We will continue to follow and address symptoms as needed    Advance Directives  Existence of Advance Directives:Yes, but NOT documented in medical record  Decision maker: HCPOA is Sascha Mcrae (son) and Velia Burch (daughter)  Code Status: DNR-comfort care arrest         Next Follow-Up Visit:  Return to clinic in 10 days     Signature and billing  Medical complexity was moderate level due to due to complexity of problems, extensive data review, and high risk of management/treatment.  Time was spent on the following: Prep Time, Time Directly with Patient/Family/Caregiver, Documentation Time, primary team. Total time spent: 30 mins      Data  Diagnostic tests and information reviewed for today's visit:  Most recent labs, Most recent imaging, Medications         Some elements copied from my note on 4/14/25, the elements have been updated and all reflect current decision making from today, 5/12/2025.      Plan of Care discussed with: Provider-Jennifer MULTANI RN-Amari, Patient, and Significant Other    SIGNATURE: OBIE Pederson-CNP    Contact information:  Supportive and Palliative Oncology  Monday-Friday 8 AM-5 PM  Phone:  [phone], press option #5, then option #1.   Or Epic Secure Chat

## 2025-05-13 ENCOUNTER — APPOINTMENT (OUTPATIENT)
Dept: PALLIATIVE MEDICINE | Facility: CLINIC | Age: 80
End: 2025-05-13
Payer: MEDICARE

## 2025-05-13 ENCOUNTER — APPOINTMENT (OUTPATIENT)
Dept: HEMATOLOGY/ONCOLOGY | Facility: CLINIC | Age: 80
End: 2025-05-13
Payer: MEDICARE

## 2025-05-13 NOTE — PROGRESS NOTES
Patient ID: Juan M Mcrae is a 79 y.o. male.  Diagnoses:   1.GE junction adenocarcinoma (signet ring cell), proficient MMR.  Localized (clinical stage II/III).  Status post esophagectomy on July 23, 2024 following neoadjuvant CRT->ypT3 N1.  Adjuvant nivolumab started on October 7, 2024. Stopped within 2 months because of severe nausea.  Cancer recurrence predominantly in the liver in January 2025.  2. Type 2 diabetes mellitus on oral hypoglycemics, hypertension controlled with medications.    Genomic profile:  Proficient MMR status on the biopsy by IHC.  HER2 positive (3+) by IHC.  Tempus XT requested on the liver biopsy specimen (from February 12, 2025 specimen) on February 24, 2025.    Assessment and Plan:  This is a pleasant 79-year-old man with a diagnosis of GE junction adenocarcinoma.  His staging PET/CT and subsequent imaging studies confirmed localized disease.  He had a port placement and a J-tube placement.  He has seen radiation oncology and he has been enrolled in the NRG- (photon versus proton) study.    He completed concurrent chemoradiation in early June 2024 (last chemo was on Bettie 10, 2024).  His surgery took place on July 23, 2024.  He tolerated the surgery well.  The final pathology was ypT3 N1.      Since he has residual tumor in the resected specimen, We discussed adjuvant therapy with nivolumab.  Discussed the data of CheckMate-577 study which showed survival advantage with adjuvant nivolumab. Patient started treatment on 10-, which he initially tolerated well with grade 1 nausea and fatigue.  Since November of 2024, for increasing nausea, poor appetite and weight loss, we have held treatment.      A CT scan from January 21, 2025 showed numerous liver lesions suggestive of metastatic disease.  A biopsy of the liver lesion on February 12, 2025 confirmed metastatic adenocarcinoma.     We discussed the overall outlook and the plan since he has metastatic recurrence in the liver.  We  discussed with him that this is an incurable condition.  His liver disease is rather extensive.  His disease is HER2 positive.  However, he did not tolerate immunotherapy at all which was discontinued because of intolerable nausea. We discussed the chemotherapy regimen with FOLFOX and trastuzumab and patient consented to treatment.     Plan: Treatment with FOLFOX plus Herceptin.  Pembrolizumab was withheld at patient's request because of his severe nausea from nivolumab.--- Started 3/18/25. He completed 4 treatments and recently had restaging scans.     Mr. Mcrae returns today to discuss recent CT scan and plan of care moving forward. Unfortunately recent CT scan from 5/6 was concerning for disease progression. I reviewed scan with Dr. Fry, patient, and his wife today. Mr. Mcrae is feeling very fatigued with increased weakness. He is unsure about any future treatment at this time but requested time to think. I did provide him with [facility] information as recommended by Dr. Fry for future treatment options. Supportive oncology is also seeing Mr. Mcrae today. He would like to further discuss with Dr. Fry next week. In the mean time, I will set up thoracentesis for pleural effusion management as patient is noticing more increased SOB with activity.     Follow-up: 1 week with MD exam to further discuss future treatment vs palliative / hospice approach     I have placed all orders as outlined above. I advised the patient to schedule the tests and follow-up appointment as discussed by contacting the  on the way out or calling by phone.    Providers:  Surgeon: Dr. Jovany Solares: Dr. Lisandra Byrdc: Donna.    Chief complaint: GE junction adenocarcinoma.    HPI:  ONCOLOGIC HISTORY-    February 28, 2024: Underwent an EGD for progressive dysphagia and weight loss.  EGD revealed an obstructing mass in the distal esophagus.  Biopsy confirmed adenocarcinoma with signet ring cell  features.    March 21, 2024: EUS revealed T2 N1 mass extending from 41 cm to 43 cm and involving less than 1 cm of the gastric cardia.    March 22, 2024: CT scan of chest did not show any metastatic disease.    April 1, 2024 24: PET/CT scan done:   1. Hypermetabolic linear focus at gastroesophageal junction which is  extending to the gastric cardia/proximal lesser curvature in  correlation with distal esophageal /gastroesophageal junction wall  thickening. These findings are compatible with biopsy-proven  adenocarcinoma.  2. No evidence of hypermetabolic lymphadenopathy.  3. Abnormal focal increase in FDG uptake in the left hepatic lobe in  correlation with hypoattenuating lesion, concerning for metastatic  disease. Further correlation with dedicated CT abdomen is recommended.  4. Large fat containing left inguinal hernia.  Interval history: He has significant dysphagia although he can get by with soup.  He has lost about 60 pounds in the last 6 months or so.  Feels fatigued.  Denies any pain.  Denies fever or chill or any other sign of ongoing infection.    April 3, 2024: CT scan of abdomen and MRI liver ruled out metastatic disease (liver lesions were hemangiomas).    May 6, 2024: Started concurrent radiation and chemotherapy with weekly carboplatin and Taxol.  Will complete concurrent chemoradiation on June 14, 2024.    6/10/24: RECEIVED THE LAST DOSE OF CHEMO. LAST DOSE OF RADIATION WAS ON 6/14/2024.    July 23, 2024: Underwent surgery.  Pathology ypT3 N1.    October 7, 2024: Started adjuvant nivolumab.  December 2, 2024-nivolumab was placed on hold because of increasing nausea.    January 21, 2025: CT scan of chest abdomen pelvis showed metastatic cancer recurrence.      February 12, 2025: Ultrasound-guided biopsy of the liver lesion confirmed metastatic adenocarcinoma.    3/18/25: Start FOLFOX + Trastuzumab     5/6/25: CT scan showing progressive disease along with new pleural effusions with concern for  malignancy    Interval history:  Mr. Mcrae returns today in follow up. Overall, he reports some ongoing fatigue and increased weakness. His appetite is still decreased and he is having more taste changes. Has occasional nausea. Bowels are moving regularly. Cold sensitivity is lasting in his hands for a few days. Denies any constant numbness or tingling in his hands or feet however.     Review of 12 systems: Negative unless otherwise stated in HPI       Allergies  Omeprazole, Sulfa, shellfish    Medications:  Acetaminophen, atenolol, dexamethasone, Emla cream, metformin 1000 mg twice daily, multivitamin, naloxone, nystatin, olanzapine, Actos, prochlorperazine, trazodone     Past Medical History:   Type 2 diabetes mellitus on oral hypoglycemics, hypertension controlled with medications.  Cataract, CKD (chronic kidney disease), Colon polyp, Deviated septum, ED (erectile dysfunction), Esophageal cancer, GERD (gastroesophageal reflux disease), Hyperlipidemia, Nephrolithiasis, Sleep apnea (wear CPAP/BiPAP)    Surgical History:    Past Surgical History:   Procedure Laterality Date    BLADDER SURGERY      COLONOSCOPY      Repeat in one year    EYE SURGERY      LITHOTRIPSY      OTHER SURGICAL HISTORY      Vocal cord surgery    TRANSURETHRAL RESECTION OF PROSTATE      UPPER GASTROINTESTINAL ENDOSCOPY  2024      Family History:    Family History   Problem Relation Name Age of Onset    Stroke Father      Kidney cancer Brother Eliu     Stroke Brother Eliu      Family Oncology History:    Cancer-related family history includes Kidney cancer in his brother.    Social History:      Tobacco Use    Smoking status: Former     Current packs/day: 0.00     Average packs/day: 2.0 packs/day for 40.0 years (80.0 ttl pk-yrs)     Types: Cigarettes     Start date: 1959     Quit date: 1999     Years since quittin.4    Smokeless tobacco: Never   Vaping Use    Vaping status: Never Used   Substance Use Topics     Alcohol use: Yes     Alcohol/week: 2.0 standard drinks of alcohol     Types: 2 Cans of beer per week     Comment: couple beers a week    Drug use: Not Currently     Comment: gummies-dispensary from [place]      Vital Signs: reviewed today     Physical Exam:   ECOG PS- 2  General: fatigued looking male, answers questions appropriately.  Eyes- No pallor or icterus.  Neck- no swelling, lymph node enlargement or thyroid gland enlargement.  Chest- respirations even and non-labored. Lungs clear to diminished throughout  Heart- no audible abnormal heart sounds.  Abdomen- Soft, non-tender. No mass or ascites. + BS  Extremities- no swelling or pitting edema.  Neuro- Alert and oriented X 4  Psych- Normal behavior and thought process        Kristi Rodriguez, APRN-CNP

## 2025-05-15 ENCOUNTER — APPOINTMENT (OUTPATIENT)
Dept: HEMATOLOGY/ONCOLOGY | Facility: CLINIC | Age: 80
End: 2025-05-15
Payer: MEDICARE

## 2025-05-15 ENCOUNTER — HOSPITAL ENCOUNTER (OUTPATIENT)
Dept: RADIOLOGY | Facility: HOSPITAL | Age: 80
Discharge: HOME | End: 2025-05-15
Payer: MEDICARE

## 2025-05-15 VITALS
DIASTOLIC BLOOD PRESSURE: 77 MMHG | TEMPERATURE: 36.5 F | OXYGEN SATURATION: 96 % | BODY MASS INDEX: 20.57 KG/M2 | WEIGHT: 128 LBS | RESPIRATION RATE: 16 BRPM | HEIGHT: 66 IN | HEART RATE: 83 BPM | SYSTOLIC BLOOD PRESSURE: 102 MMHG

## 2025-05-15 DIAGNOSIS — C15.5 MALIGNANT NEOPLASM OF LOWER THIRD OF ESOPHAGUS (MULTI): ICD-10-CM

## 2025-05-15 LAB
HOLD SPECIMEN: NORMAL
HOLD SPECIMEN: NORMAL

## 2025-05-15 PROCEDURE — 32555 ASPIRATE PLEURA W/ IMAGING: CPT | Mod: 50

## 2025-05-15 PROCEDURE — C1729 CATH, DRAINAGE: HCPCS

## 2025-05-15 PROCEDURE — 2500000004 HC RX 250 GENERAL PHARMACY W/ HCPCS (ALT 636 FOR OP/ED): Mod: JW | Performed by: RADIOLOGY

## 2025-05-15 PROCEDURE — 2720000007 HC OR 272 NO HCPCS

## 2025-05-15 RX ORDER — LIDOCAINE HYDROCHLORIDE 10 MG/ML
INJECTION, SOLUTION EPIDURAL; INFILTRATION; INTRACAUDAL; PERINEURAL
Status: COMPLETED | OUTPATIENT
Start: 2025-05-15 | End: 2025-05-15

## 2025-05-15 RX ADMIN — LIDOCAINE HYDROCHLORIDE 4 ML: 10 INJECTION, SOLUTION EPIDURAL; INFILTRATION; INTRACAUDAL; PERINEURAL at 11:48

## 2025-05-15 RX ADMIN — LIDOCAINE HYDROCHLORIDE 4 ML: 10 INJECTION, SOLUTION EPIDURAL; INFILTRATION; INTRACAUDAL; PERINEURAL at 11:43

## 2025-05-15 ASSESSMENT — PAIN - FUNCTIONAL ASSESSMENT: PAIN_FUNCTIONAL_ASSESSMENT: 0-10

## 2025-05-15 ASSESSMENT — PAIN SCALES - GENERAL
PAINLEVEL_OUTOF10: 0 - NO PAIN

## 2025-05-19 ENCOUNTER — OFFICE VISIT (OUTPATIENT)
Dept: HEMATOLOGY/ONCOLOGY | Facility: CLINIC | Age: 80
End: 2025-05-19
Payer: MEDICARE

## 2025-05-19 ENCOUNTER — TELEPHONE (OUTPATIENT)
Dept: HEMATOLOGY/ONCOLOGY | Facility: HOSPITAL | Age: 80
End: 2025-05-19

## 2025-05-19 VITALS
DIASTOLIC BLOOD PRESSURE: 72 MMHG | OXYGEN SATURATION: 96 % | HEART RATE: 82 BPM | TEMPERATURE: 97.2 F | RESPIRATION RATE: 18 BRPM | SYSTOLIC BLOOD PRESSURE: 102 MMHG

## 2025-05-19 DIAGNOSIS — C15.5 MALIGNANT NEOPLASM OF LOWER THIRD OF ESOPHAGUS (MULTI): ICD-10-CM

## 2025-05-19 PROCEDURE — 1159F MED LIST DOCD IN RCRD: CPT | Performed by: INTERNAL MEDICINE

## 2025-05-19 PROCEDURE — 3074F SYST BP LT 130 MM HG: CPT | Performed by: INTERNAL MEDICINE

## 2025-05-19 PROCEDURE — 99214 OFFICE O/P EST MOD 30 MIN: CPT | Performed by: INTERNAL MEDICINE

## 2025-05-19 PROCEDURE — 1126F AMNT PAIN NOTED NONE PRSNT: CPT | Performed by: INTERNAL MEDICINE

## 2025-05-19 PROCEDURE — 3078F DIAST BP <80 MM HG: CPT | Performed by: INTERNAL MEDICINE

## 2025-05-19 ASSESSMENT — PAIN SCALES - GENERAL: PAINLEVEL_OUTOF10: 0-NO PAIN

## 2025-05-19 NOTE — PROGRESS NOTES
Patient ID: Juan M Mcrae is a 79 y.o. male.  Diagnoses:   1.GE junction adenocarcinoma (signet ring cell), proficient MMR.  Localized (clinical stage II/III).  Status post esophagectomy on July 23, 2024 following neoadjuvant CRT->ypT3 N1.  Adjuvant nivolumab started on October 7, 2024. Stopped within 2 months because of severe nausea.  Cancer recurrence predominantly in the liver in January 2025.  2. Type 2 diabetes mellitus on oral hypoglycemics, hypertension controlled with medications.    Genomic profile:  Proficient MMR status on the biopsy by IHC.  HER2 positive (3+) by IHC.  Tempus XT requested on the liver biopsy specimen (from February 12, 2025 specimen) on February 24, 2025.    Assessment and Plan:  This is a pleasant 79-year-old man with a diagnosis of GE junction adenocarcinoma.  His staging PET/CT and subsequent imaging studies confirmed localized disease.  He had a port placement and a J-tube placement.  He has seen radiation oncology and he has been enrolled in the NRG- (photon versus proton) study.    He completed concurrent chemoradiation in early June 2024 (last chemo was on Bettie 10, 2024).  His surgery took place on July 23, 2024.  He tolerated the surgery well.  The final pathology was ypT3 N1.      Since he has residual tumor in the resected specimen, We discussed adjuvant therapy with nivolumab.  Discussed the data of CheckMate-577 study which showed survival advantage with adjuvant nivolumab. Patient started treatment on 10-, which he initially tolerated well with grade 1 nausea and fatigue.  Since November of 2024, for increasing nausea, poor appetite and weight loss, we have held treatment.      A CT scan from January 21, 2025 showed numerous liver lesions suggestive of metastatic disease.  A biopsy of the liver lesion on February 12, 2025 confirmed metastatic adenocarcinoma.     We discussed the overall outlook and the plan since he has metastatic recurrence in the liver.  We  discussed with him that this is an incurable condition.  His liver disease is rather extensive.  His disease is HER2 positive.  However, he did not tolerate immunotherapy at all which was discontinued because of intolerable nausea. We discussed the chemotherapy regimen with FOLFOX and trastuzumab and patient consented to treatment.     His disease progressed through FOLFOX plus trastuzumab.    He has lost quite a lot of weight and his current performance status is 3.  He is going downhill rapidly.  Under the circumstances, I recommended in favor of supportive care other than continued chemotherapy.  He agrees.  He has an appointment to talk to hospice tomorrow.      Providers:  Surgeon: Dr. Jovany Solares: Dr. Lisandra Byrdc: Donna.    Chief complaint: GE junction adenocarcinoma.    HPI:  ONCOLOGIC HISTORY-    February 28, 2024: Underwent an EGD for progressive dysphagia and weight loss.  EGD revealed an obstructing mass in the distal esophagus.  Biopsy confirmed adenocarcinoma with signet ring cell features.    March 21, 2024: EUS revealed T2 N1 mass extending from 41 cm to 43 cm and involving less than 1 cm of the gastric cardia.    March 22, 2024: CT scan of chest did not show any metastatic disease.    April 1, 2024 24: PET/CT scan done:   1. Hypermetabolic linear focus at gastroesophageal junction which is  extending to the gastric cardia/proximal lesser curvature in  correlation with distal esophageal /gastroesophageal junction wall  thickening. These findings are compatible with biopsy-proven  adenocarcinoma.  2. No evidence of hypermetabolic lymphadenopathy.  3. Abnormal focal increase in FDG uptake in the left hepatic lobe in  correlation with hypoattenuating lesion, concerning for metastatic  disease. Further correlation with dedicated CT abdomen is recommended.  4. Large fat containing left inguinal hernia.  Interval history: He has significant dysphagia although he can get by with soup.  He has lost  about 60 pounds in the last 6 months or so.  Feels fatigued.  Denies any pain.  Denies fever or chill or any other sign of ongoing infection.    April 3, 2024: CT scan of abdomen and MRI liver ruled out metastatic disease (liver lesions were hemangiomas).    May 6, 2024: Started concurrent radiation and chemotherapy with weekly carboplatin and Taxol.  Will complete concurrent chemoradiation on June 14, 2024.    6/10/24: RECEIVED THE LAST DOSE OF CHEMO. LAST DOSE OF RADIATION WAS ON 6/14/2024.    July 23, 2024: Underwent surgery.  Pathology ypT3 N1.    October 7, 2024: Started adjuvant nivolumab.  December 2, 2024-nivolumab was placed on hold because of increasing nausea.    January 21, 2025: CT scan of chest abdomen pelvis showed metastatic cancer recurrence.      February 12, 2025: Ultrasound-guided biopsy of the liver lesion confirmed metastatic adenocarcinoma.    3/18/25: Start FOLFOX + Trastuzumab     5/6/25: CT scan showing progressive disease along with new pleural effusions with concern for malignancy    Interval history:  Mr. Mcrae returns today in follow up.  He is losing quite a bit of weight.  His pain is under control.  He is in a wheelchair today.  He is deteriorating rather rapidly.    Allergies  Omeprazole, Sulfa, shellfish    Medications:  Acetaminophen, atenolol, dexamethasone, Emla cream, metformin 1000 mg twice daily, multivitamin, naloxone, nystatin, olanzapine, Actos, prochlorperazine, trazodone     Past Medical History:   Type 2 diabetes mellitus on oral hypoglycemics, hypertension controlled with medications.  Cataract, CKD (chronic kidney disease), Colon polyp, Deviated septum, ED (erectile dysfunction), Esophageal cancer, GERD (gastroesophageal reflux disease), Hyperlipidemia, Nephrolithiasis, Sleep apnea (wear CPAP/BiPAP)    Surgical History:    Past Surgical History:   Procedure Laterality Date    BLADDER SURGERY      COLONOSCOPY  2023    Repeat in one year    EYE SURGERY       LITHOTRIPSY      OTHER SURGICAL HISTORY      Vocal cord surgery    TRANSURETHRAL RESECTION OF PROSTATE      UPPER GASTROINTESTINAL ENDOSCOPY  2024      Family History:    Family History   Problem Relation Name Age of Onset    Stroke Father      Kidney cancer Brother Eliu     Stroke Brother Eliu      Family Oncology History:    Cancer-related family history includes Kidney cancer in his brother.    Social History:      Tobacco Use    Smoking status: Former     Current packs/day: 0.00     Average packs/day: 2.0 packs/day for 40.0 years (80.0 ttl pk-yrs)     Types: Cigarettes     Start date: 1959     Quit date: 1999     Years since quittin.4    Smokeless tobacco: Never   Vaping Use    Vaping status: Never Used   Substance Use Topics    Alcohol use: Yes     Alcohol/week: 2.0 standard drinks of alcohol     Types: 2 Cans of beer per week     Comment: couple beers a week    Drug use: Not Currently     Comment: gummies-dispensary from [place]      Vital Signs: reviewed today     Physical Exam:   ECOG PS- 3  General: fatigued looking male, answers questions appropriately.  Eyes- No pallor or icterus.  Neck- no swelling, lymph node enlargement or thyroid gland enlargement.  Chest- respirations even and non-labored. Lungs clear to diminished throughout  Heart- no audible abnormal heart sounds.  Abdomen- Soft, non-tender. No mass or ascites. + BS  Extremities- no swelling or pitting edema.  Neuro- Alert and oriented X 4  Psych- Normal behavior and thought process        Catarino Fry MD

## 2025-05-19 NOTE — PROGRESS NOTES
Patient arrived to clinic via wheelchair accompanied by his significant other Helga and his daughter. Medications and allergies reviewed.     Reported he is still feeling weak in his legs.     Has had a meeting with hospice last week and he has a nurse coming tomorrow.     No follow up needed as he chose to enroll in the hospice program.

## 2025-05-22 ENCOUNTER — APPOINTMENT (OUTPATIENT)
Dept: RADIOLOGY | Facility: CLINIC | Age: 80
End: 2025-05-22
Payer: MEDICARE

## 2025-05-22 ENCOUNTER — APPOINTMENT (OUTPATIENT)
Dept: HEMATOLOGY/ONCOLOGY | Facility: CLINIC | Age: 80
End: 2025-05-22
Payer: MEDICARE

## 2025-05-22 LAB
LAB AP ASR DISCLAIMER: NORMAL
LAB AP ASR DISCLAIMER: NORMAL
LABORATORY COMMENT REPORT: NORMAL
PATH REPORT.FINAL DX SPEC: NORMAL
PATH REPORT.FINAL DX SPEC: NORMAL
PATH REPORT.GROSS SPEC: NORMAL
PATH REPORT.GROSS SPEC: NORMAL
PATH REPORT.RELEVANT HX SPEC: NORMAL
PATH REPORT.RELEVANT HX SPEC: NORMAL
PATH REPORT.TOTAL CANCER: NORMAL
PATH REPORT.TOTAL CANCER: NORMAL

## 2025-05-23 ENCOUNTER — APPOINTMENT (OUTPATIENT)
Dept: HEMATOLOGY/ONCOLOGY | Facility: CLINIC | Age: 80
End: 2025-05-23
Payer: MEDICARE

## 2025-05-23 ENCOUNTER — APPOINTMENT (OUTPATIENT)
Dept: PALLIATIVE MEDICINE | Facility: CLINIC | Age: 80
End: 2025-05-23
Payer: MEDICARE

## 2025-05-27 ENCOUNTER — APPOINTMENT (OUTPATIENT)
Dept: HEMATOLOGY/ONCOLOGY | Facility: CLINIC | Age: 80
End: 2025-05-27
Payer: MEDICARE

## 2025-05-29 ENCOUNTER — APPOINTMENT (OUTPATIENT)
Dept: HEMATOLOGY/ONCOLOGY | Facility: CLINIC | Age: 80
End: 2025-05-29
Payer: MEDICARE

## 2025-06-10 ENCOUNTER — APPOINTMENT (OUTPATIENT)
Dept: SURGERY | Facility: CLINIC | Age: 80
End: 2025-06-10
Payer: MEDICARE

## (undated) DEVICE — SUTURE, SILK, 1, 30 IN, LABYRINTH, BLACK

## (undated) DEVICE — TROCAR, THORAPORT, W/NON-CONDUCTIVE SLEEVE, 11.5 MM, BLACK

## (undated) DEVICE — SUTURE, VICRYL, 2-0, 27 IN, UR-6, VIOLET

## (undated) DEVICE — RETRACTOR, ENDOSCOPIC, RETRACT II, 10 MM X 36 CM

## (undated) DEVICE — BOOT, SUTURE-AID, YELLOW, STERILE, LF

## (undated) DEVICE — Device

## (undated) DEVICE — DRESSING, ADHESIVE, ISLAND, TELFA, 4 X 10 IN

## (undated) DEVICE — STRIP, SKIN CLOSURE, STERI STRIP, REINFORCED, 0.5 X 4 IN

## (undated) DEVICE — COVER, TABLE, 44 X 75 IN, DISPOSABLE, LF, STERILE

## (undated) DEVICE — CLIP, LIGATING, W/ADHESIVE PAD, MEDIUM, TITANIUM

## (undated) DEVICE — SUTURE, PROLENE, 0, 30 IN, SH

## (undated) DEVICE — NEEDLE, TAPER, MAYO, 1/2 CIRCLE, DISPOSABLE, 3

## (undated) DEVICE — SUTURE, ETHIBOND, 2-0, 18 IN, FS, GREEN

## (undated) DEVICE — SHEARS, HARMONIC 5 X 36 CVD ACE+7

## (undated) DEVICE — SUTURE, SILK, 2-0, TIES, 12-30 IN, BLACK

## (undated) DEVICE — SUTURE, MONOCRYL, 3-0, 18 IN, PS2, UNDYED

## (undated) DEVICE — SHEET, SPLIT, CARDIOVASCULAR TIBURON

## (undated) DEVICE — DRESSING, GAUZE, 16 PLY, 4 X 4 IN, STERILE

## (undated) DEVICE — BRIEF, CURITY, XLARGE, MESH

## (undated) DEVICE — DEVICE, SUTURE, ENDOSTITCH, 10 MM

## (undated) DEVICE — TROCAR, KII OPTICAL BLADELESS 5MM Z THREAD 100MM LNGTH

## (undated) DEVICE — PUMP, STRYKERFLOW 2 & HANDPIECE W/10FT. IRRIGATION TUBING

## (undated) DEVICE — COUNTER, NEEDLE, FOAM BLOCK, POP-N-COUNT, W/BLADEGUARD, W/ADHESIVE 40 COUNT, RED

## (undated) DEVICE — SUTURE, SILK, 3-0, 18 IN SH/CR, BLACK

## (undated) DEVICE — NEEDLE, HYPODERMIC, MONOJECT, TRI-BEVELED, ANTI-CORING, 25 G X 1.25 IN, LUER LOCK HUB, RED

## (undated) DEVICE — COVER, CART, 45 X 27 X 48 IN, CLEAR

## (undated) DEVICE — PORT, 5MM THORACOPORT SOFT

## (undated) DEVICE — NEEDLE, HYPODERMIC, REGULAR WALL, REGULAR BEVEL, 25 G X 5/8 IN

## (undated) DEVICE — PAD, GROUNDING, ELECTROSURGICAL, W/9 FT CABLE, POLYHESIVE II, ADULT, LF

## (undated) DEVICE — CLIP, LIGATING, HORIZON, LARGE, TITANIUM

## (undated) DEVICE — CATHETER, DRAINAGE, NASOGASTRIC, DOUBLE LUMEN, FUNNEL END, SUMP, SALEM, 18 FR, 48 IN, PVC, STERILE

## (undated) DEVICE — DRESSING, ISLAND, TELFA, 4 X 5 IN

## (undated) DEVICE — SYRINGE, 60 CC, IRRIGATION, PISTON, CATH TIP, W/LUER ADAPTER,DISP

## (undated) DEVICE — SYRINGE, 60 CC, LUER LOCK, MONOJECT, W/O CAP, LF

## (undated) DEVICE — CUTTER,  LINEAR RELOAD, THICK TISSUE, 75MM, GREEN

## (undated) DEVICE — CATHETER, THORACIC, STRAIGHT, ADULT, 28 FR, PVC

## (undated) DEVICE — COVER, EQUIPMENT, CAMERA, 5 X 96 IN, STERILE

## (undated) DEVICE — CATHETER TRAY, SURESTEP, 16FR, URINE METER W/STATLOCK

## (undated) DEVICE — EVACUATOR, WOUND, SUCTION, CLOSED, JACKSON-PRATT, 100 CC, SILICONE

## (undated) DEVICE — NEEDLE, SPINAL, QUINCKE, 22 G X 7 IN, BLACK HUB

## (undated) DEVICE — DRAPE, SHEET, UTILITY, NON ABSORBENT, 18 X 26 IN, LF

## (undated) DEVICE — DRAPE, TIBURON, SPLIT SHEET, REINF ADH STRIP, 77X122

## (undated) DEVICE — SPONGE, LAP, XRAY DECT, 18IN X 18IN, W/MASTER DMT, STERILE

## (undated) DEVICE — SPONGE, DISSECTOR, PEANUT, 3/8, STERILE 5 FOAM HOLDER"

## (undated) DEVICE — CORD, MONOPOLAR HIGH FREQUENCY, 8MM PLUG, 300CM

## (undated) DEVICE — TIP, SUCTION, YANKAUER, BULB, ADULT

## (undated) DEVICE — SUTURE, VICRYL PLUS 3-0, SH, 27IN

## (undated) DEVICE — MANIFOLD, 4 PORT NEPTUNE STANDARD

## (undated) DEVICE — CAUTERY, PENCIL, PUSH BUTTON, SMOKE EVAC, 70MM

## (undated) DEVICE — STAPLER, ECHELON 3000, 60MM STD

## (undated) DEVICE — SUTURE, V-LOC 3-0 V-20 6 GR 180 ABS"

## (undated) DEVICE — SUTURE, VICRYL, 2-0, 36 IN, CT-1, UNDYED

## (undated) DEVICE — ADAPTER, WATER BOTTLE, SMART CAP, 140/160/180 SERIES

## (undated) DEVICE — TUBE SET, PNEUMOCLEAR, SMOKE EVACU, HIGH-FLOW

## (undated) DEVICE — SUTURE, ETHIBOND, XTRA, 30 IN, 0, CT-1, GREEN

## (undated) DEVICE — CATHETER, URETHRAL, FOLEY, 2 WAY, BARDEX IC, 22 FR, 30 CC, SILVER, LATEX

## (undated) DEVICE — DRAPE, SHEET, ENDOSCOPY, GENERAL, FENESTRATED, ARMBOARD COVER, 98 X 123.5 IN, DISPOSABLE, LF, STERILE

## (undated) DEVICE — COVER, LIGHT HANDLE, SURGICAL, FLEXIBLE, DISPOSABLE, STERILE

## (undated) DEVICE — CARE KIT, LAPAROSCOPIC, ADVANCED

## (undated) DEVICE — APPLIER, CLIP,  PISTOL GRIP, 10 MM, TITANIUM

## (undated) DEVICE — CUTTER, PROX LINEAR, 75MM, REG TISSUE, W/ SAFETY LOCK OUT

## (undated) DEVICE — PREP, IODOPHOR, W/ALCOHOL, DURAPREP, W/APPLICATOR, 26 CC

## (undated) DEVICE — GLOVE, SURGICAL, PROTEXIS PI BLUE W/NEUTHERA, 8.0, PF, LF

## (undated) DEVICE — TUBE SET, PNEUMOLAR HEATED, SMOKE EVACU, HIGH-FLOW

## (undated) DEVICE — TOWEL, SURGICAL, NEURO, O/R, 16 X 26, BLUE, STERILE

## (undated) DEVICE — DRAIN, WOUND, FLAT, HUBLESS, FULL LENGTH PERFORATION, 7 MM X 20 CM

## (undated) DEVICE — DRESSING, ADHESIVE, ISLAND, TELFA, 2 X 3.75 IN, LF

## (undated) DEVICE — CLEANER, ELECTROSURGICAL, TIP, 5 X 5 CM, LF

## (undated) DEVICE — COVER, TABLE, UHC

## (undated) DEVICE — GLOVE, SURGICAL, PROTEXIS PI MICRO, 7.5, PF, LF

## (undated) DEVICE — CATHETER, URETHRAL, WHISTLE TIP, 14 FR, STERIL

## (undated) DEVICE — ANTIFOG, SOLUTION, FOG-OUT

## (undated) DEVICE — INSTRUMENT, DISSECTING, ENDOSCOPIC, PEANUT, 5 MM, STERILE

## (undated) DEVICE — COLLECTION UNIT, DRAINAGE, THORACIC, SINGLE TUBE, DRY SUCTION, ATS COMPATIBLE, OASIS 3600, LF

## (undated) DEVICE — DRAPE, INSTRUMENT, W/POUCH, STERI DRAPE, 9 5/8 X 18 LONG

## (undated) DEVICE — ROLL, DENTAL, 3/8 X 1-1/2, STERILE, 5/PK

## (undated) DEVICE — STAPLER, SKIN PROXIMATE, 35 WIDE

## (undated) DEVICE — PLUG, CATHETER

## (undated) DEVICE — REST, HEAD, BAGEL, 9 IN

## (undated) DEVICE — SHEAR, W/UNIPOLAR CAUTERY, ENDOSHEAR, 5 MM

## (undated) DEVICE — SUTURE, PROLENE, 1, D, SPECIAL, TP-1

## (undated) DEVICE — SUTURE, ETHIBOND EXCEL 1, TAPER POINT CT-1 GREEN 30 INCH

## (undated) DEVICE — DRAPE, INCISE, ANTIMICROBIAL, IOBAN 2, LARGE, 17 X 23 IN, DISPOSABLE, STERILE

## (undated) DEVICE — SUTURE, SURGIPRO, 1, LOOPED, 96 IN, GS26, BLUE

## (undated) DEVICE — SUTURE, PROLENE, 1, 30 IN, CT-1, BLUE

## (undated) DEVICE — DRESSING, NON-ADHERENT, TELFA, OUCHLESS, 3 X 8 IN, STERILE

## (undated) DEVICE — HANDLE, SUCTION, SIGMOIDOSCOPIC, W/WINGED CAP, 18 FR, 30 CM

## (undated) DEVICE — ELECTRODE, ELECTROSURGICAL, BLADE, INSULATED, ENT/IMA, STERILE

## (undated) DEVICE — SUCTION TIP , YANKAUER, W/BULB SUCTION

## (undated) DEVICE — SUTURE, SILK, 0, 24 IN, SUTUPAK, BLACK

## (undated) DEVICE — SUTURE, PROLENE, 2-0, CT-1, BL MONO, LF

## (undated) DEVICE — TUBING, SUCTION, CONNECTING, STERILE 0.25 X 120 IN., LF

## (undated) DEVICE — STAPLER, LINEAR, RELOADABLE, 60MM 4.8, GREEN

## (undated) DEVICE — TROCAR, OPTICAL, BLADELESS, 12MM, THREADED, 100MM LENGTH

## (undated) DEVICE — SUTURE, SILK, 2, BLACK BR, SUTUPAK, LF

## (undated) DEVICE — RELOAD, ENDOSTITCH, SURGIDAC, 2-0, 48 IN, GREEN, SULU

## (undated) DEVICE — STAPLER,  ENDO ECHELON 60MM RELOAD, GOLD,

## (undated) DEVICE — STAPLER,  LINEAR RELOAD, 60MM, WHITE, DISP